# Patient Record
Sex: MALE | Race: BLACK OR AFRICAN AMERICAN | Employment: OTHER | URBAN - METROPOLITAN AREA
[De-identification: names, ages, dates, MRNs, and addresses within clinical notes are randomized per-mention and may not be internally consistent; named-entity substitution may affect disease eponyms.]

---

## 2017-02-02 ENCOUNTER — ANESTHESIA EVENT (OUTPATIENT)
Dept: ENDOSCOPY | Age: 67
End: 2017-02-02
Payer: MEDICARE

## 2017-02-03 ENCOUNTER — HOSPITAL ENCOUNTER (OUTPATIENT)
Age: 67
Setting detail: OUTPATIENT SURGERY
Discharge: HOME | End: 2017-02-03
Attending: COLON & RECTAL SURGERY | Admitting: COLON & RECTAL SURGERY
Payer: MEDICARE

## 2017-02-03 ENCOUNTER — ANESTHESIA (OUTPATIENT)
Dept: ENDOSCOPY | Age: 67
End: 2017-02-03
Payer: MEDICARE

## 2017-02-03 VITALS
RESPIRATION RATE: 16 BRPM | OXYGEN SATURATION: 99 % | TEMPERATURE: 97.7 F | WEIGHT: 245 LBS | SYSTOLIC BLOOD PRESSURE: 111 MMHG | HEART RATE: 64 BPM | HEIGHT: 69 IN | BODY MASS INDEX: 36.29 KG/M2 | DIASTOLIC BLOOD PRESSURE: 51 MMHG

## 2017-02-03 PROCEDURE — 74011250636 HC RX REV CODE- 250/636

## 2017-02-03 PROCEDURE — 76040000019: Performed by: COLON & RECTAL SURGERY

## 2017-02-03 PROCEDURE — 76060000031 HC ANESTHESIA FIRST 0.5 HR: Performed by: COLON & RECTAL SURGERY

## 2017-02-03 PROCEDURE — 74011250636 HC RX REV CODE- 250/636: Performed by: COLON & RECTAL SURGERY

## 2017-02-03 PROCEDURE — 77030027957 HC TBNG IRR ENDOGTR BUSS -B: Performed by: COLON & RECTAL SURGERY

## 2017-02-03 RX ORDER — PROPOFOL 10 MG/ML
INJECTION, EMULSION INTRAVENOUS AS NEEDED
Status: DISCONTINUED | OUTPATIENT
Start: 2017-02-03 | End: 2017-02-03 | Stop reason: HOSPADM

## 2017-02-03 RX ORDER — SODIUM CHLORIDE 0.9 % (FLUSH) 0.9 %
5-10 SYRINGE (ML) INJECTION EVERY 8 HOURS
Status: DISCONTINUED | OUTPATIENT
Start: 2017-02-03 | End: 2017-02-03 | Stop reason: HOSPADM

## 2017-02-03 RX ORDER — EPINEPHRINE 0.1 MG/ML
1 INJECTION INTRACARDIAC; INTRAVENOUS
Status: DISCONTINUED | OUTPATIENT
Start: 2017-02-03 | End: 2017-02-03 | Stop reason: HOSPADM

## 2017-02-03 RX ORDER — SODIUM CHLORIDE 9 MG/ML
INJECTION, SOLUTION INTRAVENOUS
Status: DISCONTINUED | OUTPATIENT
Start: 2017-02-03 | End: 2017-02-03 | Stop reason: HOSPADM

## 2017-02-03 RX ORDER — SODIUM CHLORIDE 0.9 % (FLUSH) 0.9 %
5-10 SYRINGE (ML) INJECTION AS NEEDED
Status: DISCONTINUED | OUTPATIENT
Start: 2017-02-03 | End: 2017-02-03 | Stop reason: HOSPADM

## 2017-02-03 RX ORDER — SODIUM CHLORIDE 9 MG/ML
50 INJECTION, SOLUTION INTRAVENOUS CONTINUOUS
Status: DISCONTINUED | OUTPATIENT
Start: 2017-02-03 | End: 2017-02-03 | Stop reason: HOSPADM

## 2017-02-03 RX ORDER — DEXTROMETHORPHAN/PSEUDOEPHED 2.5-7.5/.8
1.2 DROPS ORAL
Status: DISCONTINUED | OUTPATIENT
Start: 2017-02-03 | End: 2017-02-03 | Stop reason: HOSPADM

## 2017-02-03 RX ORDER — FLUMAZENIL 0.1 MG/ML
0.2 INJECTION INTRAVENOUS
Status: DISCONTINUED | OUTPATIENT
Start: 2017-02-03 | End: 2017-02-03 | Stop reason: HOSPADM

## 2017-02-03 RX ORDER — ATROPINE SULFATE 0.1 MG/ML
0.5 INJECTION INTRAVENOUS
Status: DISCONTINUED | OUTPATIENT
Start: 2017-02-03 | End: 2017-02-03 | Stop reason: HOSPADM

## 2017-02-03 RX ORDER — NALOXONE HYDROCHLORIDE 0.4 MG/ML
0.4 INJECTION, SOLUTION INTRAMUSCULAR; INTRAVENOUS; SUBCUTANEOUS
Status: DISCONTINUED | OUTPATIENT
Start: 2017-02-03 | End: 2017-02-03 | Stop reason: HOSPADM

## 2017-02-03 RX ADMIN — PROPOFOL 50 MG: 10 INJECTION, EMULSION INTRAVENOUS at 07:40

## 2017-02-03 RX ADMIN — PROPOFOL 50 MG: 10 INJECTION, EMULSION INTRAVENOUS at 07:42

## 2017-02-03 RX ADMIN — PROPOFOL 75 MG: 10 INJECTION, EMULSION INTRAVENOUS at 07:44

## 2017-02-03 RX ADMIN — SODIUM CHLORIDE 50 ML/HR: 900 INJECTION, SOLUTION INTRAVENOUS at 07:00

## 2017-02-03 RX ADMIN — SODIUM CHLORIDE: 9 INJECTION, SOLUTION INTRAVENOUS at 07:15

## 2017-02-03 NOTE — PERIOP NOTES
Assumed pt care taken to recovery via stretcher for further monitoring please see CRNA chart sedation/monitoring for procedure pt tolerated well.

## 2017-02-03 NOTE — ANESTHESIA PREPROCEDURE EVALUATION
Anesthetic History   No history of anesthetic complications            Review of Systems / Medical History  Patient summary reviewed, nursing notes reviewed and pertinent labs reviewed    Pulmonary  Within defined limits          Asthma        Neuro/Psych   Within defined limits           Cardiovascular  Within defined limits            CAD      Comments: Stent x1   GI/Hepatic/Renal  Within defined limits              Endo/Other  Within defined limits           Other Findings              Physical Exam    Airway  Mallampati: II  TM Distance: > 6 cm  Neck ROM: normal range of motion   Mouth opening: Normal     Cardiovascular  Regular rate and rhythm,  S1 and S2 normal,  no murmur, click, rub, or gallop             Dental  No notable dental hx       Pulmonary  Breath sounds clear to auscultation               Abdominal  GI exam deferred       Other Findings            Anesthetic Plan    ASA: 3  Anesthesia type: MAC          Induction: Intravenous  Anesthetic plan and risks discussed with: Patient

## 2017-02-03 NOTE — ROUTINE PROCESS
Zulay Amend  1950  [id number]    Situation:  Verbal report received from: Emily Dyre RN  Procedure: Procedure(s):  COLONOSCOPY    Background:    Preoperative diagnosis: HISTORY OF RECTAL CANCER   Postoperative diagnosis: Diverticulosis    :  Dr. Diamond Ryan  Assistant(s): Endoscopy Technician-1: Josiane Gonzalez IV  Endoscopy RN-1: Ravi Ferris RN    Specimens: * No specimens in log *  H. Pylori  no    Assessment:  Intra-procedure medications         Anesthesia gave intra-procedure sedation and medications, see anesthesia flow sheet yes    Intravenous fluids: NS@ KVO     Vital signs stable     Abdominal assessment: round and soft     Recommendation:  Discharge patient per MD order  Family or Friend Wife  Permission to share finding with family or friend yes

## 2017-02-03 NOTE — IP AVS SNAPSHOT
[address] 
[phone] Patient: Zulay Yoder MRN: [id number] :1950 You are allergic to the following Allergen Reactions Atorvastatin Myalgia Pcn (Penicillins) Hives Recent Documentation Height Weight BMI Smoking Status 1.753 m 111.1 kg 36.18 kg/m2 Current Every Day Smoker Emergency Contacts Name Discharge Info Relation Home Work Mobile Servando Christieu CAREGIVER [3] Spouse [3] [phone] About your hospitalization You were admitted on:  February 3, 2017 You last received care in the:  [facility] ENDOSCOPY You were discharged on:  February 3, 2017 Unit phone number:  [phone] Why you were hospitalized Your primary diagnosis was:  Not on File Providers Seen During Your Hospitalizations Provider Role Specialty Primary office phone Micaela Lezama MD Attending Provider Colon and Rectal Surgery [phone] Your Primary Care Physician (PCP) Primary Care Physician Office Phone Office Fax Enoch Mora [phone] Follow-up Information Follow up With Details Comments Contact Info Kevin Toribio MD   [facility] 57 
[phone] Your Appointments 2017  9:00 AM EST  
6 MONTH with Ronal Kruger MD  
[facility] [address] [place] [address]) [address] [facility]  
[phone] Friday February 10, 2017  9:30 AM EST PHYSICAL with MD Shante Arce TURNER, [address] ([address]) [address] [facility][note 2] 57  
[phone] Current Discharge Medication List  
  
CONTINUE these medications which have NOT CHANGED Dose & Instructions Dispensing Information Comments Morning Noon Evening Bedtime  
 amLODIPine 10 mg tablet Commonly known as:  Rudene Post Your next dose is: Today, Tomorrow Other:  _________ Dose:  10 mg Take 1 Tab by mouth daily. Quantity:  90 Tab Refills:  3  
     
   
   
   
  
 aspirin delayed-release 81 mg tablet Your next dose is: Today, Tomorrow Other:  _________ Dose:  81 mg Take 81 mg by mouth daily. Refills:  0  
     
   
   
   
  
 cloNIDine HCl 0.2 mg tablet Commonly known as:  CATAPRES Your next dose is: Today, Tomorrow Other:  _________ Dose:  0.2 mg Take 1 Tab by mouth two (2) times a day. Quantity:  60 Tab Refills:  1  
     
   
   
   
  
 clopidogrel 75 mg Tab Commonly known as:  PLAVIX Your next dose is: Today, Tomorrow Other:  _________ TAKE 1 TABLET BY MOUTH EVERY DAY Quantity:  30 Tab Refills:  11  
     
   
   
   
  
 dutasteride 0.5 mg capsule Commonly known as:  AVODART Your next dose is: Today, Tomorrow Other:  _________ Dose:  0.5 mg Take 1 Cap by mouth daily. Quantity:  90 Cap Refills:  3 FLOMAX 0.4 mg capsule Generic drug:  tamsulosin Your next dose is: Today, Tomorrow Other:  _________ Dose:  0.4 mg Take 0.4 mg by mouth daily. Refills:  0  
     
   
   
   
  
 glipiZIDE SR 5 mg CR tablet Commonly known as:  GLUCOTROL XL Your next dose is: Today, Tomorrow Other:  _________ Dose:  5 mg Take 1 Tab by mouth daily. Quantity:  30 Tab Refills:  6  
     
   
   
   
  
 hydrocortisone 2.5 % rectal cream  
Commonly known as:  PROCTOSOL HC Your next dose is: Today, Tomorrow Other:  _________ Insert  into rectum four (4) times daily. Quantity:  30 g Refills:  0 isosorbide mononitrate ER 60 mg CR tablet Commonly known as:  IMDUR Your next dose is: Today, Tomorrow Other:  _________ TAKE 1 TABLET BY MOUTH DAILY. Quantity:  30 Tab Refills:  9  
     
   
   
   
  
 labetalol 100 mg tablet Commonly known as:  Ekaterina Justin Your next dose is: Today, Tomorrow Other:  _________ TAKE 2 TABLETS BY MOUTH TWICE A DAY Quantity:  60 Tab Refills:  12  
     
   
   
   
  
 lisinopril 20 mg tablet Commonly known as:  Bibiana Del Castillo Your next dose is: Today, Tomorrow Other:  _________ Dose:  20 mg Take 1 Tab by mouth daily. Quantity:  30 Tab Refills:  6  
     
   
   
   
  
 metFORMIN 500 mg tablet Commonly known as:  GLUCOPHAGE Your next dose is: Today, Tomorrow Other:  _________ TAKE 2 TABLETS BY MOUTH TWICE A DAY WITH MEALS Quantity:  360 Tab Refills:  2  
     
   
   
   
  
 oxyCODONE-acetaminophen 5-325 mg per tablet Commonly known as:  PERCOCET Your next dose is: Today, Tomorrow Other:  _________ Dose:  1 Tab Take 1 Tab by mouth every four (4) hours as needed. Max Daily Amount: 6 Tabs. Quantity:  60 Tab Refills:  0 Discharge Instructions Lesly Iraheta [id number] 
1950 COLON DISCHARGE INSTRUCTIONS Discomfort: 
Redness at IV site- apply warm compress to area; if redness or soreness persist- contact your physician There may be a slight amount of blood passed from the rectum Gaseous discomfort- walking, belching will help relieve any discomfort You may not operate a vehicle for 12 hours You may not engage in an occupation involving machinery or appliances for rest of today You may not drink alcoholic beverages for at least 12 hours Avoid making any critical decisions for at least 24 hour DIET: 
 High fiber diet.  however -  remember your colon is empty and a heavy meal will produce gas. Avoid these foods:  vegetables, fried / greasy foods, carbonated drinks for today MEDICATIONS: 
resume ACTIVITY: 
You may resume your normal daily activities it is recommended that you spend the remainder of the day resting -  avoid any strenuous activity. CALL M.D. ANY SIGN OF: Increasing pain, nausea, vomiting Abdominal distension (swelling) New increased bleeding (oral or rectal) Fever (chills) Pain in chest area Bloody discharge from nose or mouth Shortness of breath Follow-up Instructions: 
 Call Sharmila Gonzales MD if any questions or problems. Telephone # [phone] Biopsy results will be available in  7 to10 days Should have a repeat colonoscopy in 1 year. COLONOSCOPY FINDINGS: 
Your colonoscopy showed: sigmoid diverticulosis and distal rectal mass. Discharge Orders None Introducing [facility] & HEALTH SERVICES! Dear Colton Benedict: 
Thank you for requesting a Lumenis account. Our records indicate that you already have an active Lumenis account. You can access your account anytime at [email or web address] Did you know that you can access your hospital and ER discharge instructions at any time in Lumenis? You can also review all of your test results from your hospital stay or ER visit. Additional Information If you have questions, please visit the Frequently Asked Questions section of the Lumenis website at [email or web address] Remember, Lumenis is NOT to be used for urgent needs. For medical emergencies, dial 911. Now available from your iPhone and Android! General Information Please provide this summary of care documentation to your next provider. Patient Signature:  ____________________________________________________________  Date:  ____________________________________________________________  
  
Josph Perfect    
 Provider Signature:  ____________________________________________________________ Date:  ____________________________________________________________

## 2017-02-03 NOTE — DISCHARGE INSTRUCTIONS
Enoc Ramos  [id number]  1950    COLON DISCHARGE INSTRUCTIONS  Discomfort:  Redness at IV site- apply warm compress to area; if redness or soreness persist- contact your physician  There may be a slight amount of blood passed from the rectum  Gaseous discomfort- walking, belching will help relieve any discomfort  You may not operate a vehicle for 12 hours  You may not engage in an occupation involving machinery or appliances for rest of today  You may not drink alcoholic beverages for at least 12 hours  Avoid making any critical decisions for at least 24 hour  DIET:   High fiber diet. - however -  remember your colon is empty and a heavy meal will produce gas. Avoid these foods:  vegetables, fried / greasy foods, carbonated drinks for today    MEDICATIONS:  resume       ACTIVITY:  You may resume your normal daily activities it is recommended that you spend the remainder of the day resting -  avoid any strenuous activity. CALL M.D. ANY SIGN OF:   Increasing pain, nausea, vomiting  Abdominal distension (swelling)  New increased bleeding (oral or rectal)  Fever (chills)  Pain in chest area  Bloody discharge from nose or mouth  Shortness of breath     Follow-up Instructions:   Call Jose Morales MD if any questions or problems. Telephone # [phone]  Biopsy results will be available in  7 to10 days  Should have a repeat colonoscopy in 1 year. COLONOSCOPY FINDINGS:  Your colonoscopy showed: sigmoid diverticulosis and distal rectal mass.

## 2017-02-03 NOTE — H&P
Colon and Rectal Surgery History and Physical    Subjective:      Joel Gray is a 78 y.o. male who has hx rectal ca    Patient Active Problem List    Diagnosis Date Noted    Rectal mass 10/15/2016    Status post colostomy ([facility] [address] 10/15/2016     Class: Acute    Essential hypertension with goal blood pressure less than 140/90 06/16/2016    Type 2 diabetes mellitus without complication ([facility] 14/57/7899    Coronary artery disease involving native coronary artery of native heart without angina pectoris 02/25/2016    Osteoarthritis of right hip 02/03/2015    Radial nerve compression 05/09/2014    Diabetic polyneuropathy ([facility] 05/09/2014    Hyperlipidemia 05/17/2011     Past Medical History   Diagnosis Date    Arthritis     Asthma      childhood    BPH (benign prostatic hypertrophy)     CAD (coronary artery disease) 56     M. I.    Chronic pain     Colon polyp     DM (diabetes mellitus) ([facility] 5/17/2011    Gout     Hemorrhoids     HTN (hypertension) 5/17/2011    Hyperlipidemia 5/17/2011      Past Surgical History   Procedure Laterality Date    Endoscopy, colon, diagnostic  6/2011    Hx prostatectomy  X2     biopsy benign    Pr cardiac surg procedure unlist  6700/1059     stent x2    Hx hernia repair       AS INFANT    Hx tonsillectomy      Hx orthopaedic  02/2016     hip replacement     Upper gi endoscopy,diagnosis  10/14/2016          Sigmoidoscopy,biopsy  10/14/2016          Colonoscopy N/A 10/14/2016     COLONOSCOPY/EGD performed by Laura Mai MD at [facility] ENDOSCOPY      Social History   Substance Use Topics    Smoking status: Current Every Day Smoker     Packs/day: 0.50     Years: 40.00    Smokeless tobacco: Never Used    Alcohol use 0.0 oz/week      Comment: OCCASIONAL      Family History   Problem Relation Age of Onset    Heart Disease Mother     COPD Mother     COPD Father     Diabetes Father     Hypertension Father     Alcohol abuse Sister     Diabetes Brother     High Cholesterol Brother     Hypertension Brother     Anesth Problems Neg Hx       Prior to Admission medications    Medication Sig Start Date End Date Taking? Authorizing Provider   labetalol (NORMODYNE) 100 mg tablet TAKE 2 TABLETS BY MOUTH TWICE A DAY 1/13/17   Brian Tillman MD   cloNIDine HCl (CATAPRES) 0.2 mg tablet Take 1 Tab by mouth two (2) times a day. 10/19/16   Tc Stark MD   oxyCODONE-acetaminophen (PERCOCET) 5-325 mg per tablet Take 1 Tab by mouth every four (4) hours as needed. Max Daily Amount: 6 Tabs. Patient not taking: Reported on 11/4/2016 10/18/16   Clem Olivas MD   tamsulosin [facility]) 0.4 mg capsule Take 0.4 mg by mouth daily. Historical Provider   isosorbide mononitrate ER (IMDUR) 60 mg CR tablet TAKE 1 TABLET BY MOUTH DAILY. 10/4/16   Paulo Mccoy MD   clopidogrel (PLAVIX) 75 mg tablet TAKE 1 TABLET BY MOUTH EVERY DAY  Patient not taking: No sig reported 9/27/16   Shelly Henderson MD   hydrocortisone (PROCTOSOL HC) 2.5 % rectal cream Insert  into rectum four (4) times daily. 8/18/16   Cassell Fleischer, DO   metFORMIN (GLUCOPHAGE) 500 mg tablet TAKE 2 TABLETS BY MOUTH TWICE A DAY WITH MEALS 6/30/16   Shelly Henderson MD   glipiZIDE SR (GLUCOTROL) 5 mg CR tablet Take 1 Tab by mouth daily. 6/16/16   Shelly Henderson MD   lisinopril (PRINIVIL, ZESTRIL) 20 mg tablet Take 1 Tab by mouth daily. 6/16/16   Shelly Henderson MD   amLODIPine (NORVASC) 10 mg tablet Take 1 Tab by mouth daily. 6/8/16   Zac Owens NP   aspirin delayed-release 81 mg tablet Take 81 mg by mouth daily. Historical Provider   dutasteride (AVODART) 0.5 mg capsule Take 1 Cap by mouth daily. 7/17/14   Shelly Henderson MD     Allergies   Allergen Reactions    Atorvastatin Myalgia    Pcn [Penicillins] Hives        Review of Systems:    A comprehensive review of systems was negative except for that written in the History of Present Illness. Objective:      There were no vitals taken for this visit. Physical Exam:   nad  Chest clear  Heart reg  abd soft    Imaging:  images and reports reviewed    Lab Review:  No results found for this or any previous visit (from the past 24 hour(s)). Labs and radiology: images and reports reviewed      Assessment:     Hx rectal ca    Plan:     1. I recommend proceeding with colonoscopy. Treatment alternatives were discussed. 2. Discussed aspects of surgical intervention, methods, risks (including by not limited to infection, bleeding, hematoma, and perforation of the intestines or solid organs), and the risks of general anesthetic. The patient understands the risks; any and all questions were answered to the patient's satisfaction.     Signed By: Dominic Altamirano MD     February 2, 2017

## 2017-02-03 NOTE — BRIEF OP NOTE
BRIEF OPERATIVE NOTE    Date of Procedure: 2/3/2017   Preoperative Diagnosis: HISTORY OF RECTAL CANCER   Postoperative Diagnosis: Diverticulosis    Procedure(s):  COLONOSCOPY  Surgeon(s) and Role:     * Alondra Morales MD - Primary            Surgical Staff:  Endoscopy Technician-1: Livan Coffman IV  Endoscopy RN-1: Lou Henderson RN  No case tracking events are documented in the log.   Anesthesia: MAC   Estimated Blood Loss: none  Specimens: * No specimens in log *   Findings: sigmoid diverticulosis and rectal mass just above dentate line   Complications: none apparent  Implants: * No implants in log *

## 2017-02-03 NOTE — PROGRESS NOTES
Dr eGorgiana Hernandez stated that pt could start back on his ASA and Plavix today.  This information was shared with pt wife

## 2017-02-03 NOTE — OP NOTES
Colonoscopy Procedure Note    Indications: Previous colon cancer    Anesthesia/Sedation: MAC anesthesia Propofol    Pre-Procedure Exam:  Airway: clear   Heart: normal S1and S2    Lungs: clear bilateral  Abdomen: soft, nontender, bowel sounds present and normal in all quadrants   Mental Status: awake, alert, and oriented to person, place, and time      Procedure in Detail:  Informed consent was obtained for the procedure, including sedation. Risks of perforation, hemorrhage, adverse drug reaction, and aspiration were discussed. The patient was placed in the left lateral decubitus position. Based on the pre-procedure assessment, including review of the patient's medical history, medications, allergies, and review of systems, he had been deemed to be an appropriate candidate for moderate sedation; he was therefore sedated with the medications listed above. The patient was monitored continuously with ECG tracing, pulse oximetry, blood pressure monitoring, and direct observations. A rectal examination was performed. The [id number] was inserted into the rectum and advanced under direct vision to the cecum, which was identified by the ileocecal valve and appendiceal orifice. The quality of the colonic preparation was excellent. A careful inspection was made as the colonoscope was withdrawn, including a retroflexed view of the rectum; findings and interventions are described below. Appropriate photodocumentation was obtained. Findings: scope passed per diverting loop stoma  Rectum:     - rectal mass just above dentate line  Sigmoid:     - Excavated lesions:     - Diverticulosis  Descending Colon:   Normal  Transverse Colon:   Normal  Ascending Colon:   Normal  Cecum:   Normal          Specimens: No specimens were collected. EBL: None    Complications: None; patient tolerated the procedure well. Attending Attestation: I performed the procedure.     Recommendations:    - repeat colonoscopy in 1 year    Signed By: Magy Schwartz MD                        February 3, 2017

## 2017-02-03 NOTE — ANESTHESIA POSTPROCEDURE EVALUATION
Post-Anesthesia Evaluation and Assessment    Patient: Esther Dillard MRN: [id number]  SSN: [id number]    YOB: 1950  Age: 78 y.o. Sex: male       Cardiovascular Function/Vital Signs  Visit Vitals    /65    Pulse 67    Temp 36.6 °C (97.9 °F)    Resp 19    Ht 5' 9\" (1.753 m)    Wt 111.1 kg (245 lb)    SpO2 99%    BMI 36.18 kg/m2       Patient is status post MAC anesthesia for Procedure(s):  COLONOSCOPY. Nausea/Vomiting: None    Postoperative hydration reviewed and adequate. Pain:  Pain Scale 1: Numeric (0 - 10) (02/03/17 0727)  Pain Intensity 1: 0 (02/03/17 0727)   Managed    Neurological Status: At baseline    Mental Status and Level of Consciousness: Arousable    Pulmonary Status:   O2 Device: Room air (02/03/17 0727)   Adequate oxygenation and airway patent    Complications related to anesthesia: None    Post-anesthesia assessment completed.  No concerns    Signed By: Lima Lomax MD     February 3, 2017

## 2017-02-08 ENCOUNTER — OFFICE VISIT (OUTPATIENT)
Dept: CARDIOLOGY CLINIC | Age: 67
End: 2017-02-08

## 2017-02-08 VITALS
OXYGEN SATURATION: 97 % | SYSTOLIC BLOOD PRESSURE: 120 MMHG | HEART RATE: 66 BPM | RESPIRATION RATE: 16 BRPM | DIASTOLIC BLOOD PRESSURE: 70 MMHG

## 2017-02-08 DIAGNOSIS — Z93.3 STATUS POST COLOSTOMY (HCC): ICD-10-CM

## 2017-02-08 DIAGNOSIS — E78.2 MIXED HYPERLIPIDEMIA: ICD-10-CM

## 2017-02-08 DIAGNOSIS — I25.10 CORONARY ARTERY DISEASE INVOLVING NATIVE CORONARY ARTERY OF NATIVE HEART WITHOUT ANGINA PECTORIS: ICD-10-CM

## 2017-02-08 DIAGNOSIS — E11.9 TYPE 2 DIABETES MELLITUS WITHOUT COMPLICATION, UNSPECIFIED LONG TERM INSULIN USE STATUS: ICD-10-CM

## 2017-02-08 DIAGNOSIS — I10 ESSENTIAL HYPERTENSION WITH GOAL BLOOD PRESSURE LESS THAN 140/90: Primary | ICD-10-CM

## 2017-02-08 NOTE — MR AVS SNAPSHOT
Visit Information Date & Time Provider Department Dept. Phone Encounter #  
 2/8/2017  9:15 AM Taye Lawrence, [address] [facility] [phone] Your Appointments 2/10/2017  9:30 AM  
PHYSICAL with MD Germaine Washington TURNER, [address] ([facility]) Appt Note: Physical exam; physical  
 [address] [facility] [id number] [phone]  
  
   
 [address] [facility] 7 [address] Upcoming Health Maintenance Date Due Hepatitis C Screening 1950 FOOT EXAM Q1 10/29/1959 EYE EXAM RETINAL OR DILATED Q1 10/29/1959 ZOSTER VACCINE AGE 60> 10/29/2009 DTaP/Tdap/Td series (1 - Tdap) 1/2/2010 GLAUCOMA SCREENING Q2Y 10/29/2014 Pneumococcal 65+ High/Highest Risk (1 of 2 - PCV13) 10/29/2014 MEDICARE YEARLY EXAM 10/29/2014 INFLUENZA AGE 9 TO ADULT 8/1/2016 MICROALBUMIN Q1 2/25/2017 HEMOGLOBIN A1C Q6M 4/17/2017 LIPID PANEL Q1 10/4/2017 COLONOSCOPY 2/3/2022 Allergies as of 2/8/2017  Review Complete On: 2/8/2017 By: Samina Jolley LPN Severity Noted Reaction Type Reactions Atorvastatin  10/04/2016    Myalgia Pcn [Penicillins]  05/17/2011    Hives Current Immunizations  Never Reviewed Name Date Pneumococcal Polysaccharide (PPSV-23) 6/7/2013 TD Vaccine 5/17/2005 Td 1/1/2010 Not reviewed this visit You Were Diagnosed With   
  
 Codes Comments Essential hypertension with goal blood pressure less than 140/90    -  Primary ICD-10-CM: I10 
ICD-9-CM: 401.9 Coronary artery disease involving native coronary artery of native heart without angina pectoris     ICD-10-CM: I25.10 ICD-9-CM: 414.01 Mixed hyperlipidemia     ICD-10-CM: E78.2 ICD-9-CM: 272.2 Status post colostomy [facility])     ICD-10-CM: Z93.3 ICD-9-CM: V44.3 Type 2 diabetes mellitus without complication, unspecified long term insulin use status (HCC)     ICD-10-CM: E11.9 ICD-9-CM: 250.00 BMI 38.0-38.9,adult     ICD-10-CM: I67.22 
ICD-9-CM: V85.38 Vitals BP Pulse Resp SpO2 Smoking Status 120/70 (BP 1 Location: Right arm, BP Patient Position: Sitting) 66 16 97% Former Smoker Vitals History Preferred Pharmacy Pharmacy Name Phone [id number] [address] [facility] [phone] Your Updated Medication List  
  
   
This list is accurate as of: 2/8/17 10:00 AM.  Always use your most recent med list. amLODIPine 10 mg tablet Commonly known as:  Rafiq Presser Take 1 Tab by mouth daily. aspirin delayed-release 81 mg tablet Take 81 mg by mouth daily. cloNIDine HCl 0.2 mg tablet Commonly known as:  CATAPRES Take 1 Tab by mouth two (2) times a day. clopidogrel 75 mg Tab Commonly known as:  PLAVIX TAKE 1 TABLET BY MOUTH EVERY DAY  
  
 dutasteride 0.5 mg capsule Commonly known as:  AVODART Take 1 Cap by mouth daily. FLOMAX 0.4 mg capsule Generic drug:  tamsulosin Take 0.4 mg by mouth daily. glipiZIDE SR 5 mg CR tablet Commonly known as:  GLUCOTROL XL Take 1 Tab by mouth daily. hydrocortisone 2.5 % rectal cream  
Commonly known as:  PROCTOSOL HC Insert  into rectum four (4) times daily. isosorbide mononitrate ER 60 mg CR tablet Commonly known as:  IMDUR  
TAKE 1 TABLET BY MOUTH DAILY. labetalol 100 mg tablet Commonly known as:  NORMODYNE  
TAKE 2 TABLETS BY MOUTH TWICE A DAY  
  
 lisinopril 20 mg tablet Commonly known as:  Jackmanuelnn Pares Take 1 Tab by mouth daily. metFORMIN 500 mg tablet Commonly known as:  GLUCOPHAGE  
TAKE 2 TABLETS BY MOUTH TWICE A DAY WITH MEALS  
  
 oxyCODONE-acetaminophen 5-325 mg per tablet Commonly known as:  PERCOCET Take 1 Tab by mouth every four (4) hours as needed. Max Daily Amount: 6 Tabs. Introducing [facility] & HEALTH SERVICES! Dear Amada Cheng: Thank you for requesting a RRsat account. Our records indicate that you already have an active RRsat account. You can access your account anytime at [email or web address] Did you know that you can access your hospital and ER discharge instructions at any time in RRsat? You can also review all of your test results from your hospital stay or ER visit. Additional Information If you have questions, please visit the Frequently Asked Questions section of the RRsat website at [email or web address] Remember, RRsat is NOT to be used for urgent needs. For medical emergencies, dial 911. Now available from your iPhone and Android! Please provide this summary of care documentation to your next provider. Your primary care clinician is listed as Steven. If you have any questions after today's visit, please call [phone].

## 2017-02-09 ENCOUNTER — TELEPHONE (OUTPATIENT)
Dept: CARDIOLOGY CLINIC | Age: 67
End: 2017-02-09

## 2017-02-09 NOTE — TELEPHONE ENCOUNTER
JHONY Patel   You 36 minutes ago (1:02 PM)                 May stop 5-7 days prior as recommended by surgeon. (Routing comment)               Will fax copy of this documentation to [facility].

## 2017-02-09 NOTE — TELEPHONE ENCOUNTER
We received a fax from [facility] requesting cardiac clearance for cystoscopy, bladder and prostate biopsy under MAC 02/15/2017. Per Dr. Alis Grant note form 02/08/207 patient is cleared for surgery. When should patient hold Plavix and aspirin. Please advise.

## 2017-04-10 ENCOUNTER — TELEPHONE (OUTPATIENT)
Dept: CARDIOLOGY CLINIC | Age: 67
End: 2017-04-10

## 2017-04-10 NOTE — TELEPHONE ENCOUNTER
[place] called from [facility] fax  stating patient did have surgery in 02/2017 but needs cardiac clearance for more extensive surgery. Patient will be having cystectomy, prostatectomy and rectal surgery that will take about 8hours on 05/01/2017. Can we clear patient? They are requesting patient to old aspirin and Plavix 1 week prior? Please advise.

## 2017-04-11 NOTE — TELEPHONE ENCOUNTER
Zayda HOU spoke with Dr. Bert Fischer and he stated patient needs appt before we can clear him. I spoke with patient informed him he needs appt before Dr. Bert Fischer can clear him and will have PSR call to schedule. He verbalized understanding.

## 2017-04-13 DIAGNOSIS — I25.10 CORONARY ARTERY DISEASE INVOLVING NATIVE CORONARY ARTERY OF NATIVE HEART WITHOUT ANGINA PECTORIS: ICD-10-CM

## 2017-04-13 DIAGNOSIS — Z01.810 PRE-OPERATIVE CARDIOVASCULAR EXAMINATION: Primary | ICD-10-CM

## 2017-04-13 NOTE — TELEPHONE ENCOUNTER
[facility] Mariya West NP 7 minutes ago (4:45 PM)                 Spoke with patient, scheduled for Tuesday, April 18, 2017 08:30 AM.   Thank you! (Routing comment)

## 2017-04-13 NOTE — TELEPHONE ENCOUNTER
Luisana Briones, LG Varner 2 days ago                 For that extensive surgery, will need stress test and clearance. I can see him Friday afternoon. If we can get the stress test friday I can take care all it all that day.   (Routing comment)

## 2017-04-18 ENCOUNTER — CLINICAL SUPPORT (OUTPATIENT)
Dept: CARDIOLOGY CLINIC | Age: 67
End: 2017-04-18

## 2017-04-18 DIAGNOSIS — I25.10 CORONARY ARTERY DISEASE INVOLVING NATIVE CORONARY ARTERY OF NATIVE HEART WITHOUT ANGINA PECTORIS: Primary | ICD-10-CM

## 2017-04-20 ENCOUNTER — CLINICAL SUPPORT (OUTPATIENT)
Dept: CARDIOLOGY CLINIC | Age: 67
End: 2017-04-20

## 2017-04-20 DIAGNOSIS — Z01.810 PRE-OPERATIVE CARDIOVASCULAR EXAMINATION: ICD-10-CM

## 2017-04-20 DIAGNOSIS — I25.10 CORONARY ARTERY DISEASE INVOLVING NATIVE CORONARY ARTERY OF NATIVE HEART WITHOUT ANGINA PECTORIS: ICD-10-CM

## 2017-04-20 DIAGNOSIS — R93.1 ABNORMAL NUCLEAR CARDIAC IMAGING TEST: Primary | ICD-10-CM

## 2017-04-24 ENCOUNTER — TELEPHONE (OUTPATIENT)
Dept: CARDIOLOGY CLINIC | Age: 67
End: 2017-04-24

## 2017-04-24 NOTE — TELEPHONE ENCOUNTER
----- Message from Andrea Gomez NP sent at 4/23/2017  8:49 PM EDT -----  Praneeth Del Cid: Please call patient, stress test very abnormal.  Looks like same place in 2014 where he needed a stent. Will need test results visit and cardiac cath.  Thanks

## 2017-04-24 NOTE — PROGRESS NOTES
Verified patient with two identifiers. Spoke with Alejo Wiseman patient's wife informed her stress test very abnormal.  Looks like same place in 2014 where he needed a stent. Will need test results visit and cardiac cath. Appt scheduled 04/26/2017. She verbalized understanding.

## 2017-04-24 NOTE — PROGRESS NOTES
[facility]: Please call patient, stress test very abnormal.  Looks like same place in 2014 where he needed a stent. Will need test results visit and cardiac cath.  Thanks

## 2017-04-24 NOTE — TELEPHONE ENCOUNTER
Notes Recorded by Russel Nyhan, LPN on 7/72/7034 at 0:54 AM  Verified patient with two identifiers.  Spoke with Toby Burgessr patient's wife informed her stress test very abnormal.  Looks like same place in 2014 where he needed a stent. Will need test results visit and cardiac cath. Appt scheduled 04/26/2017.  She verbalized understanding.

## 2017-04-26 ENCOUNTER — OFFICE VISIT (OUTPATIENT)
Dept: CARDIOLOGY CLINIC | Age: 67
End: 2017-04-26

## 2017-04-26 VITALS
DIASTOLIC BLOOD PRESSURE: 68 MMHG | HEIGHT: 69 IN | BODY MASS INDEX: 38.97 KG/M2 | WEIGHT: 263.1 LBS | HEART RATE: 87 BPM | SYSTOLIC BLOOD PRESSURE: 124 MMHG | OXYGEN SATURATION: 96 %

## 2017-04-26 DIAGNOSIS — I25.9 MYOCARDIAL ISCHEMIA: ICD-10-CM

## 2017-04-26 DIAGNOSIS — I25.119 CORONARY ARTERY DISEASE INVOLVING NATIVE CORONARY ARTERY OF NATIVE HEART WITH ANGINA PECTORIS (HCC): Primary | ICD-10-CM

## 2017-04-26 DIAGNOSIS — E78.2 MIXED HYPERLIPIDEMIA: ICD-10-CM

## 2017-04-26 DIAGNOSIS — E11.9 TYPE 2 DIABETES MELLITUS WITHOUT COMPLICATION, UNSPECIFIED LONG TERM INSULIN USE STATUS: ICD-10-CM

## 2017-04-26 DIAGNOSIS — I10 ESSENTIAL HYPERTENSION WITH GOAL BLOOD PRESSURE LESS THAN 140/90: ICD-10-CM

## 2017-04-26 NOTE — PROGRESS NOTES
Chief Complaint   Patient presents with    Results     stress test; complains of SOB when bending over

## 2017-04-26 NOTE — PROGRESS NOTES
Subjective/HPI:     Chaya Malcolm is a 78 y.o. male is here for f/u appt for cardiac clearance for upcoming 8hr procedure on May 1st. He has some primarily when bending over, hasnt noticed much else. Has been dealing with his urological and bowel issues primarily. The patient denies chest pain, orthopnea, PND, LE edema, palpitations, syncope, presyncope or fatigue. PCP Provider  Olesya Nixon MD  Past Medical History:   Diagnosis Date    Abnormal nuclear stress test 4/27/2017    Arthritis     Asthma     childhood    BPH (benign prostatic hypertrophy)     CAD [place]coronary artery disease) 1996    [place]    Cancer [facility])     Rectal CA     Chronic pain     Colon polyp     DM (diabetes mellitus) ([facility] [address] 5/17/2011    Gout     Hemorrhoids     HTN (hypertension) 5/17/2011    Hyperlipidemia 5/17/2011    S/P cardiac cath 4/27/2017      Past Surgical History:   Procedure Laterality Date    CARDIAC SURG PROCEDURE UNLIST  8246/8893    stent x2    COLONOSCOPY N/A 10/14/2016    COLONOSCOPY/EGD performed by Power Gomez MD at [facility] ENDOSCOPY    COLONOSCOPY N/A 2/3/2017    COLONOSCOPY performed by Beryl Pena [facility] at [facility] ENDOSCOPY    ENDOSCOPY, COLON, DIAGNOSTIC  6/2011    HX HERNIA REPAIR      AS INFANT    HX ORTHOPAEDIC  02/2016    hip replacement     HX PROSTATECTOMY  X2    biopsy benign    HX TONSILLECTOMY      SIGMOIDOSCOPY,BIOPSY  10/14/2016         UPPER GI ENDOSCOPY,DIAGNOSIS  10/14/2016          Allergies   Allergen Reactions    Atorvastatin Myalgia    Pcn [Penicillins] Hives      Family History   Problem Relation Age of Onset    Heart Disease Mother     COPD Mother     COPD Father     Diabetes Father     Hypertension Father     Alcohol abuse Sister     Diabetes Brother     High Cholesterol Brother     Hypertension Brother     Anesth Problems Neg Hx       Current Outpatient Prescriptions   Medication Sig    glipiZIDE SR (GLUCOTROL XL) 5 mg CR tablet TAKE 1 TABLET BY MOUTH EVERY DAY    lisinopril (PRINIVIL, ZESTRIL) 20 mg tablet TAKE 1 TABLET BY MOUTH DAILY.  labetalol (NORMODYNE) 100 mg tablet TAKE 2 TABLETS BY MOUTH TWICE A DAY    cloNIDine HCl (CATAPRES) 0.2 mg tablet Take 1 Tab by mouth two (2) times a day.  tamsulosin (FLOMAX) 0.4 mg capsule Take 0.4 mg by mouth daily.  isosorbide mononitrate ER (IMDUR) 60 mg CR tablet TAKE 1 TABLET BY MOUTH DAILY.  clopidogrel (PLAVIX) 75 mg tablet TAKE 1 TABLET BY MOUTH EVERY DAY    amLODIPine (NORVASC) 10 mg tablet Take 1 Tab by mouth daily.  aspirin delayed-release 81 mg tablet Take 81 mg by mouth daily.  dutasteride (AVODART) 0.5 mg capsule Take 1 Cap by mouth daily.  metFORMIN (GLUCOPHAGE) 500 mg tablet Please restart Metformin 4/29  TAKE 2 TABLETS BY MOUTH TWICE A DAY WITH MEALS     No current facility-administered medications for this visit. Vitals:    04/26/17 1105 04/26/17 1124   BP: 124/68 124/68   Pulse: 87    SpO2: 96%    Weight: 263 lb 1.6 oz (119.3 kg)    Height: 5' 9\" (1.753 m)      Social History     Social History    Marital status:      Spouse name: N/A    Number of children: N/A    Years of education: N/A     Occupational History    Not on file. Social History Main Topics    Smoking status: Former Smoker     Packs/day: 0.50     Years: 40.00     Quit date: 9/8/2016    Smokeless tobacco: Never Used    Alcohol use 0.0 oz/week      Comment: OCCASIONAL    Drug use: No    Sexual activity: Not on file     Other Topics Concern    Not on file     Social History Narrative       I have reviewed the nurses notes, vitals, problem list, allergy list, medical history, family, social history and medications. Review of Symptoms:    General: Pt denies excessive weight gain or loss. Pt is able to conduct ADL's  HEENT: Denies blurred vision, headaches, epistaxis and difficulty swallowing.   Respiratory: Denies shortness of breath, +TALBOT, denies wheezing or stridor. Cardiovascular: Denies precordial pain, palpitations, edema or PND  Gastrointestinal: +colostomy  Urinary: +upcoming cystectomy  Musculoskeletal: Denies pain or swelling from muscles or joints  Neurologic: Denies tremor, paresthesias, or sensory motor disturbance  Skin: Denies rash, itching or texture change. Psych: Denies depression        Physical Exam:      General: Well developed, in no acute distress, cooperative and alert  HEENT: No carotid bruits, no JVD, trach is midline. Neck Supple, PEERL, EOM intact. Heart:  Normal S1/S2 negative S3 or S4. Regular, no murmur, gallop or rub.   Respiratory: Clear bilaterally x 4, no wheezing or rales  Abdomen:   Soft, non-tender, no masses, bowel sounds are active.   Extremities:  No edema, normal cap refill, no cyanosis, atraumatic. Neuro: A&Ox3, speech clear, gait stable. Skin: Skin color is normal. No rashes or lesions. Non diaphoretic  Vascular: 2+ pulses symmetric in all extremities    Cardiographics    ECG: Sinus  Rhythm   -  Negative T-waves    Stress test-SPECT images demonstrate a large, low to moderate grade reversible inferior wall defect consistent with ischemia.  Gated SPECT images reveals normal myocardial thickening and wall motion. The left ventricular ejection fraction was calculated to be 52 %.  There is no evidence of transient ischemic dilatation. Impression:   Myocardial perfusion imaging is abnormal, consistent with significant inferior ischemia. Overall left ventricular systolic function was normal. Compared to the study from 7/30/2014, the current study reveals no significant change.      These test results indicate high likelihood for the presence of angiographically significant coronary artery disease      Results for orders placed or performed during the hospital encounter of 10/15/16   EKG, 12 LEAD, INITIAL   Result Value Ref Range    Ventricular Rate 96 BPM    Atrial Rate 96 BPM    P-R Interval 146 ms    QRS Duration 86 ms Q-T Interval 346 ms    QTC Calculation (Bezet) 437 ms    Calculated P Axis 54 degrees    Calculated R Axis 38 degrees    Calculated T Axis 138 degrees    Diagnosis       Normal sinus rhythm  Possible Left atrial enlargement  T wave abnormality, consider lateral ischemia    Confirmed by Gerri Parks MD, Christina Cedillo ([id number]) on 10/16/2016 4:00:06 PM           Cardiology Labs:  Lab Results   Component Value Date/Time    Cholesterol, total 131 05/16/2012 10:14 AM    HDL Cholesterol 49 05/16/2012 10:14 AM    LDL, calculated 66 05/16/2012 10:14 AM    Triglyceride 78 05/16/2012 10:14 AM       Lab Results   Component Value Date/Time    Sodium 141 04/26/2017 12:24 PM    Potassium 4.6 04/26/2017 12:24 PM    Chloride 100 04/26/2017 12:24 PM    CO2 28 04/26/2017 12:24 PM    Anion gap 11 10/25/2016 03:08 PM    Glucose 316 04/26/2017 12:24 PM    BUN 21 04/26/2017 12:24 PM    Creatinine 1.02 04/26/2017 12:24 PM    BUN/Creatinine ratio 21 04/26/2017 12:24 PM    GFR est AA 88 04/26/2017 12:24 PM    GFR est non-AA 76 04/26/2017 12:24 PM    Calcium 9.3 04/26/2017 12:24 PM    AST (SGOT) 36 04/26/2017 12:24 PM    Alk. phosphatase 129 04/26/2017 12:24 PM    Protein, total 7.0 04/26/2017 12:24 PM    Albumin 4.4 04/26/2017 12:24 PM    Globulin 4.0 10/25/2016 03:08 PM    A-G Ratio 1.7 04/26/2017 12:24 PM    ALT (SGPT) 40 04/26/2017 12:24 PM           Assessment:     Assessment:     Nikhil Martin was seen today for results. Diagnoses and all orders for this visit:    Coronary artery disease involving native coronary artery of native heart with angina pectoris (HCC)  -     AMB POC EKG ROUTINE W/ 12 LEADS, INTER & REP  -     CBC WITH AUTOMATED DIFF  -     METABOLIC PANEL, COMPREHENSIVE  -     PROTHROMBIN TIME + INR  -     CARDIAC CATHETERIZATION;  Future    Myocardial ischemia  -     AMB POC EKG ROUTINE W/ 12 LEADS, INTER & REP  -     CBC WITH AUTOMATED DIFF  -     METABOLIC PANEL, COMPREHENSIVE  -     PROTHROMBIN TIME + INR  -     CARDIAC CATHETERIZATION; Future    Essential hypertension with goal blood pressure less than 140/90  -     AMB POC EKG ROUTINE W/ 12 LEADS, INTER & REP  -     CBC WITH AUTOMATED DIFF  -     METABOLIC PANEL, COMPREHENSIVE  -     PROTHROMBIN TIME + INR  -     CARDIAC CATHETERIZATION; Future    Mixed hyperlipidemia  -     AMB POC EKG ROUTINE W/ 12 LEADS, INTER & REP  -     CBC WITH AUTOMATED DIFF  -     METABOLIC PANEL, COMPREHENSIVE  -     PROTHROMBIN TIME + INR  -     CARDIAC CATHETERIZATION; Future    Type 2 diabetes mellitus without complication, unspecified long term insulin use status  -     AMB POC EKG ROUTINE W/ 12 LEADS, INTER & REP  -     CBC WITH AUTOMATED DIFF  -     METABOLIC PANEL, COMPREHENSIVE  -     PROTHROMBIN TIME + INR  -     CARDIAC CATHETERIZATION; Future    Other orders  -     SPECIMEN STATUS REPORT        ICD-10-CM ICD-9-CM    1. Coronary artery disease involving native coronary artery of native heart with angina pectoris (HCC) I25.119 414.01 AMB POC EKG ROUTINE W/ 12 LEADS, INTER & REP     413.9 CBC WITH AUTOMATED DIFF      METABOLIC PANEL, COMPREHENSIVE      PROTHROMBIN TIME + INR      CARDIAC CATHETERIZATION   2. Myocardial ischemia I25.9 414.8 AMB POC EKG ROUTINE W/ 12 LEADS, INTER & REP      CBC WITH AUTOMATED DIFF      METABOLIC PANEL, COMPREHENSIVE      PROTHROMBIN TIME + INR      CARDIAC CATHETERIZATION   3. Essential hypertension with goal blood pressure less than 140/90 I10 401.9 AMB POC EKG ROUTINE W/ 12 LEADS, INTER & REP      CBC WITH AUTOMATED DIFF      METABOLIC PANEL, COMPREHENSIVE      PROTHROMBIN TIME + INR      CARDIAC CATHETERIZATION   4. Mixed hyperlipidemia E78.2 272.2 AMB POC EKG ROUTINE W/ 12 LEADS, INTER & REP      CBC WITH AUTOMATED DIFF      METABOLIC PANEL, COMPREHENSIVE      PROTHROMBIN TIME + INR      CARDIAC CATHETERIZATION   5.  Type 2 diabetes mellitus without complication, unspecified long term insulin use status E11.9 250.00 AMB POC EKG ROUTINE W/ 12 LEADS, INTER & REP      CBC WITH AUTOMATED DIFF      METABOLIC PANEL, COMPREHENSIVE      PROTHROMBIN TIME + INR      CARDIAC CATHETERIZATION     Orders Placed This Encounter    CBC WITH AUTOMATED DIFF    METABOLIC PANEL, COMPREHENSIVE    PROTHROMBIN TIME + INR    SPECIMEN STATUS REPORT    CARDIAC CATHETERIZATION     Standing Status:   Future     Standing Expiration Date:   10/26/2017     Order Specific Question:   Reason for Exam:     Answer:   abnormal stress test    AMB POC EKG ROUTINE W/ 12 LEADS, INTER & REP     Order Specific Question:   Reason for Exam:     Answer:   routine        Plan:     Pt with multiple cardiac risk factors and ischemia on his lexiscan stress test concerning d/t having similar stress test requiring stent in 2014 with extensive surgery expected on May 1st. He is a candidate for a cardiac cath. I have discussed the risks and benefits of cardiac cath +/- PCI- the patient expressed understanding and wishes to proceed.        Armida Nova MD

## 2017-04-27 ENCOUNTER — HOSPITAL ENCOUNTER (OUTPATIENT)
Dept: CARDIAC CATH/INVASIVE PROCEDURES | Age: 67
Discharge: HOME | End: 2017-04-27
Attending: INTERNAL MEDICINE | Admitting: INTERNAL MEDICINE
Payer: MEDICARE

## 2017-04-27 ENCOUNTER — TELEPHONE (OUTPATIENT)
Dept: CARDIOLOGY CLINIC | Age: 67
End: 2017-04-27

## 2017-04-27 VITALS
HEIGHT: 69 IN | SYSTOLIC BLOOD PRESSURE: 146 MMHG | RESPIRATION RATE: 18 BRPM | TEMPERATURE: 98.1 F | BODY MASS INDEX: 38.51 KG/M2 | WEIGHT: 260 LBS | HEART RATE: 74 BPM | OXYGEN SATURATION: 97 % | DIASTOLIC BLOOD PRESSURE: 63 MMHG

## 2017-04-27 PROBLEM — R94.39 ABNORMAL NUCLEAR STRESS TEST: Status: ACTIVE | Noted: 2017-04-27

## 2017-04-27 PROBLEM — Z98.890 S/P CARDIAC CATH: Status: ACTIVE | Noted: 2017-04-27

## 2017-04-27 LAB
ALBUMIN SERPL-MCNC: 4.4 G/DL (ref 3.6–4.8)
ALBUMIN/GLOB SERPL: 1.7 {RATIO} (ref 1.2–2.2)
ALP SERPL-CCNC: 129 IU/L (ref 39–117)
ALT SERPL-CCNC: 40 IU/L (ref 0–44)
AST SERPL-CCNC: 36 IU/L (ref 0–40)
BASOPHILS # BLD AUTO: 0 X10E3/UL (ref 0–0.2)
BASOPHILS NFR BLD AUTO: 0 %
BILIRUB SERPL-MCNC: 0.8 MG/DL (ref 0–1.2)
BUN SERPL-MCNC: 21 MG/DL (ref 8–27)
BUN/CREAT SERPL: 21 (ref 10–24)
CALCIUM SERPL-MCNC: 9.3 MG/DL (ref 8.6–10.2)
CHLORIDE SERPL-SCNC: 100 MMOL/L (ref 96–106)
CO2 SERPL-SCNC: 28 MMOL/L (ref 18–29)
CREAT SERPL-MCNC: 1.02 MG/DL (ref 0.76–1.27)
EOSINOPHIL # BLD AUTO: 0.2 X10E3/UL (ref 0–0.4)
EOSINOPHIL NFR BLD AUTO: 4 %
ERYTHROCYTE [DISTWIDTH] IN BLOOD BY AUTOMATED COUNT: 15.4 % (ref 12.3–15.4)
GLOBULIN SER CALC-MCNC: 2.6 G/DL (ref 1.5–4.5)
GLUCOSE BLD STRIP.AUTO-MCNC: 203 MG/DL (ref 65–100)
GLUCOSE BLD STRIP.AUTO-MCNC: 248 MG/DL (ref 65–100)
GLUCOSE SERPL-MCNC: 316 MG/DL (ref 65–99)
HCT VFR BLD AUTO: 32.1 % (ref 37.5–51)
HGB BLD-MCNC: 11.6 G/DL (ref 12.6–17.7)
INR PPP: 1 (ref 0.8–1.2)
LYMPHOCYTES # BLD AUTO: 0.6 X10E3/UL (ref 0.7–3.1)
LYMPHOCYTES NFR BLD AUTO: 10 %
MCH RBC QN AUTO: 31.4 PG (ref 26.6–33)
MCHC RBC AUTO-ENTMCNC: 36.1 G/DL (ref 31.5–35.7)
MCV RBC AUTO: 87 FL (ref 79–97)
MONOCYTES # BLD AUTO: 0.4 X10E3/UL (ref 0.1–0.9)
MONOCYTES NFR BLD AUTO: 8 %
NEUTROPHILS # BLD AUTO: 4.3 X10E3/UL (ref 1.4–7)
NEUTROPHILS NFR BLD AUTO: 78 %
PLATELET # BLD AUTO: 173 X10E3/UL (ref 150–379)
POTASSIUM SERPL-SCNC: 4.6 MMOL/L (ref 3.5–5.2)
PROT SERPL-MCNC: 7 G/DL (ref 6–8.5)
PROTHROMBIN TIME: 10.1 SEC (ref 9.1–12)
RBC # BLD AUTO: 3.7 X10E6/UL (ref 4.14–5.8)
SERVICE CMNT-IMP: ABNORMAL
SERVICE CMNT-IMP: ABNORMAL
SODIUM SERPL-SCNC: 141 MMOL/L (ref 134–144)
SPECIMEN STATUS REPORT, ROLRST: NORMAL
WBC # BLD AUTO: 5.5 X10E3/UL (ref 3.4–10.8)

## 2017-04-27 PROCEDURE — 77030010221 HC SPLNT WR POS TELE -B

## 2017-04-27 PROCEDURE — 74011250636 HC RX REV CODE- 250/636

## 2017-04-27 PROCEDURE — 77030019698 HC SYR ANGI MDLON MRTM -A

## 2017-04-27 PROCEDURE — 77030008543 HC TBNG MON PRSS MRTM -A

## 2017-04-27 PROCEDURE — C1769 GUIDE WIRE: HCPCS

## 2017-04-27 PROCEDURE — 77030019569 HC BND COMPR RAD TERU -B

## 2017-04-27 PROCEDURE — 74011636320 HC RX REV CODE- 636/320

## 2017-04-27 PROCEDURE — 74011000250 HC RX REV CODE- 250

## 2017-04-27 PROCEDURE — C1894 INTRO/SHEATH, NON-LASER: HCPCS

## 2017-04-27 PROCEDURE — 74011250636 HC RX REV CODE- 250/636: Performed by: INTERNAL MEDICINE

## 2017-04-27 PROCEDURE — 77030015766

## 2017-04-27 PROCEDURE — 99152 MOD SED SAME PHYS/QHP 5/>YRS: CPT

## 2017-04-27 PROCEDURE — 77030004549 HC CATH ANGI DX PRF MRTM -A

## 2017-04-27 PROCEDURE — 82962 GLUCOSE BLOOD TEST: CPT

## 2017-04-27 RX ORDER — HEPARIN SODIUM 1000 [USP'U]/ML
INJECTION, SOLUTION INTRAVENOUS; SUBCUTANEOUS
Status: COMPLETED
Start: 2017-04-27 | End: 2017-04-27

## 2017-04-27 RX ORDER — FENTANYL CITRATE 50 UG/ML
INJECTION, SOLUTION INTRAMUSCULAR; INTRAVENOUS
Status: COMPLETED
Start: 2017-04-27 | End: 2017-04-27

## 2017-04-27 RX ORDER — HEPARIN SODIUM 200 [USP'U]/100ML
500 INJECTION, SOLUTION INTRAVENOUS ONCE
Status: COMPLETED | OUTPATIENT
Start: 2017-04-27 | End: 2017-04-27

## 2017-04-27 RX ORDER — HEPARIN SODIUM 200 [USP'U]/100ML
INJECTION, SOLUTION INTRAVENOUS
Status: COMPLETED
Start: 2017-04-27 | End: 2017-04-27

## 2017-04-27 RX ORDER — HEPARIN SODIUM 1000 [USP'U]/ML
2500 INJECTION, SOLUTION INTRAVENOUS; SUBCUTANEOUS ONCE
Status: COMPLETED | OUTPATIENT
Start: 2017-04-27 | End: 2017-04-27

## 2017-04-27 RX ORDER — LIDOCAINE HYDROCHLORIDE 10 MG/ML
INJECTION, SOLUTION EPIDURAL; INFILTRATION; INTRACAUDAL; PERINEURAL
Status: COMPLETED
Start: 2017-04-27 | End: 2017-04-27

## 2017-04-27 RX ORDER — MIDAZOLAM HYDROCHLORIDE 1 MG/ML
INJECTION, SOLUTION INTRAMUSCULAR; INTRAVENOUS
Status: COMPLETED
Start: 2017-04-27 | End: 2017-04-27

## 2017-04-27 RX ORDER — VERAPAMIL HYDROCHLORIDE 2.5 MG/ML
INJECTION, SOLUTION INTRAVENOUS
Status: COMPLETED
Start: 2017-04-27 | End: 2017-04-27

## 2017-04-27 RX ORDER — METFORMIN HYDROCHLORIDE 500 MG/1
TABLET ORAL
Qty: 360 TAB | Refills: 2 | Status: SHIPPED | OUTPATIENT
Start: 2017-04-27 | End: 2017-09-22 | Stop reason: SDUPTHER

## 2017-04-27 RX ORDER — FENTANYL CITRATE 50 UG/ML
25-50 INJECTION, SOLUTION INTRAMUSCULAR; INTRAVENOUS
Status: DISCONTINUED | OUTPATIENT
Start: 2017-04-27 | End: 2017-04-27

## 2017-04-27 RX ORDER — VERAPAMIL HYDROCHLORIDE 2.5 MG/ML
2.5 INJECTION, SOLUTION INTRAVENOUS ONCE
Status: COMPLETED | OUTPATIENT
Start: 2017-04-27 | End: 2017-04-27

## 2017-04-27 RX ORDER — MIDAZOLAM HYDROCHLORIDE 1 MG/ML
.5-2 INJECTION, SOLUTION INTRAMUSCULAR; INTRAVENOUS
Status: DISCONTINUED | OUTPATIENT
Start: 2017-04-27 | End: 2017-04-27

## 2017-04-27 RX ORDER — LIDOCAINE HYDROCHLORIDE 10 MG/ML
1-30 INJECTION, SOLUTION EPIDURAL; INFILTRATION; INTRACAUDAL; PERINEURAL
Status: DISCONTINUED | OUTPATIENT
Start: 2017-04-27 | End: 2017-04-27

## 2017-04-27 RX ADMIN — HEPARIN SODIUM 2500 UNITS: 1000 INJECTION, SOLUTION INTRAVENOUS; SUBCUTANEOUS at 15:36

## 2017-04-27 RX ADMIN — NITROGLYCERIN 200 MCG: 5 INJECTION, SOLUTION INTRAVENOUS at 15:36

## 2017-04-27 RX ADMIN — LIDOCAINE HYDROCHLORIDE 1 ML: 10 INJECTION, SOLUTION EPIDURAL; INFILTRATION; INTRACAUDAL; PERINEURAL at 15:35

## 2017-04-27 RX ADMIN — IOPAMIDOL 50 ML: 755 INJECTION, SOLUTION INTRAVENOUS at 15:49

## 2017-04-27 RX ADMIN — HEPARIN SODIUM 1000 UNITS: 200 INJECTION, SOLUTION INTRAVENOUS at 15:28

## 2017-04-27 RX ADMIN — FENTANYL CITRATE 50 MCG: 50 INJECTION, SOLUTION INTRAMUSCULAR; INTRAVENOUS at 15:10

## 2017-04-27 RX ADMIN — VERAPAMIL HYDROCHLORIDE 2.5 MG: 2.5 INJECTION, SOLUTION INTRAVENOUS at 15:36

## 2017-04-27 RX ADMIN — MIDAZOLAM HYDROCHLORIDE 1 MG: 1 INJECTION INTRAMUSCULAR; INTRAVENOUS at 15:10

## 2017-04-27 RX ADMIN — HEPARIN SODIUM 1000 UNITS: 200 INJECTION, SOLUTION INTRAVENOUS at 15:29

## 2017-04-27 RX ADMIN — MIDAZOLAM HYDROCHLORIDE 1 MG: 1 INJECTION, SOLUTION INTRAMUSCULAR; INTRAVENOUS at 15:10

## 2017-04-27 RX ADMIN — VERAPAMIL HYDROCHLORIDE 2.5 MG: 2.5 INJECTION INTRAVENOUS at 15:36

## 2017-04-27 NOTE — IP AVS SNAPSHOT
[address] [facility] 
[phone] Patient: Abdias Roldan MRN: [id number] :1950 You are allergic to the following Allergen Reactions Atorvastatin Myalgia Pcn (Penicillins) Hives Recent Documentation Height Weight BMI Smoking Status 1.753 m 117.9 kg 38.4 kg/m2 Former Smoker Emergency Contacts Name Discharge Info Relation Home Work Mobile Jong Terry CAREGIVER [3] Spouse [3] [phone] About your hospitalization You were admitted on:  2017 You last received care in the:  [facility] [place] [facility] You were discharged on:  2017 Unit phone number:  [phone] Why you were hospitalized Your primary diagnosis was:  Not on File Your diagnoses also included:  Coronary Artery Disease Involving Native Coronary Artery Of Native Heart Without Angina Pectoris, Hyperlipidemia, Essential Hypertension With Goal Blood Pressure Less Than 140/90, Type 2 Diabetes Mellitus Without Complication (Hcc), Abnormal Nuclear Stress Test, S/P Cardiac Cath Providers Seen During Your Hospitalizations Provider Role Specialty Primary office phone Herbie Avitia MD Attending Provider Cardiology [phone] Your Primary Care Physician (PCP) Primary Care Physician Office Phone Office Fax Flaquita Humera [phone] Follow-up Information Follow up With Details Comments Contact Info Miracle Stubbs MD   [address] 
[phone] Current Discharge Medication List  
  
CONTINUE these medications which have CHANGED Dose & Instructions Dispensing Information Comments Morning Noon Evening Bedtime  
 metFORMIN 500 mg tablet Commonly known as:  GLUCOPHAGE What changed:  See the new instructions. Your last dose was: Your next dose is: Please restart Metformin 4/29 TAKE 2 TABLETS BY MOUTH TWICE A DAY WITH MEALS Quantity:  360 Tab Refills:  2 CONTINUE these medications which have NOT CHANGED Dose & Instructions Dispensing Information Comments Morning Noon Evening Bedtime  
 amLODIPine 10 mg tablet Commonly known as:  Rox Atkinson Your last dose was: Your next dose is:    
   
   
 Dose:  10 mg Take 1 Tab by mouth daily. Quantity:  90 Tab Refills:  3  
     
   
   
   
  
 aspirin delayed-release 81 mg tablet Your last dose was: Your next dose is:    
   
   
 Dose:  81 mg Take 81 mg by mouth daily. Refills:  0  
     
   
   
   
  
 cloNIDine HCl 0.2 mg tablet Commonly known as:  CATAPRES Your last dose was: Your next dose is:    
   
   
 Dose:  0.2 mg Take 1 Tab by mouth two (2) times a day. Quantity:  60 Tab Refills:  1  
     
   
   
   
  
 clopidogrel 75 mg Tab Commonly known as:  PLAVIX Your last dose was: Your next dose is: TAKE 1 TABLET BY MOUTH EVERY DAY Quantity:  30 Tab Refills:  11  
     
   
   
   
  
 dutasteride 0.5 mg capsule Commonly known as:  AVODART Your last dose was: Your next dose is:    
   
   
 Dose:  0.5 mg Take 1 Cap by mouth daily. Quantity:  90 Cap Refills:  3 FLOMAX 0.4 mg capsule Generic drug:  tamsulosin Your last dose was: Your next dose is:    
   
   
 Dose:  0.4 mg Take 0.4 mg by mouth daily. Refills:  0  
     
   
   
   
  
 glipiZIDE SR 5 mg CR tablet Commonly known as:  GLUCOTROL XL Your last dose was: Your next dose is: TAKE 1 TABLET BY MOUTH EVERY DAY Quantity:  30 Tab Refills:  6  
     
   
   
   
  
 isosorbide mononitrate ER 60 mg CR tablet Commonly known as:  IMDUR Your last dose was: Your next dose is: TAKE 1 TABLET BY MOUTH DAILY. Quantity:  30 Tab Refills:  9  
     
   
   
   
  
 labetalol 100 mg tablet Commonly known as:  Zygmunt Hope Your last dose was: Your next dose is: TAKE 2 TABLETS BY MOUTH TWICE A DAY Quantity:  60 Tab Refills:  12  
     
   
   
   
  
 lisinopril 20 mg tablet Commonly known as:  Minus Salk Your last dose was: Your next dose is: TAKE 1 TABLET BY MOUTH DAILY. Quantity:  30 Tab Refills:  11 Where to Get Your Medications Information on where to get these meds will be given to you by the nurse or doctor. ! Ask your nurse or doctor about these medications  
  metFORMIN 500 mg tablet Discharge Instructions [address] [place] [address]  [phone] Patient ID: 
Leila Allen [id number] 
78 y.o. 
1950 Admit Date: 4/27/2017 Discharge Date: 4/27/2017 Admitting Physician: Tammie Mejía MD  
 
Discharge Physician: Leonard Downey NP Admission Diagnoses: ABN TEST Discharge Diagnoses: Active Problems: Hyperlipidemia (5/17/2011) Type 2 diabetes mellitus without complication ([facility] [address] (2/78/6243) Coronary artery disease involving native coronary artery of native heart without angina pectoris (2/25/2016) Essential hypertension with goal blood pressure less than 140/90 (6/16/2016) Abnormal nuclear stress test (4/27/2017) S/P cardiac cath (4/27/2017) Overview: 4/27/17 cardiac cath with RCA 80% - plan for future intervention Discharge Condition: Good Cardiology Procedures this Admission:  Diagnostic left heart catheterization Disposition: home Reference discharge instructions provided by nursing for diet and activity. Signed: 
Leonard Downey NP 
4/27/2017 4:13 PM 
 
 
 Radial Cardiac Catheterization/Angiography Discharge Instructions It is normal to feel tired the first couple days. Take it easy and follow the physicians instructions. CHECK THE CATHETER INSERTION SITE DAILY: 
 
Remove the wrist dressing 24 hours after the procedure. You may shower 24 hours after the procedure. Wash with soap and water and pat dry. Gentle cleaning of the site with soap and water is sufficient, cover with a dry clean dressing or bandage. Do not apply creams or powders to the area. No soaking the wrist for 3 days. Leave the puncture site open to air after 24 hours post-procedure. CALL THE PHYSICIANS:  
 
If the site becomes red, swollen or feels warm to the touch If there is bleeding or drainage or if there is unusual pain at the radial site. If there is any minor oozing, you may apply a band-aid and remove after 12 hours. If the bleeding continues, hold pressure with the middle finger against the puncture site and the thumb against the back of the wrist,call 911 to be transported to the hospital. 
DO NOT DRIVE YOURSELF, [address] ACTIVITY:  
For the first 24 hours do not manipulate the wrist. 
No lifting, pushing or pulling over 3-5 pounds with the affected wrist for 7 daysand no straining the insertion site. Do not life grocery bags or the garbage can, do not run the vacuum  or  for 7 days. Start with short walks as in the hospital and gradually increase as tolerated each day. It is recommended to walk 30 minutes 5-7 days per week. Follow your physicians instructions on activity. Avoid walking outside in extremes of heat or cold. Walk inside when it is cold and windy or hot and humid. Things to keep in mind: 
No driving for at least 24 hours, or as designated by your physician. Limit the number of times you go up and down the stairs Take rests and pace yourself with activity. Be careful and do not strain with bowel movements. MEDICATIONS: 
 
Take all medications as prescribed Call your physician if you have any questions Keep an updated list of your medications with you at all times and give a list to your physician and pharmacist 
 
SIGNS AND SYMPTOMS:  
Be cautious of symptoms of angina or recurrent symptoms such as chest discomfort, unusual shortness of breath or fatigue. These could be symptoms of restenosis, a new blockage or a heart attack. If your symptoms are relieved with rest it is still recommended that you notify your physician of recurrent chest pain or discomfort. For CHEST PAIN or symptoms of angina not relieved with rest:  If the discomfort is not relieved with rest, and you have been prescribed Nitroglycerin, take as directed (taken under the tongue, one at a time 5 minutes apart for a total of 3 doses). If the discomfort is not relieved after the 3rd nitroglycerin, call 911. If you have not been prescribed Nitroglycerin  and your chest discomfort is not relieved with rest, call 911. AFTER CARE:  
Follow up with your physician as instructed. Follow a heart healthy diet with proper portion control, daily stress management, daily exercise, blood pressure and cholesterol control , and smoking cessation. Discharge Orders None Introducing [facility]! Dear Nikhil Martin: 
Thank you for requesting a [employer] account. Our records indicate that you already have an active [employer] account. You can access your account anytime at [email or web address] Did you know that you can access your hospital and ER discharge instructions at any time in [employer]? You can also review all of your test results from your hospital stay or ER visit. Additional Information If you have questions, please visit the Frequently Asked Questions section of the [employer] website at [email or web address] Remember, MyChart is NOT to be used for urgent needs. For medical emergencies, dial 911. Now available from your iPhone and Android! General Information Please provide this summary of care documentation to your next provider. Patient Signature:  ____________________________________________________________ Date:  ____________________________________________________________  
  
Gris Burn Provider Signature:  ____________________________________________________________ Date:  ____________________________________________________________

## 2017-04-27 NOTE — IP AVS SNAPSHOT
Current Discharge Medication List  
  
CONTINUE these medications which have CHANGED Dose & Instructions Dispensing Information Comments Morning Noon Evening Bedtime  
 metFORMIN 500 mg tablet Commonly known as:  GLUCOPHAGE What changed:  See the new instructions. Your last dose was: Your next dose is: Please restart Metformin 4/29 TAKE 2 TABLETS BY MOUTH TWICE A DAY WITH MEALS Quantity:  360 Tab Refills:  2 CONTINUE these medications which have NOT CHANGED Dose & Instructions Dispensing Information Comments Morning Noon Evening Bedtime  
 amLODIPine 10 mg tablet Commonly known as:  Marielena Aleida Your last dose was: Your next dose is:    
   
   
 Dose:  10 mg Take 1 Tab by mouth daily. Quantity:  90 Tab Refills:  3  
     
   
   
   
  
 aspirin delayed-release 81 mg tablet Your last dose was: Your next dose is:    
   
   
 Dose:  81 mg Take 81 mg by mouth daily. Refills:  0  
     
   
   
   
  
 cloNIDine HCl 0.2 mg tablet Commonly known as:  CATAPRES Your last dose was: Your next dose is:    
   
   
 Dose:  0.2 mg Take 1 Tab by mouth two (2) times a day. Quantity:  60 Tab Refills:  1  
     
   
   
   
  
 clopidogrel 75 mg Tab Commonly known as:  PLAVIX Your last dose was: Your next dose is: TAKE 1 TABLET BY MOUTH EVERY DAY Quantity:  30 Tab Refills:  11  
     
   
   
   
  
 dutasteride 0.5 mg capsule Commonly known as:  AVODART Your last dose was: Your next dose is:    
   
   
 Dose:  0.5 mg Take 1 Cap by mouth daily. Quantity:  90 Cap Refills:  3 FLOMAX 0.4 mg capsule Generic drug:  tamsulosin Your last dose was: Your next dose is:    
   
   
 Dose:  0.4 mg Take 0.4 mg by mouth daily. Refills:  0  
     
   
   
   
  
 glipiZIDE SR 5 mg CR tablet Commonly known as:  GLUCOTROL XL Your last dose was: Your next dose is: TAKE 1 TABLET BY MOUTH EVERY DAY Quantity:  30 Tab Refills:  6  
     
   
   
   
  
 isosorbide mononitrate ER 60 mg CR tablet Commonly known as:  IMDUR Your last dose was: Your next dose is: TAKE 1 TABLET BY MOUTH DAILY. Quantity:  30 Tab Refills:  9  
     
   
   
   
  
 labetalol 100 mg tablet Commonly known as:  Roya Adame Your last dose was: Your next dose is: TAKE 2 TABLETS BY MOUTH TWICE A DAY Quantity:  60 Tab Refills:  12  
     
   
   
   
  
 lisinopril 20 mg tablet Commonly known as:  Kitam Fonseca Your last dose was: Your next dose is: TAKE 1 TABLET BY MOUTH DAILY. Quantity:  30 Tab Refills:  11 Where to Get Your Medications Information on where to get these meds will be given to you by the nurse or doctor. ! Ask your nurse or doctor about these medications  
  metFORMIN 500 mg tablet

## 2017-04-27 NOTE — PROGRESS NOTES
Pt tolerated sitting up in chair & ambulated in weeks. Dressing to right wrist remains D&I. Discharge instructions reviewed.

## 2017-04-27 NOTE — PROGRESS NOTES
TRANSFER - OUT REPORT:    Verbal report given to Tania(name) on Ruben Churchill  being transferred to IVCU(unit) for routine progression of care       Report consisted of patients Situation, Background, Assessment and   Recommendations(SBAR). Information from the following report(s) SBAR, Kardex, Procedure Summary, Intake/Output and MAR was reviewed with the receiving nurse. Opportunity for questions and clarification was provided.       Patient transported with:   Registered Nurse  Tech

## 2017-04-27 NOTE — DISCHARGE INSTRUCTIONS
[address] [phone]        Patient ID:  Sudhir Rodríguez  [id number]  80 y.o.  1950    Admit Date: 4/27/2017    Discharge Date: 4/27/2017     Admitting Physician: Andre Delacruz MD     Discharge Physician: Surya Lombardo NP    Admission Diagnoses:   ABN TEST    Discharge Diagnoses: Active Problems:    Hyperlipidemia (5/17/2011)      Type 2 diabetes mellitus without complication ([facility] [address] (2/92/6023)      Coronary artery disease involving native coronary artery of native heart without angina pectoris (2/25/2016)      Essential hypertension with goal blood pressure less than 140/90 (6/16/2016)      Abnormal nuclear stress test (4/27/2017)      S/P cardiac cath (4/27/2017)      Overview: 4/27/17 cardiac cath with RCA 80% - plan for future intervention        Discharge Condition: Good    Cardiology Procedures this Admission:  Diagnostic left heart catheterization    Disposition: home    Reference discharge instructions provided by nursing for diet and activity. Signed:  Surya Lombardo NP  4/27/2017  4:13 PM      Radial Cardiac Catheterization/Angiography Discharge Instructions    It is normal to feel tired the first couple days. Take it easy and follow the physicians instructions. CHECK THE CATHETER INSERTION SITE DAILY:    Remove the wrist dressing 24 hours after the procedure. You may shower 24 hours after the procedure. Wash with soap and water and pat dry. Gentle cleaning of the site with soap and water is sufficient, cover with a dry clean dressing or bandage. Do not apply creams or powders to the area. No soaking the wrist for 3 days. Leave the puncture site open to air after 24 hours post-procedure. CALL THE PHYSICIANS:     If the site becomes red, swollen or feels warm to the touch  If there is bleeding or drainage or if there is unusual pain at the radial site.      If there is any minor oozing, you may apply a band-aid and remove after 12 hours. If the bleeding continues, hold pressure with the middle finger against the puncture site and the thumb against the back of the wrist,call 911 to be transported to the hospital.  DO NOT DRIVE YOURSELF, [address]. ACTIVITY:   For the first 24 hours do not manipulate the wrist.  No lifting, pushing or pulling over 3-5 pounds with the affected wrist for 7 daysand no straining the insertion site. Do not life grocery bags or the garbage can, do not run the vacuum  or  for 7 days. Start with short walks as in the hospital and gradually increase as tolerated each day. It is recommended to walk 30 minutes 5-7 days per week. Follow your physicians instructions on activity. Avoid walking outside in extremes of heat or cold. Walk inside when it is cold and windy or hot and humid. Things to keep in mind:  No driving for at least 24 hours, or as designated by your physician. Limit the number of times you go up and down the stairs  Take rests and pace yourself with activity. Be careful and do not strain with bowel movements. MEDICATIONS:    Take all medications as prescribed  Call your physician if you have any questions  Keep an updated list of your medications with you at all times and give a list to your physician and pharmacist    SIGNS AND SYMPTOMS:   Be cautious of symptoms of angina or recurrent symptoms such as chest discomfort, unusual shortness of breath or fatigue. These could be symptoms of restenosis, a new blockage or a heart attack. If your symptoms are relieved with rest it is still recommended that you notify your physician of recurrent chest pain or discomfort. For CHEST PAIN or symptoms of angina not relieved with rest:  If the discomfort is not relieved with rest, and you have been prescribed Nitroglycerin, take as directed (taken under the tongue, one at a time 5 minutes apart for a total of 3 doses).  If the discomfort is not relieved after the 3rd nitroglycerin, call 911. If you have not been prescribed Nitroglycerin  and your chest discomfort is not relieved with rest, call 911. AFTER CARE:   Follow up with your physician as instructed. Follow a heart healthy diet with proper portion control, daily stress management, daily exercise, blood pressure and cholesterol control , and smoking cessation.

## 2017-04-28 ENCOUNTER — TELEPHONE (OUTPATIENT)
Dept: CARDIOLOGY CLINIC | Age: 67
End: 2017-04-28

## 2017-04-28 NOTE — TELEPHONE ENCOUNTER
Patient was clear for his surgery 05/01/2017 and clearance was faxed to Dr. Brody Naik office  and to dyan at [facility] [address] .

## 2017-06-08 ENCOUNTER — HOSPITAL ENCOUNTER (OUTPATIENT)
Dept: LAB | Age: 67
Discharge: HOME | End: 2017-06-08

## 2017-06-08 PROCEDURE — 86920 COMPATIBILITY TEST SPIN: CPT | Performed by: INTERNAL MEDICINE

## 2017-06-08 PROCEDURE — 86900 BLOOD TYPING SEROLOGIC ABO: CPT | Performed by: INTERNAL MEDICINE

## 2017-06-08 RX ORDER — ACETAMINOPHEN 325 MG/1
650 TABLET ORAL ONCE
Status: COMPLETED | OUTPATIENT
Start: 2017-06-09 | End: 2017-06-09

## 2017-06-08 RX ORDER — DIPHENHYDRAMINE HCL 25 MG
25 CAPSULE ORAL ONCE
Status: COMPLETED | OUTPATIENT
Start: 2017-06-09 | End: 2017-06-09

## 2017-06-09 ENCOUNTER — HOSPITAL ENCOUNTER (OUTPATIENT)
Dept: INFUSION THERAPY | Age: 67
Discharge: HOME | End: 2017-06-09
Payer: MEDICARE

## 2017-06-09 VITALS
DIASTOLIC BLOOD PRESSURE: 69 MMHG | HEART RATE: 59 BPM | SYSTOLIC BLOOD PRESSURE: 120 MMHG | RESPIRATION RATE: 18 BRPM | OXYGEN SATURATION: 100 % | TEMPERATURE: 97.8 F

## 2017-06-09 PROCEDURE — P9016 RBC LEUKOCYTES REDUCED: HCPCS | Performed by: INTERNAL MEDICINE

## 2017-06-09 PROCEDURE — 36430 TRANSFUSION BLD/BLD COMPNT: CPT

## 2017-06-09 PROCEDURE — 74011250637 HC RX REV CODE- 250/637: Performed by: INTERNAL MEDICINE

## 2017-06-09 PROCEDURE — 74011250636 HC RX REV CODE- 250/636: Performed by: INTERNAL MEDICINE

## 2017-06-09 PROCEDURE — 77030013169 SET IV BLD ICUM -A

## 2017-06-09 RX ORDER — SODIUM CHLORIDE 9 MG/ML
250 INJECTION, SOLUTION INTRAVENOUS AS NEEDED
Status: DISCONTINUED | OUTPATIENT
Start: 2017-06-09 | End: 2017-06-13 | Stop reason: HOSPADM

## 2017-06-09 RX ORDER — SODIUM CHLORIDE 0.9 % (FLUSH) 0.9 %
10-40 SYRINGE (ML) INJECTION AS NEEDED
Status: ACTIVE | OUTPATIENT
Start: 2017-06-09 | End: 2017-06-10

## 2017-06-09 RX ADMIN — Medication 10 ML: at 14:20

## 2017-06-09 RX ADMIN — SODIUM CHLORIDE 250 ML: 900 INJECTION, SOLUTION INTRAVENOUS at 09:12

## 2017-06-09 RX ADMIN — Medication 10 ML: at 09:12

## 2017-06-09 RX ADMIN — ACETAMINOPHEN 650 MG: 325 TABLET ORAL at 09:04

## 2017-06-09 RX ADMIN — DIPHENHYDRAMINE HYDROCHLORIDE 25 MG: 25 CAPSULE ORAL at 09:04

## 2017-06-09 NOTE — PROGRESS NOTES
0855-Pt arrived at [facility] in no distress for transfusion of 2 units PRBCs. Assessment completed, pt reports SOB upon exertion and fatigue. # 22 PIV established in R A/C without difficulty. Signs/symptoms of adverse blood reaction discussed with pt, voiced understanding. Medications received:   NS @ KVO   Tylenol 650 mg PO  Benadryl 25 mg PO    0920: 1st unit PRBCs started and infusing without difficulty, will monitor. 1130: 1st unit completed without adverse reaction noted, NS flushing line. 1150: 2nd unit PRBCs started and infusing without difficulty   1350: 2nd unit completed without adverse reaction noted, NS flushing line. Patient Vitals for the past 12 hrs:   Temp Pulse Resp BP SpO2   06/09/17 1425 97.8 °F (36.6 °C) (!) 59 18 120/69 -   06/09/17 1350 97.9 °F (36.6 °C) (!) 57 18 111/59 -   06/09/17 1250 97.6 °F (36.4 °C) (!) 56 18 140/72 -   06/09/17 1220 98 °F (36.7 °C) 60 18 129/72 -   06/09/17 1205 97.6 °F (36.4 °C) (!) 57 18 133/71 -   06/09/17 1144 97.6 °F (36.4 °C) (!) 55 16 116/66 -   06/09/17 1120 98 °F (36.7 °C) (!) 59 18 119/71 -   06/09/17 1020 97.8 °F (36.6 °C) (!) 58 16 119/70 -   06/09/17 0950 97.9 °F (36.6 °C) 60 18 117/69 -   06/09/17 0935 97.7 °F (36.5 °C) 60 18 106/64 -   06/09/17 0915 97.9 °F (36.6 °C) 60 18 100/59 -   06/09/17 0857 97.9 °F (36.6 °C) 61 20 145/72 100 %       Tolerated transfusion well, no adverse reaction noted. D/C instructions reviewed, copy to pt, voiced understanding. Patient declined 1 hour post transfusion observation. 1425-D/Cd from [facility] in no distress accompanied by spouse. No further appt's scheduled at this time.

## 2017-06-10 LAB
ABO + RH BLD: NORMAL
BLD PROD TYP BPU: NORMAL
BLD PROD TYP BPU: NORMAL
BLOOD GROUP ANTIBODIES SERPL: NORMAL
BPU ID: NORMAL
BPU ID: NORMAL
CROSSMATCH RESULT,%XM: NORMAL
CROSSMATCH RESULT,%XM: NORMAL
SPECIMEN EXP DATE BLD: NORMAL
STATUS OF UNIT,%ST: NORMAL
STATUS OF UNIT,%ST: NORMAL
UNIT DIVISION, %UDIV: 0
UNIT DIVISION, %UDIV: 0

## 2017-06-19 RX ORDER — AMLODIPINE BESYLATE 10 MG/1
TABLET ORAL
Qty: 90 TAB | Refills: 2 | Status: SHIPPED | OUTPATIENT
Start: 2017-06-19 | End: 2018-04-23 | Stop reason: SDUPTHER

## 2017-06-27 ENCOUNTER — OFFICE VISIT (OUTPATIENT)
Dept: CARDIOLOGY CLINIC | Age: 67
End: 2017-06-27

## 2017-06-27 VITALS
SYSTOLIC BLOOD PRESSURE: 110 MMHG | HEIGHT: 69 IN | WEIGHT: 233.7 LBS | BODY MASS INDEX: 34.61 KG/M2 | OXYGEN SATURATION: 98 % | RESPIRATION RATE: 16 BRPM | HEART RATE: 70 BPM | DIASTOLIC BLOOD PRESSURE: 60 MMHG

## 2017-06-27 DIAGNOSIS — I25.10 CORONARY ARTERY DISEASE INVOLVING NATIVE CORONARY ARTERY OF NATIVE HEART WITHOUT ANGINA PECTORIS: Primary | ICD-10-CM

## 2017-06-27 DIAGNOSIS — E11.9 TYPE 2 DIABETES MELLITUS WITHOUT COMPLICATION, UNSPECIFIED LONG TERM INSULIN USE STATUS: ICD-10-CM

## 2017-06-27 DIAGNOSIS — C20 RECTAL CANCER (HCC): ICD-10-CM

## 2017-06-27 DIAGNOSIS — C61 PROSTATE CANCER (HCC): ICD-10-CM

## 2017-06-27 DIAGNOSIS — E78.2 MIXED HYPERLIPIDEMIA: ICD-10-CM

## 2017-06-27 RX ORDER — FOLIC ACID 1 MG/1
TABLET ORAL DAILY
COMMUNITY
End: 2018-01-16

## 2017-06-27 NOTE — PROGRESS NOTES
Subjective/HPI:     Orlando Cooper is a 78 y.o. male is here for f/u appt. He had his surgery for resection of the sigmoid colon, rectum, prostate, bladder and anus for poorly differentiated adenocarcinoma with 5/6 lymph node + for mets. He has been asked by his hem/onc to obtain clearance to start his chemo. He has had a low h/h requiring transfusions. He is currently not taking his Plavix. He is currently dealing with right sided abdominal pain. The patient denies chest pain/ shortness of breath, orthopnea, PND, LE edema, palpitations, syncope, presyncope or fatigue. PCP Provider  Nuha Camargo MD  Past Medical History:   Diagnosis Date    Abnormal nuclear stress test 4/27/2017    Arthritis     Asthma     childhood    BPH (benign prostatic hypertrophy)     CAD (coronary artery disease) 1996    [place]    Cancer [facility])     Rectal CA     Chronic pain     Colon polyp     DM (diabetes mellitus) ([facility] [address] 5/17/2011    Gout     Hemorrhoids     HTN (hypertension) 5/17/2011    Hyperlipidemia 5/17/2011    S/P cardiac cath 4/27/2017      Past Surgical History:   Procedure Laterality Date    CARDIAC SURG PROCEDURE UNLIST  2503/0479    stent x2    COLONOSCOPY N/A 10/14/2016    COLONOSCOPY/EGD [facility]ah [facility] ENDOSCOPY    COLONOSCOPY N/A 2/3/2017    COLONOSCOPY performed by [facility]ria Wills [facility] at [facility] ENDOSCOPY    ENDOSCOPY, COLON, DIAGNOSTIC  6/2011    HX HERNIA REPAIR      AS INFANT    HX ORTHOPAEDIC  02/2016    hip replacement     HX PROSTATECTOMY  X2    biopsy benign    HX TONSILLECTOMY      SIGMOIDOSCOPY,BIOPSY  10/14/2016         UPPER GI ENDOSCOPY,DIAGNOSIS  10/14/2016          Allergies   Allergen Reactions    Atorvastatin Myalgia    Pcn [Penicillins] Hives      Family History   Problem Relation Age of Onset    Heart Disease Mother     COPD Mother     COPD Father     Diabetes Father     Hypertension Father     Alcohol abuse Sister     Diabetes Brother     High Cholesterol Brother     Hypertension Brother     Anesth Problems Neg Hx       Current Outpatient Prescriptions   Medication Sig    multivitamins-minerals-lutein (MULTIVITAMIN 50 PLUS) tab tablet   See Instructions, daily, 0 Refills    ferrous sulfate (SLOW FE) 142 mg (45 mg iron) ER tablet Take  by mouth Daily (before breakfast).  folic acid (FOLVITE) 1 mg tablet Take  by mouth daily.  amLODIPine (NORVASC) 10 mg tablet TAKE 1 TABLET BY MOUTH EVERY DAY    ALPRAZolam (XANAX) 0.5 mg tablet Take 0.25 mg by mouth two (2) times daily as needed for Anxiety.  insulin glargine (LANTUS) 100 unit/mL injection 16 Units by SubCUTAneous route nightly.  metFORMIN (GLUCOPHAGE) 500 mg tablet Please restart Metformin 4/29  TAKE 2 TABLETS BY MOUTH TWICE A DAY WITH MEALS (Patient taking differently: 1,000 mg daily (with breakfast). Please restart Metformin 4/29  TAKE 2 TABLETS BY MOUTH TWICE A DAY WITH MEALS)    labetalol (NORMODYNE) 100 mg tablet TAKE 2 TABLETS BY MOUTH TWICE A DAY    cloNIDine HCl (CATAPRES) 0.2 mg tablet Take 1 Tab by mouth two (2) times a day.  isosorbide mononitrate ER (IMDUR) 60 mg CR tablet TAKE 1 TABLET BY MOUTH DAILY.  aspirin delayed-release 81 mg tablet Take 81 mg by mouth daily. No current facility-administered medications for this visit. Vitals:    06/27/17 1127 06/27/17 1128   BP: 100/60 110/60   Pulse: 70    Resp: 16    SpO2: 98%    Weight: 233 lb 11.2 oz (106 kg)    Height: 5' 9\" (1.753 m)      Social History     Social History    Marital status:      Spouse name: N/A    Number of children: N/A    Years of education: N/A     Occupational History    Not on file.      Social History Main Topics    Smoking status: Former Smoker     Packs/day: 0.50     Years: 40.00     Quit date: 9/8/2016    Smokeless tobacco: Never Used    Alcohol use 0.0 oz/week      Comment: OCCASIONAL    Drug use: No    Sexual activity: Not on file     Other Topics Concern    Not on file     Social History Narrative       I have reviewed the nurses notes, vitals, problem list, allergy list, medical history, family, social history and medications. Review of Symptoms:    General: Pt denies excessive weight gain or loss. Pt is able to conduct ADL's  HEENT: Denies blurred vision, headaches, epistaxis and difficulty swallowing. Respiratory: Denies shortness of breath, TALBOT, wheezing or stridor. Cardiovascular: Denies precordial pain, palpitations, edema or PND  Gastrointestinal: Denies indigestion,+ abdominal pain  Musculoskeletal: Denies pain or swelling from muscles or joints  Neurologic: Denies tremor, paresthesias, or sensory motor disturbance  Skin: Denies rash, itching or texture change. Psych: Denies depression        Physical Exam:      General: Well developed, in no acute distress, cooperative and alert  HEENT: No carotid bruits, no JVD, trach is midline. Neck Supple, PEERL, EOM intact. Heart:  Normal S1/S2 negative S3 or S4. Regular, no murmur, gallop or rub.   Respiratory: Clear bilaterally x 4, no wheezing or rales  Abdomen:   Soft, bowel sounds are active.   Extremities:  No edema, normal cap refill, no cyanosis, atraumatic. Neuro: A&Ox3, speech clear, gait stable. Skin: Skin color is normal.  Non diaphoretic  Vascular: 2+ pulses symmetric in all extremities    Cardiographics    ECG:Sinus  Rhythm  - occasional PAC  HR 70   Nonspecific T-abnormality.      Results for orders placed or performed during the hospital encounter of 10/15/16   EKG, 12 LEAD, INITIAL   Result Value Ref Range    Ventricular Rate 96 BPM    Atrial Rate 96 BPM    P-R Interval 146 ms    QRS Duration 86 ms    Q-T Interval 346 ms    QTC Calculation (Bezet) 437 ms    Calculated P Axis 54 degrees    Calculated R Axis 38 degrees    Calculated T Axis 138 degrees    Diagnosis       Normal sinus rhythm  Possible Left atrial enlargement  T wave abnormality, consider lateral ischemia    Confirmed by Daniele Olivas MD, Cher Larkin ([id number]) on 10/16/2016 4:00:06 PM           Cardiology Labs:  Lab Results   Component Value Date/Time    Cholesterol, total 131 05/16/2012 10:14 AM    HDL Cholesterol 49 05/16/2012 10:14 AM    LDL, calculated 66 05/16/2012 10:14 AM    Triglyceride 78 05/16/2012 10:14 AM       Lab Results   Component Value Date/Time    Sodium 135 05/26/2017 03:32 PM    Potassium 4.8 05/26/2017 03:32 PM    Chloride 98 05/26/2017 03:32 PM    CO2 19 05/26/2017 03:32 PM    Anion gap 11 10/25/2016 03:08 PM    Glucose 144 05/26/2017 03:32 PM    BUN 19 05/26/2017 03:32 PM    Creatinine 1.52 05/26/2017 03:32 PM    BUN/Creatinine ratio 13 05/26/2017 03:32 PM    GFR est AA 54 05/26/2017 03:32 PM    GFR est non-AA 47 05/26/2017 03:32 PM    Calcium 8.3 05/26/2017 03:32 PM    AST (SGOT) 19 05/26/2017 03:32 PM    Alk. phosphatase 92 05/26/2017 03:32 PM    Protein, total 6.4 05/26/2017 03:32 PM    Albumin 3.3 05/26/2017 03:32 PM    Globulin 4.0 10/25/2016 03:08 PM    A-G Ratio 1.1 05/26/2017 03:32 PM    ALT (SGPT) 28 05/26/2017 03:32 PM           Assessment:     Assessment:     Kathi Braun was seen today for hospital follow up. Diagnoses and all orders for this visit:    Coronary artery disease involving native coronary artery of native heart without angina pectoris    Mixed hyperlipidemia  -     AMB POC EKG ROUTINE W/ 12 LEADS, INTER & REP    Type 2 diabetes mellitus without complication, unspecified long term insulin use status    Rectal cancer ([facility] [address]    Prostate cancer ([facility] [address]        ICD-10-CM ICD-9-CM    1. Coronary artery disease involving native coronary artery of native heart without angina pectoris I25.10 414.01    2. Mixed hyperlipidemia E78.2 272.2 multivitamins-minerals-lutein (MULTIVITAMIN 50 PLUS) tab tablet      ferrous sulfate (SLOW FE) 142 mg (45 mg iron) ER tablet      folic acid (FOLVITE) 1 mg tablet      AMB POC EKG ROUTINE W/ 12 LEADS, INTER & REP   3.  Type 2 diabetes mellitus without complication, unspecified long term insulin use status E11.9 250.00    4. Rectal cancer (HCC) C20 154.1    5. Prostate cancer ([facility] [address] C61 185      Orders Placed This Encounter    AMB POC EKG ROUTINE W/ 12 LEADS, INTER & REP     Order Specific Question:   Reason for Exam:     Answer:   routine    multivitamins-minerals-lutein (MULTIVITAMIN 50 PLUS) tab tablet     Sig:   See Instructions, daily, 0 Refills    ferrous sulfate (SLOW FE) 142 mg (45 mg iron) ER tablet     Sig: Take  by mouth Daily (before breakfast).  folic acid (FOLVITE) 1 mg tablet     Sig: Take  by mouth daily. Plan:     Patient presents today for f/u appt requesting clearance to continue to hold the Plavix and proceed with his chemo for his adenocarcinoma. He denies cardiac complaints. EKG remains SR. BP is normotensive. Cardiac cath showed 75% stenosis discrete RCA. He can continue to hold his Plavix and proceed with his chemo. He can f/u after chemotherapy to determine if cath is indicated at that time.     Steven Benitez MD

## 2017-06-27 NOTE — PROGRESS NOTES
Patient C/O right sided abdomen pain Dr Robbie Naik is requesting if patient is cleared for chemotherapy and holding blood thinners due to low blood count.

## 2017-06-27 NOTE — MR AVS SNAPSHOT
Visit Information Date & Time Provider Department Dept. Phone Encounter #  
 6/27/2017 11:00 AM Yoel Benitez, [address] [facility] [phone] Your Appointments 8/8/2017  3:30 PM  
ESTABLISHED PATIENT with MD Asiya Mckeon TURNER, [address] [facility] ([address]) Appt Note: 3 mos check  
 [address] [facility] [address]  
[phone]  
  
   
 [address] [phone]  
  
    
 1/17/2018 10:45 AM  
6 MONTH with Yoel Benitez MD  
[facility] [address]) Appt Note: Per Dr German Motta $15CP REM; 6 month per Dr. German Motta, 15.00 cc, jr 6/27/17  
 [address]  
[phone] [address] Upcoming Health Maintenance Date Due Hepatitis C Screening 1950 FOOT EXAM Q1 10/29/1959 EYE EXAM RETINAL OR DILATED Q1 10/29/1959 ZOSTER VACCINE AGE 60> 10/29/2009 DTaP/Tdap/Td series (1 - Tdap) 1/2/2010 GLAUCOMA SCREENING Q2Y 10/29/2014 Pneumococcal 65+ High/Highest Risk (1 of 2 - PCV13) 10/29/2014 MEDICARE YEARLY EXAM 10/29/2014 MICROALBUMIN Q1 2/25/2017 INFLUENZA AGE 9 TO ADULT 8/1/2017 LIPID PANEL Q1 10/4/2017 HEMOGLOBIN A1C Q6M 11/26/2017 COLONOSCOPY 2/3/2022 Allergies as of 6/27/2017  Review Complete On: 6/27/2017 By: Jason Hall NP Severity Noted Reaction Type Reactions Atorvastatin  10/04/2016    Myalgia Pcn [Penicillins]  05/17/2011    Hives Current Immunizations  Reviewed on 6/9/2017 Name Date Pneumococcal Polysaccharide (PPSV-23) 6/7/2013 TD Vaccine 5/17/2005 Td 1/1/2010 Not reviewed this visit You Were Diagnosed With   
  
 Codes Comments Coronary artery disease involving native coronary artery of native heart without angina pectoris    -  Primary ICD-10-CM: I25.10 ICD-9-CM: 414.01 Mixed hyperlipidemia     ICD-10-CM: E78.2 ICD-9-CM: 272.2 Type 2 diabetes mellitus without complication, unspecified long term insulin use status     ICD-10-CM: E11.9 ICD-9-CM: 250.00 Rectal cancer ([facility] [address]     ICD-10-CM: C20 
ICD-9-CM: 154.1 Prostate cancer [facility])     ICD-10-CM: B89 ICD-9-CM: 375 Vitals BP Pulse Resp Height(growth percentile) Weight(growth percentile) SpO2  
 110/60 (BP 1 Location: Right arm, BP Patient Position: Sitting) 70 16 5' 9\" (1.753 m) 233 lb 11.2 oz (106 kg) 98% BMI Smoking Status 34.51 kg/m2 Former Smoker Vitals History BMI and BSA Data Body Mass Index Body Surface Area 34.51 kg/m 2 2.27 m 2 Preferred Pharmacy Pharmacy Name Phone [id number] [place] [address] [facility] [address] [phone] Your Updated Medication List  
  
   
This list is accurate as of: 6/27/17 12:08 PM.  Always use your most recent med list. amLODIPine 10 mg tablet Commonly known as:  Davin Durko TAKE 1 TABLET BY MOUTH EVERY DAY  
  
 aspirin delayed-release 81 mg tablet Take 81 mg by mouth daily. cloNIDine HCl 0.2 mg tablet Commonly known as:  CATAPRES Take 1 Tab by mouth two (2) times a day. folic acid 1 mg tablet Commonly known as:  Google Take  by mouth daily. isosorbide mononitrate ER 60 mg CR tablet Commonly known as:  IMDUR  
TAKE 1 TABLET BY MOUTH DAILY. labetalol 100 mg tablet Commonly known as:  NORMODYNE  
TAKE 2 TABLETS BY MOUTH TWICE A DAY  
  
 LANTUS 100 unit/mL injection Generic drug:  insulin glargine 16 Units by SubCUTAneous route nightly. metFORMIN 500 mg tablet Commonly known as:  GLUCOPHAGE Please restart Metformin 4/29 TAKE 2 TABLETS BY MOUTH TWICE A DAY WITH MEALS  
  
 MULTIVITAMIN 50 PLUS Tab tablet Generic drug:  multivitamins-minerals-lutein See Instructions, daily, 0 Refills SLOW  mg (45 mg iron) ER tablet Generic drug:  ferrous sulfate Take  by mouth Daily (before breakfast). XANAX 0.5 mg tablet Generic drug:  ALPRAZolam  
Take 0.25 mg by mouth two (2) times daily as needed for Anxiety. We Performed the Following AMB POC EKG ROUTINE W/ 12 LEADS, INTER & REP [55577 CPT(R)] Introducing [facility] SERVICES! Dear Jillian Wilson: 
Thank you for requesting a [employer] account. Our records indicate that you already have an active [employer] account. You can access your account anytime at [email or web address] Did you know that you can access your hospital and ER discharge instructions at any time in [employer]? You can also review all of your test results from your hospital stay or ER visit. Additional Information If you have questions, please visit the Frequently Asked Questions section of the [employer] website at [email or web address] Remember, [employer] is NOT to be used for urgent needs. For medical emergencies, dial 911. Now available from your iPhone and Android! Please provide this summary of care documentation to your next provider. Your primary care clinician is listed as Steven. If you have any questions after today's visit, please call [phone].

## 2017-07-10 ENCOUNTER — HOSPITAL ENCOUNTER (OUTPATIENT)
Dept: GENERAL RADIOLOGY | Age: 67
Discharge: HOME | End: 2017-07-10
Payer: MEDICARE

## 2017-07-10 DIAGNOSIS — R10.9 ABDOMINAL PAIN: ICD-10-CM

## 2017-07-10 PROCEDURE — 74020 XR ABD FLAT/ ERECT: CPT

## 2017-08-08 PROBLEM — E11.59 HYPERTENSION COMPLICATING DIABETES (HCC): Status: ACTIVE | Noted: 2017-08-08

## 2017-08-08 PROBLEM — I15.2 HYPERTENSION COMPLICATING DIABETES (HCC): Status: ACTIVE | Noted: 2017-08-08

## 2017-09-11 RX ORDER — ISOSORBIDE MONONITRATE 60 MG/1
TABLET, EXTENDED RELEASE ORAL
Qty: 30 TAB | Refills: 9 | Status: SHIPPED | OUTPATIENT
Start: 2017-09-11 | End: 2018-08-12 | Stop reason: SDUPTHER

## 2017-11-07 ENCOUNTER — HOSPITAL ENCOUNTER (OUTPATIENT)
Dept: GENERAL RADIOLOGY | Age: 67
Discharge: HOME | End: 2017-11-07
Payer: MEDICARE

## 2017-11-07 DIAGNOSIS — R06.02 SOB (SHORTNESS OF BREATH): ICD-10-CM

## 2017-11-07 DIAGNOSIS — Z85.048 HISTORY OF RECTAL CANCER: ICD-10-CM

## 2017-11-07 PROCEDURE — 71020 XR CHEST PA LAT: CPT

## 2017-12-27 ENCOUNTER — HOSPITAL ENCOUNTER (OUTPATIENT)
Dept: NUCLEAR MEDICINE | Age: 67
Discharge: HOME | End: 2017-12-27
Attending: INTERNAL MEDICINE
Payer: MEDICARE

## 2017-12-27 DIAGNOSIS — C20 MALIGNANT NEOPLASM OF RECTUM (HCC): ICD-10-CM

## 2017-12-27 PROCEDURE — 78306 BONE IMAGING WHOLE BODY: CPT

## 2018-01-01 ENCOUNTER — CLINICAL SUPPORT (OUTPATIENT)
Dept: CARDIOLOGY CLINIC | Age: 68
End: 2018-01-01

## 2018-01-01 ENCOUNTER — TELEPHONE (OUTPATIENT)
Dept: CARDIOLOGY CLINIC | Age: 68
End: 2018-01-01

## 2018-01-01 DIAGNOSIS — I35.9 AORTIC VALVE DISEASE: ICD-10-CM

## 2018-01-01 DIAGNOSIS — Z92.21 STATUS POST CHEMOTHERAPY: ICD-10-CM

## 2018-01-01 DIAGNOSIS — E11.59 HYPERTENSION COMPLICATING DIABETES (HCC): ICD-10-CM

## 2018-01-01 DIAGNOSIS — I15.2 HYPERTENSION COMPLICATING DIABETES (HCC): ICD-10-CM

## 2018-01-01 DIAGNOSIS — C20 RECTAL ADENOCARCINOMA (HCC): Primary | ICD-10-CM

## 2018-01-16 PROBLEM — C20 RECTAL ADENOCARCINOMA (HCC): Status: ACTIVE | Noted: 2018-01-16

## 2018-01-22 ENCOUNTER — APPOINTMENT (OUTPATIENT)
Dept: CT IMAGING | Age: 68
End: 2018-01-22
Attending: EMERGENCY MEDICINE
Payer: MEDICARE

## 2018-01-22 ENCOUNTER — HOSPITAL ENCOUNTER (EMERGENCY)
Age: 68
Discharge: HOME | End: 2018-01-23
Attending: EMERGENCY MEDICINE
Payer: MEDICARE

## 2018-01-22 DIAGNOSIS — R18.0 MALIGNANT ASCITES: Primary | ICD-10-CM

## 2018-01-22 LAB
ALBUMIN SERPL-MCNC: 3.7 G/DL (ref 3.5–5)
ALBUMIN/GLOB SERPL: 0.7 {RATIO} (ref 1.1–2.2)
ALP SERPL-CCNC: 175 U/L (ref 45–117)
ALT SERPL-CCNC: 37 U/L (ref 12–78)
ANION GAP SERPL CALC-SCNC: 8 MMOL/L (ref 5–15)
AST SERPL-CCNC: 40 U/L (ref 15–37)
BASOPHILS # BLD: 0 K/UL (ref 0–0.1)
BASOPHILS NFR BLD: 0 % (ref 0–1)
BILIRUB SERPL-MCNC: 1.2 MG/DL (ref 0.2–1)
BUN SERPL-MCNC: 12 MG/DL (ref 6–20)
BUN/CREAT SERPL: 12 (ref 12–20)
CALCIUM SERPL-MCNC: 9.6 MG/DL (ref 8.5–10.1)
CHLORIDE SERPL-SCNC: 101 MMOL/L (ref 97–108)
CO2 SERPL-SCNC: 26 MMOL/L (ref 21–32)
CREAT SERPL-MCNC: 1.04 MG/DL (ref 0.7–1.3)
DIFFERENTIAL METHOD BLD: ABNORMAL
EOSINOPHIL # BLD: 0.3 K/UL (ref 0–0.4)
EOSINOPHIL NFR BLD: 3 % (ref 0–7)
ERYTHROCYTE [DISTWIDTH] IN BLOOD BY AUTOMATED COUNT: 15.2 % (ref 11.5–14.5)
GLOBULIN SER CALC-MCNC: 5.2 G/DL (ref 2–4)
GLUCOSE SERPL-MCNC: 144 MG/DL (ref 65–100)
HCT VFR BLD AUTO: 35.2 % (ref 36.6–50.3)
HGB BLD-MCNC: 12 G/DL (ref 12.1–17)
LACTATE SERPL-SCNC: 1.4 MMOL/L (ref 0.4–2)
LYMPHOCYTES # BLD: 0.6 K/UL (ref 0.8–3.5)
LYMPHOCYTES NFR BLD: 6 % (ref 12–49)
MCH RBC QN AUTO: 30.4 PG (ref 26–34)
MCHC RBC AUTO-ENTMCNC: 34.1 G/DL (ref 30–36.5)
MCV RBC AUTO: 89.1 FL (ref 80–99)
MONOCYTES # BLD: 0.6 K/UL (ref 0–1)
MONOCYTES NFR BLD: 7 % (ref 5–13)
NEUTS SEG # BLD: 7.7 K/UL (ref 1.8–8)
NEUTS SEG NFR BLD: 84 % (ref 32–75)
PLATELET # BLD AUTO: 295 K/UL (ref 150–400)
POTASSIUM SERPL-SCNC: 4.1 MMOL/L (ref 3.5–5.1)
PROT SERPL-MCNC: 8.9 G/DL (ref 6.4–8.2)
RBC # BLD AUTO: 3.95 M/UL (ref 4.1–5.7)
RBC MORPH BLD: ABNORMAL
SODIUM SERPL-SCNC: 135 MMOL/L (ref 136–145)
WBC # BLD AUTO: 9.2 K/UL (ref 4.1–11.1)

## 2018-01-22 PROCEDURE — 99283 EMERGENCY DEPT VISIT LOW MDM: CPT

## 2018-01-22 PROCEDURE — 80053 COMPREHEN METABOLIC PANEL: CPT | Performed by: EMERGENCY MEDICINE

## 2018-01-22 PROCEDURE — 36415 COLL VENOUS BLD VENIPUNCTURE: CPT | Performed by: EMERGENCY MEDICINE

## 2018-01-22 PROCEDURE — 74177 CT ABD & PELVIS W/CONTRAST: CPT

## 2018-01-22 PROCEDURE — 74011636320 HC RX REV CODE- 636/320: Performed by: EMERGENCY MEDICINE

## 2018-01-22 PROCEDURE — 85025 COMPLETE CBC W/AUTO DIFF WBC: CPT | Performed by: EMERGENCY MEDICINE

## 2018-01-22 PROCEDURE — 83605 ASSAY OF LACTIC ACID: CPT | Performed by: EMERGENCY MEDICINE

## 2018-01-22 PROCEDURE — 74011250636 HC RX REV CODE- 250/636: Performed by: EMERGENCY MEDICINE

## 2018-01-22 RX ORDER — SODIUM CHLORIDE 0.9 % (FLUSH) 0.9 %
10 SYRINGE (ML) INJECTION
Status: COMPLETED | OUTPATIENT
Start: 2018-01-22 | End: 2018-01-22

## 2018-01-22 RX ORDER — SODIUM CHLORIDE 9 MG/ML
50 INJECTION, SOLUTION INTRAVENOUS
Status: COMPLETED | OUTPATIENT
Start: 2018-01-22 | End: 2018-01-22

## 2018-01-22 RX ADMIN — DIATRIZOATE MEGLUMINE AND DIATRIZOATE SODIUM 30 ML: 600; 100 SOLUTION ORAL; RECTAL at 19:32

## 2018-01-22 RX ADMIN — IOPAMIDOL 100 ML: 755 INJECTION, SOLUTION INTRAVENOUS at 20:53

## 2018-01-22 RX ADMIN — Medication 10 ML: at 20:53

## 2018-01-22 RX ADMIN — SODIUM CHLORIDE 50 ML/HR: 900 INJECTION, SOLUTION INTRAVENOUS at 20:53

## 2018-01-23 VITALS
WEIGHT: 248.46 LBS | DIASTOLIC BLOOD PRESSURE: 100 MMHG | SYSTOLIC BLOOD PRESSURE: 207 MMHG | HEART RATE: 111 BPM | TEMPERATURE: 98.6 F | HEIGHT: 69 IN | OXYGEN SATURATION: 95 % | RESPIRATION RATE: 16 BRPM | BODY MASS INDEX: 36.8 KG/M2

## 2018-01-23 NOTE — ED NOTES
Discharge instructions given to patient by ANDRES Almanzar. Pt verbalized understanding of discharge instructions. Pt discharged without difficulty. Pt discharged in stable condition via ambulation, accompanied by wife.

## 2018-01-23 NOTE — ED NOTES
Patient reports pain and discomfort in his abdomen for 3 days now. Patient also states not having proper bowel movements for the three days. Patient has a vast GI history and has not has much out of his ostomies. Patient states he hasn't been able to eat either. Patient denies nausea and vomiting, but feels extremely bloated. Patient updated on plan of care and call bell within reach. Patient wife at bedside.

## 2018-01-23 NOTE — ED PROVIDER NOTES
EMERGENCY DEPARTMENT HISTORY AND PHYSICAL EXAM      Date: 1/22/2018  Patient Name: Linden Hansen    History of Presenting Illness     Chief Complaint   Patient presents with    Abdominal Pain     diffuse mid Abd pain; colon surgery on May 1st for colorectal cancer - had part of colon removed, prostate removed, and bladder removed, closed rectum; has colostomy (no output in 3 days - has been taking Miralax and Colace, with no relief) and ileostomy       History Provided By: Patient    HPI: Linden Hansen, 75 y.o. male with PMHx significant for DM, HTN, hyperlipidemia, gout, BPH, colon polyps, CAD, asthma, arthritis, rectal cancer, and rectal adenocarcinoma, presents ambulatory to the ED with cc of constant, diffuse abdominal pain which started 4 days ago. Pt notes that the pain is a \"soreness and tightness\". His associated pain is mild and worse on the sides of his abdomen. Pt describes that his pain was sudden in onset. He notes his pain has improved since this morning. Pt also c/o associated constipation and abdominal distension. His pain is worse with deep inspiration. He has tried Miralax and Colace with no improvement. His last chemotherapy treatment was on 12/31. He recently had a consultation with his oncologist on 1/05 with no report of metastasis. Pt denies recent fevers. His last colon surgery was on 5/01. PCP: Elissa Abarca MD  Colorectal surgery: Dr. Charo Solano   Oncology: Chris Murdock    There are no other complaints, changes, or physical findings at this time. Current Outpatient Prescriptions   Medication Sig Dispense Refill    magnesium oxide (MAGOX) 400 mg tablet Take 400 mg by mouth daily.  rosuvastatin (CRESTOR) 5 mg tablet Take 0.5 Tabs by mouth nightly. 45 Tab 3    insulin glargine (LANTUS,BASAGLAR) 100 unit/mL (3 mL) inpn 52 Units by SubCUTAneous route daily.  16 Pen 3    ASCENSIA CONTOUR strip CHECK THREE TIMES DAILY AS DIRECTED 300 Strip 11    insulin lispro (HUMALOG) 100 unit/mL kwikpen Sliding scale QID (ac meals, hs): For -299 give 4 units, 300-400, give 7 units, over 400 give 10 units. 1 Pen 5    metFORMIN (GLUCOPHAGE) 500 mg tablet Please restart Metformin 4/29  TAKE 2 TABLETS BY MOUTH TWICE A DAY WITH MEALS 360 Tab 2    isosorbide mononitrate ER (IMDUR) 60 mg CR tablet TAKE 1 TABLET BY MOUTH DAILY. 30 Tab 9    BD INSULIN PEN NEEDLE UF SHORT 31 gauge x 5/16\" ndle USE DAILY 100 Pen Needle 11    labetalol (NORMODYNE) 100 mg tablet TAKE 2 TABLETS BY MOUTH TWICE A  Tab 12    cloNIDine HCl (CATAPRES) 0.2 mg tablet Take 1 Tab by mouth two (2) times a day. 60 Tab 11    multivitamins-minerals-lutein (MULTIVITAMIN 50 PLUS) tab tablet   See Instructions, daily, 0 Refills      ferrous sulfate (SLOW FE) 142 mg (45 mg iron) ER tablet Take  by mouth Daily (before breakfast).  amLODIPine (NORVASC) 10 mg tablet TAKE 1 TABLET BY MOUTH EVERY DAY 90 Tab 2    aspirin delayed-release 81 mg tablet Take 81 mg by mouth daily.  ALPRAZolam (XANAX) 0.5 mg tablet Take 0.25 mg by mouth two (2) times daily as needed for Anxiety. Past History     Past Medical History:  Past Medical History:   Diagnosis Date    Abnormal nuclear stress test 4/27/2017    Arthritis     Asthma     childhood    BPH (benign prostatic hypertrophy)     CAD (coronary artery disease) 1996    M. I.    Cancer [facility])     Rectal CA     Chronic pain     Colon polyp     DM (diabetes mellitus) ([facility] [address] 5/17/2011    Gout     Hemorrhoids     HTN (hypertension) 5/17/2011    Hyperlipidemia 5/17/2011    Rectal adenocarcinoma ([facility] [address] 1/16/2018    S/p surg.     S/P cardiac cath 4/27/2017       Past Surgical History:  Past Surgical History:   Procedure Laterality Date    CARDIAC SURG PROCEDURE UNLIST  9962/8680    stent x2    COLONOSCOPY N/A 10/14/2016    COLONOSCOPY/EGD performed by Basim [facility] MD at [facility] ENDOSCOPY    COLONOSCOPY N/A 2/3/2017    COLONOSCOPY performed by Sharlotte Cooks Aspen Mann MD at [facility] ENDOSCOPY    ENDOSCOPY, COLON, DIAGNOSTIC  6/2011    HX HERNIA REPAIR      AS INFANT    HX ORTHOPAEDIC  02/2016    hip replacement     HX PROSTATECTOMY  X2    biopsy benign    HX TONSILLECTOMY      SIGMOIDOSCOPY,BIOPSY  10/14/2016         UPPER GI ENDOSCOPY,DIAGNOSIS  10/14/2016            Family History:  Family History   Problem Relation Age of Onset    Heart Disease Mother     COPD Mother     COPD Father     Diabetes Father     Hypertension Father     Alcohol abuse Sister     Diabetes Brother     High Cholesterol Brother     Hypertension Brother     Anesth Problems Neg Hx        Social History:  Social History   Substance Use Topics    Smoking status: Former Smoker     Packs/day: 0.50     Years: 40.00     Quit date: 9/8/2016    Smokeless tobacco: Never Used    Alcohol use No      Comment: OCCASIONAL       Allergies: Allergies   Allergen Reactions    Atorvastatin Myalgia    Pcn [Penicillins] Hives         Review of Systems   Review of Systems   Constitutional: Negative for fatigue and fever. HENT: Negative for congestion, ear pain and rhinorrhea. Eyes: Negative for pain and redness. Respiratory: Negative for cough and wheezing. Cardiovascular: Negative for chest pain and palpitations. Gastrointestinal: Positive for abdominal distention, abdominal pain (diffuse) and constipation. Negative for nausea and vomiting. Genitourinary: Negative for dysuria, frequency and urgency. Musculoskeletal: Negative for back pain, neck pain and neck stiffness. Skin: Negative for rash and wound. Neurological: Negative for weakness, light-headedness, numbness and headaches. Physical Exam   Physical Exam   Constitutional: He is oriented to person, place, and time. He appears well-developed and well-nourished. Non-toxic appearance. No distress. HENT:   Head: Normocephalic and atraumatic.  Head is without right periorbital erythema and without left periorbital erythema. Right Ear: External ear normal.   Left Ear: External ear normal.   Nose: Nose normal.   Mouth/Throat: Uvula is midline. No trismus in the jaw. Eyes: Conjunctivae and EOM are normal. Pupils are equal, round, and reactive to light. No scleral icterus. Neck: Normal range of motion and full passive range of motion without pain. Cardiovascular: Normal rate, regular rhythm and normal heart sounds. HR is 100 on exam.    Pulmonary/Chest: Effort normal and breath sounds normal. No accessory muscle usage. No tachypnea. No respiratory distress. He has no decreased breath sounds. He has no wheezes. Abdominal: Soft. There is no tenderness. There is no rigidity and no guarding. Protuberant abdomen. 2 ostomies in place. Right ostomy with clear liquid. Left ostomy with scant amount of fecal material.  Abdomen is soft, not rigid. Diffusely uncomfortable and worse in the lateral recesses. No bruising of the flanks or abdomen. Musculoskeletal: Normal range of motion. Neurological: He is alert and oriented to person, place, and time. He is not disoriented. No cranial nerve deficit or sensory deficit. GCS eye subscore is 4. GCS verbal subscore is 5. GCS motor subscore is 6. Skin: Skin is intact. No rash noted. Psychiatric: He has a normal mood and affect. His speech is normal.   Nursing note and vitals reviewed. Diagnostic Study Results     Labs -     Recent Results (from the past 12 hour(s))   CBC WITH AUTOMATED DIFF    Collection Time: 01/22/18  6:32 PM   Result Value Ref Range    WBC 9.2 4.1 - 11.1 K/uL    RBC 3.95 (L) 4.10 - 5.70 M/uL    HGB 12.0 (L) 12.1 - 17.0 g/dL    HCT 35.2 (L) 36.6 - 50.3 %    MCV 89.1 80.0 - 99.0 FL    MCH 30.4 26.0 - 34.0 PG    MCHC 34.1 30.0 - 36.5 g/dL    RDW 15.2 (H) 11.5 - 14.5 %    PLATELET 626 957 - 697 K/uL    NEUTROPHILS 84 (H) 32 - 75 %    LYMPHOCYTES 6 (L) 12 - 49 %    MONOCYTES 7 5 - 13 %    EOSINOPHILS 3 0 - 7 %    BASOPHILS 0 0 - 1 %    ABS.  NEUTROPHILS 7.7 1.8 - 8.0 K/UL    ABS. LYMPHOCYTES 0.6 (L) 0.8 - 3.5 K/UL    ABS. MONOCYTES 0.6 0.0 - 1.0 K/UL    ABS. EOSINOPHILS 0.3 0.0 - 0.4 K/UL    ABS. BASOPHILS 0.0 0.0 - 0.1 K/UL    DF SMEAR SCANNED      RBC COMMENTS ANISOCYTOSIS  1+       METABOLIC PANEL, COMPREHENSIVE    Collection Time: 01/22/18  6:32 PM   Result Value Ref Range    Sodium 135 (L) 136 - 145 mmol/L    Potassium 4.1 3.5 - 5.1 mmol/L    Chloride 101 97 - 108 mmol/L    CO2 26 21 - 32 mmol/L    Anion gap 8 5 - 15 mmol/L    Glucose 144 (H) 65 - 100 mg/dL    BUN 12 6 - 20 MG/DL    Creatinine 1.04 0.70 - 1.30 MG/DL    BUN/Creatinine ratio 12 12 - 20      GFR est AA >60 >60 ml/min/1.73m2    GFR est non-AA >60 >60 ml/min/1.73m2    Calcium 9.6 8.5 - 10.1 MG/DL    Bilirubin, total 1.2 (H) 0.2 - 1.0 MG/DL    ALT (SGPT) 37 12 - 78 U/L    AST (SGOT) 40 (H) 15 - 37 U/L    Alk. phosphatase 175 (H) 45 - 117 U/L    Protein, total 8.9 (H) 6.4 - 8.2 g/dL    Albumin 3.7 3.5 - 5.0 g/dL    Globulin 5.2 (H) 2.0 - 4.0 g/dL    A-G Ratio 0.7 (L) 1.1 - 2.2     LACTIC ACID    Collection Time: 01/22/18  6:32 PM   Result Value Ref Range    Lactic acid 1.4 0.4 - 2.0 MMOL/L       Radiologic Studies -     CT Results  (Last 48 hours)               01/22/18 2052  CT ABD PELV W CONT Final result    Impression:  IMPRESSION:       New ascites. Cannot exclude carcinomatosis or spontaneous bacterial peritonitis. Increased small pericardial effusion, nonspecific. Interval sigmoid resection and AP resection. Narrative:  EXAM:  CT ABD PELV W CONT       INDICATION: bowel obstruction/blockage       COMPARISON: 10/15/2016       CONTRAST:  100 mL of Isovue-370. TECHNIQUE:    Following the uneventful intravenous administration of contrast, thin axial   images were obtained through the abdomen and pelvis. Coronal and sagittal   reconstructions were generated. Oral contrast wasadministered.  CT dose reduction   was achieved through use of a standardized protocol tailored for this examination and automatic exposure control for dose modulation. FINDINGS:    LUNG BASES: Clear. INCIDENTALLY IMAGED HEART AND MEDIASTINUM: Small pericardial effusion. LIVER: No mass or biliary dilatation. GALLBLADDER: Unremarkable. SPLEEN: No mass. PANCREAS: No mass or ductal dilatation. ADRENALS: Unremarkable. KIDNEYS: No mass, calculus, or hydronephrosis. STOMACH: Unremarkable. SMALL BOWEL: No dilatation or wall thickening. COLON: No dilatation or wall thickening. APPENDIX: Absent   PERITONEUM: New ascites diffusely. 2.5 x 3.5 cm presacral fluid collection with   density measurement of 16   RETROPERITONEUM: No lymphadenopathy or aortic aneurysm. REPRODUCTIVE ORGANS: AP resection. Sigmoid resection. Bilateral lower abdominal   wall ostomies. URINARY BLADDER: No mass or calculus. BONES: No destructive bone lesion. Right total hip replacement. ADDITIONAL COMMENTS: N/A               Medical Decision Making   I am the first provider for this patient. I reviewed the vital signs, available nursing notes, past medical history, past surgical history, family history and social history. Vital Signs-Reviewed the patient's vital signs. Patient Vitals for the past 12 hrs:   Temp Pulse Resp BP SpO2   01/23/18 0016 - (!) 111 16 (!) 207/100 95 %   01/22/18 2225 - 97 16 (!) 207/90 98 %   01/22/18 1930 98.6 °F (37 °C) (!) 118 16 (!) 209/83 100 %   01/22/18 1755 99.1 °F (37.3 °C) (!) 117 16 (!) 169/99 100 %     Records Reviewed: Nursing Notes and Old Medical Records    Provider Notes (Medical Decision Making):   DDx: obstruction, carcinomatosis, bacterial peritonitis, ascites     ED Course:   Initial assessment performed. The patients presenting problems have been discussed, and they are in agreement with the care plan formulated and outlined with them. I have encouraged them to ask questions as they arise throughout their visit. 10:34 PM  Discussed patient with Dr. Yamilet Cabrera.   She agrees with plan. 11:15 PM  Dr. Jake Hector paged for the third time. 11:47 PM  Attempted to page colorectal surgery a 4th time. Informed that a physician will call back in 30 minutes. CONSULT NOTE:  11:50 PM  NEL Kyle spoke with Dr. Jake Hector,  Specialty: colorectal surgery  Discussed patient's hx, disposition, and available diagnostic and imaging results. Reviewed care plans. Consultant agrees with plans as outlined. Continue the Miralax and follow up tomorrow in the office. 12:13 AM  Colorectal recommendation discussed with family. CT results discussed in detail. Pt and spouse agree to follow up with Dr. Marry Sharif tomorrow. Pt states he has plenty of pain medication at home. Anticipate discharge. Pt notes he has not taken his HTN medications today. Disposition:  DISCHARGE NOTE  12:16 AM  The patient has been re-evaluated and is ready for discharge. Reviewed available results with patient. Counseled patient on diagnosis and care plan. Patient has expressed understanding, and all questions have been answered. Patient agrees with plan and agrees to follow up as recommended, or return to the ED if their symptoms worsen. Discharge instructions have been provided and explained to the patient, along with reasons to return to the ED. PLAN:  1. Discharge Medication List as of 1/23/2018 12:15 AM        2. Follow-up Information     Follow up With Details Comments Contact Info    Richy Lewis MD Schedule an appointment as soon as possible for a visit COLORECTAL SURGERY: call tomorrow to schedule follow up [address] [address] [phone]          Return to ED if worse     Diagnosis     Clinical Impression:   1. Malignant ascites        Attestations:    Attestation Note:  This note is prepared by P[facility], acting as Scribe for Veneta Spatz PA-C Cassell Chessman:  The scribe's documentation has been prepared under my direction and personally reviewed by me in its entirety. I confirm that the note above accurately reflects all work, treatment, procedures, and medical decision making performed by me.

## 2018-01-23 NOTE — DISCHARGE INSTRUCTIONS
Thank you for allowing us to provide you with care today. We hope we addressed all of your concerns and needs. We strive to provide excellent quality care in the Emergency Department. Please rate us as excellent, as anything less than excellent does not meet our expectations. If you feel that you have not received excellent quality care or timely care, please ask to speak to the nurse manager. Please choose us in the future for your continued health care needs. The exam and treatment you received in the Emergency Department were for an urgent problem and are not intended as complete care. It is important that you follow-up with a doctor, nurse practitioner, or  [id number] assistant to: (1) confirm your diagnosis, (2) re-evaluation of changes in your illness and treatment, and (3) for ongoing care. If your symptoms become worse or you do not improve as expected and you are unable to reach your usual health care provider, you should return to the Emergency Department. We are available 24 hours a day. Take this sheet with you when you go to your follow-up visit. If you have any problem arranging the follow-up visit, contact the Emergency Department immediately. Make an appointment with your Primary Care doctor for follow up of this visit. Return to the ER if you are unable to be seen in the time recommended on your discharge instructions.

## 2018-01-24 NOTE — PROGRESS NOTES
Pre call completed with patient and  regarding upcoming procedure. Patient is still taking Aspirin 81 mg each day.

## 2018-01-25 ENCOUNTER — HOSPITAL ENCOUNTER (OUTPATIENT)
Dept: ULTRASOUND IMAGING | Age: 68
Discharge: HOME | End: 2018-01-25
Attending: INTERNAL MEDICINE
Payer: MEDICARE

## 2018-01-25 DIAGNOSIS — R18.8 ASCITIC FLUID: ICD-10-CM

## 2018-01-25 DIAGNOSIS — C20 MALIGNANT NEOPLASM OF RECTUM (HCC): ICD-10-CM

## 2018-01-25 PROCEDURE — 76705 ECHO EXAM OF ABDOMEN: CPT

## 2018-01-30 ENCOUNTER — HOSPITAL ENCOUNTER (OUTPATIENT)
Dept: PET IMAGING | Age: 68
Discharge: HOME | End: 2018-01-30
Attending: INTERNAL MEDICINE
Payer: MEDICARE

## 2018-01-30 VITALS — WEIGHT: 250 LBS | BODY MASS INDEX: 37.03 KG/M2 | HEIGHT: 69 IN

## 2018-01-30 DIAGNOSIS — R18.8 ASCITIC FLUID: ICD-10-CM

## 2018-01-30 DIAGNOSIS — C20 MALIGNANT NEOPLASM OF RECTUM (HCC): ICD-10-CM

## 2018-01-30 PROCEDURE — A9552 F18 FDG: HCPCS

## 2018-01-30 RX ORDER — SODIUM CHLORIDE 0.9 % (FLUSH) 0.9 %
10 SYRINGE (ML) INJECTION
Status: COMPLETED | OUTPATIENT
Start: 2018-01-30 | End: 2018-01-30

## 2018-01-30 RX ADMIN — Medication 10 ML: at 09:37

## 2018-02-02 ENCOUNTER — OFFICE VISIT (OUTPATIENT)
Dept: CARDIOLOGY CLINIC | Age: 68
End: 2018-02-02

## 2018-02-02 VITALS
HEART RATE: 72 BPM | OXYGEN SATURATION: 98 % | BODY MASS INDEX: 37.38 KG/M2 | HEIGHT: 69 IN | DIASTOLIC BLOOD PRESSURE: 62 MMHG | WEIGHT: 252.4 LBS | SYSTOLIC BLOOD PRESSURE: 128 MMHG

## 2018-02-02 DIAGNOSIS — E78.2 MIXED HYPERLIPIDEMIA: ICD-10-CM

## 2018-02-02 DIAGNOSIS — I25.10 CORONARY ARTERY DISEASE INVOLVING NATIVE CORONARY ARTERY OF NATIVE HEART WITHOUT ANGINA PECTORIS: Primary | ICD-10-CM

## 2018-02-02 DIAGNOSIS — E11.9 TYPE 2 DIABETES MELLITUS WITHOUT COMPLICATION, UNSPECIFIED LONG TERM INSULIN USE STATUS: ICD-10-CM

## 2018-02-02 DIAGNOSIS — I10 ESSENTIAL HYPERTENSION WITH GOAL BLOOD PRESSURE LESS THAN 140/90: ICD-10-CM

## 2018-02-02 NOTE — PROGRESS NOTES
Lucila Giraldo MD          NAME:  Loan Galvin   :   1950   MRN:   [id number]   PCP:  Jeison Koroma MD           Subjective: The patient is a 75y.o. year old male  who returns for a routine follow-up. Since the last visit, patient reports no change in exercise tolerance, chest pain, edema, medication intolerance, palpitations, shortness of breath, PND/orthopnea wheezing, sputum, syncope, dizziness or light headedness. Doing well. Past Medical History:   Diagnosis Date    Abnormal nuclear stress test 2017    Arthritis     Asthma     childhood    BPH (benign prostatic hypertrophy)     CAD (coronary artery disease)     M. I.    Cancer [facility])     Rectal CA     Chronic pain     Colon polyp     DM (diabetes mellitus) ([facility] [address] 2011    Gout     Hemorrhoids     HTN (hypertension) 2011    Hyperlipidemia 2011    [facility]    S/p surg.  S/P cardiac cath 2017        ICD-10-CM ICD-9-CM    1. Coronary artery disease involving native coronary artery of native heart without angina pectoris I25.10 414.01 AMB POC EKG ROUTINE W/ 12 LEADS, INTER & REP   2. Type 2 diabetes mellitus without complication, unspecified long term insulin use status (HCC) E11.9 250.00    3. Mixed hyperlipidemia E78.2 272.2    4.  Essential hypertension with goal blood pressure less than 140/90 I10 401.9       Social History   Substance Use Topics    Smoking status: Former Smoker     Packs/day: 0.50     Years: 40.00     Quit date: 2016    Smokeless tobacco: Never Used    Alcohol use No      Comment: OCCASIONAL      Family History   Problem Relation Age of Onset    Heart Disease Mother     COPD Mother     COPD Father     Diabetes Father     Hypertension Father     Alcohol abuse Sister     Diabetes Brother     High Cholesterol Brother     Hypertension Brother     Anesth Problems Neg Hx         Review of Systems  Cardiovascular: Negative except as noted in HPI      Objective:       Vitals:    02/02/18 1337 02/02/18 1350   BP: 124/66 128/62   Pulse: 72    SpO2: 98%    Weight: 252 lb 6.4 oz (114.5 kg)    Height: 5' 9\" (1.753 m)     Body mass index is 37.27 kg/(m^2). General PE  Mental Status - Alert. General Appearance - Not in acute distress. Chest and Lung Exam   Inspection: Accessory muscles - No use of accessory muscles in breathing. Auscultation:   Breath sounds: - Normal.    Cardiovascular   Inspection: Jugular vein - Bilateral - Inspection Normal.  Palpation/Percussion:   Apical Impulse: - Normal.  Auscultation: Rhythm - Regular. Heart Sounds - S1 WNL and S2 WNL. No S3 or S4. Murmurs & Other Heart Sounds: Auscultation of the heart reveals - No Murmurs. Peripheral Vascular   Upper Extremity: Inspection - Bilateral - No Cyanotic nailbeds or Digital clubbing. Lower Extremity:   Palpation: Edema - Bilateral - No edema. Data Review:     EKG -  EKG: unchanged from previous tracings, normal sinus rhythm, nonspecific ST and T waves changes. LABS- @brieflabs@      Allergies reviewed  Allergies   Allergen Reactions    Atorvastatin Myalgia    Pcn [Penicillins] Hives       Medications reviewed  Current Outpatient Prescriptions   Medication Sig    lactulose (CHRONULAC) 10 gram/15 mL solution Take  by mouth as needed.  magnesium oxide (MAGOX) 400 mg tablet Take 400 mg by mouth two (2) times a day.  rosuvastatin (CRESTOR) 5 mg tablet Take 0.5 Tabs by mouth nightly.  insulin glargine (LANTUS,BASAGLAR) 100 unit/mL (3 mL) inpn 52 Units by SubCUTAneous route daily.  ASCENSIA CONTOUR strip CHECK THREE TIMES DAILY AS DIRECTED    insulin lispro (HUMALOG) 100 unit/mL kwikpen Sliding scale QID (ac meals, hs): For -299 give 4 units, 300-400, give 7 units, over 400 give 10 units.     metFORMIN (GLUCOPHAGE) 500 mg tablet Please restart Metformin 4/29  TAKE 2 TABLETS BY MOUTH TWICE A DAY WITH MEALS    isosorbide mononitrate ER (IMDUR) 60 mg CR tablet TAKE 1 TABLET BY MOUTH DAILY.  BD INSULIN PEN NEEDLE UF SHORT 31 gauge x 5/16\" ndle USE DAILY    labetalol (NORMODYNE) 100 mg tablet TAKE 2 TABLETS BY MOUTH TWICE A DAY    cloNIDine HCl (CATAPRES) 0.2 mg tablet Take 1 Tab by mouth two (2) times a day.  multivitamins-minerals-lutein (MULTIVITAMIN 50 PLUS) tab tablet   See Instructions, daily, 0 Refills    ferrous sulfate (SLOW FE) 142 mg (45 mg iron) ER tablet Take  by mouth Daily (before breakfast).  amLODIPine (NORVASC) 10 mg tablet TAKE 1 TABLET BY MOUTH EVERY DAY    aspirin delayed-release 81 mg tablet Take 81 mg by mouth daily.  ALPRAZolam (XANAX) 0.5 mg tablet Take 0.25 mg by mouth two (2) times daily as needed for Anxiety. No current facility-administered medications for this visit. Assessment:       ICD-10-CM ICD-9-CM    1. Coronary artery disease involving native coronary artery of native heart without angina pectoris I25.10 414.01 AMB POC EKG ROUTINE W/ 12 LEADS, INTER & REP   2. Type 2 diabetes mellitus without complication, unspecified long term insulin use status (HCC) E11.9 250.00    3. Mixed hyperlipidemia E78.2 272.2    4. Essential hypertension with goal blood pressure less than 140/90 I10 401.9         Orders Placed This Encounter    AMB POC EKG ROUTINE W/ 12 LEADS, INTER & REP     Order Specific Question:   Reason for Exam:     Answer:   BELINDA    lactulose (CHRONULAC) 10 gram/15 mL solution     Sig: Take  by mouth as needed. Plan:     No angina. Mild TALBOT. EKG OK. Weight stable. BP at target. F/U 6 mo. Continue medical management.       Jacque Tyson MD

## 2018-02-02 NOTE — MR AVS SNAPSHOT
[address] [facility] 
[phone] Patient: Wanda Hameed MRN: [id number] :1950 Visit Information Date & Time Provider Department Dept. Phone Encounter #  
 2018  2:00 PM Roslyn Gupta, [address] [place] [address] [facility] [phone] Your Appointments 2018 11:00 AM  
ESTABLISHED PATIENT with MD Hari Velasco TURNER, [address] [facility] ([address]) Appt Note: 3 mos check  
 [id number] [address] [facility] [address] [address]  
[phone]  
  
   
 [address] Curiously [facility] [employer] [facility] [id number] Upcoming Health Maintenance Date Due Hepatitis C Screening 1950 FOOT EXAM Q1 10/29/1959 EYE EXAM RETINAL OR DILATED Q1 10/29/1959 ZOSTER VACCINE AGE 60> 2009 DTaP/Tdap/Td series (1 - Tdap) 2010 GLAUCOMA SCREENING Q2Y 10/29/2014 Pneumococcal 65+ High/Highest Risk (1 of 2 - PCV13) 10/29/2014 MICROALBUMIN Q1 2017 Influenza Age 5 to Adult 2017 HEMOGLOBIN A1C Q6M 2018 MEDICARE YEARLY EXAM 2018 LIPID PANEL Q1 2019 COLONOSCOPY 2/3/2022 Allergies as of 2018  Review Complete On: 2018 By: Roslyn Gupta MD  
  
 Severity Noted Reaction Type Reactions Atorvastatin  10/04/2016    Myalgia Pcn [Penicillins]  2011    Hives Current Immunizations  Reviewed on 2017 Name Date Pneumococcal Polysaccharide (PPSV-23) 2013 TD Vaccine 2005 Td 2010 Not reviewed this visit You Were Diagnosed With   
  
 Codes Comments Coronary artery disease involving native coronary artery of native heart without angina pectoris    -  Primary ICD-10-CM: I25.10 ICD-9-CM: 414.01 Vitals BP Pulse Height(growth percentile) Weight(growth percentile) SpO2 BMI  
 128/62 72 5' 9\" (1.753 m) 252 lb 6.4 oz (114.5 kg) 98% 37.27 kg/m2 Smoking Status Former Smoker Vitals History BMI and BSA Data Body Mass Index Body Surface Area  
 37.27 kg/m 2 2.36 m 2 Preferred Pharmacy Pharmacy Name Phone [id number] [place] [address] [facility] 0 [phone] Your Updated Medication List  
  
   
This list is accurate as of: 2/2/18  2:38 PM.  Always use your most recent med list. amLODIPine 10 mg tablet Commonly known as:  Sanchez Esposito TAKE 1 TABLET BY MOUTH EVERY DAY [employer] CONTOUR strip Generic drug:  glucose blood VI test strips CHECK THREE TIMES DAILY AS DIRECTED  
  
 aspirin delayed-release 81 mg tablet Take 81 mg by mouth daily. BD INSULIN PEN NEEDLE UF SHORT 31 gauge x 5/16\" Ndle Generic drug:  Insulin Needles (Disposable) USE DAILY  
  
 cloNIDine HCl 0.2 mg tablet Commonly known as:  CATAPRES Take 1 Tab by mouth two (2) times a day. insulin glargine 100 unit/mL (3 mL) Inpn Commonly known as:  LANTUSBASAGLAR  
52 Units by SubCUTAneous route daily. insulin lispro 100 unit/mL kwikpen Commonly known as:  HUMALOG Sliding scale QID (ac meals, hs): For -299 give 4 units, 300-400, give 7 units, over 400 give 10 units. isosorbide mononitrate ER 60 mg CR tablet Commonly known as:  IMDUR  
TAKE 1 TABLET BY MOUTH DAILY. labetalol 100 mg tablet Commonly known as:  NORMODYNE  
TAKE 2 TABLETS BY MOUTH TWICE A DAY  
  
 lactulose 10 gram/15 mL solution Commonly known as:  Issa Galea Take  by mouth as needed. MAGOX 400 mg tablet Generic drug:  magnesium oxide Take 400 mg by mouth two (2) times a day. metFORMIN 500 mg tablet Commonly known as:  GLUCOPHAGE Please restart Metformin 4/29 TAKE 2 TABLETS BY MOUTH TWICE A DAY WITH MEALS  
  
 MULTIVITAMIN 50 PLUS Tab tablet Generic drug:  multivitamins-minerals-lutein See Instructions, daily, 0 Refills  
  
 rosuvastatin 5 mg tablet Commonly known as:  CRESTOR Take 0.5 Tabs by mouth nightly. SLOW  mg (45 mg iron) ER tablet Generic drug:  ferrous sulfate Take  by mouth Daily (before breakfast). XANAX 0.5 mg tablet Generic drug:  ALPRAZolam  
Take 0.25 mg by mouth two (2) times daily as needed for Anxiety. We Performed the Following AMB POC EKG ROUTINE W/ 12 LEADS, INTER & REP [35419 CPT(R)] Introducing [facility]! Dear Jere Mai: 
Thank you for requesting a Libersy account. Our records indicate that you already have an active Libersy account. You can access your account anytime at [email or web address] Did you know that you can access your hospital and ER discharge instructions at any time in Libersy? You can also review all of your test results from your hospital stay or ER visit. Additional Information If you have questions, please visit the Frequently Asked Questions section of the Libersy website at [email or web address] Remember, Libersy is NOT to be used for urgent needs. For medical emergencies, dial 911. Now available from your iPhone and Android! Please provide this summary of care documentation to your next provider. Your primary care clinician is listed as Steven. If you have any questions after today's visit, please call [phone].

## 2018-02-02 NOTE — PROGRESS NOTES
Chief Complaint   Patient presents with    Shortness of Breath    Abdominal Pain    ED Follow-up     1. Have you been to the ER, urgent care clinic since your last visit? Hospitalized since your last visit? YES. ED [facility] ON 1/22/18 FOR ABDOMINAL PAIN    2. Have you seen or consulted any other health care providers outside of the [address] [place] [address] since your last visit? Include any pap smears or colon screening.  NO

## 2018-03-05 ENCOUNTER — HOSPITAL ENCOUNTER (OUTPATIENT)
Dept: ULTRASOUND IMAGING | Age: 68
Discharge: HOME | End: 2018-03-05
Attending: INTERNAL MEDICINE
Payer: MEDICARE

## 2018-03-05 VITALS
SYSTOLIC BLOOD PRESSURE: 176 MMHG | DIASTOLIC BLOOD PRESSURE: 72 MMHG | HEIGHT: 69 IN | RESPIRATION RATE: 16 BRPM | OXYGEN SATURATION: 100 % | WEIGHT: 250 LBS | HEART RATE: 71 BPM | BODY MASS INDEX: 37.03 KG/M2 | TEMPERATURE: 98.1 F

## 2018-03-05 DIAGNOSIS — R97.20 ELEVATED PROSTATE SPECIFIC ANTIGEN (PSA): ICD-10-CM

## 2018-03-05 DIAGNOSIS — R18.8 ASCITIC FLUID: ICD-10-CM

## 2018-03-05 DIAGNOSIS — C20 MALIGNANT NEOPLASM OF RECTUM (HCC): ICD-10-CM

## 2018-03-05 LAB
AMYLASE FLD-CCNC: 29 U/L
APPEARANCE FLD: ABNORMAL
COLOR FLD: YELLOW
EOSINOPHIL NFR FLD MANUAL: 5 %
GLUCOSE FLD-MCNC: 174 MG/DL
INR BLD: 1.2 (ref 0.9–1.2)
LYMPHOCYTES NFR FLD: 24 %
MESOTHL CELL NFR FLD: 34 %
MONOS+MACROS NFR FLD: 19 %
NEUTROPHILS NFR FLD: 18 %
NUC CELL # FLD: 425 /CU MM
PROT FLD-MCNC: 5.3 G/DL
RBC # FLD: >100 /CU MM
SPECIMEN SOURCE FLD: ABNORMAL
SPECIMEN SOURCE FLD: NORMAL

## 2018-03-05 PROCEDURE — 87205 SMEAR GRAM STAIN: CPT | Performed by: RADIOLOGY

## 2018-03-05 PROCEDURE — 84157 ASSAY OF PROTEIN OTHER: CPT | Performed by: RADIOLOGY

## 2018-03-05 PROCEDURE — 85610 PROTHROMBIN TIME: CPT

## 2018-03-05 PROCEDURE — 82150 ASSAY OF AMYLASE: CPT | Performed by: RADIOLOGY

## 2018-03-05 PROCEDURE — 74011000250 HC RX REV CODE- 250: Performed by: RADIOLOGY

## 2018-03-05 PROCEDURE — 49083 ABD PARACENTESIS W/IMAGING: CPT

## 2018-03-05 PROCEDURE — 82945 GLUCOSE OTHER FLUID: CPT | Performed by: RADIOLOGY

## 2018-03-05 PROCEDURE — 87102 FUNGUS ISOLATION CULTURE: CPT | Performed by: RADIOLOGY

## 2018-03-05 PROCEDURE — 87075 CULTR BACTERIA EXCEPT BLOOD: CPT | Performed by: RADIOLOGY

## 2018-03-05 PROCEDURE — 89050 BODY FLUID CELL COUNT: CPT | Performed by: RADIOLOGY

## 2018-03-05 RX ORDER — LIDOCAINE HYDROCHLORIDE 10 MG/ML
10 INJECTION, SOLUTION EPIDURAL; INFILTRATION; INTRACAUDAL; PERINEURAL
Status: COMPLETED | OUTPATIENT
Start: 2018-03-05 | End: 2018-03-05

## 2018-03-05 RX ADMIN — LIDOCAINE HYDROCHLORIDE 10 ML: 10 INJECTION, SOLUTION EPIDURAL; INFILTRATION; INTRACAUDAL; PERINEURAL at 13:00

## 2018-03-05 NOTE — IP AVS SNAPSHOT
[address] [facility] 
[phone] Patient: Last Sea MRN: [id number] :1950 About your hospitalization You were admitted on:  2018 You last received care in the:  [facility] Ultrasound You were discharged on:  2018 Why you were hospitalized Your primary diagnosis was:  Not on File Follow-up Information None Your Scheduled Appointments 2018  1:30 PM EDT  
ECHO CARDIOGRAMS 2D with ECHO, [facility]) [address] [facility]  
[phone] 2018 11:00 AM EDT  
ESTABLISHED PATIENT with MD Brittney Elkins HARTLEY, [address] [place] [address] ([facility]) [address] [address]  
[phone] Discharge Orders None A check cesar indicates which time of day the medication should be taken. My Medications ASK your doctor about these medications Instructions Each Dose to Equal  
 Morning Noon Evening Bedtime  
 amLODIPine 10 mg tablet Commonly known as:  Nikki Peterson Your last dose was: Your next dose is: TAKE 1 TABLET BY MOUTH EVERY DAY [employer] CONTOUR strip Generic drug:  glucose blood VI test strips Your last dose was: Your next dose is: CHECK THREE TIMES DAILY AS DIRECTED  
     
   
   
   
  
 aspirin delayed-release 81 mg tablet Your last dose was: Your next dose is: Take 81 mg by mouth daily. 81 mg  
    
   
   
   
  
 BD INSULIN PEN NEEDLE UF SHORT 31 gauge x 5/16\" Ndle Generic drug:  Insulin Needles (Disposable) Your last dose was: Your next dose is: USE DAILY  
     
   
   
   
  
 cloNIDine HCl 0.2 mg tablet Commonly known as:  CATAPRES Your last dose was: Your next dose is: Take 1 Tab by mouth two (2) times a day. 0.2 mg  
    
   
   
   
  
 insulin glargine 100 unit/mL (3 mL) Inpn Commonly known as:  Primitivomekhi Rices Your last dose was: Your next dose is:    
   
   
 52 Units by SubCUTAneous route daily. 52 Units  
    
   
   
   
  
 insulin lispro 100 unit/mL kwikpen Commonly known as:  HUMALOG Your last dose was: Your next dose is:    
   
   
 Sliding scale QID (ac meals, hs): For -299 give 4 units, 300-400, give 7 units, over 400 give 10 units. isosorbide mononitrate ER 60 mg CR tablet Commonly known as:  IMDUR Your last dose was: Your next dose is: TAKE 1 TABLET BY MOUTH DAILY. labetalol 100 mg tablet Commonly known as:  Dorla Tyler Hill Your last dose was: Your next dose is: TAKE 2 TABLETS BY MOUTH TWICE A DAY  
     
   
   
   
  
 lactulose 10 gram/15 mL solution Commonly known as:  Caye Purpura Your last dose was: Your next dose is: Take  by mouth as needed. MAGOX 400 mg tablet Generic drug:  magnesium oxide Your last dose was: Your next dose is: Take 400 mg by mouth two (2) times a day. 400 mg  
    
   
   
   
  
 metFORMIN 500 mg tablet Commonly known as:  GLUCOPHAGE Your last dose was: Your next dose is: Please restart Metformin 4/29 TAKE 2 TABLETS BY MOUTH TWICE A DAY WITH MEALS  
     
   
   
   
  
 MULTIVITAMIN 50 PLUS Tab tablet Generic drug:  multivitamins-minerals-lutein Your last dose was: Your next dose is:    
   
   
 See Instructions, daily, 0 Refills  
     
   
   
   
  
 rosuvastatin 5 mg tablet Commonly known as:  CRESTOR Your last dose was: Your next dose is: Take 0.5 Tabs by mouth nightly. 2.5 mg  
    
   
   
   
  
 SLOW  mg (45 mg iron) ER tablet Generic drug:  ferrous sulfate Your last dose was: Your next dose is: Take  by mouth Daily (before breakfast). XANAX 0.5 mg tablet Generic drug:  ALPRAZolam  
   
Your last dose was: Your next dose is: Take 0.25 mg by mouth two (2) times daily as needed for Anxiety. 0.25 mg Discharge Instructions [address] PARACENTESIS DISCHARGE INSTRUCTIONS General Information: 
During this procedure, the doctor will insert a needle into the abdomen to drain fluid. After the procedure, you will be able to take a deep breath much easier. The site of the puncture may ooze the first day. This will decrease and eventually stop. Paracentesis (draining fluid from the abdomen) sometimes makes patients hypotensive (low blood pressure). Your doctor may order for you to receive fluids or albumin (a volume booster) during the procedure through an IV site. Home Care Instructions: 
Keep the puncture site clean and dry. No tub baths or swimming until puncture site heals. Showering is acceptable. Resume your normal diet, and resume your normal activity slowly and as you tolerate. If you are short of breath, rest. If shortness of breath does not ease, please call your ordering doctor. Fluid can re-accumulate in the chest and/or in the abdomen. If this should occur, your doctor needs to know as you may need to have the procedure done again. Call If: 
   You should call your Physician and/or the Radiology Nurse if you notice any signs of infection, like pus draining, or if it is swollen or reddened. Also call if you have a fever, or if you are bleeding from the puncture site more than a small amount on the dressing. Call if the puncture site keeps draining fluid.  Some oozing is to be expected, but should slow and then stop. Call if you feel like you have pressure in your abdomen. SEEK IMMEDIATE CARE OR CALL [phone] IF YOU SUDDENLY HAVE TROUBLE BREATHING, OR IF YOUR LIPS TURN BLUE, OR IF YOU NOTICE BLOOD IN YOUR SPUTUM. Follow-Up Instructions: Please see your ordering doctor as he/she has requested. To Reach Us:  Please call [phone] and ask to speak to the nurse on call. Beth Mcgovern [facility] Special Procedures/Radiology Department Radiologist:  Dr. Tash Gama Date:  3/5/2018 Paracentesis Discharge Instructions You may have an aching pain in your abdomen at the puncture site tonight. You may take Tylenol, as directed on the label, for the pain or discomfort. Resume your previous diet and follow the medication reconciliation form. Rest today. Watch for signs of infection at the puncture site:  redness, swelling, pus, fever or chills. If this occurs, call your doctor immediately or go to the nearest Emergency Room. If you experience severe sweating, severe abdominal pain, dizziness or faintness, go to the nearest Emergency Room immediately. If you have any questions or concerns, please call [phone], and ask to speak to the nurse on-call. Other: 
 
 
 
Date: 3/5/2018 Discharging Nurse: Parish Clemons RN Introducing [facility]! Dear Sayra Henriquez: 
Thank you for requesting a Withings account. Our records indicate that you already have an active Withings account. You can access your account anytime at [email or web address] Did you know that you can access your hospital and ER discharge instructions at any time in Withings? You can also review all of your test results from your hospital stay or ER visit. Additional Information If you have questions, please visit the Frequently Asked Questions section of the Withings website at [email or web address] Remember, MyChart is NOT to be used for urgent needs. For medical emergencies, dial 911. Now available from your iPhone and Android! Unresulted Labs-Please follow up with your PCP about these lab tests Order Current Status AMYLASE, FLUID In process CELL COUNT, BODY FLUID In process CULTURE, ANAEROBIC In process CULTURE, BODY FLUID W GRAM STAIN In process CULTURE, FUNGUS In process GLUCOSE, FLUID In process GRAM STAIN In process PROTEIN TOTAL, FLUID In process US PARACENTESIS ABD W IMAGING In process Providers Seen During Your Hospitalization Provider Specialty Primary office phone Charmaine Robert MD Hematology and Oncology [phone] Your Primary Care Physician (PCP) Primary Care Physician Office Phone Office Fax Esteban Jang [phone] You are allergic to the following Allergen Reactions Atorvastatin Myalgia Pcn (Penicillins) Hives Recent Documentation Height Weight BMI Smoking Status 1.753 m 113.4 kg 36.92 kg/m2 Former Smoker Emergency Contacts Name Discharge Info Relation Home Work Mobile Millie Davila CAREGIVER [3] Spouse [3] [phone] Patient Belongings The following personal items are in your possession at time of discharge: 
     Visual Aid: None Please provide this summary of care documentation to your next provider. Signatures-by signing, you are acknowledging that this After Visit Summary has been reviewed with you and you have received a copy. Patient Signature:  ____________________________________________________________ Date:  ____________________________________________________________  
  
Betty Michael Provider Signature:  ____________________________________________________________ Date:  ____________________________________________________________

## 2018-03-05 NOTE — DISCHARGE INSTRUCTIONS
[address] [facility] PARACENTESIS DISCHARGE INSTRUCTIONS    General Information:  During this procedure, the doctor will insert a needle into the abdomen to drain fluid. After the procedure, you will be able to take a deep breath much easier. The site of the puncture may ooze the first day. This will decrease and eventually stop. Paracentesis (draining fluid from the abdomen) sometimes makes patients hypotensive (low blood pressure). Your doctor may order for you to receive fluids or albumin (a volume booster) during the procedure through an IV site. Home Care Instructions:  Keep the puncture site clean and dry. No tub baths or swimming until puncture site heals. Showering is acceptable. Resume your normal diet, and resume your normal activity slowly and as you tolerate. If you are short of breath, rest. If shortness of breath does not ease, please call your ordering doctor. Fluid can re-accumulate in the chest and/or in the abdomen. If this should occur, your doctor needs to know as you may need to have the procedure done again. Call If:     You should call your Physician and/or the Radiology Nurse if you notice any signs of infection, like pus draining, or if it is swollen or reddened. Also call if you have a fever, or if you are bleeding from the puncture site more than a small amount on the dressing. Call if the puncture site keeps draining fluid. Some oozing is to be expected, but should slow and then stop. Call if you feel like you have pressure in your abdomen. SEEK IMMEDIATE CARE OR CALL 171 IF YOU SUDDENLY HAVE TROUBLE BREATHING, OR IF YOUR LIPS TURN BLUE, OR IF YOU NOTICE BLOOD IN YOUR SPUTUM. Follow-Up Instructions: Please see your ordering doctor as he/she has requested. To Reach Us:  Please call [phone] and ask to speak to the nurse on call. [address]  Special Procedures/Radiology Department      Radiologist:  Dr. Magda Duong [place]    Date:  3/5/2018    Paracentesis Discharge Instructions    You may have an aching pain in your abdomen at the puncture site tonight. You may take Tylenol, as directed on the label, for the pain or discomfort. Resume your previous diet and follow the medication reconciliation form. Rest today. Watch for signs of infection at the puncture site:  redness, swelling, pus, fever or chills. If this occurs, call your doctor immediately or go to the nearest Emergency Room. If you experience severe sweating, severe abdominal pain, dizziness or faintness, go to the nearest Emergency Room immediately. If you have any questions or concerns, please call [phone], and ask to speak to the nurse on-call.     Other:        Date: 3/5/2018  Discharging Nurse: Citlaly Lind RN

## 2018-03-05 NOTE — IP AVS SNAPSHOT
[address] [facility] 
[phone] Patient: Guillermo Ye MRN: [id number] :1950 A check cesar indicates which time of day the medication should be taken. My Medications ASK your doctor about these medications Instructions Each Dose to Equal  
 Morning Noon Evening Bedtime  
 amLODIPine 10 mg tablet Commonly known as:  Arcelia Slate Your last dose was: Your next dose is: TAKE 1 TABLET BY MOUTH EVERY DAY [employer] CONTOUR strip Generic drug:  glucose blood VI test strips Your last dose was: Your next dose is: CHECK THREE TIMES DAILY AS DIRECTED  
     
   
   
   
  
 aspirin delayed-release 81 mg tablet Your last dose was: Your next dose is: Take 81 mg by mouth daily. 81 mg  
    
   
   
   
  
 BD INSULIN PEN NEEDLE UF SHORT 31 gauge x \" Ndle Generic drug:  Insulin Needles (Disposable) Your last dose was: Your next dose is: USE DAILY  
     
   
   
   
  
 cloNIDine HCl 0.2 mg tablet Commonly known as:  CATAPRES Your last dose was: Your next dose is: Take 1 Tab by mouth two (2) times a day. 0.2 mg  
    
   
   
   
  
 insulin glargine 100 unit/mL (3 mL) Inpn Commonly known as:  Hesham Anger Your last dose was: Your next dose is:    
   
   
 52 Units by SubCUTAneous route daily. 52 Units  
    
   
   
   
  
 insulin lispro 100 unit/mL kwikpen Commonly known as:  HUMALOG Your last dose was: Your next dose is:    
   
   
 Sliding scale QID (ac meals, hs): For -299 give 4 units, 300-400, give 7 units, over 400 give 10 units. isosorbide mononitrate ER 60 mg CR tablet Commonly known as:  IMDUR Your last dose was: Your next dose is: TAKE 1 TABLET BY MOUTH DAILY. labetalol 100 mg tablet Commonly known as:  Logan Pfeiffer Your last dose was: Your next dose is: TAKE 2 TABLETS BY MOUTH TWICE A DAY  
     
   
   
   
  
 lactulose 10 gram/15 mL solution Commonly known as:  Maria Dolores Manceraer Your last dose was: Your next dose is: Take  by mouth as needed. MAGOX 400 mg tablet Generic drug:  magnesium oxide Your last dose was: Your next dose is: Take 400 mg by mouth two (2) times a day. 400 mg  
    
   
   
   
  
 metFORMIN 500 mg tablet Commonly known as:  GLUCOPHAGE Your last dose was: Your next dose is: Please restart Metformin 4/29 TAKE 2 TABLETS BY MOUTH TWICE A DAY WITH MEALS  
     
   
   
   
  
 MULTIVITAMIN 50 PLUS Tab tablet Generic drug:  multivitamins-minerals-lutein Your last dose was: Your next dose is:    
   
   
 See Instructions, daily, 0 Refills  
     
   
   
   
  
 rosuvastatin 5 mg tablet Commonly known as:  CRESTOR Your last dose was: Your next dose is: Take 0.5 Tabs by mouth nightly. 2.5 mg  
    
   
   
   
  
 SLOW  mg (45 mg iron) ER tablet Generic drug:  ferrous sulfate Your last dose was: Your next dose is: Take  by mouth Daily (before breakfast). XANAX 0.5 mg tablet Generic drug:  ALPRAZolam  
   
Your last dose was: Your next dose is: Take 0.25 mg by mouth two (2) times daily as needed for Anxiety.   
 0.25 mg

## 2018-03-06 PROCEDURE — 88305 TISSUE EXAM BY PATHOLOGIST: CPT | Performed by: INTERNAL MEDICINE

## 2018-03-06 PROCEDURE — 88112 CYTOPATH CELL ENHANCE TECH: CPT | Performed by: INTERNAL MEDICINE

## 2018-03-09 LAB
BACTERIA SPEC CULT: NORMAL
GRAM STN SPEC: NORMAL
GRAM STN SPEC: NORMAL
SERVICE CMNT-IMP: NORMAL
SERVICE CMNT-IMP: NORMAL

## 2018-03-13 ENCOUNTER — CLINICAL SUPPORT (OUTPATIENT)
Dept: CARDIOLOGY CLINIC | Age: 68
End: 2018-03-13

## 2018-03-13 DIAGNOSIS — I31.39 PERICARDIAL EFFUSION: Primary | ICD-10-CM

## 2018-03-23 ENCOUNTER — HOSPITAL ENCOUNTER (OUTPATIENT)
Dept: GENERAL RADIOLOGY | Age: 68
Discharge: HOME | End: 2018-03-23
Attending: INTERNAL MEDICINE
Payer: MEDICARE

## 2018-03-23 DIAGNOSIS — K59.09 OTHER CONSTIPATION: ICD-10-CM

## 2018-03-23 PROCEDURE — 74250 X-RAY XM SM INT 1CNTRST STD: CPT

## 2018-03-27 ENCOUNTER — ANESTHESIA (OUTPATIENT)
Dept: ENDOSCOPY | Age: 68
End: 2018-03-27
Payer: MEDICARE

## 2018-03-27 ENCOUNTER — HOSPITAL ENCOUNTER (OUTPATIENT)
Age: 68
Setting detail: OUTPATIENT SURGERY
Discharge: HOME | End: 2018-03-27
Attending: COLON & RECTAL SURGERY | Admitting: COLON & RECTAL SURGERY
Payer: MEDICARE

## 2018-03-27 ENCOUNTER — ANESTHESIA EVENT (OUTPATIENT)
Dept: ENDOSCOPY | Age: 68
End: 2018-03-27
Payer: MEDICARE

## 2018-03-27 VITALS
HEART RATE: 78 BPM | DIASTOLIC BLOOD PRESSURE: 83 MMHG | OXYGEN SATURATION: 100 % | BODY MASS INDEX: 36.37 KG/M2 | TEMPERATURE: 99.3 F | RESPIRATION RATE: 14 BRPM | WEIGHT: 245.56 LBS | SYSTOLIC BLOOD PRESSURE: 172 MMHG | HEIGHT: 69 IN

## 2018-03-27 LAB
GLUCOSE BLD STRIP.AUTO-MCNC: 140 MG/DL (ref 65–100)
SERVICE CMNT-IMP: ABNORMAL

## 2018-03-27 PROCEDURE — 74011250636 HC RX REV CODE- 250/636

## 2018-03-27 PROCEDURE — 74011000250 HC RX REV CODE- 250

## 2018-03-27 PROCEDURE — 76040000019: Performed by: COLON & RECTAL SURGERY

## 2018-03-27 PROCEDURE — 76060000031 HC ANESTHESIA FIRST 0.5 HR: Performed by: COLON & RECTAL SURGERY

## 2018-03-27 PROCEDURE — 82962 GLUCOSE BLOOD TEST: CPT

## 2018-03-27 PROCEDURE — 74011250636 HC RX REV CODE- 250/636: Performed by: COLON & RECTAL SURGERY

## 2018-03-27 RX ORDER — EPINEPHRINE 0.1 MG/ML
1 INJECTION INTRACARDIAC; INTRAVENOUS
Status: DISCONTINUED | OUTPATIENT
Start: 2018-03-27 | End: 2018-03-27 | Stop reason: HOSPADM

## 2018-03-27 RX ORDER — LIDOCAINE HYDROCHLORIDE 20 MG/ML
INJECTION, SOLUTION EPIDURAL; INFILTRATION; INTRACAUDAL; PERINEURAL AS NEEDED
Status: DISCONTINUED | OUTPATIENT
Start: 2018-03-27 | End: 2018-03-27 | Stop reason: HOSPADM

## 2018-03-27 RX ORDER — SODIUM CHLORIDE 0.9 % (FLUSH) 0.9 %
5-10 SYRINGE (ML) INJECTION AS NEEDED
Status: DISCONTINUED | OUTPATIENT
Start: 2018-03-27 | End: 2018-03-27 | Stop reason: HOSPADM

## 2018-03-27 RX ORDER — SODIUM CHLORIDE 0.9 % (FLUSH) 0.9 %
5-10 SYRINGE (ML) INJECTION EVERY 8 HOURS
Status: DISCONTINUED | OUTPATIENT
Start: 2018-03-27 | End: 2018-03-27 | Stop reason: HOSPADM

## 2018-03-27 RX ORDER — FLUMAZENIL 0.1 MG/ML
0.2 INJECTION INTRAVENOUS
Status: DISCONTINUED | OUTPATIENT
Start: 2018-03-27 | End: 2018-03-27 | Stop reason: HOSPADM

## 2018-03-27 RX ORDER — ATROPINE SULFATE 0.1 MG/ML
0.5 INJECTION INTRAVENOUS
Status: DISCONTINUED | OUTPATIENT
Start: 2018-03-27 | End: 2018-03-27 | Stop reason: HOSPADM

## 2018-03-27 RX ORDER — DEXTROMETHORPHAN/PSEUDOEPHED 2.5-7.5/.8
1.2 DROPS ORAL
Status: DISCONTINUED | OUTPATIENT
Start: 2018-03-27 | End: 2018-03-27 | Stop reason: HOSPADM

## 2018-03-27 RX ORDER — SODIUM CHLORIDE 9 MG/ML
50 INJECTION, SOLUTION INTRAVENOUS CONTINUOUS
Status: DISCONTINUED | OUTPATIENT
Start: 2018-03-27 | End: 2018-03-27 | Stop reason: HOSPADM

## 2018-03-27 RX ORDER — PROPOFOL 10 MG/ML
INJECTION, EMULSION INTRAVENOUS AS NEEDED
Status: DISCONTINUED | OUTPATIENT
Start: 2018-03-27 | End: 2018-03-27 | Stop reason: HOSPADM

## 2018-03-27 RX ORDER — NALOXONE HYDROCHLORIDE 0.4 MG/ML
0.4 INJECTION, SOLUTION INTRAMUSCULAR; INTRAVENOUS; SUBCUTANEOUS
Status: DISCONTINUED | OUTPATIENT
Start: 2018-03-27 | End: 2018-03-27 | Stop reason: HOSPADM

## 2018-03-27 RX ADMIN — SODIUM CHLORIDE 50 ML/HR: 900 INJECTION, SOLUTION INTRAVENOUS at 12:20

## 2018-03-27 RX ADMIN — LIDOCAINE HYDROCHLORIDE 40 MG: 20 INJECTION, SOLUTION EPIDURAL; INFILTRATION; INTRACAUDAL; PERINEURAL at 12:59

## 2018-03-27 RX ADMIN — PROPOFOL 150 MG: 10 INJECTION, EMULSION INTRAVENOUS at 13:14

## 2018-03-27 NOTE — ANESTHESIA POSTPROCEDURE EVALUATION
Post-Anesthesia Evaluation and Assessment    Patient: Acosta Yee MRN: [id number]  SSN: [id number]    YOB: 1950  Age: 75 y.o. Sex: male       Cardiovascular Function/Vital Signs  Visit Vitals    /83    Pulse 78    Temp 37.4 °C (99.3 °F)    Resp 14    Ht 5' 9\" (1.753 m)    Wt 111.4 kg (245 lb 9 oz)    SpO2 100%    BMI 36.26 kg/m2       Patient is status post total IV anesthesia anesthesia for Procedure(s):  COLONOSCOPY. Nausea/Vomiting: None    Postoperative hydration reviewed and adequate. Pain:  Pain Scale 1: Numeric (0 - 10) (03/27/18 1339)  Pain Intensity 1: 0 (03/27/18 1339)   Managed    Neurological Status: At baseline    Mental Status and Level of Consciousness: Arousable    Pulmonary Status:   O2 Device: Room air (03/27/18 1341)   Adequate oxygenation and airway patent    Complications related to anesthesia: None    Post-anesthesia assessment completed.  No concerns    Signed By: George Prieto DO     March 27, 2018

## 2018-03-27 NOTE — BRIEF OP NOTE
BRIEF OPERATIVE NOTE    Date of Procedure: 3/27/2018   Preoperative Diagnosis: HISTORY OF RECTAL CANCER  Postoperative Diagnosis: * No post-op diagnosis entered *    Procedure(s):  COLONOSCOPY  Surgeon(s) and Role:     * Tarun Garcia MD - Primary         Assistant Staff: None      Surgical Staff:  Endoscopy Technician-1: Josesito Melgar  Endoscopy RN-1: Pb Whitfield RN  No case tracking events are documented in the log. Anesthesia: MAC   Estimated Blood Loss: none  Specimens: * No specimens in log *   Findings: thin stool. No obstruction.     Complications: none apparent  Implants: * No implants in log *

## 2018-03-27 NOTE — PROGRESS NOTES
Anesthesia reports 150 mg Propofol, 40 mg Lidocaine and 500 mL NS given during procedure. Received report from anesthesia staff on vital signs and status of patient.

## 2018-03-27 NOTE — ANESTHESIA PREPROCEDURE EVALUATION
Anesthetic History   No history of anesthetic complications            Review of Systems / Medical History  Patient summary reviewed, nursing notes reviewed and pertinent labs reviewed    Pulmonary          Smoker  Asthma        Neuro/Psych   Within defined limits           Cardiovascular    Hypertension          Past MI, CAD and cardiac stents (x2)    Exercise tolerance: <4 METS     GI/Hepatic/Renal  Within defined limits              Endo/Other    Diabetes    Obesity, arthritis and cancer (rectal CA)     Other Findings   Comments: Gout  Radial nerve compression         Physical Exam    Airway  Mallampati: II  TM Distance: 4 - 6 cm  Neck ROM: normal range of motion   Mouth opening: Normal     Cardiovascular  Regular rate and rhythm,  S1 and S2 normal,  no murmur, click, rub, or gallop             Dental    Dentition: Full upper dentures and Full lower dentures     Pulmonary  Breath sounds clear to auscultation               Abdominal  GI exam deferred       Other Findings            Anesthetic Plan    ASA: 3  Anesthesia type: MAC            Anesthetic plan and risks discussed with: Patient

## 2018-03-27 NOTE — H&P
Colon and Rectal Surgery History and Physical    Subjective:      Chloé Gary is a 75 y.o. male who has hx rectal cancer    Patient Active Problem List    Diagnosis Date Noted    Rectal adenocarcinoma ([address] 01/16/2018    Hypertension complicating diabetes ([address] 08/08/2017    Abnormal nuclear stress test 04/27/2017    S/P cardiac cath 04/27/2017    Rectal mass 10/15/2016    Status post colostomy ([address] 10/15/2016     Class: Acute    Type 2 diabetes mellitus without complication ([address] 97/35/0911    Coronary artery disease involving native coronary artery of native heart without angina pectoris 02/25/2016    Osteoarthritis of right hip 02/03/2015    Radial nerve compression 05/09/2014    Diabetic polyneuropathy ([address] 05/09/2014    Hyperlipidemia 05/17/2011     Past Medical History:   Diagnosis Date    Abnormal nuclear stress test 4/27/2017    Arthritis     Asthma     childhood    BPH (benign prostatic hypertrophy)     CAD (coronary artery disease) 1996    M.I., Stents x2    Cancer ([address]     Rectal CA     Chronic pain     Colon polyp     DM (diabetes mellitus) ([address] 5/17/2011    Gout     Hemorrhoids     HTN (hypertension) 5/17/2011    Hyperlipidemia 5/17/2011    Ill-defined condition     Colostomy, iliostomy    Rectal adenocarcinoma ([address] 1/16/2018    S/p surg.  S/P cardiac cath 4/27/2017      Past Surgical History:   Procedure Laterality Date    CARDIAC SURG PROCEDURE UNLIST  1060/4171    stent x2    COLONOSCOPY N/A 10/14/2016    COLONOSCOPY/EGD performed [facility] Patti Mix MD [facility] END[facility]COPY    COLONOSCOPY N/A 2/3/2017    COLONOSCOPY performed by Alexy Allen MD at [facility]. [address], COLON, DIAGNOSTIC  6/2011    HX GI      Prostate, Bladder, rectum.  colon removed    HX HERNIA REPAIR      AS INFANT    HX ORTHOPAEDIC  02/2016    hip replacement , left    HX PROSTATECTOMY  X2    biopsy benign    HX TONSILLECTOMY      HX UROLOGICAL      Bladder removed    SIGMOIDOSCOPY,BIOPSY  10/14/2016         UPPER GI ENDOSCOPY,DIAGNOSIS  10/14/2016           Social History   Substance Use Topics    Smoking status: Former Smoker     Packs/day: 0.50     Years: 40.00     Quit date: 9/8/2016    Smokeless tobacco: Never Used    Alcohol use No      Comment: [de-identified]      Family History   Problem Relation Age of Onset    Heart Disease Mother     COPD Mother     COPD Father     Diabetes Father     Hypertension Father     Alcohol abuse Sister     Diabetes Brother     High Cholesterol Brother     Hypertension Brother     Anesth Problems Neg Hx       Prior to Admission medications    Medication Sig Start Date End Date Taking? Authorizing Provider   magnesium oxide (MAGOX) 400 mg tablet Take 400 mg by mouth two (2) times a day. Yes Historical Provider   rosuvastatin (CRESTOR) 5 mg tablet Take 0.5 Tabs by mouth nightly. 1/16/18  Yes Oscar Ayala MD   insulin glargine (LANTUS,BASAGLAR) 100 unit/mL (3 mL) inpn 52 Units by SubCUTAneous route daily. Patient taking differently: 30 Units by SubCUTAneous route daily. 1/16/18  Yes Oscar Ayala MD   ASCENSIA CONTOUR strip CHECK THREE TIMES DAILY AS DIRECTED 10/29/17  Yes Oscar Ayala MD   metFORMIN (GLUCOPHAGE) 500 mg tablet Please restart Metformin 4/29  TAKE 2 TABLETS BY MOUTH TWICE A DAY WITH MEALS 9/22/17  Yes SAIMA Ayala MD   isosorbide mononitrate ER (IMDUR) 60 mg CR tablet TAKE 1 TABLET BY MOUTH DAILY. 9/11/17  Yes Alfonso Thornton MD   BD INSULIN PEN NEEDLE UF SHORT 31 gauge x 5/16\" ndle USE DAILY 8/8/17  Yes Oscar Ayala MD   labetalol (NORMODYNE) 100 mg tablet TAKE 2 TABLETS BY MOUTH TWICE A DAY 8/8/17  Yes SAIMA Ayala MD   cloNIDine HCl (CATAPRES) 0.2 mg tablet Take 1 Tab by mouth two (2) times a day.  8/8/17  Yes Oscar Ayala MD   multivitamins-minerals-lutein (MULTIVITAMIN 50 PLUS) tab tablet   See Instructions, daily, 0 Refills 10/21/16 Yes Historical Provider   ferrous sulfate (SLOW FE) 142 mg (45 mg iron) ER tablet Take  by mouth Daily (before breakfast). Yes Historical Provider   amLODIPine (NORVASC) 10 mg tablet TAKE 1 TABLET BY MOUTH EVERY DAY 6/19/17  Yes JHONY Patel   aspirin delayed-release 81 mg tablet Take 81 mg by mouth daily. Yes Historical Provider     Allergies   Allergen Reactions    Atorvastatin Myalgia    Pcn [Penicillins] Hives        Review of Systems:    A comprehensive review of systems was negative except for that written in the History of Present Illness. Objective:     Visit Vitals    BP (!) 158/97    Pulse 91    Temp 98.1 °F (36.7 °C)    Resp 19    Ht 5' 9\" (1.753 m)    Wt 111.4 kg (245 lb 9 oz)    SpO2 99%    BMI 36.26 kg/m2        Physical Exam: nad  Chest clear  Heart reg  abd soft    Imaging:  images and reports reviewed    Lab Review:    Recent Results (from the past 24 hour(s))   GLUCOSE, POC    Collection Time: 03/27/18 12:12 PM   Result Value Ref Range    Glucose (POC) 140 (H) 65 - 100 mg/dL    Performed by [address] and radiology: images and reports reviewed      Assessment:   Hx rectal cancer and change in bowel habits    Plan:     1. I recommend proceeding with colonoscopy. Treatment alternatives were discussed. 2. Discussed aspects of surgical intervention, methods, risks (including by not limited to infection, bleeding, hematoma, and perforation of the intestines or solid organs), and the risks of general anesthetic. The patient understands the risks; any and all questions were answered to the patient's satisfaction.     Signed By: Gonzalo Vang MD     March 27, 2018

## 2018-03-27 NOTE — OP NOTES
Colonoscopy Procedure Note    Indications: Previous colon cancer    Anesthesia/Sedation: MAC anesthesia Propofol    Pre-Procedure Exam:  Airway: clear   Heart: normal S1and S2    Lungs: clear bilateral  Abdomen: soft, nontender, bowel sounds present and normal in all quadrants   Mental Status: awake, alert, and oriented to person, place, and time      Procedure in Detail:  Informed consent was obtained for the procedure, including sedation. Risks of perforation, hemorrhage, adverse drug reaction, and aspiration were discussed. The patient was placed in the left lateral decubitus position. Based on the pre-procedure assessment, including review of the patient's medical history, medications, allergies, and review of systems, he had been deemed to be an appropriate candidate for moderate sedation; he was therefore sedated with the medications listed above. The patient was monitored continuously with ECG tracing, pulse oximetry, blood pressure monitoring, and direct observations. A rectal examination was performed. The [id number] was inserted into the rectum and advanced under direct vision to the cecum, which was identified by the ileocecal valve and appendiceal orifice. The quality of the colonic preparation was adequate. A careful inspection was made as the colonoscope was withdrawn, including a retroflexed view of the rectum; findings and interventions are described below. Appropriate photodocumentation was obtained. Findings:   Sigmoid: Normal  Descending Colon: Normal  Transverse Colon: Normal  Ascending Colon: Normal  Cecum: Normal    Specimens: No specimens were collected. EBL: None    Complications: None; patient tolerated the procedure well. Attending Attestation: I performed the procedure.     Recommendations:    - repeat colonoscopy in 6 months    Signed By: Gaby Stallings MD                        March 27, 2018

## 2018-03-27 NOTE — IP AVS SNAPSHOT
[address] [facility] 
[phone] Patient: Wanda Hameed MRN: [id number] :1950 About your hospitalization You were admitted on:  2018 You last received care in the:  [facility] ENDOSCOPY You were discharged on:  2018 Why you were hospitalized Your primary diagnosis was:  Not on File Follow-up Information Follow up With Details Comments Contact Info MD Tavo Velasco [address] 
[phone] Your Scheduled Appointments 2018 11:00 AM EDT  
ESTABLISHED PATIENT with MD Hari Velasco TURNER, [address] ([facility]) [address] [facility] [address]  
[phone] Discharge Orders None A check cesar indicates which time of day the medication should be taken. My Medications CHANGE how you take these medications Instructions Each Dose to Equal  
 Morning Noon Evening Bedtime  
 insulin glargine 100 unit/mL (3 mL) Inpn Commonly known as:  Nikky Shows What changed:  how much to take Your last dose was: Your next dose is:    
   
   
 52 Units by SubCUTAneous route daily. 52 Units CONTINUE taking these medications Instructions Each Dose to Equal  
 Morning Noon Evening Bedtime  
 amLODIPine 10 mg tablet Commonly known as:  Sanchez Esposito Your last dose was: Your next dose is: TAKE 1 TABLET BY MOUTH EVERY DAY [employer] CONTOUR strip Generic drug:  glucose blood VI test strips Your last dose was: Your next dose is: CHECK THREE TIMES DAILY AS DIRECTED  
     
   
   
   
  
 aspirin delayed-release 81 mg tablet Your last dose was: Your next dose is: Take 81 mg by mouth daily.   
 81 mg  
    
   
 BD INSULIN PEN NEEDLE UF SHORT 31 gauge x 5/16\" Ndle Generic drug:  Insulin Needles (Disposable) Your last dose was: Your next dose is: USE DAILY  
     
   
   
   
  
 cloNIDine HCl 0.2 mg tablet Commonly known as:  CATAPRES Your last dose was: Your next dose is: Take 1 Tab by mouth two (2) times a day. 0.2 mg  
    
   
   
   
  
 isosorbide mononitrate ER 60 mg CR tablet Commonly known as:  IMDUR Your last dose was: Your next dose is: TAKE 1 TABLET BY MOUTH DAILY. labetalol 100 mg tablet Commonly known as:  Sony Florentino Your last dose was: Your next dose is: TAKE 2 TABLETS BY MOUTH TWICE A DAY  
     
   
   
   
  
 MAGOX 400 mg tablet Generic drug:  magnesium oxide Your last dose was: Your next dose is: Take 400 mg by mouth two (2) times a day. 400 mg  
    
   
   
   
  
 metFORMIN 500 mg tablet Commonly known as:  GLUCOPHAGE Your last dose was: Your next dose is: Please restart Metformin 4/29 TAKE 2 TABLETS BY MOUTH TWICE A DAY WITH MEALS  
     
   
   
   
  
 MULTIVITAMIN 50 PLUS Tab tablet Generic drug:  multivitamins-minerals-lutein Your last dose was: Your next dose is:    
   
   
 See Instructions, daily, 0 Refills  
     
   
   
   
  
 rosuvastatin 5 mg tablet Commonly known as:  CRESTOR Your last dose was: Your next dose is: Take 0.5 Tabs by mouth nightly. 2.5 mg  
    
   
   
   
  
 SLOW  mg (45 mg iron) ER tablet Generic drug:  ferrous sulfate Your last dose was: Your next dose is: Take  by mouth Daily (before breakfast). Discharge Instructions Danilo Oconnor [id number] 
1950 COLON DISCHARGE INSTRUCTIONS Discomfort: Redness at IV site- apply warm compress to area; if redness or soreness persist- contact your physician There may be a slight amount of blood passed from the rectum Gaseous discomfort- walking, belching will help relieve any discomfort You may not operate a vehicle for 12 hours You may not engage in an occupation involving machinery or appliances for rest of today You may not drink alcoholic beverages for at least 12 hours Avoid making any critical decisions for at least 24 hour DIET: 
 High fiber diet.  however -  remember your colon is empty and a heavy meal will produce gas. Avoid these foods:  vegetables, fried / greasy foods, carbonated drinks for today MEDICATIONS: 
resume ACTIVITY: 
You may resume your normal daily activities it is recommended that you spend the remainder of the day resting -  avoid any strenuous activity. CALL M.D. ANY SIGN OF: Increasing pain, nausea, vomiting Abdominal distension (swelling) New increased bleeding (oral or rectal) Fever (chills) Follow-up Instructions: 
 Call Olga Blue MD if any questions or problems. Telephone # [phone] Should have a repeat colonoscopy in 6 months. COLONOSCOPY FINDINGS: 
Your colonoscopy showed: no obstruction. Fairly normal colon although prep was inadequate MyChart Activation Thank you for requesting access to [employer] Please follow the instructions below to securely access and download your online medical record. [employer] allows you to send messages to your doctor, view your test results, renew your prescriptions, schedule appointments, and more. How Do I Sign Up? 1. In your internet browser, go to [email or web address] 
2. Click on the First Time User? Click Here link in the Sign In box. You will be redirect to the New Member Sign Up page. 3. Enter your [employer] Access Code exactly as it appears below. You will not need to use this code after youve completed the sign-up process.  If you do not sign up before the expiration date, you must request a new code. Toolmeet Access Code: Activation code not generated Current Toolmeet Status: Active (This is the date your Toolmeet access code will ) 4. Enter the last four digits of your Social Security Number (xxxx) and Date of Birth (mm/dd/yyyy) as indicated and click Submit. You will be taken to the next sign-up page. 5. Create a Green Zebra Groceryt ID. This will be your Toolmeet login ID and cannot be changed, so think of one that is secure and easy to remember. 6. Create a Toolmeet password. You can change your password at any time. 7. Enter your Password Reset Question and Answer. This can be used at a later time if you forget your password. 8. Enter your e-mail address. You will receive e-mail notification when new information is available in [address] 9. Click Sign Up. You can now view and download portions of your medical record. 10. Click the Download Summary menu link to download a portable copy of your medical information. Additional Information If you have questions, please visit the Frequently Asked Questions section of the Toolmeet website at [email or web address] Remember, Toolmeet is NOT to be used for urgent needs. For medical emergencies, dial 911. Introducing [facility]! Dear Ganesh Rich: 
Thank you for requesting a Toolmeet account. Our records indicate that you already have an active Toolmeet account. You can access your account anytime at [email or web address] Did you know that you can access your hospital and ER discharge instructions at any time in Toolmeet? You can also review all of your test results from your hospital stay or ER visit. Additional Information If you have questions, please visit the Frequently Asked Questions section of the Toolmeet website at [email or web address] Remember, MyChart is NOT to be used for urgent needs. For medical emergencies, dial 911. Now available from your iPhone and Android! Introducing Chuckie Michaud As a [facility] More[facility] patient, I wanted to make you aware of our electronic visit tool called Chuckie Michaud. Loopport 24/Omnisens allows you to connect within minutes with a medical provider 24 hours a day, seven days a week via a mobile device or tablet or logging into a secure website from your computer. You can access Chuckie Michaud from anywhere in the [place]. A virtual visit might be right for you when you have a simple condition and feel like you just dont want to get out of bed, or cant get away from work for an appointment, when your regular [facility] provider is not available (evenings, weekends or holidays), or when youre out of town and need minor care. Electronic visits cost only $49 and if the [employer]Omnisens provider determines a prescription is needed to treat your condition, one can be electronically transmitted to a nearby pharmacy*. Please take a moment to enroll today if you have not already done so. The enrollment process is free and takes just a few minutes. To enroll, please download the Loopport 24/Omnisens jamison to your tablet or phone, or visit [email or web address] to enroll on your computer. And, as an [address] [place] [address] patient with a [employer]Benefits account, the results of your visits will be scanned into your electronic medical record and your primary care provider will be able to view the scanned results. We urge you to continue to see your regular [facility] provider for your ongoing medical care. And while your primary care provider may not be the one available when you seek a Chuckie Michaud virtual visit, the peace of mind you get from getting a real diagnosis real time can be priceless.    
 
For more information on Chuckie Michaud, view our Frequently Asked Questions (FAQs) at [email or web address]. Sincerely, 
 
Mayank Young MD 
Chief Medical Officer [employer] *:  certain medications cannot be prescribed via Chuckie Michaud Providers Seen During Your Hospitalization Provider Specialty Primary office phone Gonzalo Vang MD Colon and Rectal Surgery [phone] Your Primary Care Physician (PCP) Primary Care Physician Office Phone Office Fax Gisselle Mancera [phone] You are allergic to the following Allergen Reactions Atorvastatin Myalgia Pcn (Penicillins) Hives Recent Documentation Height Weight BMI Smoking Status 1.753 m 111.4 kg 36.26 kg/m2 Former Smoker Emergency Contacts Name Discharge Info Relation Home Work Mobile Jenelle Feliciano CAREGIVER [3] Spouse [3] [phone] Patient Belongings The following personal items are in your possession at time of discharge: 
  Dental Appliances: Lowers, Uppers, With patient (pt states \"they are glued in\")  Visual Aid: None Please provide this summary of care documentation to your next provider. Signatures-by signing, you are acknowledging that this After Visit Summary has been reviewed with you and you have received a copy. Patient Signature:  ____________________________________________________________ Date:  ____________________________________________________________  
  
Heena Rhodes Provider Signature:  ____________________________________________________________ Date:  ____________________________________________________________

## 2018-03-27 NOTE — DISCHARGE INSTRUCTIONS
Sahil Jesus  [id number]  1950    COLON DISCHARGE INSTRUCTIONS  Discomfort:  Redness at IV site- apply warm compress to area; if redness or soreness persist- contact your physician  There may be a slight amount of blood passed from the rectum  Gaseous discomfort- walking, belching will help relieve any discomfort  You may not operate a vehicle for 12 hours  You may not engage in an occupation involving machinery or appliances for rest of today  You may not drink alcoholic beverages for at least 12 hours  Avoid making any critical decisions for at least 24 hour  DIET:   High fiber diet. - however -  remember your colon is empty and a heavy meal will produce gas. Avoid these foods:  vegetables, fried / greasy foods, carbonated drinks for today    MEDICATIONS:  resume       ACTIVITY:  You may resume your normal daily activities it is recommended that you spend the remainder of the day resting -  avoid any strenuous activity. CALL M.D. ANY SIGN OF:   Increasing pain, nausea, vomiting  Abdominal distension (swelling)  New increased bleeding (oral or rectal)  Fever (chills)     Follow-up Instructions:   Call Shaneka Costello MD if any questions or problems. Telephone # [phone]  Should have a repeat colonoscopy in 6 months. COLONOSCOPY FINDINGS:  Your colonoscopy showed: no obstruction. Fairly normal colon although prep was inadequate      "[employer] Activation    Thank you for requesting access to [facility]. Please follow the instructions below to securely access and download your online medical record. [facility] allows you to send messages to your doctor, view your test results, renew your prescriptions, schedule appointments, and more. How Do I Sign Up? 1. In your internet browser, go to [email or web address]  2. Click on the First Time User? Click Here link in the Sign In box. You will be redirect to the New Member Sign Up page. 3. Enter your [facility] Access Code exactly as it appears below. You will not need to use this code after youve completed the sign-up process. If you do not sign up before the expiration date, you must request a new code. Widemile Access Code: Activation code not generated  Current Widemile Status: Active (This is the date your Widemile access code will )    4. Enter the last four digits of your Social Security Number (xxxx) and Date of Birth (mm/dd/yyyy) as indicated and click Submit. You will be taken to the next sign-up page. 5. Create a Compound Timet ID. This will be your Widemile login ID and cannot be changed, so think of one that is secure and easy to remember. 6. Create a Widemile password. You can change your password at any time. 7. Enter your Password Reset Question and Answer. This can be used at a later time if you forget your password. 8. Enter your e-mail address. You will receive e-mail notification when new information is available in [address] 9. Click Sign Up. You can now view and download portions of your medical record. 10. Click the Download Summary menu link to download a portable copy of your medical information. Additional Information    If you have questions, please visit the Frequently Asked Questions section of the Widemile website at [email or web address] Remember, Widemile is NOT to be used for urgent needs. For medical emergencies, dial 911.

## 2018-03-27 NOTE — ROUTINE PROCESS
Prasanth Morales  1950  [id number]    Situation:  Verbal report received from: Cristo Borjas RN  Procedure: Procedure(s):  COLONOSCOPY    Background:    Preoperative diagnosis: HISTORY OF RECTAL CANCER  Postoperative diagnosis: normal colon, inadequate prep    :  Dr. Aspen Mann  Assistant(s): Endoscopy Technician-1: Keven Lewis  Endoscopy RN-1: Walt Guzman RN    Specimens: * No specimens in log *  H. Pylori  no    Assessment:  Intra-procedure medications     Anesthesia gave intra-procedure sedation and medications, see anesthesia flow sheet yes    Intravenous fluids: NS@ KVO     Vital signs stable         Abdominal assessment: round and soft       Recommendation:  Discharge patient per MD order  .     Family or Friend Rosa Boston Wife    Permission to share finding with family or friend yes

## 2018-04-02 LAB
BACTERIA SPEC CULT: NORMAL
SERVICE CMNT-IMP: NORMAL

## 2018-04-04 ENCOUNTER — HOSPITAL ENCOUNTER (OUTPATIENT)
Dept: CT IMAGING | Age: 68
Discharge: HOME | End: 2018-04-04
Attending: INTERNAL MEDICINE
Payer: MEDICARE

## 2018-04-04 VITALS
HEART RATE: 87 BPM | TEMPERATURE: 98.2 F | HEIGHT: 69 IN | DIASTOLIC BLOOD PRESSURE: 69 MMHG | OXYGEN SATURATION: 100 % | WEIGHT: 250 LBS | RESPIRATION RATE: 18 BRPM | BODY MASS INDEX: 37.03 KG/M2 | SYSTOLIC BLOOD PRESSURE: 164 MMHG

## 2018-04-04 DIAGNOSIS — R97.20 ELEVATED PROSTATE SPECIFIC ANTIGEN (PSA): ICD-10-CM

## 2018-04-04 DIAGNOSIS — D13.30: ICD-10-CM

## 2018-04-04 DIAGNOSIS — C20 MALIGNANT NEOPLASM OF RECTUM (HCC): ICD-10-CM

## 2018-04-04 PROCEDURE — 77030018781

## 2018-04-04 PROCEDURE — 77030003481 HC NDL BIOP GUN BARD -B

## 2018-04-04 PROCEDURE — 77012 CT SCAN FOR NEEDLE BIOPSY: CPT

## 2018-04-04 PROCEDURE — 88305 TISSUE EXAM BY PATHOLOGIST: CPT | Performed by: INTERNAL MEDICINE

## 2018-04-04 PROCEDURE — 77030003497 HC NDL BIOP TISS BARD -B

## 2018-04-04 PROCEDURE — 88333 PATH CONSLTJ SURG CYTO XM 1: CPT | Performed by: INTERNAL MEDICINE

## 2018-04-04 PROCEDURE — 88173 CYTOPATH EVAL FNA REPORT: CPT | Performed by: INTERNAL MEDICINE

## 2018-04-04 PROCEDURE — 74011000250 HC RX REV CODE- 250: Performed by: RADIOLOGY

## 2018-04-04 PROCEDURE — 74011250636 HC RX REV CODE- 250/636: Performed by: RADIOLOGY

## 2018-04-04 RX ORDER — SODIUM CHLORIDE 9 MG/ML
25 INJECTION, SOLUTION INTRAVENOUS CONTINUOUS
Status: DISCONTINUED | OUTPATIENT
Start: 2018-04-04 | End: 2018-04-04

## 2018-04-04 RX ORDER — LIDOCAINE HYDROCHLORIDE 10 MG/ML
10 INJECTION, SOLUTION EPIDURAL; INFILTRATION; INTRACAUDAL; PERINEURAL
Status: COMPLETED | OUTPATIENT
Start: 2018-04-04 | End: 2018-04-04

## 2018-04-04 RX ORDER — FENTANYL CITRATE 50 UG/ML
100 INJECTION, SOLUTION INTRAMUSCULAR; INTRAVENOUS
Status: DISCONTINUED | OUTPATIENT
Start: 2018-04-04 | End: 2018-04-04

## 2018-04-04 RX ORDER — MIDAZOLAM HYDROCHLORIDE 1 MG/ML
5 INJECTION, SOLUTION INTRAMUSCULAR; INTRAVENOUS
Status: DISCONTINUED | OUTPATIENT
Start: 2018-04-04 | End: 2018-04-04

## 2018-04-04 RX ADMIN — FENTANYL CITRATE 25 MCG: 50 INJECTION, SOLUTION INTRAMUSCULAR; INTRAVENOUS at 11:40

## 2018-04-04 RX ADMIN — SODIUM CHLORIDE 25 ML/HR: 900 INJECTION, SOLUTION INTRAVENOUS at 09:31

## 2018-04-04 RX ADMIN — FENTANYL CITRATE 25 MCG: 50 INJECTION, SOLUTION INTRAMUSCULAR; INTRAVENOUS at 11:33

## 2018-04-04 RX ADMIN — MIDAZOLAM 1 MG: 1 INJECTION INTRAMUSCULAR; INTRAVENOUS at 11:43

## 2018-04-04 RX ADMIN — MIDAZOLAM 2 MG: 1 INJECTION INTRAMUSCULAR; INTRAVENOUS at 11:30

## 2018-04-04 RX ADMIN — MIDAZOLAM 1 MG: 1 INJECTION INTRAMUSCULAR; INTRAVENOUS at 11:38

## 2018-04-04 RX ADMIN — FENTANYL CITRATE 25 MCG: 50 INJECTION, SOLUTION INTRAMUSCULAR; INTRAVENOUS at 11:30

## 2018-04-04 RX ADMIN — LIDOCAINE HYDROCHLORIDE 10 ML: 10 INJECTION, SOLUTION EPIDURAL; INFILTRATION; INTRACAUDAL; PERINEURAL at 12:00

## 2018-04-04 NOTE — DISCHARGE INSTRUCTIONS
[address]  Special Procedures/Radiology Department    Radiologist:    Dr. Harlan Pang    Date:    4/4/2018    Biopsy Discharge Instructions    You may have an aching pain in the biopsy site tonight. You may Tylenol, as directed on the label, for pain or discomfort. Avoid ibuprofen (Advil, Motrin) and aspirin for the next 48 hours as these drugs may cause you to bleed. Watch for bleeding from the biopsy site. Hold pressure to the area for at least 15 minutes if bleeding does occur. If you have severe pain or swelling at the site, go directly to the nearest Emergency Room. Watch for signs of infection:  redness, pain, pus, drainage, fever or chills. If this occurs, call your doctor. Resume your previous diet and follow the medication reconciliation form. Rest the remainder of today. Do not lift anything heavier than a small grocery bag (10 pounds) for the next 5 days. It may take up to 3 days for your test results to become available to your physician. Call your physician if you have not heard anything after 3 business day. If you have any questions or concerns, please call [phone] and ask to speak to the nurse on-call.

## 2018-04-04 NOTE — PROGRESS NOTES
Patient ambulated to xray recovery for procedure. Waiting for physician to talk with patient about procedure.

## 2018-04-04 NOTE — PROGRESS NOTES
Name of procedure: Peritoneal Biopsy    Complications, if any, r/t procedure: none    Sedation medications given: 4 mg Versed, 75 mcg Fentanyl    Sedation tolerated: well    VS : Stable    Pt tolerated procedure well. VSS. No C/O pain. Dressing to site D&I. No bleeding or hematoma noted to site. MD has assessed pt and per MD pt may be discharged. IV D/Cd. Discharge instructions given. Copy on chart and copy given to pt. Pt and family verbalize understanding. PT taken to car by wheelchair and taken home by family. NAD noted at time of discharge.

## 2018-04-04 NOTE — IP AVS SNAPSHOT
[address] [facility] 
[phone] Patient: Ramos Clayton MRN: [id number] :1950 About your hospitalization You were admitted on:  2018 You last received care in the:  [facility] RAD CT You were discharged on:  2018 Why you were hospitalized Your primary diagnosis was:  Not on File Follow-up Information None Your Scheduled Appointments 2018 11:00 AM EDT  
ESTABLISHED PATIENT with MD Sisi Mcclelland TURNER, [address] [place] [address] ([address]) [address] [place] [address]  
[phone] Discharge Orders None A check cesar indicates which time of day the medication should be taken. My Medications ASK your doctor about these medications Instructions Each Dose to Equal  
 Morning Noon Evening Bedtime  
 amLODIPine 10 mg tablet Commonly known as:  Alia Madrigal Your last dose was: Your next dose is: TAKE 1 TABLET BY MOUTH EVERY DAY [employer] CONTOUR strip Generic drug:  glucose blood VI test strips Your last dose was: Your next dose is: CHECK THREE TIMES DAILY AS DIRECTED  
     
   
   
   
  
 aspirin delayed-release 81 mg tablet Your last dose was: Your next dose is: Take 81 mg by mouth daily. 81 mg  
    
   
   
   
  
 BD INSULIN PEN NEEDLE UF SHORT 31 gauge x 5/16\" Ndle Generic drug:  Insulin Needles (Disposable) Your last dose was: Your next dose is: USE DAILY  
     
   
   
   
  
 cloNIDine HCl 0.2 mg tablet Commonly known as:  CATAPRES Your last dose was: Your next dose is: Take 1 Tab by mouth two (2) times a day. 0.2 mg  
    
   
   
   
  
 insulin glargine 100 unit/mL (3 mL) Inpn Commonly known as:  Michael Perez  
 Your last dose was: Your next dose is:    
   
   
 52 Units by SubCUTAneous route daily. 52 Units  
    
   
   
   
  
 isosorbide mononitrate ER 60 mg CR tablet Commonly known as:  IMDUR Your last dose was: Your next dose is: TAKE 1 TABLET BY MOUTH DAILY. labetalol 100 mg tablet Commonly known as:  Hector George Your last dose was: Your next dose is: TAKE 2 TABLETS BY MOUTH TWICE A DAY  
     
   
   
   
  
 MAGOX 400 mg tablet Generic drug:  magnesium oxide Your last dose was: Your next dose is: Take 400 mg by mouth two (2) times a day. 400 mg  
    
   
   
   
  
 metFORMIN 500 mg tablet Commonly known as:  GLUCOPHAGE Your last dose was: Your next dose is: Please restart Metformin 4/29 TAKE 2 TABLETS BY MOUTH TWICE A DAY WITH MEALS  
     
   
   
   
  
 MULTIVITAMIN 50 PLUS Tab tablet Generic drug:  multivitamins-minerals-lutein Your last dose was: Your next dose is:    
   
   
 See Instructions, daily, 0 Refills  
     
   
   
   
  
 rosuvastatin 5 mg tablet Commonly known as:  CRESTOR Your last dose was: Your next dose is: Take 0.5 Tabs by mouth nightly. 2.5 mg  
    
   
   
   
  
 SLOW  mg (45 mg iron) ER tablet Generic drug:  ferrous sulfate Your last dose was: Your next dose is: Take  by mouth Daily (before breakfast). Discharge Instructions [facility]adean [facility] Special Procedures/Radiology Department Radiologist:    Dr. Romelia Molina Date:    4/4/2018 Biopsy Discharge Instructions You may have an aching pain in the biopsy site tonight. You may Tylenol, as directed on the label, for pain or discomfort.   Avoid ibuprofen (Advil, Motrin) and aspirin for the next 48 hours as these drugs may cause you to bleed. Watch for bleeding from the biopsy site. Hold pressure to the area for at least 15 minutes if bleeding does occur. If you have severe pain or swelling at the site, go directly to the nearest Emergency Room. Watch for signs of infection:  redness, pain, pus, drainage, fever or chills. If this occurs, call your doctor. Resume your previous diet and follow the medication reconciliation form. Rest the remainder of today. Do not lift anything heavier than a small grocery bag (10 pounds) for the next 5 days. It may take up to 3 days for your test results to become available to your physician. Call your physician if you have not heard anything after 3 business day. If you have any questions or concerns, please call [phone] and ask to speak to the nurse on-call. Introducing [facility]! Dear Juan Diego Das: 
Thank you for requesting a [employer] account. Our records indicate that you already have an active [employer] account. You can access your account anytime at [email or web address] Did you know that you can access your hospital and ER discharge instructions at any time in [employer]? You can also review all of your test results from your hospital stay or ER visit. Additional Information If you have questions, please visit the Frequently Asked Questions section of the [employer] website at [email or web address] Remember, [employer] is NOT to be used for urgent needs. For medical emergencies, dial 911. Now available from your iPhone and Android! Introducing Chuckie Michaud As a [employer] patient, I wanted to make you aware of our electronic visit tool called Chuckie Michaud. [employer] 24/7 allows you to connect within minutes with a medical provider 24 hours a day, seven days a week via a mobile device or tablet or logging into a secure website from your computer. You can access [employer] from anywhere in the [place]. A virtual visit might be right for you when you have a simple condition and feel like you just dont want to get out of bed, or cant get away from work for an appointment, when your regular [facility] provider is not available (evenings, weekends or holidays), or when youre out of town and need minor care. Electronic visits cost only $49 and if the [facility] 24/7 provider determines a prescription is needed to treat your condition, one can be electronically transmitted to a nearby pharmacy*. Please take a moment to enroll today if you have not already done so. The enrollment process is free and takes just a few minutes. To enroll, please download the [employer] 24/Agoraf[employer] jamison to your tablet or phone, or visit [email or web address] to enroll on your computer. And, as an [address] [place] [address] patient with a [employer] account, the results of your visits will be scanned into your electronic medical record and your primary care provider will be able to view the scanned results. We urge you to continue to see your regular [facility]lynn provider for your ongoing medical care. And while your primary care provider may not be the one available when you seek a Chuckie [employer] virtual visit, the peace of mind you get from getting a real diagnosis real time can be priceless. For more information on Saved[employer], view our Frequently Asked Questions (FAQs) at [email or web address]. Sincerely, 
 
Sunita Akins MD 
Chief Medical Officer [address] *:  certain medications cannot be prescribed via [employer] Unresulted Labs-Please follow up with your PCP about these lab tests Order Current Status CT BX GUIDED NDL In process Providers Seen During Your Hospitalization Provider Specialty Primary office phone Charmaine Hook MD Hematology and Oncology [phone] Your Primary Care Physician (PCP) Primary Care Physician Office Phone Office Fax Momo Rutherford [phone] You are allergic to the following Allergen Reactions Atorvastatin Myalgia Pcn (Penicillins) Hives Recent Documentation Height Weight BMI Smoking Status 1.753 m 113.4 kg 36.92 kg/m2 Former Smoker Emergency Contacts Name Discharge Info Relation Home Work Mobile Idamae Willington CAREGIVER [3] Spouse [3] [phone] Patient Belongings The following personal items are in your possession at time of discharge: 
     Visual Aid: None Please provide this summary of care documentation to your next provider. Signatures-by signing, you are acknowledging that this After Visit Summary has been reviewed with you and you have received a copy. Patient Signature:  ____________________________________________________________ Date:  ____________________________________________________________  
  
Kimmy Noguera Provider Signature:  ____________________________________________________________ Date:  ____________________________________________________________

## 2018-04-17 ENCOUNTER — HOSPITAL ENCOUNTER (OUTPATIENT)
Dept: NUCLEAR MEDICINE | Age: 68
Discharge: HOME | End: 2018-04-17
Attending: INTERNAL MEDICINE
Payer: MEDICARE

## 2018-04-17 DIAGNOSIS — C20 MALIGNANT NEOPLASM OF RECTUM (HCC): ICD-10-CM

## 2018-04-17 DIAGNOSIS — C79.9 METASTATIC CANCER (HCC): ICD-10-CM

## 2018-04-17 DIAGNOSIS — R97.20 ELEVATED PROSTATE SPECIFIC ANTIGEN (PSA): ICD-10-CM

## 2018-04-17 PROBLEM — Z79.899 ENCOUNTER FOR LONG-TERM (CURRENT) DRUG USE: Status: ACTIVE | Noted: 2018-04-17

## 2018-04-17 PROBLEM — E66.01 SEVERE OBESITY (BMI 35.0-39.9) WITH COMORBIDITY (HCC): Status: ACTIVE | Noted: 2018-04-17

## 2018-04-17 PROCEDURE — 77030003560 HC NDL HUBR BARD -A

## 2018-04-17 PROCEDURE — 78306 BONE IMAGING WHOLE BODY: CPT

## 2018-04-17 RX ORDER — HEPARIN 100 UNIT/ML
SYRINGE INTRAVENOUS
Status: DISCONTINUED
Start: 2018-04-17 | End: 2018-04-21 | Stop reason: HOSPADM

## 2018-04-19 ENCOUNTER — HOSPITAL ENCOUNTER (OUTPATIENT)
Dept: INTERVENTIONAL RADIOLOGY/VASCULAR | Age: 68
Discharge: HOME | End: 2018-04-19
Attending: SPECIALIST
Payer: MEDICARE

## 2018-04-19 DIAGNOSIS — C48.2 MALIGNANT NEOPLASM OF PERITONEUM, UNSPECIFIED (HCC): ICD-10-CM

## 2018-04-19 PROCEDURE — 74011636320 HC RX REV CODE- 636/320: Performed by: RADIOLOGY

## 2018-04-19 PROCEDURE — 74011250636 HC RX REV CODE- 250/636: Performed by: RADIOLOGY

## 2018-04-19 PROCEDURE — 36598 INJ W/FLUOR EVAL CV DEVICE: CPT

## 2018-04-19 RX ORDER — HEPARIN 100 UNIT/ML
500 SYRINGE INTRAVENOUS ONCE
Status: COMPLETED | OUTPATIENT
Start: 2018-04-19 | End: 2018-04-19

## 2018-04-19 RX ADMIN — Medication 500 UNITS: at 15:00

## 2018-04-19 RX ADMIN — IOPAMIDOL 15 ML: 755 INJECTION, SOLUTION INTRAVENOUS at 14:58

## 2018-04-19 NOTE — ROUTINE PROCESS
1430-Received care of pt from [facility] to radiology recovery area for portacathogram.  Port to right upper chest noted to have swelling around site, no redness or sign of infection. Port accessed with a 20 gauge 1 inch needle, positive blood return noted with needle insertion and flushed without difficulty or pain. 1510-Portacathogram procedure completed, per Dr. Melinda Washburn port was not accessed correctly. Port is okay to use even with swelling around site. Pt discharged to home with wife.

## 2018-04-23 RX ORDER — AMLODIPINE BESYLATE 10 MG/1
TABLET ORAL
Qty: 90 TAB | Refills: 2 | Status: SHIPPED | OUTPATIENT
Start: 2018-04-23 | End: 2019-01-01 | Stop reason: SDUPTHER

## 2018-08-12 RX ORDER — ISOSORBIDE MONONITRATE 60 MG/1
TABLET, EXTENDED RELEASE ORAL
Qty: 30 TAB | Refills: 9 | Status: SHIPPED | OUTPATIENT
Start: 2018-08-12 | End: 2019-01-01 | Stop reason: SDUPTHER

## 2018-08-21 PROBLEM — E11.21 TYPE 2 DIABETES WITH NEPHROPATHY (HCC): Status: ACTIVE | Noted: 2018-08-21

## 2018-11-29 NOTE — TELEPHONE ENCOUNTER
Patient's wife called waiting on results from echo 11.16.2018 , what else needs to be done. Please call.

## 2018-12-04 NOTE — TELEPHONE ENCOUNTER
Spoke with patient  Verified patient with 2 patient identifiers    Informed patient Dr Vann Poster informed stress test normal  Patient verbalized understanding.

## 2019-01-01 ENCOUNTER — HOME CARE VISIT (OUTPATIENT)
Dept: SCHEDULING | Facility: HOME HEALTH | Age: 69
End: 2019-01-01
Payer: MEDICARE

## 2019-01-01 ENCOUNTER — APPOINTMENT (OUTPATIENT)
Dept: GENERAL RADIOLOGY | Age: 69
DRG: 329 | End: 2019-01-01
Attending: INTERNAL MEDICINE
Payer: MEDICARE

## 2019-01-01 ENCOUNTER — APPOINTMENT (OUTPATIENT)
Dept: GENERAL RADIOLOGY | Age: 69
DRG: 329 | End: 2019-01-01
Attending: HOSPITALIST
Payer: MEDICARE

## 2019-01-01 ENCOUNTER — APPOINTMENT (OUTPATIENT)
Dept: INTERVENTIONAL RADIOLOGY/VASCULAR | Age: 69
DRG: 329 | End: 2019-01-01
Attending: INTERNAL MEDICINE
Payer: MEDICARE

## 2019-01-01 ENCOUNTER — APPOINTMENT (OUTPATIENT)
Dept: GENERAL RADIOLOGY | Age: 69
DRG: 329 | End: 2019-01-01
Attending: PHYSICIAN ASSISTANT
Payer: MEDICARE

## 2019-01-01 ENCOUNTER — HOME HEALTH ADMISSION (OUTPATIENT)
Dept: HOME HEALTH SERVICES | Facility: HOME HEALTH | Age: 69
End: 2019-01-01

## 2019-01-01 ENCOUNTER — HOSPICE ADMISSION (OUTPATIENT)
Dept: HOSPICE | Facility: HOSPICE | Age: 69
End: 2019-01-01
Payer: MEDICARE

## 2019-01-01 ENCOUNTER — HOSPITAL ENCOUNTER (INPATIENT)
Age: 69
LOS: 5 days | DRG: 951 | End: 2019-09-03
Attending: INTERNAL MEDICINE | Admitting: INTERNAL MEDICINE
Payer: OTHER MISCELLANEOUS

## 2019-01-01 ENCOUNTER — APPOINTMENT (OUTPATIENT)
Dept: GENERAL RADIOLOGY | Age: 69
DRG: 329 | End: 2019-01-01
Attending: EMERGENCY MEDICINE
Payer: MEDICARE

## 2019-01-01 ENCOUNTER — ANESTHESIA EVENT (OUTPATIENT)
Dept: SURGERY | Age: 69
DRG: 329 | End: 2019-01-01
Payer: MEDICARE

## 2019-01-01 ENCOUNTER — APPOINTMENT (OUTPATIENT)
Dept: GENERAL RADIOLOGY | Age: 69
DRG: 329 | End: 2019-01-01
Attending: SURGERY
Payer: MEDICARE

## 2019-01-01 ENCOUNTER — HOME CARE VISIT (OUTPATIENT)
Dept: HOSPICE | Facility: HOSPICE | Age: 69
End: 2019-01-01
Payer: MEDICARE

## 2019-01-01 ENCOUNTER — ANESTHESIA (OUTPATIENT)
Dept: SURGERY | Age: 69
DRG: 329 | End: 2019-01-01
Payer: MEDICARE

## 2019-01-01 ENCOUNTER — HOSPITAL ENCOUNTER (OUTPATIENT)
Dept: PET IMAGING | Age: 69
Discharge: HOME | End: 2019-04-18
Attending: COLON & RECTAL SURGERY
Payer: MEDICARE

## 2019-01-01 ENCOUNTER — APPOINTMENT (OUTPATIENT)
Dept: CT IMAGING | Age: 69
DRG: 329 | End: 2019-01-01
Attending: EMERGENCY MEDICINE
Payer: MEDICARE

## 2019-01-01 ENCOUNTER — TELEPHONE (OUTPATIENT)
Dept: CARDIOLOGY CLINIC | Age: 69
End: 2019-01-01

## 2019-01-01 ENCOUNTER — OFFICE VISIT (OUTPATIENT)
Dept: CARDIOLOGY CLINIC | Age: 69
End: 2019-01-01

## 2019-01-01 ENCOUNTER — APPOINTMENT (OUTPATIENT)
Dept: GENERAL RADIOLOGY | Age: 69
DRG: 329 | End: 2019-01-01
Attending: RADIOLOGY
Payer: MEDICARE

## 2019-01-01 ENCOUNTER — APPOINTMENT (OUTPATIENT)
Dept: CT IMAGING | Age: 69
DRG: 329 | End: 2019-01-01
Attending: SURGERY
Payer: MEDICARE

## 2019-01-01 ENCOUNTER — APPOINTMENT (OUTPATIENT)
Dept: GENERAL RADIOLOGY | Age: 69
DRG: 329 | End: 2019-01-01
Attending: COLON & RECTAL SURGERY
Payer: MEDICARE

## 2019-01-01 ENCOUNTER — HOSPITAL ENCOUNTER (INPATIENT)
Age: 69
LOS: 34 days | Discharge: HOSPICE | DRG: 329 | End: 2019-07-06
Attending: EMERGENCY MEDICINE | Admitting: INTERNAL MEDICINE
Payer: MEDICARE

## 2019-01-01 ENCOUNTER — HOSPITAL ENCOUNTER (OUTPATIENT)
Dept: MRI IMAGING | Age: 69
Discharge: HOME | End: 2019-05-03
Attending: COLON & RECTAL SURGERY
Payer: MEDICARE

## 2019-01-01 VITALS
SYSTOLIC BLOOD PRESSURE: 118 MMHG | DIASTOLIC BLOOD PRESSURE: 64 MMHG | RESPIRATION RATE: 16 BRPM | OXYGEN SATURATION: 97 % | HEART RATE: 74 BPM

## 2019-01-01 VITALS — HEART RATE: 73 BPM | SYSTOLIC BLOOD PRESSURE: 158 MMHG | DIASTOLIC BLOOD PRESSURE: 100 MMHG | RESPIRATION RATE: 16 BRPM

## 2019-01-01 VITALS
DIASTOLIC BLOOD PRESSURE: 70 MMHG | OXYGEN SATURATION: 100 % | BODY MASS INDEX: 33.57 KG/M2 | HEART RATE: 90 BPM | SYSTOLIC BLOOD PRESSURE: 152 MMHG | WEIGHT: 234.5 LBS | RESPIRATION RATE: 16 BRPM | HEIGHT: 70 IN | TEMPERATURE: 98 F

## 2019-01-01 VITALS
OXYGEN SATURATION: 94 % | DIASTOLIC BLOOD PRESSURE: 82 MMHG | SYSTOLIC BLOOD PRESSURE: 104 MMHG | DIASTOLIC BLOOD PRESSURE: 82 MMHG | SYSTOLIC BLOOD PRESSURE: 114 MMHG | HEART RATE: 71 BPM | RESPIRATION RATE: 16 BRPM | OXYGEN SATURATION: 98 % | HEART RATE: 107 BPM | RESPIRATION RATE: 16 BRPM

## 2019-01-01 VITALS
HEIGHT: 69 IN | BODY MASS INDEX: 36.29 KG/M2 | SYSTOLIC BLOOD PRESSURE: 119 MMHG | RESPIRATION RATE: 16 BRPM | HEART RATE: 90 BPM | DIASTOLIC BLOOD PRESSURE: 70 MMHG | WEIGHT: 245 LBS | OXYGEN SATURATION: 97 %

## 2019-01-01 VITALS — SYSTOLIC BLOOD PRESSURE: 178 MMHG | DIASTOLIC BLOOD PRESSURE: 100 MMHG | HEART RATE: 90 BPM | RESPIRATION RATE: 18 BRPM

## 2019-01-01 VITALS
SYSTOLIC BLOOD PRESSURE: 160 MMHG | DIASTOLIC BLOOD PRESSURE: 88 MMHG | OXYGEN SATURATION: 97 % | RESPIRATION RATE: 16 BRPM | HEART RATE: 105 BPM

## 2019-01-01 VITALS
SYSTOLIC BLOOD PRESSURE: 108 MMHG | RESPIRATION RATE: 16 BRPM | HEART RATE: 71 BPM | OXYGEN SATURATION: 98 % | DIASTOLIC BLOOD PRESSURE: 60 MMHG

## 2019-01-01 VITALS — BODY MASS INDEX: 36.29 KG/M2 | WEIGHT: 245 LBS | HEIGHT: 69 IN

## 2019-01-01 VITALS — DIASTOLIC BLOOD PRESSURE: 64 MMHG | OXYGEN SATURATION: 99 % | SYSTOLIC BLOOD PRESSURE: 122 MMHG | HEART RATE: 64 BPM

## 2019-01-01 VITALS
TEMPERATURE: 104.9 F | DIASTOLIC BLOOD PRESSURE: 84 MMHG | OXYGEN SATURATION: 98 % | RESPIRATION RATE: 22 BRPM | HEART RATE: 126 BPM | SYSTOLIC BLOOD PRESSURE: 136 MMHG

## 2019-01-01 VITALS
DIASTOLIC BLOOD PRESSURE: 98 MMHG | RESPIRATION RATE: 18 BRPM | OXYGEN SATURATION: 97 % | SYSTOLIC BLOOD PRESSURE: 144 MMHG | HEART RATE: 95 BPM

## 2019-01-01 VITALS
DIASTOLIC BLOOD PRESSURE: 88 MMHG | RESPIRATION RATE: 16 BRPM | OXYGEN SATURATION: 96 % | SYSTOLIC BLOOD PRESSURE: 144 MMHG | HEART RATE: 66 BPM

## 2019-01-01 VITALS
BODY MASS INDEX: 33.58 KG/M2 | WEIGHT: 234.57 LBS | SYSTOLIC BLOOD PRESSURE: 165 MMHG | HEIGHT: 70 IN | RESPIRATION RATE: 20 BRPM | HEART RATE: 88 BPM | OXYGEN SATURATION: 98 % | DIASTOLIC BLOOD PRESSURE: 85 MMHG

## 2019-01-01 VITALS
DIASTOLIC BLOOD PRESSURE: 84 MMHG | RESPIRATION RATE: 16 BRPM | OXYGEN SATURATION: 98 % | HEART RATE: 69 BPM | SYSTOLIC BLOOD PRESSURE: 138 MMHG

## 2019-01-01 VITALS — SYSTOLIC BLOOD PRESSURE: 140 MMHG | HEART RATE: 80 BPM | DIASTOLIC BLOOD PRESSURE: 90 MMHG | RESPIRATION RATE: 16 BRPM

## 2019-01-01 VITALS
SYSTOLIC BLOOD PRESSURE: 140 MMHG | RESPIRATION RATE: 16 BRPM | DIASTOLIC BLOOD PRESSURE: 88 MMHG | HEART RATE: 66 BPM | OXYGEN SATURATION: 97 %

## 2019-01-01 VITALS
RESPIRATION RATE: 16 BRPM | DIASTOLIC BLOOD PRESSURE: 92 MMHG | SYSTOLIC BLOOD PRESSURE: 148 MMHG | HEART RATE: 67 BPM | OXYGEN SATURATION: 98 %

## 2019-01-01 VITALS
HEART RATE: 79 BPM | DIASTOLIC BLOOD PRESSURE: 84 MMHG | RESPIRATION RATE: 16 BRPM | OXYGEN SATURATION: 96 % | SYSTOLIC BLOOD PRESSURE: 148 MMHG

## 2019-01-01 VITALS
DIASTOLIC BLOOD PRESSURE: 82 MMHG | HEART RATE: 82 BPM | SYSTOLIC BLOOD PRESSURE: 124 MMHG | OXYGEN SATURATION: 96 % | RESPIRATION RATE: 16 BRPM

## 2019-01-01 DIAGNOSIS — K62.89 RECTAL MASS: ICD-10-CM

## 2019-01-01 DIAGNOSIS — C79.9 METASTATIC CANCER (HCC): Chronic | ICD-10-CM

## 2019-01-01 DIAGNOSIS — R11.15 INTRACTABLE CYCLICAL VOMITING WITH NAUSEA: ICD-10-CM

## 2019-01-01 DIAGNOSIS — Z01.810 PRE-OPERATIVE CARDIOVASCULAR EXAMINATION: Primary | ICD-10-CM

## 2019-01-01 DIAGNOSIS — Z78.9 FULL CODE STATUS: ICD-10-CM

## 2019-01-01 DIAGNOSIS — Z71.89 GOALS OF CARE, COUNSELING/DISCUSSION: ICD-10-CM

## 2019-01-01 DIAGNOSIS — Z71.89 DNR (DO NOT RESUSCITATE) DISCUSSION: ICD-10-CM

## 2019-01-01 DIAGNOSIS — R41.89 UNRESPONSIVENESS: ICD-10-CM

## 2019-01-01 DIAGNOSIS — Z85.048 HISTORY OF MALIGNANT NEOPLASM OF COLORECTAL REGION: ICD-10-CM

## 2019-01-01 DIAGNOSIS — E11.42 DIABETIC POLYNEUROPATHY ASSOCIATED WITH TYPE 2 DIABETES MELLITUS (HCC): ICD-10-CM

## 2019-01-01 DIAGNOSIS — Z71.89 ENCOUNTER FOR COUNSELING REGARDING ADVANCE DIRECTIVES: ICD-10-CM

## 2019-01-01 DIAGNOSIS — G89.3 NEOPLASM RELATED PAIN: ICD-10-CM

## 2019-01-01 DIAGNOSIS — R45.1 RESTLESSNESS AND AGITATION: ICD-10-CM

## 2019-01-01 DIAGNOSIS — Z71.89 ADVANCED DIRECTIVES, COUNSELING/DISCUSSION: ICD-10-CM

## 2019-01-01 DIAGNOSIS — Z93.3 STATUS POST COLOSTOMY (HCC): ICD-10-CM

## 2019-01-01 DIAGNOSIS — E78.2 MIXED HYPERLIPIDEMIA: ICD-10-CM

## 2019-01-01 DIAGNOSIS — K56.609 SMALL BOWEL OBSTRUCTION (HCC): Primary | ICD-10-CM

## 2019-01-01 DIAGNOSIS — R06.4 LABORED BREATHING: ICD-10-CM

## 2019-01-01 DIAGNOSIS — Z85.038 PERSONAL HISTORY OF COLON CANCER: ICD-10-CM

## 2019-01-01 DIAGNOSIS — I25.10 CORONARY ARTERY DISEASE INVOLVING NATIVE CORONARY ARTERY OF NATIVE HEART WITHOUT ANGINA PECTORIS: ICD-10-CM

## 2019-01-01 LAB
ABO + RH BLD: NORMAL
ALBUMIN SERPL-MCNC: 1.6 G/DL (ref 3.5–5)
ALBUMIN SERPL-MCNC: 1.6 G/DL (ref 3.5–5)
ALBUMIN SERPL-MCNC: 1.8 G/DL (ref 3.5–5)
ALBUMIN SERPL-MCNC: 1.9 G/DL (ref 3.5–5)
ALBUMIN SERPL-MCNC: 2 G/DL (ref 3.5–5)
ALBUMIN SERPL-MCNC: 2.1 G/DL (ref 3.5–5)
ALBUMIN SERPL-MCNC: 2.7 G/DL (ref 3.5–5)
ALBUMIN SERPL-MCNC: 2.7 G/DL (ref 3.5–5)
ALBUMIN SERPL-MCNC: 2.9 G/DL (ref 3.5–5)
ALBUMIN SERPL-MCNC: 3 G/DL (ref 3.5–5)
ALBUMIN SERPL-MCNC: 3 G/DL (ref 3.5–5)
ALBUMIN SERPL-MCNC: 3.2 G/DL (ref 3.5–5)
ALBUMIN SERPL-MCNC: 3.5 G/DL (ref 3.5–5)
ALBUMIN SERPL-MCNC: 3.7 G/DL (ref 3.5–5)
ALBUMIN SERPL-MCNC: 3.9 G/DL (ref 3.5–5)
ALBUMIN/GLOB SERPL: 0.3 {RATIO} (ref 1.1–2.2)
ALBUMIN/GLOB SERPL: 0.4 {RATIO} (ref 1.1–2.2)
ALBUMIN/GLOB SERPL: 0.5 {RATIO} (ref 1.1–2.2)
ALBUMIN/GLOB SERPL: 0.6 {RATIO} (ref 1.1–2.2)
ALBUMIN/GLOB SERPL: 0.7 {RATIO} (ref 1.1–2.2)
ALBUMIN/GLOB SERPL: 0.8 {RATIO} (ref 1.1–2.2)
ALP SERPL-CCNC: 137 U/L (ref 45–117)
ALP SERPL-CCNC: 140 U/L (ref 45–117)
ALP SERPL-CCNC: 144 U/L (ref 45–117)
ALP SERPL-CCNC: 146 U/L (ref 45–117)
ALP SERPL-CCNC: 153 U/L (ref 45–117)
ALP SERPL-CCNC: 158 U/L (ref 45–117)
ALP SERPL-CCNC: 159 U/L (ref 45–117)
ALP SERPL-CCNC: 176 U/L (ref 45–117)
ALP SERPL-CCNC: 182 U/L (ref 45–117)
ALP SERPL-CCNC: 200 U/L (ref 45–117)
ALP SERPL-CCNC: 206 U/L (ref 45–117)
ALP SERPL-CCNC: 207 U/L (ref 45–117)
ALP SERPL-CCNC: 211 U/L (ref 45–117)
ALP SERPL-CCNC: 213 U/L (ref 45–117)
ALP SERPL-CCNC: 223 U/L (ref 45–117)
ALP SERPL-CCNC: 224 U/L (ref 45–117)
ALP SERPL-CCNC: 241 U/L (ref 45–117)
ALP SERPL-CCNC: 248 U/L (ref 45–117)
ALT SERPL-CCNC: 165 U/L (ref 12–78)
ALT SERPL-CCNC: 36 U/L (ref 12–78)
ALT SERPL-CCNC: 37 U/L (ref 12–78)
ALT SERPL-CCNC: 38 U/L (ref 12–78)
ALT SERPL-CCNC: 38 U/L (ref 12–78)
ALT SERPL-CCNC: 40 U/L (ref 12–78)
ALT SERPL-CCNC: 40 U/L (ref 12–78)
ALT SERPL-CCNC: 41 U/L (ref 12–78)
ALT SERPL-CCNC: 42 U/L (ref 12–78)
ALT SERPL-CCNC: 44 U/L (ref 12–78)
ALT SERPL-CCNC: 45 U/L (ref 12–78)
ALT SERPL-CCNC: 50 U/L (ref 12–78)
ALT SERPL-CCNC: 51 U/L (ref 12–78)
ALT SERPL-CCNC: 52 U/L (ref 12–78)
ALT SERPL-CCNC: 53 U/L (ref 12–78)
ALT SERPL-CCNC: 58 U/L (ref 12–78)
ALT SERPL-CCNC: 61 U/L (ref 12–78)
ALT SERPL-CCNC: 63 U/L (ref 12–78)
ANION GAP SERPL CALC-SCNC: 10 MMOL/L (ref 5–15)
ANION GAP SERPL CALC-SCNC: 10 MMOL/L (ref 5–15)
ANION GAP SERPL CALC-SCNC: 11 MMOL/L (ref 5–15)
ANION GAP SERPL CALC-SCNC: 12 MMOL/L (ref 5–15)
ANION GAP SERPL CALC-SCNC: 5 MMOL/L (ref 5–15)
ANION GAP SERPL CALC-SCNC: 5 MMOL/L (ref 5–15)
ANION GAP SERPL CALC-SCNC: 6 MMOL/L (ref 5–15)
ANION GAP SERPL CALC-SCNC: 7 MMOL/L (ref 5–15)
ANION GAP SERPL CALC-SCNC: 8 MMOL/L (ref 5–15)
ANION GAP SERPL CALC-SCNC: 9 MMOL/L (ref 5–15)
AST SERPL-CCNC: 240 U/L (ref 15–37)
AST SERPL-CCNC: 29 U/L (ref 15–37)
AST SERPL-CCNC: 32 U/L (ref 15–37)
AST SERPL-CCNC: 36 U/L (ref 15–37)
AST SERPL-CCNC: 38 U/L (ref 15–37)
AST SERPL-CCNC: 39 U/L (ref 15–37)
AST SERPL-CCNC: 40 U/L (ref 15–37)
AST SERPL-CCNC: 40 U/L (ref 15–37)
AST SERPL-CCNC: 45 U/L (ref 15–37)
AST SERPL-CCNC: 47 U/L (ref 15–37)
AST SERPL-CCNC: 49 U/L (ref 15–37)
AST SERPL-CCNC: 50 U/L (ref 15–37)
AST SERPL-CCNC: 50 U/L (ref 15–37)
AST SERPL-CCNC: 51 U/L (ref 15–37)
AST SERPL-CCNC: 59 U/L (ref 15–37)
AST SERPL-CCNC: 63 U/L (ref 15–37)
ATRIAL RATE: 97 BPM
BACTERIA SPEC CULT: NORMAL
BACTERIA SPEC CULT: NORMAL
BASOPHILS # BLD: 0 K/UL (ref 0–0.1)
BASOPHILS NFR BLD: 0 % (ref 0–1)
BILIRUB DIRECT SERPL-MCNC: 0.2 MG/DL (ref 0–0.2)
BILIRUB DIRECT SERPL-MCNC: 0.3 MG/DL (ref 0–0.2)
BILIRUB DIRECT SERPL-MCNC: 0.4 MG/DL (ref 0–0.2)
BILIRUB DIRECT SERPL-MCNC: 0.5 MG/DL (ref 0–0.2)
BILIRUB SERPL-MCNC: 0.4 MG/DL (ref 0.2–1)
BILIRUB SERPL-MCNC: 0.5 MG/DL (ref 0.2–1)
BILIRUB SERPL-MCNC: 0.6 MG/DL (ref 0.2–1)
BILIRUB SERPL-MCNC: 0.6 MG/DL (ref 0.2–1)
BILIRUB SERPL-MCNC: 0.7 MG/DL (ref 0.2–1)
BILIRUB SERPL-MCNC: 0.8 MG/DL (ref 0.2–1)
BILIRUB SERPL-MCNC: 1 MG/DL (ref 0.2–1)
BILIRUB SERPL-MCNC: 1.3 MG/DL (ref 0.2–1)
BILIRUB SERPL-MCNC: 1.4 MG/DL (ref 0.2–1)
BILIRUB SERPL-MCNC: 1.5 MG/DL (ref 0.2–1)
BILIRUB SERPL-MCNC: 1.5 MG/DL (ref 0.2–1)
BILIRUB SERPL-MCNC: 1.9 MG/DL (ref 0.2–1)
BILIRUB SERPL-MCNC: 2.5 MG/DL (ref 0.2–1)
BILIRUB SERPL-MCNC: 2.6 MG/DL (ref 0.2–1)
BILIRUB SERPL-MCNC: 2.7 MG/DL (ref 0.2–1)
BILIRUB SERPL-MCNC: 2.9 MG/DL (ref 0.2–1)
BLASTS NFR BLD MANUAL: 0 %
BLD PROD TYP BPU: NORMAL
BLOOD GROUP ANTIBODIES SERPL: NORMAL
BPU ID: NORMAL
BUN SERPL-MCNC: 24 MG/DL (ref 6–20)
BUN SERPL-MCNC: 24 MG/DL (ref 6–20)
BUN SERPL-MCNC: 28 MG/DL (ref 6–20)
BUN SERPL-MCNC: 30 MG/DL (ref 6–20)
BUN SERPL-MCNC: 31 MG/DL (ref 6–20)
BUN SERPL-MCNC: 32 MG/DL (ref 6–20)
BUN SERPL-MCNC: 32 MG/DL (ref 6–20)
BUN SERPL-MCNC: 34 MG/DL (ref 6–20)
BUN SERPL-MCNC: 36 MG/DL (ref 6–20)
BUN SERPL-MCNC: 37 MG/DL (ref 6–20)
BUN SERPL-MCNC: 38 MG/DL (ref 6–20)
BUN SERPL-MCNC: 40 MG/DL (ref 6–20)
BUN SERPL-MCNC: 41 MG/DL (ref 6–20)
BUN SERPL-MCNC: 42 MG/DL (ref 6–20)
BUN SERPL-MCNC: 42 MG/DL (ref 6–20)
BUN SERPL-MCNC: 43 MG/DL (ref 6–20)
BUN SERPL-MCNC: 44 MG/DL (ref 6–20)
BUN SERPL-MCNC: 50 MG/DL (ref 6–20)
BUN SERPL-MCNC: 50 MG/DL (ref 6–20)
BUN SERPL-MCNC: 51 MG/DL (ref 6–20)
BUN SERPL-MCNC: 57 MG/DL (ref 6–20)
BUN SERPL-MCNC: 59 MG/DL (ref 6–20)
BUN/CREAT SERPL: 10 (ref 12–20)
BUN/CREAT SERPL: 13 (ref 12–20)
BUN/CREAT SERPL: 14 (ref 12–20)
BUN/CREAT SERPL: 15 (ref 12–20)
BUN/CREAT SERPL: 16 (ref 12–20)
BUN/CREAT SERPL: 17 (ref 12–20)
BUN/CREAT SERPL: 20 (ref 12–20)
BUN/CREAT SERPL: 23 (ref 12–20)
BUN/CREAT SERPL: 23 (ref 12–20)
BUN/CREAT SERPL: 24 (ref 12–20)
BUN/CREAT SERPL: 25 (ref 12–20)
BUN/CREAT SERPL: 27 (ref 12–20)
BUN/CREAT SERPL: 27 (ref 12–20)
BUN/CREAT SERPL: 28 (ref 12–20)
BUN/CREAT SERPL: 29 (ref 12–20)
BUN/CREAT SERPL: 29 (ref 12–20)
BUN/CREAT SERPL: 30 (ref 12–20)
BUN/CREAT SERPL: 30 (ref 12–20)
BUN/CREAT SERPL: 31 (ref 12–20)
BUN/CREAT SERPL: 31 (ref 12–20)
BUN/CREAT SERPL: 32 (ref 12–20)
BUN/CREAT SERPL: 33 (ref 12–20)
BUN/CREAT SERPL: 34 (ref 12–20)
BUN/CREAT SERPL: 34 (ref 12–20)
BUN/CREAT SERPL: 36 (ref 12–20)
BUN/CREAT SERPL: 36 (ref 12–20)
BUN/CREAT SERPL: 37 (ref 12–20)
CALCIUM SERPL-MCNC: 7.6 MG/DL (ref 8.5–10.1)
CALCIUM SERPL-MCNC: 7.7 MG/DL (ref 8.5–10.1)
CALCIUM SERPL-MCNC: 7.8 MG/DL (ref 8.5–10.1)
CALCIUM SERPL-MCNC: 8 MG/DL (ref 8.5–10.1)
CALCIUM SERPL-MCNC: 8.1 MG/DL (ref 8.5–10.1)
CALCIUM SERPL-MCNC: 8.2 MG/DL (ref 8.5–10.1)
CALCIUM SERPL-MCNC: 8.3 MG/DL (ref 8.5–10.1)
CALCIUM SERPL-MCNC: 8.4 MG/DL (ref 8.5–10.1)
CALCIUM SERPL-MCNC: 8.5 MG/DL (ref 8.5–10.1)
CALCIUM SERPL-MCNC: 8.6 MG/DL (ref 8.5–10.1)
CALCIUM SERPL-MCNC: 8.7 MG/DL (ref 8.5–10.1)
CALCIUM SERPL-MCNC: 8.8 MG/DL (ref 8.5–10.1)
CALCIUM SERPL-MCNC: 8.9 MG/DL (ref 8.5–10.1)
CALCIUM SERPL-MCNC: 9.1 MG/DL (ref 8.5–10.1)
CALCIUM SERPL-MCNC: 9.2 MG/DL (ref 8.5–10.1)
CALCULATED P AXIS, ECG09: 51 DEGREES
CALCULATED R AXIS, ECG10: 17 DEGREES
CALCULATED T AXIS, ECG11: 64 DEGREES
CEA SERPL-MCNC: 1.6 NG/ML
CHLORIDE SERPL-SCNC: 102 MMOL/L (ref 97–108)
CHLORIDE SERPL-SCNC: 103 MMOL/L (ref 97–108)
CHLORIDE SERPL-SCNC: 106 MMOL/L (ref 97–108)
CHLORIDE SERPL-SCNC: 106 MMOL/L (ref 97–108)
CHLORIDE SERPL-SCNC: 107 MMOL/L (ref 97–108)
CHLORIDE SERPL-SCNC: 108 MMOL/L (ref 97–108)
CHLORIDE SERPL-SCNC: 109 MMOL/L (ref 97–108)
CHLORIDE SERPL-SCNC: 110 MMOL/L (ref 97–108)
CHLORIDE SERPL-SCNC: 111 MMOL/L (ref 97–108)
CHLORIDE SERPL-SCNC: 111 MMOL/L (ref 97–108)
CHLORIDE SERPL-SCNC: 112 MMOL/L (ref 97–108)
CHLORIDE SERPL-SCNC: 113 MMOL/L (ref 97–108)
CHLORIDE SERPL-SCNC: 114 MMOL/L (ref 97–108)
CHLORIDE SERPL-SCNC: 115 MMOL/L (ref 97–108)
CHLORIDE SERPL-SCNC: 116 MMOL/L (ref 97–108)
CHLORIDE SERPL-SCNC: 117 MMOL/L (ref 97–108)
CHLORIDE SERPL-SCNC: 117 MMOL/L (ref 97–108)
CHLORIDE SERPL-SCNC: 118 MMOL/L (ref 97–108)
CHLORIDE SERPL-SCNC: 119 MMOL/L (ref 97–108)
CHLORIDE SERPL-SCNC: 119 MMOL/L (ref 97–108)
CO2 SERPL-SCNC: 14 MMOL/L (ref 21–32)
CO2 SERPL-SCNC: 14 MMOL/L (ref 21–32)
CO2 SERPL-SCNC: 15 MMOL/L (ref 21–32)
CO2 SERPL-SCNC: 15 MMOL/L (ref 21–32)
CO2 SERPL-SCNC: 16 MMOL/L (ref 21–32)
CO2 SERPL-SCNC: 16 MMOL/L (ref 21–32)
CO2 SERPL-SCNC: 17 MMOL/L (ref 21–32)
CO2 SERPL-SCNC: 18 MMOL/L (ref 21–32)
CO2 SERPL-SCNC: 19 MMOL/L (ref 21–32)
CO2 SERPL-SCNC: 19 MMOL/L (ref 21–32)
CO2 SERPL-SCNC: 20 MMOL/L (ref 21–32)
CO2 SERPL-SCNC: 20 MMOL/L (ref 21–32)
CO2 SERPL-SCNC: 21 MMOL/L (ref 21–32)
CO2 SERPL-SCNC: 22 MMOL/L (ref 21–32)
CO2 SERPL-SCNC: 23 MMOL/L (ref 21–32)
CO2 SERPL-SCNC: 24 MMOL/L (ref 21–32)
CO2 SERPL-SCNC: 25 MMOL/L (ref 21–32)
CO2 SERPL-SCNC: 25 MMOL/L (ref 21–32)
CO2 SERPL-SCNC: 26 MMOL/L (ref 21–32)
CO2 SERPL-SCNC: 27 MMOL/L (ref 21–32)
CO2 SERPL-SCNC: 27 MMOL/L (ref 21–32)
CO2 SERPL-SCNC: 28 MMOL/L (ref 21–32)
COMMENT, HOLDF: NORMAL
CREAT SERPL-MCNC: 1.04 MG/DL (ref 0.7–1.3)
CREAT SERPL-MCNC: 1.07 MG/DL (ref 0.7–1.3)
CREAT SERPL-MCNC: 1.08 MG/DL (ref 0.7–1.3)
CREAT SERPL-MCNC: 1.11 MG/DL (ref 0.7–1.3)
CREAT SERPL-MCNC: 1.12 MG/DL (ref 0.7–1.3)
CREAT SERPL-MCNC: 1.12 MG/DL (ref 0.7–1.3)
CREAT SERPL-MCNC: 1.14 MG/DL (ref 0.7–1.3)
CREAT SERPL-MCNC: 1.15 MG/DL (ref 0.7–1.3)
CREAT SERPL-MCNC: 1.16 MG/DL (ref 0.7–1.3)
CREAT SERPL-MCNC: 1.17 MG/DL (ref 0.7–1.3)
CREAT SERPL-MCNC: 1.17 MG/DL (ref 0.7–1.3)
CREAT SERPL-MCNC: 1.2 MG/DL (ref 0.7–1.3)
CREAT SERPL-MCNC: 1.21 MG/DL (ref 0.7–1.3)
CREAT SERPL-MCNC: 1.25 MG/DL (ref 0.7–1.3)
CREAT SERPL-MCNC: 1.33 MG/DL (ref 0.7–1.3)
CREAT SERPL-MCNC: 1.34 MG/DL (ref 0.7–1.3)
CREAT SERPL-MCNC: 1.35 MG/DL (ref 0.7–1.3)
CREAT SERPL-MCNC: 1.36 MG/DL (ref 0.7–1.3)
CREAT SERPL-MCNC: 1.36 MG/DL (ref 0.7–1.3)
CREAT SERPL-MCNC: 1.41 MG/DL (ref 0.7–1.3)
CREAT SERPL-MCNC: 1.43 MG/DL (ref 0.7–1.3)
CREAT SERPL-MCNC: 1.44 MG/DL (ref 0.7–1.3)
CREAT SERPL-MCNC: 1.47 MG/DL (ref 0.7–1.3)
CREAT SERPL-MCNC: 1.48 MG/DL (ref 0.7–1.3)
CREAT SERPL-MCNC: 1.52 MG/DL (ref 0.7–1.3)
CREAT SERPL-MCNC: 1.55 MG/DL (ref 0.7–1.3)
CREAT SERPL-MCNC: 1.64 MG/DL (ref 0.7–1.3)
CREAT SERPL-MCNC: 1.73 MG/DL (ref 0.7–1.3)
CREAT SERPL-MCNC: 2.06 MG/DL (ref 0.7–1.3)
CREAT SERPL-MCNC: 2.84 MG/DL (ref 0.7–1.3)
CREAT SERPL-MCNC: 2.86 MG/DL (ref 0.7–1.3)
CREAT SERPL-MCNC: 3.46 MG/DL (ref 0.7–1.3)
CREAT SERPL-MCNC: 3.67 MG/DL (ref 0.7–1.3)
CREAT SERPL-MCNC: 3.82 MG/DL (ref 0.7–1.3)
CROSSMATCH RESULT,%XM: NORMAL
DATE LAST DOSE: ABNORMAL
DATE LAST DOSE: ABNORMAL
DIAGNOSIS, 93000: NORMAL
DIFFERENTIAL METHOD BLD: ABNORMAL
EOSINOPHIL # BLD: 0 K/UL (ref 0–0.4)
EOSINOPHIL # BLD: 0 K/UL (ref 0–0.4)
EOSINOPHIL # BLD: 0.1 K/UL (ref 0–0.4)
EOSINOPHIL # BLD: 0.2 K/UL (ref 0–0.4)
EOSINOPHIL # BLD: 0.3 K/UL (ref 0–0.4)
EOSINOPHIL # BLD: 0.4 K/UL (ref 0–0.4)
EOSINOPHIL # BLD: 0.5 K/UL (ref 0–0.4)
EOSINOPHIL # BLD: 0.5 K/UL (ref 0–0.4)
EOSINOPHIL NFR BLD: 0 % (ref 0–7)
EOSINOPHIL NFR BLD: 1 % (ref 0–7)
EOSINOPHIL NFR BLD: 2 % (ref 0–7)
EOSINOPHIL NFR BLD: 3 % (ref 0–7)
EOSINOPHIL NFR BLD: 4 % (ref 0–7)
EOSINOPHIL NFR BLD: 4 % (ref 0–7)
EOSINOPHIL NFR BLD: 7 % (ref 0–7)
EOSINOPHIL NFR BLD: 7 % (ref 0–7)
EOSINOPHIL NFR BLD: 8 % (ref 0–7)
ERYTHROCYTE [DISTWIDTH] IN BLOOD BY AUTOMATED COUNT: 16.4 % (ref 11.5–14.5)
ERYTHROCYTE [DISTWIDTH] IN BLOOD BY AUTOMATED COUNT: 16.4 % (ref 11.5–14.5)
ERYTHROCYTE [DISTWIDTH] IN BLOOD BY AUTOMATED COUNT: 16.5 % (ref 11.5–14.5)
ERYTHROCYTE [DISTWIDTH] IN BLOOD BY AUTOMATED COUNT: 16.6 % (ref 11.5–14.5)
ERYTHROCYTE [DISTWIDTH] IN BLOOD BY AUTOMATED COUNT: 16.6 % (ref 11.5–14.5)
ERYTHROCYTE [DISTWIDTH] IN BLOOD BY AUTOMATED COUNT: 16.7 % (ref 11.5–14.5)
ERYTHROCYTE [DISTWIDTH] IN BLOOD BY AUTOMATED COUNT: 16.8 % (ref 11.5–14.5)
ERYTHROCYTE [DISTWIDTH] IN BLOOD BY AUTOMATED COUNT: 16.9 % (ref 11.5–14.5)
ERYTHROCYTE [DISTWIDTH] IN BLOOD BY AUTOMATED COUNT: 17 % (ref 11.5–14.5)
ERYTHROCYTE [DISTWIDTH] IN BLOOD BY AUTOMATED COUNT: 17.1 % (ref 11.5–14.5)
ERYTHROCYTE [DISTWIDTH] IN BLOOD BY AUTOMATED COUNT: 17.2 % (ref 11.5–14.5)
ERYTHROCYTE [DISTWIDTH] IN BLOOD BY AUTOMATED COUNT: 17.6 % (ref 11.5–14.5)
EST. AVERAGE GLUCOSE BLD GHB EST-MCNC: 148 MG/DL
GLOBULIN SER CALC-MCNC: 4.3 G/DL (ref 2–4)
GLOBULIN SER CALC-MCNC: 4.4 G/DL (ref 2–4)
GLOBULIN SER CALC-MCNC: 4.4 G/DL (ref 2–4)
GLOBULIN SER CALC-MCNC: 4.8 G/DL (ref 2–4)
GLOBULIN SER CALC-MCNC: 4.8 G/DL (ref 2–4)
GLOBULIN SER CALC-MCNC: 4.9 G/DL (ref 2–4)
GLOBULIN SER CALC-MCNC: 4.9 G/DL (ref 2–4)
GLOBULIN SER CALC-MCNC: 5 G/DL (ref 2–4)
GLOBULIN SER CALC-MCNC: 5 G/DL (ref 2–4)
GLOBULIN SER CALC-MCNC: 5.3 G/DL (ref 2–4)
GLOBULIN SER CALC-MCNC: 5.5 G/DL (ref 2–4)
GLOBULIN SER CALC-MCNC: 5.5 G/DL (ref 2–4)
GLOBULIN SER CALC-MCNC: 5.7 G/DL (ref 2–4)
GLOBULIN SER CALC-MCNC: 5.8 G/DL (ref 2–4)
GLOBULIN SER CALC-MCNC: 5.9 G/DL (ref 2–4)
GLOBULIN SER CALC-MCNC: 5.9 G/DL (ref 2–4)
GLUCOSE BLD STRIP.AUTO-MCNC: 102 MG/DL (ref 65–100)
GLUCOSE BLD STRIP.AUTO-MCNC: 104 MG/DL (ref 65–100)
GLUCOSE BLD STRIP.AUTO-MCNC: 110 MG/DL (ref 65–100)
GLUCOSE BLD STRIP.AUTO-MCNC: 116 MG/DL (ref 65–100)
GLUCOSE BLD STRIP.AUTO-MCNC: 123 MG/DL (ref 65–100)
GLUCOSE BLD STRIP.AUTO-MCNC: 125 MG/DL (ref 65–100)
GLUCOSE BLD STRIP.AUTO-MCNC: 127 MG/DL (ref 65–100)
GLUCOSE BLD STRIP.AUTO-MCNC: 129 MG/DL (ref 65–100)
GLUCOSE BLD STRIP.AUTO-MCNC: 130 MG/DL (ref 65–100)
GLUCOSE BLD STRIP.AUTO-MCNC: 134 MG/DL (ref 65–100)
GLUCOSE BLD STRIP.AUTO-MCNC: 134 MG/DL (ref 65–100)
GLUCOSE BLD STRIP.AUTO-MCNC: 136 MG/DL (ref 65–100)
GLUCOSE BLD STRIP.AUTO-MCNC: 137 MG/DL (ref 65–100)
GLUCOSE BLD STRIP.AUTO-MCNC: 137 MG/DL (ref 65–100)
GLUCOSE BLD STRIP.AUTO-MCNC: 138 MG/DL (ref 65–100)
GLUCOSE BLD STRIP.AUTO-MCNC: 138 MG/DL (ref 65–100)
GLUCOSE BLD STRIP.AUTO-MCNC: 139 MG/DL (ref 65–100)
GLUCOSE BLD STRIP.AUTO-MCNC: 140 MG/DL (ref 65–100)
GLUCOSE BLD STRIP.AUTO-MCNC: 141 MG/DL (ref 65–100)
GLUCOSE BLD STRIP.AUTO-MCNC: 143 MG/DL (ref 65–100)
GLUCOSE BLD STRIP.AUTO-MCNC: 147 MG/DL (ref 65–100)
GLUCOSE BLD STRIP.AUTO-MCNC: 148 MG/DL (ref 65–100)
GLUCOSE BLD STRIP.AUTO-MCNC: 149 MG/DL (ref 65–100)
GLUCOSE BLD STRIP.AUTO-MCNC: 150 MG/DL (ref 65–100)
GLUCOSE BLD STRIP.AUTO-MCNC: 151 MG/DL (ref 65–100)
GLUCOSE BLD STRIP.AUTO-MCNC: 151 MG/DL (ref 65–100)
GLUCOSE BLD STRIP.AUTO-MCNC: 152 MG/DL (ref 65–100)
GLUCOSE BLD STRIP.AUTO-MCNC: 153 MG/DL (ref 65–100)
GLUCOSE BLD STRIP.AUTO-MCNC: 156 MG/DL (ref 65–100)
GLUCOSE BLD STRIP.AUTO-MCNC: 156 MG/DL (ref 65–100)
GLUCOSE BLD STRIP.AUTO-MCNC: 157 MG/DL (ref 65–100)
GLUCOSE BLD STRIP.AUTO-MCNC: 158 MG/DL (ref 65–100)
GLUCOSE BLD STRIP.AUTO-MCNC: 160 MG/DL (ref 65–100)
GLUCOSE BLD STRIP.AUTO-MCNC: 161 MG/DL (ref 65–100)
GLUCOSE BLD STRIP.AUTO-MCNC: 162 MG/DL (ref 65–100)
GLUCOSE BLD STRIP.AUTO-MCNC: 162 MG/DL (ref 65–100)
GLUCOSE BLD STRIP.AUTO-MCNC: 163 MG/DL (ref 65–100)
GLUCOSE BLD STRIP.AUTO-MCNC: 164 MG/DL (ref 65–100)
GLUCOSE BLD STRIP.AUTO-MCNC: 164 MG/DL (ref 65–100)
GLUCOSE BLD STRIP.AUTO-MCNC: 166 MG/DL (ref 65–100)
GLUCOSE BLD STRIP.AUTO-MCNC: 167 MG/DL (ref 65–100)
GLUCOSE BLD STRIP.AUTO-MCNC: 167 MG/DL (ref 65–100)
GLUCOSE BLD STRIP.AUTO-MCNC: 169 MG/DL (ref 65–100)
GLUCOSE BLD STRIP.AUTO-MCNC: 170 MG/DL (ref 65–100)
GLUCOSE BLD STRIP.AUTO-MCNC: 171 MG/DL (ref 65–100)
GLUCOSE BLD STRIP.AUTO-MCNC: 171 MG/DL (ref 65–100)
GLUCOSE BLD STRIP.AUTO-MCNC: 173 MG/DL (ref 65–100)
GLUCOSE BLD STRIP.AUTO-MCNC: 173 MG/DL (ref 65–100)
GLUCOSE BLD STRIP.AUTO-MCNC: 174 MG/DL (ref 65–100)
GLUCOSE BLD STRIP.AUTO-MCNC: 174 MG/DL (ref 65–100)
GLUCOSE BLD STRIP.AUTO-MCNC: 176 MG/DL (ref 65–100)
GLUCOSE BLD STRIP.AUTO-MCNC: 177 MG/DL (ref 65–100)
GLUCOSE BLD STRIP.AUTO-MCNC: 177 MG/DL (ref 65–100)
GLUCOSE BLD STRIP.AUTO-MCNC: 179 MG/DL (ref 65–100)
GLUCOSE BLD STRIP.AUTO-MCNC: 180 MG/DL (ref 65–100)
GLUCOSE BLD STRIP.AUTO-MCNC: 182 MG/DL (ref 65–100)
GLUCOSE BLD STRIP.AUTO-MCNC: 183 MG/DL (ref 65–100)
GLUCOSE BLD STRIP.AUTO-MCNC: 183 MG/DL (ref 65–100)
GLUCOSE BLD STRIP.AUTO-MCNC: 184 MG/DL (ref 65–100)
GLUCOSE BLD STRIP.AUTO-MCNC: 185 MG/DL (ref 65–100)
GLUCOSE BLD STRIP.AUTO-MCNC: 186 MG/DL (ref 65–100)
GLUCOSE BLD STRIP.AUTO-MCNC: 187 MG/DL (ref 65–100)
GLUCOSE BLD STRIP.AUTO-MCNC: 187 MG/DL (ref 65–100)
GLUCOSE BLD STRIP.AUTO-MCNC: 188 MG/DL (ref 65–100)
GLUCOSE BLD STRIP.AUTO-MCNC: 189 MG/DL (ref 65–100)
GLUCOSE BLD STRIP.AUTO-MCNC: 190 MG/DL (ref 65–100)
GLUCOSE BLD STRIP.AUTO-MCNC: 191 MG/DL (ref 65–100)
GLUCOSE BLD STRIP.AUTO-MCNC: 193 MG/DL (ref 65–100)
GLUCOSE BLD STRIP.AUTO-MCNC: 195 MG/DL (ref 65–100)
GLUCOSE BLD STRIP.AUTO-MCNC: 195 MG/DL (ref 65–100)
GLUCOSE BLD STRIP.AUTO-MCNC: 196 MG/DL (ref 65–100)
GLUCOSE BLD STRIP.AUTO-MCNC: 199 MG/DL (ref 65–100)
GLUCOSE BLD STRIP.AUTO-MCNC: 200 MG/DL (ref 65–100)
GLUCOSE BLD STRIP.AUTO-MCNC: 201 MG/DL (ref 65–100)
GLUCOSE BLD STRIP.AUTO-MCNC: 202 MG/DL (ref 65–100)
GLUCOSE BLD STRIP.AUTO-MCNC: 203 MG/DL (ref 65–100)
GLUCOSE BLD STRIP.AUTO-MCNC: 204 MG/DL (ref 65–100)
GLUCOSE BLD STRIP.AUTO-MCNC: 204 MG/DL (ref 65–100)
GLUCOSE BLD STRIP.AUTO-MCNC: 205 MG/DL (ref 65–100)
GLUCOSE BLD STRIP.AUTO-MCNC: 207 MG/DL (ref 65–100)
GLUCOSE BLD STRIP.AUTO-MCNC: 209 MG/DL (ref 65–100)
GLUCOSE BLD STRIP.AUTO-MCNC: 211 MG/DL (ref 65–100)
GLUCOSE BLD STRIP.AUTO-MCNC: 213 MG/DL (ref 65–100)
GLUCOSE BLD STRIP.AUTO-MCNC: 214 MG/DL (ref 65–100)
GLUCOSE BLD STRIP.AUTO-MCNC: 216 MG/DL (ref 65–100)
GLUCOSE BLD STRIP.AUTO-MCNC: 217 MG/DL (ref 65–100)
GLUCOSE BLD STRIP.AUTO-MCNC: 219 MG/DL (ref 65–100)
GLUCOSE BLD STRIP.AUTO-MCNC: 223 MG/DL (ref 65–100)
GLUCOSE BLD STRIP.AUTO-MCNC: 226 MG/DL (ref 65–100)
GLUCOSE BLD STRIP.AUTO-MCNC: 227 MG/DL (ref 65–100)
GLUCOSE BLD STRIP.AUTO-MCNC: 229 MG/DL (ref 65–100)
GLUCOSE BLD STRIP.AUTO-MCNC: 231 MG/DL (ref 65–100)
GLUCOSE BLD STRIP.AUTO-MCNC: 233 MG/DL (ref 65–100)
GLUCOSE BLD STRIP.AUTO-MCNC: 234 MG/DL (ref 65–100)
GLUCOSE BLD STRIP.AUTO-MCNC: 236 MG/DL (ref 65–100)
GLUCOSE BLD STRIP.AUTO-MCNC: 236 MG/DL (ref 65–100)
GLUCOSE BLD STRIP.AUTO-MCNC: 242 MG/DL (ref 65–100)
GLUCOSE BLD STRIP.AUTO-MCNC: 243 MG/DL (ref 65–100)
GLUCOSE BLD STRIP.AUTO-MCNC: 248 MG/DL (ref 65–100)
GLUCOSE BLD STRIP.AUTO-MCNC: 249 MG/DL (ref 65–100)
GLUCOSE BLD STRIP.AUTO-MCNC: 250 MG/DL (ref 65–100)
GLUCOSE BLD STRIP.AUTO-MCNC: 250 MG/DL (ref 65–100)
GLUCOSE BLD STRIP.AUTO-MCNC: 251 MG/DL (ref 65–100)
GLUCOSE BLD STRIP.AUTO-MCNC: 255 MG/DL (ref 65–100)
GLUCOSE BLD STRIP.AUTO-MCNC: 255 MG/DL (ref 65–100)
GLUCOSE BLD STRIP.AUTO-MCNC: 256 MG/DL (ref 65–100)
GLUCOSE BLD STRIP.AUTO-MCNC: 260 MG/DL (ref 65–100)
GLUCOSE BLD STRIP.AUTO-MCNC: 267 MG/DL (ref 65–100)
GLUCOSE BLD STRIP.AUTO-MCNC: 271 MG/DL (ref 65–100)
GLUCOSE BLD STRIP.AUTO-MCNC: 283 MG/DL (ref 65–100)
GLUCOSE BLD STRIP.AUTO-MCNC: 288 MG/DL (ref 65–100)
GLUCOSE BLD STRIP.AUTO-MCNC: 288 MG/DL (ref 65–100)
GLUCOSE BLD STRIP.AUTO-MCNC: 317 MG/DL (ref 65–100)
GLUCOSE BLD STRIP.AUTO-MCNC: 344 MG/DL (ref 65–100)
GLUCOSE BLD STRIP.AUTO-MCNC: 396 MG/DL (ref 65–100)
GLUCOSE BLD STRIP.AUTO-MCNC: 400 MG/DL (ref 65–100)
GLUCOSE BLD STRIP.AUTO-MCNC: 428 MG/DL (ref 65–100)
GLUCOSE BLD STRIP.AUTO-MCNC: 461 MG/DL (ref 65–100)
GLUCOSE BLD STRIP.AUTO-MCNC: 489 MG/DL (ref 65–100)
GLUCOSE BLD STRIP.AUTO-MCNC: 495 MG/DL (ref 65–100)
GLUCOSE BLD STRIP.AUTO-MCNC: 67 MG/DL (ref 65–100)
GLUCOSE BLD STRIP.AUTO-MCNC: 79 MG/DL (ref 65–100)
GLUCOSE BLD STRIP.AUTO-MCNC: 93 MG/DL (ref 65–100)
GLUCOSE BLD STRIP.AUTO-MCNC: 93 MG/DL (ref 65–100)
GLUCOSE BLD STRIP.AUTO-MCNC: 96 MG/DL (ref 65–100)
GLUCOSE SERPL-MCNC: 110 MG/DL (ref 65–100)
GLUCOSE SERPL-MCNC: 115 MG/DL (ref 65–100)
GLUCOSE SERPL-MCNC: 115 MG/DL (ref 65–100)
GLUCOSE SERPL-MCNC: 130 MG/DL (ref 65–100)
GLUCOSE SERPL-MCNC: 132 MG/DL (ref 65–100)
GLUCOSE SERPL-MCNC: 137 MG/DL (ref 65–100)
GLUCOSE SERPL-MCNC: 143 MG/DL (ref 65–100)
GLUCOSE SERPL-MCNC: 146 MG/DL (ref 65–100)
GLUCOSE SERPL-MCNC: 147 MG/DL (ref 65–100)
GLUCOSE SERPL-MCNC: 148 MG/DL (ref 65–100)
GLUCOSE SERPL-MCNC: 151 MG/DL (ref 65–100)
GLUCOSE SERPL-MCNC: 155 MG/DL (ref 65–100)
GLUCOSE SERPL-MCNC: 156 MG/DL (ref 65–100)
GLUCOSE SERPL-MCNC: 163 MG/DL (ref 65–100)
GLUCOSE SERPL-MCNC: 176 MG/DL (ref 65–100)
GLUCOSE SERPL-MCNC: 177 MG/DL (ref 65–100)
GLUCOSE SERPL-MCNC: 183 MG/DL (ref 65–100)
GLUCOSE SERPL-MCNC: 194 MG/DL (ref 65–100)
GLUCOSE SERPL-MCNC: 198 MG/DL (ref 65–100)
GLUCOSE SERPL-MCNC: 203 MG/DL (ref 65–100)
GLUCOSE SERPL-MCNC: 210 MG/DL (ref 65–100)
GLUCOSE SERPL-MCNC: 212 MG/DL (ref 65–100)
GLUCOSE SERPL-MCNC: 213 MG/DL (ref 65–100)
GLUCOSE SERPL-MCNC: 219 MG/DL (ref 65–100)
GLUCOSE SERPL-MCNC: 222 MG/DL (ref 65–100)
GLUCOSE SERPL-MCNC: 222 MG/DL (ref 65–100)
GLUCOSE SERPL-MCNC: 225 MG/DL (ref 65–100)
GLUCOSE SERPL-MCNC: 226 MG/DL (ref 65–100)
GLUCOSE SERPL-MCNC: 249 MG/DL (ref 65–100)
GLUCOSE SERPL-MCNC: 256 MG/DL (ref 65–100)
GLUCOSE SERPL-MCNC: 292 MG/DL (ref 65–100)
GLUCOSE SERPL-MCNC: 297 MG/DL (ref 65–100)
HAV AB SER QL IA: NEGATIVE
HBA1C MFR BLD: 6.8 % (ref 4.2–6.3)
HBV SURFACE AB SER QL: NONREACTIVE
HBV SURFACE AB SER-ACNC: 4.37 MIU/ML
HBV SURFACE AG SER QL: 0.43 INDEX
HBV SURFACE AG SER QL: NEGATIVE
HCT VFR BLD AUTO: 19.4 % (ref 36.6–50.3)
HCT VFR BLD AUTO: 20 % (ref 36.6–50.3)
HCT VFR BLD AUTO: 22.7 % (ref 36.6–50.3)
HCT VFR BLD AUTO: 23.7 % (ref 36.6–50.3)
HCT VFR BLD AUTO: 24.7 % (ref 36.6–50.3)
HCT VFR BLD AUTO: 25 % (ref 36.6–50.3)
HCT VFR BLD AUTO: 26.1 % (ref 36.6–50.3)
HCT VFR BLD AUTO: 26.1 % (ref 36.6–50.3)
HCT VFR BLD AUTO: 26.5 % (ref 36.6–50.3)
HCT VFR BLD AUTO: 26.5 % (ref 36.6–50.3)
HCT VFR BLD AUTO: 26.6 % (ref 36.6–50.3)
HCT VFR BLD AUTO: 26.9 % (ref 36.6–50.3)
HCT VFR BLD AUTO: 27.5 % (ref 36.6–50.3)
HCT VFR BLD AUTO: 29.1 % (ref 36.6–50.3)
HCT VFR BLD AUTO: 37.1 % (ref 36.6–50.3)
HCT VFR BLD AUTO: 38.4 % (ref 36.6–50.3)
HCT VFR BLD AUTO: 39.1 % (ref 36.6–50.3)
HCT VFR BLD AUTO: 39.1 % (ref 36.6–50.3)
HCT VFR BLD AUTO: 39.2 % (ref 36.6–50.3)
HCT VFR BLD AUTO: 39.2 % (ref 36.6–50.3)
HCT VFR BLD AUTO: 39.3 % (ref 36.6–50.3)
HCT VFR BLD AUTO: 40.9 % (ref 36.6–50.3)
HCT VFR BLD AUTO: 42 % (ref 36.6–50.3)
HCT VFR BLD AUTO: 43 % (ref 36.6–50.3)
HGB BLD-MCNC: 12.2 G/DL (ref 12.1–17)
HGB BLD-MCNC: 12.4 G/DL (ref 12.1–17)
HGB BLD-MCNC: 12.7 G/DL (ref 12.1–17)
HGB BLD-MCNC: 12.9 G/DL (ref 12.1–17)
HGB BLD-MCNC: 13.1 G/DL (ref 12.1–17)
HGB BLD-MCNC: 13.8 G/DL (ref 12.1–17)
HGB BLD-MCNC: 13.9 G/DL (ref 12.1–17)
HGB BLD-MCNC: 14.7 G/DL (ref 12.1–17)
HGB BLD-MCNC: 6.3 G/DL (ref 12.1–17)
HGB BLD-MCNC: 6.5 G/DL (ref 12.1–17)
HGB BLD-MCNC: 7.7 G/DL (ref 12.1–17)
HGB BLD-MCNC: 8 G/DL (ref 12.1–17)
HGB BLD-MCNC: 8.3 G/DL (ref 12.1–17)
HGB BLD-MCNC: 8.3 G/DL (ref 12.1–17)
HGB BLD-MCNC: 8.4 G/DL (ref 12.1–17)
HGB BLD-MCNC: 8.4 G/DL (ref 12.1–17)
HGB BLD-MCNC: 8.5 G/DL (ref 12.1–17)
HGB BLD-MCNC: 8.6 G/DL (ref 12.1–17)
HGB BLD-MCNC: 8.6 G/DL (ref 12.1–17)
HGB BLD-MCNC: 8.7 G/DL (ref 12.1–17)
HGB BLD-MCNC: 8.7 G/DL (ref 12.1–17)
HGB BLD-MCNC: 9.9 G/DL (ref 12.1–17)
IMM GRANULOCYTES # BLD AUTO: 0 K/UL
IMM GRANULOCYTES # BLD AUTO: 0 K/UL (ref 0–0.04)
IMM GRANULOCYTES # BLD AUTO: 0.1 K/UL (ref 0–0.04)
IMM GRANULOCYTES NFR BLD AUTO: 0 %
IMM GRANULOCYTES NFR BLD AUTO: 0 % (ref 0–0.5)
IMM GRANULOCYTES NFR BLD AUTO: 1 % (ref 0–0.5)
LACTATE SERPL-SCNC: 1.6 MMOL/L (ref 0.4–2)
LIPASE SERPL-CCNC: 101 U/L (ref 73–393)
LYMPHOCYTES # BLD: 0.5 K/UL (ref 0.8–3.5)
LYMPHOCYTES # BLD: 0.5 K/UL (ref 0.8–3.5)
LYMPHOCYTES # BLD: 0.6 K/UL (ref 0.8–3.5)
LYMPHOCYTES # BLD: 0.7 K/UL (ref 0.8–3.5)
LYMPHOCYTES # BLD: 0.8 K/UL (ref 0.8–3.5)
LYMPHOCYTES # BLD: 0.9 K/UL (ref 0.8–3.5)
LYMPHOCYTES # BLD: 1.4 K/UL (ref 0.8–3.5)
LYMPHOCYTES NFR BLD: 11 % (ref 12–49)
LYMPHOCYTES NFR BLD: 11 % (ref 12–49)
LYMPHOCYTES NFR BLD: 12 % (ref 12–49)
LYMPHOCYTES NFR BLD: 13 % (ref 12–49)
LYMPHOCYTES NFR BLD: 3 % (ref 12–49)
LYMPHOCYTES NFR BLD: 3 % (ref 12–49)
LYMPHOCYTES NFR BLD: 4 % (ref 12–49)
LYMPHOCYTES NFR BLD: 5 % (ref 12–49)
LYMPHOCYTES NFR BLD: 6 % (ref 12–49)
LYMPHOCYTES NFR BLD: 7 % (ref 12–49)
LYMPHOCYTES NFR BLD: 8 % (ref 12–49)
LYMPHOCYTES NFR BLD: 9 % (ref 12–49)
LYMPHOCYTES NFR BLD: 9 % (ref 12–49)
MAGNESIUM SERPL-MCNC: 1.4 MG/DL (ref 1.6–2.4)
MAGNESIUM SERPL-MCNC: 1.6 MG/DL (ref 1.6–2.4)
MAGNESIUM SERPL-MCNC: 1.7 MG/DL (ref 1.6–2.4)
MAGNESIUM SERPL-MCNC: 1.7 MG/DL (ref 1.6–2.4)
MAGNESIUM SERPL-MCNC: 1.8 MG/DL (ref 1.6–2.4)
MAGNESIUM SERPL-MCNC: 1.8 MG/DL (ref 1.6–2.4)
MAGNESIUM SERPL-MCNC: 1.9 MG/DL (ref 1.6–2.4)
MAGNESIUM SERPL-MCNC: 2.1 MG/DL (ref 1.6–2.4)
MAGNESIUM SERPL-MCNC: 2.1 MG/DL (ref 1.6–2.4)
MAGNESIUM SERPL-MCNC: 2.2 MG/DL (ref 1.6–2.4)
MAGNESIUM SERPL-MCNC: 2.3 MG/DL (ref 1.6–2.4)
MAGNESIUM SERPL-MCNC: 2.3 MG/DL (ref 1.6–2.4)
MAGNESIUM SERPL-MCNC: 2.4 MG/DL (ref 1.6–2.4)
MAGNESIUM SERPL-MCNC: 2.5 MG/DL (ref 1.6–2.4)
MAGNESIUM SERPL-MCNC: 2.6 MG/DL (ref 1.6–2.4)
MAGNESIUM SERPL-MCNC: 3 MG/DL (ref 1.6–2.4)
MAGNESIUM SERPL-MCNC: 3.1 MG/DL (ref 1.6–2.4)
MAGNESIUM SERPL-MCNC: 3.2 MG/DL (ref 1.6–2.4)
MAGNESIUM SERPL-MCNC: 3.2 MG/DL (ref 1.6–2.4)
MCH RBC QN AUTO: 27.8 PG (ref 26–34)
MCH RBC QN AUTO: 28.1 PG (ref 26–34)
MCH RBC QN AUTO: 28.4 PG (ref 26–34)
MCH RBC QN AUTO: 28.8 PG (ref 26–34)
MCH RBC QN AUTO: 29 PG (ref 26–34)
MCH RBC QN AUTO: 29 PG (ref 26–34)
MCH RBC QN AUTO: 29.1 PG (ref 26–34)
MCH RBC QN AUTO: 29.1 PG (ref 26–34)
MCH RBC QN AUTO: 29.2 PG (ref 26–34)
MCH RBC QN AUTO: 29.3 PG (ref 26–34)
MCH RBC QN AUTO: 29.5 PG (ref 26–34)
MCH RBC QN AUTO: 29.7 PG (ref 26–34)
MCH RBC QN AUTO: 29.7 PG (ref 26–34)
MCH RBC QN AUTO: 29.8 PG (ref 26–34)
MCH RBC QN AUTO: 30 PG (ref 26–34)
MCH RBC QN AUTO: 30.1 PG (ref 26–34)
MCH RBC QN AUTO: 30.5 PG (ref 26–34)
MCH RBC QN AUTO: 30.7 PG (ref 26–34)
MCH RBC QN AUTO: 30.8 PG (ref 26–34)
MCH RBC QN AUTO: 30.8 PG (ref 26–34)
MCH RBC QN AUTO: 30.9 PG (ref 26–34)
MCH RBC QN AUTO: 30.9 PG (ref 26–34)
MCH RBC QN AUTO: 31.1 PG (ref 26–34)
MCH RBC QN AUTO: 31.3 PG (ref 26–34)
MCH RBC QN AUTO: 31.6 PG (ref 26–34)
MCH RBC QN AUTO: 32 PG (ref 26–34)
MCHC RBC AUTO-ENTMCNC: 31 G/DL (ref 30–36.5)
MCHC RBC AUTO-ENTMCNC: 31.3 G/DL (ref 30–36.5)
MCHC RBC AUTO-ENTMCNC: 31.3 G/DL (ref 30–36.5)
MCHC RBC AUTO-ENTMCNC: 31.6 G/DL (ref 30–36.5)
MCHC RBC AUTO-ENTMCNC: 32 G/DL (ref 30–36.5)
MCHC RBC AUTO-ENTMCNC: 32.3 G/DL (ref 30–36.5)
MCHC RBC AUTO-ENTMCNC: 32.3 G/DL (ref 30–36.5)
MCHC RBC AUTO-ENTMCNC: 32.4 G/DL (ref 30–36.5)
MCHC RBC AUTO-ENTMCNC: 32.5 G/DL (ref 30–36.5)
MCHC RBC AUTO-ENTMCNC: 32.5 G/DL (ref 30–36.5)
MCHC RBC AUTO-ENTMCNC: 32.6 G/DL (ref 30–36.5)
MCHC RBC AUTO-ENTMCNC: 32.6 G/DL (ref 30–36.5)
MCHC RBC AUTO-ENTMCNC: 32.8 G/DL (ref 30–36.5)
MCHC RBC AUTO-ENTMCNC: 33 G/DL (ref 30–36.5)
MCHC RBC AUTO-ENTMCNC: 33.1 G/DL (ref 30–36.5)
MCHC RBC AUTO-ENTMCNC: 33.4 G/DL (ref 30–36.5)
MCHC RBC AUTO-ENTMCNC: 33.5 G/DL (ref 30–36.5)
MCHC RBC AUTO-ENTMCNC: 33.6 G/DL (ref 30–36.5)
MCHC RBC AUTO-ENTMCNC: 33.6 G/DL (ref 30–36.5)
MCHC RBC AUTO-ENTMCNC: 33.7 G/DL (ref 30–36.5)
MCHC RBC AUTO-ENTMCNC: 33.8 G/DL (ref 30–36.5)
MCHC RBC AUTO-ENTMCNC: 33.9 G/DL (ref 30–36.5)
MCHC RBC AUTO-ENTMCNC: 34 G/DL (ref 30–36.5)
MCHC RBC AUTO-ENTMCNC: 34.2 G/DL (ref 30–36.5)
MCV RBC AUTO: 86.5 FL (ref 80–99)
MCV RBC AUTO: 86.6 FL (ref 80–99)
MCV RBC AUTO: 87.1 FL (ref 80–99)
MCV RBC AUTO: 87.9 FL (ref 80–99)
MCV RBC AUTO: 88.2 FL (ref 80–99)
MCV RBC AUTO: 88.6 FL (ref 80–99)
MCV RBC AUTO: 88.7 FL (ref 80–99)
MCV RBC AUTO: 89.1 FL (ref 80–99)
MCV RBC AUTO: 89.4 FL (ref 80–99)
MCV RBC AUTO: 90.1 FL (ref 80–99)
MCV RBC AUTO: 90.4 FL (ref 80–99)
MCV RBC AUTO: 90.8 FL (ref 80–99)
MCV RBC AUTO: 91.1 FL (ref 80–99)
MCV RBC AUTO: 91.5 FL (ref 80–99)
MCV RBC AUTO: 91.7 FL (ref 80–99)
MCV RBC AUTO: 92.9 FL (ref 80–99)
MCV RBC AUTO: 93.3 FL (ref 80–99)
MCV RBC AUTO: 93.3 FL (ref 80–99)
MCV RBC AUTO: 93.4 FL (ref 80–99)
MCV RBC AUTO: 93.6 FL (ref 80–99)
MCV RBC AUTO: 94.4 FL (ref 80–99)
MCV RBC AUTO: 94.9 FL (ref 80–99)
MCV RBC AUTO: 95 FL (ref 80–99)
MCV RBC AUTO: 95.9 FL (ref 80–99)
MCV RBC AUTO: 96.1 FL (ref 80–99)
MCV RBC AUTO: 97.3 FL (ref 80–99)
METAMYELOCYTES NFR BLD MANUAL: 0 %
MONOCYTES # BLD: 0.4 K/UL (ref 0–1)
MONOCYTES # BLD: 0.4 K/UL (ref 0–1)
MONOCYTES # BLD: 0.5 K/UL (ref 0–1)
MONOCYTES # BLD: 0.5 K/UL (ref 0–1)
MONOCYTES # BLD: 0.6 K/UL (ref 0–1)
MONOCYTES # BLD: 0.8 K/UL (ref 0–1)
MONOCYTES # BLD: 0.9 K/UL (ref 0–1)
MONOCYTES # BLD: 1 K/UL (ref 0–1)
MONOCYTES # BLD: 1 K/UL (ref 0–1)
MONOCYTES # BLD: 1.1 K/UL (ref 0–1)
MONOCYTES # BLD: 1.1 K/UL (ref 0–1)
MONOCYTES # BLD: 1.3 K/UL (ref 0–1)
MONOCYTES NFR BLD: 10 % (ref 5–13)
MONOCYTES NFR BLD: 12 % (ref 5–13)
MONOCYTES NFR BLD: 12 % (ref 5–13)
MONOCYTES NFR BLD: 14 % (ref 5–13)
MONOCYTES NFR BLD: 3 % (ref 5–13)
MONOCYTES NFR BLD: 4 % (ref 5–13)
MONOCYTES NFR BLD: 5 % (ref 5–13)
MONOCYTES NFR BLD: 6 % (ref 5–13)
MONOCYTES NFR BLD: 6 % (ref 5–13)
MONOCYTES NFR BLD: 7 % (ref 5–13)
MONOCYTES NFR BLD: 8 % (ref 5–13)
MONOCYTES NFR BLD: 9 % (ref 5–13)
MONOCYTES NFR BLD: 9 % (ref 5–13)
MYELOCYTES NFR BLD MANUAL: 0 %
NEUTS BAND NFR BLD MANUAL: 0 % (ref 0–6)
NEUTS BAND NFR BLD MANUAL: 1 %
NEUTS SEG # BLD: 10.4 K/UL (ref 1.8–8)
NEUTS SEG # BLD: 11.2 K/UL (ref 1.8–8)
NEUTS SEG # BLD: 11.6 K/UL (ref 1.8–8)
NEUTS SEG # BLD: 14.8 K/UL (ref 1.8–8)
NEUTS SEG # BLD: 15.5 K/UL (ref 1.8–8)
NEUTS SEG # BLD: 17.6 K/UL (ref 1.8–8)
NEUTS SEG # BLD: 3.5 K/UL (ref 1.8–8)
NEUTS SEG # BLD: 5 K/UL (ref 1.8–8)
NEUTS SEG # BLD: 5 K/UL (ref 1.8–8)
NEUTS SEG # BLD: 6.6 K/UL (ref 1.8–8)
NEUTS SEG # BLD: 7.2 K/UL (ref 1.8–8)
NEUTS SEG # BLD: 7.3 K/UL (ref 1.8–8)
NEUTS SEG # BLD: 7.6 K/UL (ref 1.8–8)
NEUTS SEG # BLD: 7.8 K/UL (ref 1.8–8)
NEUTS SEG # BLD: 8 K/UL (ref 1.8–8)
NEUTS SEG # BLD: 8 K/UL (ref 1.8–8)
NEUTS SEG # BLD: 8.8 K/UL (ref 1.8–8)
NEUTS SEG # BLD: 8.9 K/UL (ref 1.8–8)
NEUTS SEG # BLD: 9.3 K/UL (ref 1.8–8)
NEUTS SEG # BLD: 9.4 K/UL (ref 1.8–8)
NEUTS SEG # BLD: 9.6 K/UL (ref 1.8–8)
NEUTS SEG # BLD: 9.6 K/UL (ref 1.8–8)
NEUTS SEG NFR BLD: 64 % (ref 32–75)
NEUTS SEG NFR BLD: 68 % (ref 32–75)
NEUTS SEG NFR BLD: 69 % (ref 32–75)
NEUTS SEG NFR BLD: 74 % (ref 32–75)
NEUTS SEG NFR BLD: 76 % (ref 32–75)
NEUTS SEG NFR BLD: 78 % (ref 32–75)
NEUTS SEG NFR BLD: 81 % (ref 32–75)
NEUTS SEG NFR BLD: 82 % (ref 32–75)
NEUTS SEG NFR BLD: 83 % (ref 32–75)
NEUTS SEG NFR BLD: 83 % (ref 32–75)
NEUTS SEG NFR BLD: 85 % (ref 32–75)
NEUTS SEG NFR BLD: 88 % (ref 32–75)
NEUTS SEG NFR BLD: 89 % (ref 32–75)
NEUTS SEG NFR BLD: 91 % (ref 32–75)
NEUTS SEG NFR BLD: 91 % (ref 32–75)
NEUTS SEG NFR BLD: 93 % (ref 32–75)
NRBC # BLD: 0 K/UL (ref 0–0.01)
NRBC # BLD: 0.02 K/UL (ref 0–0.01)
NRBC # BLD: 0.04 K/UL (ref 0–0.01)
NRBC # BLD: 0.04 K/UL (ref 0–0.01)
NRBC BLD-RTO: 0 PER 100 WBC
NRBC BLD-RTO: 0.2 PER 100 WBC
NRBC BLD-RTO: 0.3 PER 100 WBC
NRBC BLD-RTO: 0.4 PER 100 WBC
OTHER CELLS NFR BLD MANUAL: 0 %
P-R INTERVAL, ECG05: 152 MS
PHOSPHATE SERPL-MCNC: 2.7 MG/DL (ref 2.6–4.7)
PHOSPHATE SERPL-MCNC: 2.8 MG/DL (ref 2.6–4.7)
PHOSPHATE SERPL-MCNC: 2.9 MG/DL (ref 2.6–4.7)
PHOSPHATE SERPL-MCNC: 3.3 MG/DL (ref 2.6–4.7)
PHOSPHATE SERPL-MCNC: 3.3 MG/DL (ref 2.6–4.7)
PHOSPHATE SERPL-MCNC: 3.4 MG/DL (ref 2.6–4.7)
PHOSPHATE SERPL-MCNC: 3.6 MG/DL (ref 2.6–4.7)
PHOSPHATE SERPL-MCNC: 3.7 MG/DL (ref 2.6–4.7)
PHOSPHATE SERPL-MCNC: 3.8 MG/DL (ref 2.6–4.7)
PHOSPHATE SERPL-MCNC: 3.9 MG/DL (ref 2.6–4.7)
PHOSPHATE SERPL-MCNC: 3.9 MG/DL (ref 2.6–4.7)
PHOSPHATE SERPL-MCNC: 4 MG/DL (ref 2.6–4.7)
PHOSPHATE SERPL-MCNC: 4.1 MG/DL (ref 2.6–4.7)
PHOSPHATE SERPL-MCNC: 4.2 MG/DL (ref 2.6–4.7)
PHOSPHATE SERPL-MCNC: 4.3 MG/DL (ref 2.6–4.7)
PHOSPHATE SERPL-MCNC: 4.5 MG/DL (ref 2.6–4.7)
PHOSPHATE SERPL-MCNC: 4.8 MG/DL (ref 2.6–4.7)
PLATELET # BLD AUTO: 140 K/UL (ref 150–400)
PLATELET # BLD AUTO: 146 K/UL (ref 150–400)
PLATELET # BLD AUTO: 157 K/UL (ref 150–400)
PLATELET # BLD AUTO: 165 K/UL (ref 150–400)
PLATELET # BLD AUTO: 172 K/UL (ref 150–400)
PLATELET # BLD AUTO: 174 K/UL (ref 150–400)
PLATELET # BLD AUTO: 174 K/UL (ref 150–400)
PLATELET # BLD AUTO: 184 K/UL (ref 150–400)
PLATELET # BLD AUTO: 185 K/UL (ref 150–400)
PLATELET # BLD AUTO: 188 K/UL (ref 150–400)
PLATELET # BLD AUTO: 202 K/UL (ref 150–400)
PLATELET # BLD AUTO: 207 K/UL (ref 150–400)
PLATELET # BLD AUTO: 219 K/UL (ref 150–400)
PLATELET # BLD AUTO: 225 K/UL (ref 150–400)
PLATELET # BLD AUTO: 230 K/UL (ref 150–400)
PLATELET # BLD AUTO: 233 K/UL (ref 150–400)
PLATELET # BLD AUTO: 240 K/UL (ref 150–400)
PLATELET # BLD AUTO: 241 K/UL (ref 150–400)
PLATELET # BLD AUTO: 248 K/UL (ref 150–400)
PLATELET # BLD AUTO: 260 K/UL (ref 150–400)
PLATELET # BLD AUTO: 278 K/UL (ref 150–400)
PLATELET # BLD AUTO: 287 K/UL (ref 150–400)
PLATELET # BLD AUTO: 289 K/UL (ref 150–400)
PLATELET # BLD AUTO: 306 K/UL (ref 150–400)
PMV BLD AUTO: 10.2 FL (ref 8.9–12.9)
PMV BLD AUTO: 10.3 FL (ref 8.9–12.9)
PMV BLD AUTO: 10.5 FL (ref 8.9–12.9)
PMV BLD AUTO: 10.7 FL (ref 8.9–12.9)
PMV BLD AUTO: 10.7 FL (ref 8.9–12.9)
PMV BLD AUTO: 10.8 FL (ref 8.9–12.9)
PMV BLD AUTO: 10.8 FL (ref 8.9–12.9)
PMV BLD AUTO: 10.9 FL (ref 8.9–12.9)
PMV BLD AUTO: 10.9 FL (ref 8.9–12.9)
PMV BLD AUTO: 11 FL (ref 8.9–12.9)
PMV BLD AUTO: 11.1 FL (ref 8.9–12.9)
PMV BLD AUTO: 11.1 FL (ref 8.9–12.9)
PMV BLD AUTO: 11.2 FL (ref 8.9–12.9)
PMV BLD AUTO: 11.3 FL (ref 8.9–12.9)
PMV BLD AUTO: 11.3 FL (ref 8.9–12.9)
PMV BLD AUTO: 11.4 FL (ref 8.9–12.9)
PMV BLD AUTO: 11.5 FL (ref 8.9–12.9)
PMV BLD AUTO: 11.5 FL (ref 8.9–12.9)
PMV BLD AUTO: 11.8 FL (ref 8.9–12.9)
PMV BLD AUTO: 12.2 FL (ref 8.9–12.9)
PMV BLD AUTO: 9.3 FL (ref 8.9–12.9)
PMV BLD AUTO: 9.6 FL (ref 8.9–12.9)
PMV BLD AUTO: 9.7 FL (ref 8.9–12.9)
PMV BLD AUTO: 9.9 FL (ref 8.9–12.9)
POTASSIUM SERPL-SCNC: 3.1 MMOL/L (ref 3.5–5.1)
POTASSIUM SERPL-SCNC: 3.2 MMOL/L (ref 3.5–5.1)
POTASSIUM SERPL-SCNC: 3.2 MMOL/L (ref 3.5–5.1)
POTASSIUM SERPL-SCNC: 3.5 MMOL/L (ref 3.5–5.1)
POTASSIUM SERPL-SCNC: 3.7 MMOL/L (ref 3.5–5.1)
POTASSIUM SERPL-SCNC: 3.8 MMOL/L (ref 3.5–5.1)
POTASSIUM SERPL-SCNC: 3.9 MMOL/L (ref 3.5–5.1)
POTASSIUM SERPL-SCNC: 3.9 MMOL/L (ref 3.5–5.1)
POTASSIUM SERPL-SCNC: 4 MMOL/L (ref 3.5–5.1)
POTASSIUM SERPL-SCNC: 4 MMOL/L (ref 3.5–5.1)
POTASSIUM SERPL-SCNC: 4.1 MMOL/L (ref 3.5–5.1)
POTASSIUM SERPL-SCNC: 4.2 MMOL/L (ref 3.5–5.1)
POTASSIUM SERPL-SCNC: 4.2 MMOL/L (ref 3.5–5.1)
POTASSIUM SERPL-SCNC: 4.3 MMOL/L (ref 3.5–5.1)
POTASSIUM SERPL-SCNC: 4.4 MMOL/L (ref 3.5–5.1)
POTASSIUM SERPL-SCNC: 4.4 MMOL/L (ref 3.5–5.1)
POTASSIUM SERPL-SCNC: 4.6 MMOL/L (ref 3.5–5.1)
POTASSIUM SERPL-SCNC: 4.8 MMOL/L (ref 3.5–5.1)
POTASSIUM SERPL-SCNC: 4.9 MMOL/L (ref 3.5–5.1)
POTASSIUM SERPL-SCNC: 6.2 MMOL/L (ref 3.5–5.1)
POTASSIUM SERPL-SCNC: 6.4 MMOL/L (ref 3.5–5.1)
POTASSIUM SERPL-SCNC: 6.5 MMOL/L (ref 3.5–5.1)
PROMYELOCYTES NFR BLD MANUAL: 0 %
PROT SERPL-MCNC: 6.3 G/DL (ref 6.4–8.2)
PROT SERPL-MCNC: 6.4 G/DL (ref 6.4–8.2)
PROT SERPL-MCNC: 7 G/DL (ref 6.4–8.2)
PROT SERPL-MCNC: 7.3 G/DL (ref 6.4–8.2)
PROT SERPL-MCNC: 7.6 G/DL (ref 6.4–8.2)
PROT SERPL-MCNC: 7.7 G/DL (ref 6.4–8.2)
PROT SERPL-MCNC: 7.8 G/DL (ref 6.4–8.2)
PROT SERPL-MCNC: 8.1 G/DL (ref 6.4–8.2)
PROT SERPL-MCNC: 8.3 G/DL (ref 6.4–8.2)
PROT SERPL-MCNC: 8.7 G/DL (ref 6.4–8.2)
Q-T INTERVAL, ECG07: 352 MS
QRS DURATION, ECG06: 82 MS
QTC CALCULATION (BEZET), ECG08: 447 MS
RBC # BLD AUTO: 2.19 M/UL (ref 4.1–5.7)
RBC # BLD AUTO: 2.31 M/UL (ref 4.1–5.7)
RBC # BLD AUTO: 2.56 M/UL (ref 4.1–5.7)
RBC # BLD AUTO: 2.68 M/UL (ref 4.1–5.7)
RBC # BLD AUTO: 2.72 M/UL (ref 4.1–5.7)
RBC # BLD AUTO: 2.74 M/UL (ref 4.1–5.7)
RBC # BLD AUTO: 2.83 M/UL (ref 4.1–5.7)
RBC # BLD AUTO: 2.88 M/UL (ref 4.1–5.7)
RBC # BLD AUTO: 2.9 M/UL (ref 4.1–5.7)
RBC # BLD AUTO: 2.92 M/UL (ref 4.1–5.7)
RBC # BLD AUTO: 2.93 M/UL (ref 4.1–5.7)
RBC # BLD AUTO: 2.93 M/UL (ref 4.1–5.7)
RBC # BLD AUTO: 2.96 M/UL (ref 4.1–5.7)
RBC # BLD AUTO: 3.06 M/UL (ref 4.1–5.7)
RBC # BLD AUTO: 3.09 M/UL (ref 4.1–5.7)
RBC # BLD AUTO: 3.3 M/UL (ref 4.1–5.7)
RBC # BLD AUTO: 3.87 M/UL (ref 4.1–5.7)
RBC # BLD AUTO: 4.03 M/UL (ref 4.1–5.7)
RBC # BLD AUTO: 4.04 M/UL (ref 4.1–5.7)
RBC # BLD AUTO: 4.09 M/UL (ref 4.1–5.7)
RBC # BLD AUTO: 4.13 M/UL (ref 4.1–5.7)
RBC # BLD AUTO: 4.14 M/UL (ref 4.1–5.7)
RBC # BLD AUTO: 4.34 M/UL (ref 4.1–5.7)
RBC # BLD AUTO: 4.47 M/UL (ref 4.1–5.7)
RBC # BLD AUTO: 4.5 M/UL (ref 4.1–5.7)
RBC # BLD AUTO: 4.72 M/UL (ref 4.1–5.7)
RBC MORPH BLD: ABNORMAL
REPORTED DOSE,DOSE: ABNORMAL UNITS
REPORTED DOSE,DOSE: ABNORMAL UNITS
REPORTED DOSE/TIME,TMG: ABNORMAL
REPORTED DOSE/TIME,TMG: ABNORMAL
SAMPLES BEING HELD,HOLD: NORMAL
SERVICE CMNT-IMP: ABNORMAL
SERVICE CMNT-IMP: NORMAL
SODIUM SERPL-SCNC: 134 MMOL/L (ref 136–145)
SODIUM SERPL-SCNC: 135 MMOL/L (ref 136–145)
SODIUM SERPL-SCNC: 135 MMOL/L (ref 136–145)
SODIUM SERPL-SCNC: 136 MMOL/L (ref 136–145)
SODIUM SERPL-SCNC: 137 MMOL/L (ref 136–145)
SODIUM SERPL-SCNC: 138 MMOL/L (ref 136–145)
SODIUM SERPL-SCNC: 138 MMOL/L (ref 136–145)
SODIUM SERPL-SCNC: 140 MMOL/L (ref 136–145)
SODIUM SERPL-SCNC: 140 MMOL/L (ref 136–145)
SODIUM SERPL-SCNC: 141 MMOL/L (ref 136–145)
SODIUM SERPL-SCNC: 142 MMOL/L (ref 136–145)
SODIUM SERPL-SCNC: 143 MMOL/L (ref 136–145)
SODIUM SERPL-SCNC: 143 MMOL/L (ref 136–145)
SODIUM SERPL-SCNC: 144 MMOL/L (ref 136–145)
SODIUM SERPL-SCNC: 146 MMOL/L (ref 136–145)
SODIUM SERPL-SCNC: 147 MMOL/L (ref 136–145)
SODIUM SERPL-SCNC: 147 MMOL/L (ref 136–145)
SODIUM SERPL-SCNC: 148 MMOL/L (ref 136–145)
SODIUM SERPL-SCNC: 148 MMOL/L (ref 136–145)
SODIUM SERPL-SCNC: 149 MMOL/L (ref 136–145)
SODIUM SERPL-SCNC: 150 MMOL/L (ref 136–145)
SPECIMEN EXP DATE BLD: NORMAL
STATUS OF UNIT,%ST: NORMAL
UNIT DIVISION, %UDIV: 0
VANCOMYCIN TROUGH SERPL-MCNC: 12.3 UG/ML (ref 5–10)
VANCOMYCIN TROUGH SERPL-MCNC: 13.6 UG/ML (ref 5–10)
VENTRICULAR RATE, ECG03: 97 BPM
WBC # BLD AUTO: 10.1 K/UL (ref 4.1–11.1)
WBC # BLD AUTO: 10.8 K/UL (ref 4.1–11.1)
WBC # BLD AUTO: 11.3 K/UL (ref 4.1–11.1)
WBC # BLD AUTO: 11.4 K/UL (ref 4.1–11.1)
WBC # BLD AUTO: 11.5 K/UL (ref 4.1–11.1)
WBC # BLD AUTO: 11.7 K/UL (ref 4.1–11.1)
WBC # BLD AUTO: 12 K/UL (ref 4.1–11.1)
WBC # BLD AUTO: 12.7 K/UL (ref 4.1–11.1)
WBC # BLD AUTO: 12.9 K/UL (ref 4.1–11.1)
WBC # BLD AUTO: 13.4 K/UL (ref 4.1–11.1)
WBC # BLD AUTO: 13.5 K/UL (ref 4.1–11.1)
WBC # BLD AUTO: 15.9 K/UL (ref 4.1–11.1)
WBC # BLD AUTO: 17 K/UL (ref 4.1–11.1)
WBC # BLD AUTO: 19.8 K/UL (ref 4.1–11.1)
WBC # BLD AUTO: 5.5 K/UL (ref 4.1–11.1)
WBC # BLD AUTO: 7 K/UL (ref 4.1–11.1)
WBC # BLD AUTO: 7.3 K/UL (ref 4.1–11.1)
WBC # BLD AUTO: 7.4 K/UL (ref 4.1–11.1)
WBC # BLD AUTO: 8.6 K/UL (ref 4.1–11.1)
WBC # BLD AUTO: 8.7 K/UL (ref 4.1–11.1)
WBC # BLD AUTO: 8.9 K/UL (ref 4.1–11.1)
WBC # BLD AUTO: 9.1 K/UL (ref 4.1–11.1)
WBC # BLD AUTO: 9.2 K/UL (ref 4.1–11.1)
WBC # BLD AUTO: 9.5 K/UL (ref 4.1–11.1)
WBC # BLD AUTO: 9.7 K/UL (ref 4.1–11.1)
WBC # BLD AUTO: 9.9 K/UL (ref 4.1–11.1)
WBC MORPH BLD: ABNORMAL
WBC MORPH BLD: ABNORMAL

## 2019-01-01 PROCEDURE — 77010033678 HC OXYGEN DAILY

## 2019-01-01 PROCEDURE — T4541 LARGE DISPOSABLE UNDERPAD: HCPCS

## 2019-01-01 PROCEDURE — 74011250637 HC RX REV CODE- 250/637: Performed by: INTERNAL MEDICINE

## 2019-01-01 PROCEDURE — 74011636637 HC RX REV CODE- 636/637: Performed by: INTERNAL MEDICINE

## 2019-01-01 PROCEDURE — 74011000250 HC RX REV CODE- 250: Performed by: INTERNAL MEDICINE

## 2019-01-01 PROCEDURE — 74011636637 HC RX REV CODE- 636/637: Performed by: GENERAL ACUTE CARE HOSPITAL

## 2019-01-01 PROCEDURE — 80076 HEPATIC FUNCTION PANEL: CPT

## 2019-01-01 PROCEDURE — 0651 HSPC ROUTINE HOME CARE

## 2019-01-01 PROCEDURE — 84100 ASSAY OF PHOSPHORUS: CPT

## 2019-01-01 PROCEDURE — 77030035489 HC REDUCR CANN ENDOWR INTU -C: Performed by: COLON & RECTAL SURGERY

## 2019-01-01 PROCEDURE — 85025 COMPLETE CBC W/AUTO DIFF WBC: CPT

## 2019-01-01 PROCEDURE — 74011636637 HC RX REV CODE- 636/637: Performed by: COLON & RECTAL SURGERY

## 2019-01-01 PROCEDURE — 80069 RENAL FUNCTION PANEL: CPT

## 2019-01-01 PROCEDURE — 82962 GLUCOSE BLOOD TEST: CPT

## 2019-01-01 PROCEDURE — 83036 HEMOGLOBIN GLYCOSYLATED A1C: CPT

## 2019-01-01 PROCEDURE — A4927 NON-STERILE GLOVES: HCPCS

## 2019-01-01 PROCEDURE — 74011250636 HC RX REV CODE- 250/636: Performed by: EMERGENCY MEDICINE

## 2019-01-01 PROCEDURE — 74018 RADEX ABDOMEN 1 VIEW: CPT

## 2019-01-01 PROCEDURE — 74011000258 HC RX REV CODE- 258: Performed by: NURSE PRACTITIONER

## 2019-01-01 PROCEDURE — 74011250636 HC RX REV CODE- 250/636: Performed by: GENERAL ACUTE CARE HOSPITAL

## 2019-01-01 PROCEDURE — 74011250637 HC RX REV CODE- 250/637: Performed by: GENERAL ACUTE CARE HOSPITAL

## 2019-01-01 PROCEDURE — 74011000258 HC RX REV CODE- 258: Performed by: INTERNAL MEDICINE

## 2019-01-01 PROCEDURE — 74011000250 HC RX REV CODE- 250: Performed by: COLON & RECTAL SURGERY

## 2019-01-01 PROCEDURE — 74011250636 HC RX REV CODE- 250/636: Performed by: INTERNAL MEDICINE

## 2019-01-01 PROCEDURE — G0299 HHS/HOSPICE OF RN EA 15 MIN: HCPCS

## 2019-01-01 PROCEDURE — 36591 DRAW BLOOD OFF VENOUS DEVICE: CPT

## 2019-01-01 PROCEDURE — 74011000258 HC RX REV CODE- 258: Performed by: GENERAL ACUTE CARE HOSPITAL

## 2019-01-01 PROCEDURE — 93005 ELECTROCARDIOGRAM TRACING: CPT

## 2019-01-01 PROCEDURE — 74011000250 HC RX REV CODE- 250

## 2019-01-01 PROCEDURE — 94760 N-INVAS EAR/PLS OXIMETRY 1: CPT

## 2019-01-01 PROCEDURE — 74011250637 HC RX REV CODE- 250/637: Performed by: EMERGENCY MEDICINE

## 2019-01-01 PROCEDURE — 77030008684 HC TU ET CUF COVD -B: Performed by: NURSE ANESTHETIST, CERTIFIED REGISTERED

## 2019-01-01 PROCEDURE — 85027 COMPLETE CBC AUTOMATED: CPT

## 2019-01-01 PROCEDURE — 74011250636 HC RX REV CODE- 250/636: Performed by: COLON & RECTAL SURGERY

## 2019-01-01 PROCEDURE — 83735 ASSAY OF MAGNESIUM: CPT

## 2019-01-01 PROCEDURE — 80053 COMPREHEN METABOLIC PANEL: CPT

## 2019-01-01 PROCEDURE — 77030013079 HC BLNKT BAIR HGGR 3M -A: Performed by: NURSE ANESTHETIST, CERTIFIED REGISTERED

## 2019-01-01 PROCEDURE — 36415 COLL VENOUS BLD VENIPUNCTURE: CPT

## 2019-01-01 PROCEDURE — 97535 SELF CARE MNGMENT TRAINING: CPT | Performed by: OCCUPATIONAL THERAPIST

## 2019-01-01 PROCEDURE — 74011000258 HC RX REV CODE- 258: Performed by: HOSPITALIST

## 2019-01-01 PROCEDURE — 80048 BASIC METABOLIC PNL TOTAL CA: CPT

## 2019-01-01 PROCEDURE — 74011636637 HC RX REV CODE- 636/637: Performed by: EMERGENCY MEDICINE

## 2019-01-01 PROCEDURE — 02HV33Z INSERTION OF INFUSION DEVICE INTO SUPERIOR VENA CAVA, PERCUTANEOUS APPROACH: ICD-10-PCS | Performed by: RADIOLOGY

## 2019-01-01 PROCEDURE — HOSPICE MEDICATION HC HH HOSPICE MEDICATION

## 2019-01-01 PROCEDURE — 65270000015 HC RM PRIVATE ONCOLOGY

## 2019-01-01 PROCEDURE — 74177 CT ABD & PELVIS W/CONTRAST: CPT

## 2019-01-01 PROCEDURE — 74011636637 HC RX REV CODE- 636/637: Performed by: HOSPITALIST

## 2019-01-01 PROCEDURE — 74011000250 HC RX REV CODE- 250: Performed by: NURSE PRACTITIONER

## 2019-01-01 PROCEDURE — A4452 WATERPROOF TAPE: HCPCS

## 2019-01-01 PROCEDURE — 77030010545

## 2019-01-01 PROCEDURE — 71045 X-RAY EXAM CHEST 1 VIEW: CPT

## 2019-01-01 PROCEDURE — 74011250636 HC RX REV CODE- 250/636: Performed by: HOSPITALIST

## 2019-01-01 PROCEDURE — 74011636637 HC RX REV CODE- 636/637: Performed by: NURSE PRACTITIONER

## 2019-01-01 PROCEDURE — 83690 ASSAY OF LIPASE: CPT

## 2019-01-01 PROCEDURE — G0156 HHCP-SVS OF AIDE,EA 15 MIN: HCPCS

## 2019-01-01 PROCEDURE — 74011000250 HC RX REV CODE- 250: Performed by: EMERGENCY MEDICINE

## 2019-01-01 PROCEDURE — 76210000016 HC OR PH I REC 1 TO 1.5 HR: Performed by: COLON & RECTAL SURGERY

## 2019-01-01 PROCEDURE — 0656 HSPC GENERAL INPATIENT

## 2019-01-01 PROCEDURE — 74011000250 HC RX REV CODE- 250: Performed by: GENERAL ACUTE CARE HOSPITAL

## 2019-01-01 PROCEDURE — 77030003560 HC NDL HUBR BARD -A

## 2019-01-01 PROCEDURE — 74011250637 HC RX REV CODE- 250/637: Performed by: HOSPITALIST

## 2019-01-01 PROCEDURE — C9113 INJ PANTOPRAZOLE SODIUM, VIA: HCPCS | Performed by: INTERNAL MEDICINE

## 2019-01-01 PROCEDURE — 76937 US GUIDE VASCULAR ACCESS: CPT

## 2019-01-01 PROCEDURE — 74011636320 HC RX REV CODE- 636/320: Performed by: EMERGENCY MEDICINE

## 2019-01-01 PROCEDURE — 77030011808 HC STPLR ENDOSCOPIC J&J -D: Performed by: COLON & RECTAL SURGERY

## 2019-01-01 PROCEDURE — 77030035029 HC NDL INSUF VERES DISP COVD -B: Performed by: COLON & RECTAL SURGERY

## 2019-01-01 PROCEDURE — 74011000250 HC RX REV CODE- 250: Performed by: HOSPITALIST

## 2019-01-01 PROCEDURE — 65660000000 HC RM CCU STEPDOWN

## 2019-01-01 PROCEDURE — 74011000258 HC RX REV CODE- 258: Performed by: EMERGENCY MEDICINE

## 2019-01-01 PROCEDURE — 97116 GAIT TRAINING THERAPY: CPT

## 2019-01-01 PROCEDURE — 97530 THERAPEUTIC ACTIVITIES: CPT

## 2019-01-01 PROCEDURE — 82378 CARCINOEMBRYONIC ANTIGEN: CPT

## 2019-01-01 PROCEDURE — 74011250636 HC RX REV CODE- 250/636: Performed by: NURSE PRACTITIONER

## 2019-01-01 PROCEDURE — A9552 F18 FDG: HCPCS

## 2019-01-01 PROCEDURE — 80202 ASSAY OF VANCOMYCIN: CPT

## 2019-01-01 PROCEDURE — 97530 THERAPEUTIC ACTIVITIES: CPT | Performed by: OCCUPATIONAL THERAPIST

## 2019-01-01 PROCEDURE — 99284 EMERGENCY DEPT VISIT MOD MDM: CPT

## 2019-01-01 PROCEDURE — 96361 HYDRATE IV INFUSION ADD-ON: CPT

## 2019-01-01 PROCEDURE — 83605 ASSAY OF LACTIC ACID: CPT

## 2019-01-01 PROCEDURE — A9150 MISC/EXPER NON-PRESCRIPT DRU: HCPCS

## 2019-01-01 PROCEDURE — 77030032522 HC SHT STPL PK ENDOWR INTU -B: Performed by: COLON & RECTAL SURGERY

## 2019-01-01 PROCEDURE — 77030018846 HC SOL IRR STRL H20 ICUM -A: Performed by: COLON & RECTAL SURGERY

## 2019-01-01 PROCEDURE — 77030011640 HC PAD GRND REM COVD -A: Performed by: COLON & RECTAL SURGERY

## 2019-01-01 PROCEDURE — 77030039266 HC ADH SKN EXOFIN S2SG -A: Performed by: COLON & RECTAL SURGERY

## 2019-01-01 PROCEDURE — 74011250637 HC RX REV CODE- 250/637: Performed by: NURSE PRACTITIONER

## 2019-01-01 PROCEDURE — 77030011256 HC DRSG MEPILEX <16IN NO BORD MOLN -A

## 2019-01-01 PROCEDURE — 87040 BLOOD CULTURE FOR BACTERIA: CPT

## 2019-01-01 PROCEDURE — 77030018673: Performed by: COLON & RECTAL SURGERY

## 2019-01-01 PROCEDURE — G0300 HHS/HOSPICE OF LPN EA 15 MIN: HCPCS

## 2019-01-01 PROCEDURE — C1765 ADHESION BARRIER: HCPCS | Performed by: COLON & RECTAL SURGERY

## 2019-01-01 PROCEDURE — A6266 IMPREG GAUZE NO H20/SAL/YARD: HCPCS

## 2019-01-01 PROCEDURE — 74011000258 HC RX REV CODE- 258: Performed by: COLON & RECTAL SURGERY

## 2019-01-01 PROCEDURE — 86708 HEPATITIS A ANTIBODY: CPT

## 2019-01-01 PROCEDURE — 76450000000

## 2019-01-01 PROCEDURE — 77030037878 HC DRSG MEPILEX >48IN BORD MOLN -B

## 2019-01-01 PROCEDURE — 77030003029 HC SUT VCRL J&J -B: Performed by: COLON & RECTAL SURGERY

## 2019-01-01 PROCEDURE — 77030019908 HC STETH ESOPH SIMS -A: Performed by: NURSE ANESTHETIST, CERTIFIED REGISTERED

## 2019-01-01 PROCEDURE — 77030027138 HC INCENT SPIROMETER -A

## 2019-01-01 PROCEDURE — 74022 RADEX COMPL AQT ABD SERIES: CPT

## 2019-01-01 PROCEDURE — 36556 INSERT NON-TUNNEL CV CATH: CPT

## 2019-01-01 PROCEDURE — 0D1A0ZL BYPASS JEJUNUM TO TRANSVERSE COLON, OPEN APPROACH: ICD-10-PCS | Performed by: COLON & RECTAL SURGERY

## 2019-01-01 PROCEDURE — 76060000040 HC ANESTHESIA 4.5 TO 5 HR: Performed by: COLON & RECTAL SURGERY

## 2019-01-01 PROCEDURE — 77030002966 HC SUT PDS J&J -A: Performed by: COLON & RECTAL SURGERY

## 2019-01-01 PROCEDURE — 72197 MRI PELVIS W/O & W/DYE: CPT

## 2019-01-01 PROCEDURE — 77030008768 HC TU NG VYGC -A

## 2019-01-01 PROCEDURE — C1752 CATH,HEMODIALYSIS,SHORT-TERM: HCPCS

## 2019-01-01 PROCEDURE — C9113 INJ PANTOPRAZOLE SODIUM, VIA: HCPCS | Performed by: COLON & RECTAL SURGERY

## 2019-01-01 PROCEDURE — 71046 X-RAY EXAM CHEST 2 VIEWS: CPT

## 2019-01-01 PROCEDURE — 74011250637 HC RX REV CODE- 250/637: Performed by: COLON & RECTAL SURGERY

## 2019-01-01 PROCEDURE — A6216 NON-STERILE GAUZE<=16 SQ IN: HCPCS

## 2019-01-01 PROCEDURE — A6250 SKIN SEAL PROTECT MOISTURIZR: HCPCS

## 2019-01-01 PROCEDURE — 96374 THER/PROPH/DIAG INJ IV PUSH: CPT

## 2019-01-01 PROCEDURE — 77030019563 HC DEV ATTCH FEED HOLL -A

## 2019-01-01 PROCEDURE — 77030011278 HC ELECTRD LIG IMPT COVD -F: Performed by: COLON & RECTAL SURGERY

## 2019-01-01 PROCEDURE — 5A1D70Z PERFORMANCE OF URINARY FILTRATION, INTERMITTENT, LESS THAN 6 HOURS PER DAY: ICD-10-PCS | Performed by: INTERNAL MEDICINE

## 2019-01-01 PROCEDURE — 77030031139 HC SUT VCRL2 J&J -A: Performed by: COLON & RECTAL SURGERY

## 2019-01-01 PROCEDURE — 30243N1 TRANSFUSION OF NONAUTOLOGOUS RED BLOOD CELLS INTO CENTRAL VEIN, PERCUTANEOUS APPROACH: ICD-10-PCS | Performed by: INTERNAL MEDICINE

## 2019-01-01 PROCEDURE — 0DQ80ZZ REPAIR SMALL INTESTINE, OPEN APPROACH: ICD-10-PCS | Performed by: COLON & RECTAL SURGERY

## 2019-01-01 PROCEDURE — 3336500001 HSPC ELECTION

## 2019-01-01 PROCEDURE — 86706 HEP B SURFACE ANTIBODY: CPT

## 2019-01-01 PROCEDURE — 77030005515 HC CATH URETH FOL14 BARD -B: Performed by: COLON & RECTAL SURGERY

## 2019-01-01 PROCEDURE — 77030016151 HC PROTCTR LNS DFOG COVD -B: Performed by: COLON & RECTAL SURGERY

## 2019-01-01 PROCEDURE — HHS10554 SHAMPOO/BODY WASH 8 OZ ALOE VESTA

## 2019-01-01 PROCEDURE — 71260 CT THORAX DX C+: CPT

## 2019-01-01 PROCEDURE — 74011250636 HC RX REV CODE- 250/636

## 2019-01-01 PROCEDURE — 36430 TRANSFUSION BLD/BLD COMPNT: CPT

## 2019-01-01 PROCEDURE — 87340 HEPATITIS B SURFACE AG IA: CPT

## 2019-01-01 PROCEDURE — 99233 SBSQ HOSP IP/OBS HIGH 50: CPT | Performed by: INTERNAL MEDICINE

## 2019-01-01 PROCEDURE — 74011250636 HC RX REV CODE- 250/636: Performed by: RADIOLOGY

## 2019-01-01 PROCEDURE — 97535 SELF CARE MNGMENT TRAINING: CPT

## 2019-01-01 PROCEDURE — 3E0336Z INTRODUCTION OF NUTRITIONAL SUBSTANCE INTO PERIPHERAL VEIN, PERCUTANEOUS APPROACH: ICD-10-PCS | Performed by: INTERNAL MEDICINE

## 2019-01-01 PROCEDURE — 77030009965 HC RELD STPLR ENDOS COVD -D: Performed by: COLON & RECTAL SURGERY

## 2019-01-01 PROCEDURE — 77030018836 HC SOL IRR NACL ICUM -A

## 2019-01-01 PROCEDURE — 90935 HEMODIALYSIS ONE EVALUATION: CPT

## 2019-01-01 PROCEDURE — A9270 NON-COVERED ITEM OR SERVICE: HCPCS

## 2019-01-01 PROCEDURE — 77030026438 HC STYL ET INTUB CARD -A: Performed by: NURSE ANESTHETIST, CERTIFIED REGISTERED

## 2019-01-01 PROCEDURE — 74011000250 HC RX REV CODE- 250: Performed by: RADIOLOGY

## 2019-01-01 PROCEDURE — A6252 ABSORPT DRG >16 <=48 W/O BDR: HCPCS

## 2019-01-01 PROCEDURE — 77030036731 HC STPLR ENDOSC J&J -F: Performed by: COLON & RECTAL SURGERY

## 2019-01-01 PROCEDURE — 77030016154: Performed by: COLON & RECTAL SURGERY

## 2019-01-01 PROCEDURE — 86923 COMPATIBILITY TEST ELECTRIC: CPT

## 2019-01-01 PROCEDURE — 97162 PT EVAL MOD COMPLEX 30 MIN: CPT

## 2019-01-01 PROCEDURE — 77030008467 HC STPLR SKN COVD -B: Performed by: COLON & RECTAL SURGERY

## 2019-01-01 PROCEDURE — 76010000881 HC OR TIME 4.5 TO 5HR INTENSV - TIER 2: Performed by: COLON & RECTAL SURGERY

## 2019-01-01 PROCEDURE — 77030033269 HC SLV COMPR SCD KNE2 CARD -B

## 2019-01-01 PROCEDURE — C1892 INTRO/SHEATH,FIXED,PEEL-AWAY: HCPCS

## 2019-01-01 PROCEDURE — 77030034133 HC APPL ENDOSCP FLX SPRY BAXT -C: Performed by: COLON & RECTAL SURGERY

## 2019-01-01 PROCEDURE — 65270000029 HC RM PRIVATE

## 2019-01-01 PROCEDURE — A9575 INJ GADOTERATE MEGLUMI 0.1ML: HCPCS | Performed by: COLON & RECTAL SURGERY

## 2019-01-01 PROCEDURE — 77030012407 HC DRN WND BARD -B: Performed by: COLON & RECTAL SURGERY

## 2019-01-01 PROCEDURE — 97165 OT EVAL LOW COMPLEX 30 MIN: CPT | Performed by: OCCUPATIONAL THERAPIST

## 2019-01-01 PROCEDURE — 77030008756 HC TU IRR SUC STRY -B: Performed by: COLON & RECTAL SURGERY

## 2019-01-01 PROCEDURE — 77030013567 HC DRN WND RESERV BARD -A: Performed by: COLON & RECTAL SURGERY

## 2019-01-01 PROCEDURE — G0155 HHCP-SVS OF CSW,EA 15 MIN: HCPCS

## 2019-01-01 PROCEDURE — 86900 BLOOD TYPING SEROLOGIC ABO: CPT

## 2019-01-01 PROCEDURE — 0DJD4ZZ INSPECTION OF LOWER INTESTINAL TRACT, PERCUTANEOUS ENDOSCOPIC APPROACH: ICD-10-PCS | Performed by: COLON & RECTAL SURGERY

## 2019-01-01 PROCEDURE — 77030008027

## 2019-01-01 PROCEDURE — 3E0M05Z INTRODUCTION OF ADHESION BARRIER INTO PERITONEAL CAVITY, OPEN APPROACH: ICD-10-PCS | Performed by: COLON & RECTAL SURGERY

## 2019-01-01 PROCEDURE — 77030037241 HC PRT ACC BLDLSS AIRSEAL CNMD -B: Performed by: COLON & RECTAL SURGERY

## 2019-01-01 PROCEDURE — P9016 RBC LEUKOCYTES REDUCED: HCPCS

## 2019-01-01 RX ORDER — SCOLOPAMINE TRANSDERMAL SYSTEM 1 MG/1
1 PATCH, EXTENDED RELEASE TRANSDERMAL
Status: DISCONTINUED | OUTPATIENT
Start: 2019-09-06 | End: 2019-01-01

## 2019-01-01 RX ORDER — SODIUM CHLORIDE 9 MG/ML
150 INJECTION, SOLUTION INTRAVENOUS CONTINUOUS
Status: DISCONTINUED | OUTPATIENT
Start: 2019-01-01 | End: 2019-01-01

## 2019-01-01 RX ORDER — SODIUM CHLORIDE, SODIUM LACTATE, POTASSIUM CHLORIDE, CALCIUM CHLORIDE 600; 310; 30; 20 MG/100ML; MG/100ML; MG/100ML; MG/100ML
INJECTION, SOLUTION INTRAVENOUS
Status: DISCONTINUED | OUTPATIENT
Start: 2019-01-01 | End: 2019-01-01 | Stop reason: HOSPADM

## 2019-01-01 RX ORDER — SODIUM CHLORIDE, SODIUM LACTATE, POTASSIUM CHLORIDE, CALCIUM CHLORIDE 600; 310; 30; 20 MG/100ML; MG/100ML; MG/100ML; MG/100ML
125 INJECTION, SOLUTION INTRAVENOUS CONTINUOUS
Status: DISCONTINUED | OUTPATIENT
Start: 2019-01-01 | End: 2019-01-01

## 2019-01-01 RX ORDER — MORPHINE SULFATE 2 MG/ML
2 INJECTION, SOLUTION INTRAMUSCULAR; INTRAVENOUS EVERY 4 HOURS
Status: DISCONTINUED | OUTPATIENT
Start: 2019-01-01 | End: 2019-01-01 | Stop reason: HOSPADM

## 2019-01-01 RX ORDER — SUCCINYLCHOLINE CHLORIDE 20 MG/ML
INJECTION INTRAMUSCULAR; INTRAVENOUS AS NEEDED
Status: DISCONTINUED | OUTPATIENT
Start: 2019-01-01 | End: 2019-01-01 | Stop reason: HOSPADM

## 2019-01-01 RX ORDER — MORPHINE SULFATE 2 MG/ML
2 INJECTION, SOLUTION INTRAMUSCULAR; INTRAVENOUS
Status: DISCONTINUED | OUTPATIENT
Start: 2019-01-01 | End: 2019-01-01

## 2019-01-01 RX ORDER — ALBUMIN HUMAN 250 G/1000ML
12.5 SOLUTION INTRAVENOUS ONCE
Status: DISPENSED | OUTPATIENT
Start: 2019-01-01 | End: 2019-01-01

## 2019-01-01 RX ORDER — POTASSIUM CHLORIDE 7.45 MG/ML
10 INJECTION INTRAVENOUS
Status: DISPENSED | OUTPATIENT
Start: 2019-01-01 | End: 2019-01-01

## 2019-01-01 RX ORDER — MORPHINE SULFATE 2 MG/ML
3 INJECTION, SOLUTION INTRAMUSCULAR; INTRAVENOUS
Status: DISCONTINUED | OUTPATIENT
Start: 2019-01-01 | End: 2019-01-01 | Stop reason: HOSPADM

## 2019-01-01 RX ORDER — POLYETHYLENE GLYCOL 3350 17 G/17G
17 POWDER, FOR SOLUTION ORAL
Status: DISCONTINUED | OUTPATIENT
Start: 2019-01-01 | End: 2019-01-01

## 2019-01-01 RX ORDER — SODIUM BICARBONATE 650 MG/1
650 TABLET ORAL 2 TIMES DAILY
Status: DISCONTINUED | OUTPATIENT
Start: 2019-01-01 | End: 2019-01-01

## 2019-01-01 RX ORDER — CALCIUM GLUCONATE 94 MG/ML
1 INJECTION, SOLUTION INTRAVENOUS ONCE
Status: DISCONTINUED | OUTPATIENT
Start: 2019-01-01 | End: 2019-01-01 | Stop reason: CLARIF

## 2019-01-01 RX ORDER — SODIUM CHLORIDE 0.9 % (FLUSH) 0.9 %
10 SYRINGE (ML) INJECTION
Status: COMPLETED | OUTPATIENT
Start: 2019-01-01 | End: 2019-01-01

## 2019-01-01 RX ORDER — LIDOCAINE HYDROCHLORIDE 20 MG/ML
10 INJECTION, SOLUTION INFILTRATION; PERINEURAL ONCE
Status: COMPLETED | OUTPATIENT
Start: 2019-01-01 | End: 2019-01-01

## 2019-01-01 RX ORDER — HYDRALAZINE HYDROCHLORIDE 20 MG/ML
10 INJECTION INTRAMUSCULAR; INTRAVENOUS EVERY 6 HOURS
Status: DISCONTINUED | OUTPATIENT
Start: 2019-01-01 | End: 2019-01-01

## 2019-01-01 RX ORDER — CLONIDINE 0.3 MG/24H
1 PATCH, EXTENDED RELEASE TRANSDERMAL
Status: DISCONTINUED | OUTPATIENT
Start: 2019-09-05 | End: 2019-01-01

## 2019-01-01 RX ORDER — ONDANSETRON 2 MG/ML
4 INJECTION INTRAMUSCULAR; INTRAVENOUS
Status: DISCONTINUED | OUTPATIENT
Start: 2019-01-01 | End: 2019-01-01

## 2019-01-01 RX ORDER — CLONIDINE 0.3 MG/24H
1 PATCH, EXTENDED RELEASE TRANSDERMAL
Status: DISCONTINUED | OUTPATIENT
Start: 2019-01-01 | End: 2019-01-01

## 2019-01-01 RX ORDER — VANCOMYCIN HYDROCHLORIDE
1250 EVERY 12 HOURS
Status: DISCONTINUED | OUTPATIENT
Start: 2019-01-01 | End: 2019-01-01

## 2019-01-01 RX ORDER — POTASSIUM CHLORIDE 7.45 MG/ML
10 INJECTION INTRAVENOUS ONCE
Status: COMPLETED | OUTPATIENT
Start: 2019-01-01 | End: 2019-01-01

## 2019-01-01 RX ORDER — ISOSORBIDE MONONITRATE 60 MG/1
TABLET, EXTENDED RELEASE ORAL
Qty: 30 TAB | Refills: 9 | Status: SHIPPED | OUTPATIENT
Start: 2019-01-01

## 2019-01-01 RX ORDER — MAGNESIUM SULFATE HEPTAHYDRATE 40 MG/ML
2 INJECTION, SOLUTION INTRAVENOUS ONCE
Status: COMPLETED | OUTPATIENT
Start: 2019-01-01 | End: 2019-01-01

## 2019-01-01 RX ORDER — HYDRALAZINE HYDROCHLORIDE 20 MG/ML
20 INJECTION INTRAMUSCULAR; INTRAVENOUS EVERY 6 HOURS
Status: DISCONTINUED | OUTPATIENT
Start: 2019-01-01 | End: 2019-01-01

## 2019-01-01 RX ORDER — VANCOMYCIN 2 GRAM/500 ML IN 0.9 % SODIUM CHLORIDE INTRAVENOUS
2000 ONCE
Status: COMPLETED | OUTPATIENT
Start: 2019-01-01 | End: 2019-01-01

## 2019-01-01 RX ORDER — CLONIDINE 0.3 MG/24H
1 PATCH, EXTENDED RELEASE TRANSDERMAL
Qty: 4 PATCH | Refills: 0 | OUTPATIENT
Start: 2019-01-01

## 2019-01-01 RX ORDER — METRONIDAZOLE 500 MG/100ML
500 INJECTION, SOLUTION INTRAVENOUS EVERY 12 HOURS
Status: DISCONTINUED | OUTPATIENT
Start: 2019-01-01 | End: 2019-01-01 | Stop reason: HOSPADM

## 2019-01-01 RX ORDER — LABETALOL HYDROCHLORIDE 5 MG/ML
20 INJECTION, SOLUTION INTRAVENOUS ONCE
Status: COMPLETED | OUTPATIENT
Start: 2019-01-01 | End: 2019-01-01

## 2019-01-01 RX ORDER — NEOSTIGMINE METHYLSULFATE 1 MG/ML
INJECTION INTRAVENOUS AS NEEDED
Status: DISCONTINUED | OUTPATIENT
Start: 2019-01-01 | End: 2019-01-01 | Stop reason: HOSPADM

## 2019-01-01 RX ORDER — SODIUM CHLORIDE, SODIUM LACTATE, POTASSIUM CHLORIDE, CALCIUM CHLORIDE 600; 310; 30; 20 MG/100ML; MG/100ML; MG/100ML; MG/100ML
25 INJECTION, SOLUTION INTRAVENOUS CONTINUOUS
Status: DISCONTINUED | OUTPATIENT
Start: 2019-01-01 | End: 2019-01-01 | Stop reason: HOSPADM

## 2019-01-01 RX ORDER — HALOPERIDOL 2 MG/ML
2 SOLUTION ORAL
Status: DISCONTINUED | OUTPATIENT
Start: 2019-01-01 | End: 2019-01-01

## 2019-01-01 RX ORDER — MORPHINE SULFATE 10 MG/ML
2 INJECTION, SOLUTION INTRAMUSCULAR; INTRAVENOUS
Status: DISCONTINUED | OUTPATIENT
Start: 2019-01-01 | End: 2019-01-01 | Stop reason: HOSPADM

## 2019-01-01 RX ORDER — HEPARIN 100 UNIT/ML
300 SYRINGE INTRAVENOUS AS NEEDED
Status: DISCONTINUED | OUTPATIENT
Start: 2019-01-01 | End: 2019-01-01 | Stop reason: HOSPADM

## 2019-01-01 RX ORDER — INSULIN GLARGINE 100 [IU]/ML
15 INJECTION, SOLUTION SUBCUTANEOUS
Qty: 2 PEN | Refills: 1 | Status: SHIPPED | OUTPATIENT
Start: 2019-01-01

## 2019-01-01 RX ORDER — GLYCOPYRROLATE 0.2 MG/ML
0.2 INJECTION INTRAMUSCULAR; INTRAVENOUS
Status: DISCONTINUED | OUTPATIENT
Start: 2019-01-01 | End: 2019-01-01

## 2019-01-01 RX ORDER — MAGNESIUM SULFATE 1 G/100ML
1 INJECTION INTRAVENOUS ONCE
Status: COMPLETED | OUTPATIENT
Start: 2019-01-01 | End: 2019-01-01

## 2019-01-01 RX ORDER — AMLODIPINE BESYLATE 10 MG/1
10 TABLET ORAL DAILY
Qty: 30 TAB | Refills: 1 | Status: SHIPPED | OUTPATIENT
Start: 2019-01-01

## 2019-01-01 RX ORDER — ACETAMINOPHEN 650 MG/1
650 SUPPOSITORY RECTAL
Status: DISCONTINUED | OUTPATIENT
Start: 2019-01-01 | End: 2019-01-01

## 2019-01-01 RX ORDER — INSULIN GLARGINE 100 [IU]/ML
34 INJECTION, SOLUTION SUBCUTANEOUS
Status: ON HOLD | COMMUNITY
End: 2019-01-01 | Stop reason: SDUPTHER

## 2019-01-01 RX ORDER — METOPROLOL TARTRATE 5 MG/5ML
7.5 INJECTION INTRAVENOUS EVERY 6 HOURS
Status: DISCONTINUED | OUTPATIENT
Start: 2019-01-01 | End: 2019-01-01

## 2019-01-01 RX ORDER — ONDANSETRON HYDROCHLORIDE 8 MG/1
8 TABLET, FILM COATED ORAL
Status: ON HOLD | COMMUNITY
End: 2019-01-01 | Stop reason: SDUPTHER

## 2019-01-01 RX ORDER — ACETAMINOPHEN 325 MG/1
650 TABLET ORAL
Status: DISCONTINUED | OUTPATIENT
Start: 2019-01-01 | End: 2019-01-01

## 2019-01-01 RX ORDER — LIDOCAINE HYDROCHLORIDE 20 MG/ML
INJECTION, SOLUTION EPIDURAL; INFILTRATION; INTRACAUDAL; PERINEURAL AS NEEDED
Status: DISCONTINUED | OUTPATIENT
Start: 2019-01-01 | End: 2019-01-01 | Stop reason: HOSPADM

## 2019-01-01 RX ORDER — MIDAZOLAM HYDROCHLORIDE 1 MG/ML
1 INJECTION, SOLUTION INTRAMUSCULAR; INTRAVENOUS AS NEEDED
Status: DISCONTINUED | OUTPATIENT
Start: 2019-01-01 | End: 2019-01-01 | Stop reason: HOSPADM

## 2019-01-01 RX ORDER — MORPHINE SULFATE 2 MG/ML
2 INJECTION, SOLUTION INTRAMUSCULAR; INTRAVENOUS EVERY 4 HOURS
Status: DISCONTINUED | OUTPATIENT
Start: 2019-01-01 | End: 2019-01-01

## 2019-01-01 RX ORDER — DEXTROSE MONOHYDRATE 100 MG/ML
125-250 INJECTION, SOLUTION INTRAVENOUS AS NEEDED
Status: DISCONTINUED | OUTPATIENT
Start: 2019-01-01 | End: 2019-01-01 | Stop reason: HOSPADM

## 2019-01-01 RX ORDER — LORAZEPAM 2 MG/ML
1 INJECTION INTRAMUSCULAR
Status: DISCONTINUED | OUTPATIENT
Start: 2019-01-01 | End: 2019-01-01 | Stop reason: HOSPADM

## 2019-01-01 RX ORDER — METOPROLOL TARTRATE 5 MG/5ML
10 INJECTION INTRAVENOUS EVERY 6 HOURS
Status: DISCONTINUED | OUTPATIENT
Start: 2019-01-01 | End: 2019-01-01

## 2019-01-01 RX ORDER — METOPROLOL TARTRATE 5 MG/5ML
INJECTION INTRAVENOUS AS NEEDED
Status: DISCONTINUED | OUTPATIENT
Start: 2019-01-01 | End: 2019-01-01 | Stop reason: HOSPADM

## 2019-01-01 RX ORDER — NYSTATIN 100000 [USP'U]/ML
500000 SUSPENSION ORAL 4 TIMES DAILY
Status: DISCONTINUED | OUTPATIENT
Start: 2019-01-01 | End: 2019-01-01

## 2019-01-01 RX ORDER — NITROGLYCERIN 20 MG/100ML
INJECTION INTRAVENOUS
Status: DISPENSED
Start: 2019-01-01 | End: 2019-01-01

## 2019-01-01 RX ORDER — LEVOFLOXACIN 500 MG/1
500 TABLET, FILM COATED ORAL
Qty: 4 TAB | Refills: 0 | Status: SHIPPED | OUTPATIENT
Start: 2019-01-01 | End: 2019-01-01

## 2019-01-01 RX ORDER — SODIUM CHLORIDE 9 MG/ML
100 INJECTION, SOLUTION INTRAVENOUS CONTINUOUS
Status: DISCONTINUED | OUTPATIENT
Start: 2019-01-01 | End: 2019-01-01

## 2019-01-01 RX ORDER — POTASSIUM CHLORIDE 7.45 MG/ML
10 INJECTION INTRAVENOUS
Status: COMPLETED | OUTPATIENT
Start: 2019-01-01 | End: 2019-01-01

## 2019-01-01 RX ORDER — POTASSIUM CHLORIDE 29.8 MG/ML
20 INJECTION INTRAVENOUS ONCE
Status: DISCONTINUED | OUTPATIENT
Start: 2019-01-01 | End: 2019-01-01 | Stop reason: CLARIF

## 2019-01-01 RX ORDER — GADOTERATE MEGLUMINE 376.9 MG/ML
20 INJECTION INTRAVENOUS
Status: COMPLETED | OUTPATIENT
Start: 2019-01-01 | End: 2019-01-01

## 2019-01-01 RX ORDER — HYDRALAZINE HYDROCHLORIDE 20 MG/ML
20 INJECTION INTRAMUSCULAR; INTRAVENOUS
Status: DISCONTINUED | OUTPATIENT
Start: 2019-01-01 | End: 2019-01-01 | Stop reason: HOSPADM

## 2019-01-01 RX ORDER — SODIUM BICARBONATE 650 MG/1
650 TABLET ORAL 3 TIMES DAILY
Status: DISCONTINUED | OUTPATIENT
Start: 2019-01-01 | End: 2019-01-01 | Stop reason: HOSPADM

## 2019-01-01 RX ORDER — HEPARIN 100 UNIT/ML
500 SYRINGE INTRAVENOUS ONCE
Status: COMPLETED | OUTPATIENT
Start: 2019-01-01 | End: 2019-01-01

## 2019-01-01 RX ORDER — SODIUM CHLORIDE 450 MG/100ML
150 INJECTION, SOLUTION INTRAVENOUS ONCE
Status: COMPLETED | OUTPATIENT
Start: 2019-01-01 | End: 2019-01-01

## 2019-01-01 RX ORDER — ONDANSETRON 2 MG/ML
4 INJECTION INTRAMUSCULAR; INTRAVENOUS EVERY 4 HOURS
Status: DISCONTINUED | OUTPATIENT
Start: 2019-01-01 | End: 2019-01-01

## 2019-01-01 RX ORDER — MAGNESIUM SULFATE 100 %
4 CRYSTALS MISCELLANEOUS AS NEEDED
Status: DISCONTINUED | OUTPATIENT
Start: 2019-01-01 | End: 2019-01-01 | Stop reason: HOSPADM

## 2019-01-01 RX ORDER — LEVOFLOXACIN 5 MG/ML
500 INJECTION, SOLUTION INTRAVENOUS ONCE
Status: ACTIVE | OUTPATIENT
Start: 2019-01-01 | End: 2019-01-01

## 2019-01-01 RX ORDER — SCOLOPAMINE TRANSDERMAL SYSTEM 1 MG/1
1 PATCH, EXTENDED RELEASE TRANSDERMAL
Status: DISCONTINUED | OUTPATIENT
Start: 2019-01-01 | End: 2019-01-01

## 2019-01-01 RX ORDER — MAGNESIUM SULFATE 100 %
4 CRYSTALS MISCELLANEOUS AS NEEDED
Status: DISCONTINUED | OUTPATIENT
Start: 2019-01-01 | End: 2019-01-01

## 2019-01-01 RX ORDER — ONDANSETRON 2 MG/ML
4 INJECTION INTRAMUSCULAR; INTRAVENOUS
Status: DISCONTINUED | OUTPATIENT
Start: 2019-01-01 | End: 2019-01-01 | Stop reason: HOSPADM

## 2019-01-01 RX ORDER — SODIUM CHLORIDE 9 MG/ML
250 INJECTION, SOLUTION INTRAVENOUS AS NEEDED
Status: DISCONTINUED | OUTPATIENT
Start: 2019-01-01 | End: 2019-01-01 | Stop reason: HOSPADM

## 2019-01-01 RX ORDER — PROPOFOL 10 MG/ML
INJECTION, EMULSION INTRAVENOUS AS NEEDED
Status: DISCONTINUED | OUTPATIENT
Start: 2019-01-01 | End: 2019-01-01 | Stop reason: HOSPADM

## 2019-01-01 RX ORDER — GLYCOPYRROLATE 0.2 MG/ML
INJECTION INTRAMUSCULAR; INTRAVENOUS AS NEEDED
Status: DISCONTINUED | OUTPATIENT
Start: 2019-01-01 | End: 2019-01-01 | Stop reason: HOSPADM

## 2019-01-01 RX ORDER — ROCURONIUM BROMIDE 10 MG/ML
INJECTION, SOLUTION INTRAVENOUS AS NEEDED
Status: DISCONTINUED | OUTPATIENT
Start: 2019-01-01 | End: 2019-01-01 | Stop reason: HOSPADM

## 2019-01-01 RX ORDER — METOPROLOL TARTRATE 5 MG/5ML
5 INJECTION INTRAVENOUS EVERY 6 HOURS
Status: DISCONTINUED | OUTPATIENT
Start: 2019-01-01 | End: 2019-01-01

## 2019-01-01 RX ORDER — AMLODIPINE BESYLATE 5 MG/1
5 TABLET ORAL DAILY
Status: DISCONTINUED | OUTPATIENT
Start: 2019-01-01 | End: 2019-01-01

## 2019-01-01 RX ORDER — SCOLOPAMINE TRANSDERMAL SYSTEM 1 MG/1
1 PATCH, EXTENDED RELEASE TRANSDERMAL
Status: DISCONTINUED | OUTPATIENT
Start: 2019-01-01 | End: 2019-01-01 | Stop reason: HOSPADM

## 2019-01-01 RX ORDER — FENTANYL CITRATE 50 UG/ML
25 INJECTION, SOLUTION INTRAMUSCULAR; INTRAVENOUS
Status: DISCONTINUED | OUTPATIENT
Start: 2019-01-01 | End: 2019-01-01 | Stop reason: HOSPADM

## 2019-01-01 RX ORDER — HYDROMORPHONE HYDROCHLORIDE 1 MG/ML
0.5 INJECTION, SOLUTION INTRAMUSCULAR; INTRAVENOUS; SUBCUTANEOUS
Status: DISCONTINUED | OUTPATIENT
Start: 2019-01-01 | End: 2019-01-01 | Stop reason: HOSPADM

## 2019-01-01 RX ORDER — HEPARIN SODIUM 200 [USP'U]/100ML
600 INJECTION, SOLUTION INTRAVENOUS ONCE
Status: DISPENSED | OUTPATIENT
Start: 2019-01-01 | End: 2019-01-01

## 2019-01-01 RX ORDER — NYSTATIN 100000 [USP'U]/ML
500000 SUSPENSION ORAL 4 TIMES DAILY
Status: DISPENSED | OUTPATIENT
Start: 2019-01-01 | End: 2019-01-01

## 2019-01-01 RX ORDER — SODIUM CHLORIDE 0.9 % (FLUSH) 0.9 %
5-40 SYRINGE (ML) INJECTION EVERY 8 HOURS
Status: DISCONTINUED | OUTPATIENT
Start: 2019-01-01 | End: 2019-01-01

## 2019-01-01 RX ORDER — INSULIN LISPRO 100 [IU]/ML
INJECTION, SOLUTION INTRAVENOUS; SUBCUTANEOUS EVERY 6 HOURS
Status: DISCONTINUED | OUTPATIENT
Start: 2019-01-01 | End: 2019-01-01 | Stop reason: HOSPADM

## 2019-01-01 RX ORDER — POLYETHYLENE GLYCOL 3350 17 G/17G
17 POWDER, FOR SOLUTION ORAL
COMMUNITY

## 2019-01-01 RX ORDER — SODIUM BICARBONATE 84 MG/ML
50 INJECTION, SOLUTION INTRAVENOUS ONCE
Status: COMPLETED | OUTPATIENT
Start: 2019-01-01 | End: 2019-01-01

## 2019-01-01 RX ORDER — ACETAMINOPHEN 10 MG/ML
1000 INJECTION, SOLUTION INTRAVENOUS ONCE
Status: COMPLETED | OUTPATIENT
Start: 2019-01-01 | End: 2019-01-01

## 2019-01-01 RX ORDER — CLONIDINE 0.3 MG/24H
1 PATCH, EXTENDED RELEASE TRANSDERMAL
Status: DISCONTINUED | OUTPATIENT
Start: 2019-01-01 | End: 2019-01-01 | Stop reason: HOSPADM

## 2019-01-01 RX ORDER — INSULIN GLARGINE 100 [IU]/ML
15 INJECTION, SOLUTION SUBCUTANEOUS
Status: DISCONTINUED | OUTPATIENT
Start: 2019-01-01 | End: 2019-01-01

## 2019-01-01 RX ORDER — LABETALOL 100 MG/1
100 TABLET, FILM COATED ORAL EVERY 12 HOURS
Qty: 60 TAB | Refills: 1 | Status: SHIPPED | OUTPATIENT
Start: 2019-01-01

## 2019-01-01 RX ORDER — HYDROMORPHONE HYDROCHLORIDE 2 MG/ML
INJECTION, SOLUTION INTRAMUSCULAR; INTRAVENOUS; SUBCUTANEOUS AS NEEDED
Status: DISCONTINUED | OUTPATIENT
Start: 2019-01-01 | End: 2019-01-01 | Stop reason: HOSPADM

## 2019-01-01 RX ORDER — MORPHINE SULFATE 20 MG/5ML
5 SOLUTION ORAL
Qty: 100 ML | Refills: 0 | Status: SHIPPED | OUTPATIENT
Start: 2019-01-01 | End: 2019-01-01

## 2019-01-01 RX ORDER — LABETALOL 100 MG/1
100 TABLET, FILM COATED ORAL EVERY 12 HOURS
Status: DISCONTINUED | OUTPATIENT
Start: 2019-01-01 | End: 2019-01-01 | Stop reason: HOSPADM

## 2019-01-01 RX ORDER — KETOROLAC TROMETHAMINE 30 MG/ML
30 INJECTION, SOLUTION INTRAMUSCULAR; INTRAVENOUS
Status: DISCONTINUED | OUTPATIENT
Start: 2019-01-01 | End: 2019-01-01 | Stop reason: HOSPADM

## 2019-01-01 RX ORDER — ENOXAPARIN SODIUM 100 MG/ML
40 INJECTION SUBCUTANEOUS EVERY 24 HOURS
Status: DISCONTINUED | OUTPATIENT
Start: 2019-01-01 | End: 2019-01-01 | Stop reason: HOSPADM

## 2019-01-01 RX ORDER — BUPIVACAINE HYDROCHLORIDE AND EPINEPHRINE 5; 5 MG/ML; UG/ML
INJECTION, SOLUTION EPIDURAL; INTRACAUDAL; PERINEURAL AS NEEDED
Status: DISCONTINUED | OUTPATIENT
Start: 2019-01-01 | End: 2019-01-01 | Stop reason: HOSPADM

## 2019-01-01 RX ORDER — SODIUM CHLORIDE 0.9 % (FLUSH) 0.9 %
5-40 SYRINGE (ML) INJECTION AS NEEDED
Status: DISCONTINUED | OUTPATIENT
Start: 2019-01-01 | End: 2019-01-01 | Stop reason: HOSPADM

## 2019-01-01 RX ORDER — PHENYLEPHRINE HCL IN 0.9% NACL 0.4MG/10ML
SYRINGE (ML) INTRAVENOUS AS NEEDED
Status: DISCONTINUED | OUTPATIENT
Start: 2019-01-01 | End: 2019-01-01 | Stop reason: HOSPADM

## 2019-01-01 RX ORDER — DEXTROSE MONOHYDRATE AND SODIUM CHLORIDE 5; .45 G/100ML; G/100ML
75 INJECTION, SOLUTION INTRAVENOUS CONTINUOUS
Status: DISCONTINUED | OUTPATIENT
Start: 2019-01-01 | End: 2019-01-01

## 2019-01-01 RX ORDER — ONDANSETRON 2 MG/ML
INJECTION INTRAMUSCULAR; INTRAVENOUS AS NEEDED
Status: DISCONTINUED | OUTPATIENT
Start: 2019-01-01 | End: 2019-01-01 | Stop reason: HOSPADM

## 2019-01-01 RX ORDER — SODIUM CHLORIDE 450 MG/100ML
100 INJECTION, SOLUTION INTRAVENOUS CONTINUOUS
Status: DISCONTINUED | OUTPATIENT
Start: 2019-01-01 | End: 2019-01-01

## 2019-01-01 RX ORDER — FENTANYL CITRATE 50 UG/ML
50 INJECTION, SOLUTION INTRAMUSCULAR; INTRAVENOUS AS NEEDED
Status: DISCONTINUED | OUTPATIENT
Start: 2019-01-01 | End: 2019-01-01 | Stop reason: HOSPADM

## 2019-01-01 RX ORDER — SODIUM BICARBONATE IN D5W 150/1000ML
PLASTIC BAG, INJECTION (ML) INTRAVENOUS CONTINUOUS
Status: DISCONTINUED | OUTPATIENT
Start: 2019-01-01 | End: 2019-01-01

## 2019-01-01 RX ORDER — LIDOCAINE HYDROCHLORIDE 10 MG/ML
0.1 INJECTION, SOLUTION EPIDURAL; INFILTRATION; INTRACAUDAL; PERINEURAL AS NEEDED
Status: DISCONTINUED | OUTPATIENT
Start: 2019-01-01 | End: 2019-01-01 | Stop reason: HOSPADM

## 2019-01-01 RX ORDER — LORAZEPAM 2 MG/ML
0.5 INJECTION INTRAMUSCULAR EVERY 6 HOURS
Status: DISCONTINUED | OUTPATIENT
Start: 2019-01-01 | End: 2019-01-01

## 2019-01-01 RX ORDER — SODIUM BICARBONATE 650 MG/1
650 TABLET ORAL 3 TIMES DAILY
Qty: 90 TAB | Refills: 0 | Status: SHIPPED | OUTPATIENT
Start: 2019-01-01

## 2019-01-01 RX ORDER — SCOLOPAMINE TRANSDERMAL SYSTEM 1 MG/1
1 PATCH, EXTENDED RELEASE TRANSDERMAL
Qty: 10 PATCH | Refills: 0 | Status: SHIPPED | OUTPATIENT
Start: 2019-01-01

## 2019-01-01 RX ORDER — METRONIDAZOLE 500 MG/100ML
500 INJECTION, SOLUTION INTRAVENOUS EVERY 12 HOURS
Status: DISCONTINUED | OUTPATIENT
Start: 2019-01-01 | End: 2019-01-01

## 2019-01-01 RX ORDER — LORAZEPAM 2 MG/ML
1 INJECTION INTRAMUSCULAR EVERY 4 HOURS
Status: DISCONTINUED | OUTPATIENT
Start: 2019-01-01 | End: 2019-01-01 | Stop reason: HOSPADM

## 2019-01-01 RX ORDER — SODIUM CHLORIDE 0.9 % (FLUSH) 0.9 %
5-40 SYRINGE (ML) INJECTION EVERY 8 HOURS
Status: DISCONTINUED | OUTPATIENT
Start: 2019-01-01 | End: 2019-01-01 | Stop reason: HOSPADM

## 2019-01-01 RX ORDER — LABETALOL HYDROCHLORIDE 5 MG/ML
20 INJECTION, SOLUTION INTRAVENOUS
Status: DISCONTINUED | OUTPATIENT
Start: 2019-01-01 | End: 2019-01-01 | Stop reason: HOSPADM

## 2019-01-01 RX ORDER — DEXAMETHASONE SODIUM PHOSPHATE 4 MG/ML
4 INJECTION, SOLUTION INTRA-ARTICULAR; INTRALESIONAL; INTRAMUSCULAR; INTRAVENOUS; SOFT TISSUE EVERY 6 HOURS
Status: DISCONTINUED | OUTPATIENT
Start: 2019-01-01 | End: 2019-01-01

## 2019-01-01 RX ORDER — VANCOMYCIN HYDROCHLORIDE
1250
Status: DISCONTINUED | OUTPATIENT
Start: 2019-01-01 | End: 2019-01-01

## 2019-01-01 RX ORDER — DICYCLOMINE HYDROCHLORIDE 10 MG/1
20 CAPSULE ORAL
Status: DISCONTINUED | OUTPATIENT
Start: 2019-01-01 | End: 2019-01-01 | Stop reason: HOSPADM

## 2019-01-01 RX ORDER — INSULIN LISPRO 100 [IU]/ML
INJECTION, SOLUTION INTRAVENOUS; SUBCUTANEOUS EVERY 6 HOURS
Status: DISCONTINUED | OUTPATIENT
Start: 2019-01-01 | End: 2019-01-01

## 2019-01-01 RX ORDER — LABETALOL HYDROCHLORIDE 5 MG/ML
INJECTION, SOLUTION INTRAVENOUS AS NEEDED
Status: DISCONTINUED | OUTPATIENT
Start: 2019-01-01 | End: 2019-01-01 | Stop reason: HOSPADM

## 2019-01-01 RX ORDER — ENOXAPARIN SODIUM 100 MG/ML
40 INJECTION SUBCUTANEOUS EVERY 24 HOURS
Status: DISCONTINUED | OUTPATIENT
Start: 2019-01-01 | End: 2019-01-01

## 2019-01-01 RX ORDER — ONDANSETRON 2 MG/ML
4 INJECTION INTRAMUSCULAR; INTRAVENOUS
Status: COMPLETED | OUTPATIENT
Start: 2019-01-01 | End: 2019-01-01

## 2019-01-01 RX ORDER — NALOXONE HYDROCHLORIDE 0.4 MG/ML
0.4 INJECTION, SOLUTION INTRAMUSCULAR; INTRAVENOUS; SUBCUTANEOUS AS NEEDED
Status: DISCONTINUED | OUTPATIENT
Start: 2019-01-01 | End: 2019-01-01 | Stop reason: HOSPADM

## 2019-01-01 RX ORDER — INSULIN LISPRO 100 [IU]/ML
INJECTION, SOLUTION INTRAVENOUS; SUBCUTANEOUS
Status: DISCONTINUED | OUTPATIENT
Start: 2019-01-01 | End: 2019-01-01

## 2019-01-01 RX ORDER — HYDRALAZINE HYDROCHLORIDE 20 MG/ML
10 INJECTION INTRAMUSCULAR; INTRAVENOUS
Status: DISCONTINUED | OUTPATIENT
Start: 2019-01-01 | End: 2019-01-01

## 2019-01-01 RX ORDER — BISMUTH SUBSALICYLATE 262 MG
1 TABLET,CHEWABLE ORAL DAILY
COMMUNITY
End: 2019-01-01

## 2019-01-01 RX ORDER — CAPECITABINE 500 MG/1
TABLET, FILM COATED ORAL
Refills: 2 | Status: ON HOLD | COMMUNITY
Start: 2019-01-01 | End: 2019-01-01

## 2019-01-01 RX ORDER — FENTANYL CITRATE 50 UG/ML
INJECTION, SOLUTION INTRAMUSCULAR; INTRAVENOUS AS NEEDED
Status: DISCONTINUED | OUTPATIENT
Start: 2019-01-01 | End: 2019-01-01 | Stop reason: HOSPADM

## 2019-01-01 RX ORDER — HEPARIN SODIUM 5000 [USP'U]/ML
5000 INJECTION, SOLUTION INTRAVENOUS; SUBCUTANEOUS EVERY 8 HOURS
Status: DISCONTINUED | OUTPATIENT
Start: 2019-01-01 | End: 2019-01-01

## 2019-01-01 RX ORDER — HYDRALAZINE HYDROCHLORIDE 20 MG/ML
20 INJECTION INTRAMUSCULAR; INTRAVENOUS EVERY 6 HOURS
Status: DISPENSED | OUTPATIENT
Start: 2019-01-01 | End: 2019-01-01

## 2019-01-01 RX ORDER — HEPARIN SODIUM 5000 [USP'U]/ML
5000 INJECTION, SOLUTION INTRAVENOUS; SUBCUTANEOUS EVERY 12 HOURS
Status: DISCONTINUED | OUTPATIENT
Start: 2019-01-01 | End: 2019-01-01 | Stop reason: CLARIF

## 2019-01-01 RX ORDER — CLONIDINE 0.3 MG/24H
1 PATCH, EXTENDED RELEASE TRANSDERMAL
Qty: 4 PATCH | Refills: 1 | Status: SHIPPED | OUTPATIENT
Start: 2019-01-01

## 2019-01-01 RX ORDER — AMLODIPINE BESYLATE 5 MG/1
10 TABLET ORAL DAILY
Status: DISCONTINUED | OUTPATIENT
Start: 2019-01-01 | End: 2019-01-01 | Stop reason: HOSPADM

## 2019-01-01 RX ORDER — ACETAMINOPHEN 650 MG/1
650 SUPPOSITORY RECTAL
Status: DISCONTINUED | OUTPATIENT
Start: 2019-01-01 | End: 2019-01-01 | Stop reason: HOSPADM

## 2019-01-01 RX ORDER — KETOROLAC TROMETHAMINE 30 MG/ML
30 INJECTION, SOLUTION INTRAMUSCULAR; INTRAVENOUS
Status: ACTIVE | OUTPATIENT
Start: 2019-01-01 | End: 2019-01-01

## 2019-01-01 RX ORDER — ACETAMINOPHEN 10 MG/ML
1000 INJECTION, SOLUTION INTRAVENOUS
Status: DISPENSED | OUTPATIENT
Start: 2019-01-01 | End: 2019-01-01

## 2019-01-01 RX ORDER — MORPHINE SULFATE 2 MG/ML
2 INJECTION, SOLUTION INTRAMUSCULAR; INTRAVENOUS
Status: DISCONTINUED | OUTPATIENT
Start: 2019-01-01 | End: 2019-01-01 | Stop reason: HOSPADM

## 2019-01-01 RX ORDER — AMLODIPINE BESYLATE 10 MG/1
TABLET ORAL
Qty: 90 TAB | Refills: 2 | Status: SHIPPED | OUTPATIENT
Start: 2019-01-01 | End: 2019-01-01

## 2019-01-01 RX ORDER — LORAZEPAM 2 MG/ML
1 CONCENTRATE ORAL
Qty: 30 ML | Refills: 0 | Status: SHIPPED | OUTPATIENT
Start: 2019-01-01

## 2019-01-01 RX ORDER — ONDANSETRON HYDROCHLORIDE 8 MG/1
8 TABLET, FILM COATED ORAL
Qty: 30 TAB | Refills: 0 | Status: SHIPPED | OUTPATIENT
Start: 2019-01-01

## 2019-01-01 RX ORDER — BACLOFEN 10 MG/1
10 TABLET ORAL 3 TIMES DAILY
Status: DISCONTINUED | OUTPATIENT
Start: 2019-01-01 | End: 2019-01-01

## 2019-01-01 RX ORDER — ONDANSETRON 2 MG/ML
4 INJECTION INTRAMUSCULAR; INTRAVENOUS AS NEEDED
Status: DISCONTINUED | OUTPATIENT
Start: 2019-01-01 | End: 2019-01-01 | Stop reason: HOSPADM

## 2019-01-01 RX ORDER — FACIAL-BODY WIPES
10 EACH TOPICAL DAILY PRN
Status: DISCONTINUED | OUTPATIENT
Start: 2019-01-01 | End: 2019-01-01

## 2019-01-01 RX ORDER — METRONIDAZOLE 500 MG/100ML
500 INJECTION, SOLUTION INTRAVENOUS ONCE
Status: COMPLETED | OUTPATIENT
Start: 2019-01-01 | End: 2019-01-01

## 2019-01-01 RX ORDER — VANCOMYCIN/0.9 % SOD CHLORIDE 1.5G/250ML
1500 PLASTIC BAG, INJECTION (ML) INTRAVENOUS EVERY 24 HOURS
Status: DISCONTINUED | OUTPATIENT
Start: 2019-01-01 | End: 2019-01-01 | Stop reason: ALTCHOICE

## 2019-01-01 RX ORDER — SODIUM BICARBONATE 1 MEQ/ML
50 SYRINGE (ML) INTRAVENOUS ONCE
Status: COMPLETED | OUTPATIENT
Start: 2019-01-01 | End: 2019-01-01

## 2019-01-01 RX ORDER — LORAZEPAM 2 MG/ML
1 INJECTION INTRAMUSCULAR
Status: DISCONTINUED | OUTPATIENT
Start: 2019-01-01 | End: 2019-01-01

## 2019-01-01 RX ADMIN — ACETAMINOPHEN 650 MG: 160 SOLUTION ORAL at 08:42

## 2019-01-01 RX ADMIN — HUMAN INSULIN 10 UNITS: 100 INJECTION, SOLUTION SUBCUTANEOUS at 12:05

## 2019-01-01 RX ADMIN — PROMETHAZINE HYDROCHLORIDE 25 MG: 25 INJECTION INTRAMUSCULAR; INTRAVENOUS at 18:44

## 2019-01-01 RX ADMIN — CEFEPIME HYDROCHLORIDE 2 G: 2 INJECTION, POWDER, FOR SOLUTION INTRAVENOUS at 23:23

## 2019-01-01 RX ADMIN — NITROGLYCERIN 1 INCH: 20 OINTMENT TOPICAL at 00:41

## 2019-01-01 RX ADMIN — MORPHINE SULFATE 2 MG: 2 INJECTION, SOLUTION INTRAMUSCULAR; INTRAVENOUS at 17:37

## 2019-01-01 RX ADMIN — HYDROMORPHONE HYDROCHLORIDE 0.5 MG: 1 INJECTION, SOLUTION INTRAMUSCULAR; INTRAVENOUS; SUBCUTANEOUS at 07:08

## 2019-01-01 RX ADMIN — METRONIDAZOLE 500 MG: 500 INJECTION, SOLUTION INTRAVENOUS at 01:00

## 2019-01-01 RX ADMIN — NITROGLYCERIN 1 INCH: 20 OINTMENT TOPICAL at 05:52

## 2019-01-01 RX ADMIN — POTASSIUM CHLORIDE 10 MEQ: 10 INJECTION, SOLUTION INTRAVENOUS at 08:04

## 2019-01-01 RX ADMIN — METOPROLOL TARTRATE 5 MG: 5 INJECTION INTRAVENOUS at 18:16

## 2019-01-01 RX ADMIN — HUMAN INSULIN 25 UNITS: 100 INJECTION, SUSPENSION SUBCUTANEOUS at 20:59

## 2019-01-01 RX ADMIN — ACETAMINOPHEN 1000 MG: 10 INJECTION, SOLUTION INTRAVENOUS at 10:48

## 2019-01-01 RX ADMIN — MORPHINE SULFATE 2 MG: 2 INJECTION, SOLUTION INTRAMUSCULAR; INTRAVENOUS at 01:07

## 2019-01-01 RX ADMIN — AMLODIPINE BESYLATE 10 MG: 5 TABLET ORAL at 09:17

## 2019-01-01 RX ADMIN — NITROGLYCERIN 1 INCH: 20 OINTMENT TOPICAL at 12:32

## 2019-01-01 RX ADMIN — Medication 10 ML: at 21:00

## 2019-01-01 RX ADMIN — METOPROLOL TARTRATE 5 MG: 5 INJECTION INTRAVENOUS at 00:11

## 2019-01-01 RX ADMIN — Medication 10 ML: at 05:57

## 2019-01-01 RX ADMIN — LABETALOL HCL 100 MG: 100 TABLET, FILM COATED ORAL at 08:38

## 2019-01-01 RX ADMIN — ACETAMINOPHEN 1000 MG: 10 INJECTION, SOLUTION INTRAVENOUS at 18:30

## 2019-01-01 RX ADMIN — POTASSIUM CHLORIDE 10 MEQ: 10 INJECTION, SOLUTION INTRAVENOUS at 11:40

## 2019-01-01 RX ADMIN — METOPROLOL TARTRATE 5 MG: 5 INJECTION INTRAVENOUS at 06:36

## 2019-01-01 RX ADMIN — HEPARIN SODIUM 5000 UNITS: 5000 INJECTION INTRAVENOUS; SUBCUTANEOUS at 08:51

## 2019-01-01 RX ADMIN — Medication 10 ML: at 19:36

## 2019-01-01 RX ADMIN — Medication 10 ML: at 14:00

## 2019-01-01 RX ADMIN — LORAZEPAM 1 MG: 2 INJECTION INTRAMUSCULAR; INTRAVENOUS at 05:03

## 2019-01-01 RX ADMIN — METOPROLOL TARTRATE 7.5 MG: 5 INJECTION INTRAVENOUS at 18:14

## 2019-01-01 RX ADMIN — MORPHINE SULFATE 2 MG: 2 INJECTION, SOLUTION INTRAMUSCULAR; INTRAVENOUS at 04:09

## 2019-01-01 RX ADMIN — HUMAN INSULIN 25 UNITS: 100 INJECTION, SUSPENSION SUBCUTANEOUS at 08:22

## 2019-01-01 RX ADMIN — SODIUM BICARBONATE 650 MG: 650 TABLET ORAL at 23:04

## 2019-01-01 RX ADMIN — HUMAN INSULIN 25 UNITS: 100 INJECTION, SUSPENSION SUBCUTANEOUS at 08:57

## 2019-01-01 RX ADMIN — NITROGLYCERIN 1 INCH: 20 OINTMENT TOPICAL at 18:38

## 2019-01-01 RX ADMIN — PANTOPRAZOLE SODIUM 40 MG: 40 INJECTION, POWDER, FOR SOLUTION INTRAVENOUS at 08:27

## 2019-01-01 RX ADMIN — Medication 10 ML: at 06:16

## 2019-01-01 RX ADMIN — NITROGLYCERIN 1 INCH: 20 OINTMENT TOPICAL at 23:22

## 2019-01-01 RX ADMIN — INSULIN LISPRO 4 UNITS: 100 INJECTION, SOLUTION INTRAVENOUS; SUBCUTANEOUS at 05:45

## 2019-01-01 RX ADMIN — ENOXAPARIN SODIUM 40 MG: 40 INJECTION SUBCUTANEOUS at 12:32

## 2019-01-01 RX ADMIN — CEFEPIME HYDROCHLORIDE 1 G: 1 INJECTION, POWDER, FOR SOLUTION INTRAMUSCULAR; INTRAVENOUS at 12:04

## 2019-01-01 RX ADMIN — INSULIN LISPRO 4 UNITS: 100 INJECTION, SOLUTION INTRAVENOUS; SUBCUTANEOUS at 18:27

## 2019-01-01 RX ADMIN — Medication: at 10:55

## 2019-01-01 RX ADMIN — NITROGLYCERIN 1 INCH: 20 OINTMENT TOPICAL at 11:48

## 2019-01-01 RX ADMIN — ENOXAPARIN SODIUM 40 MG: 40 INJECTION SUBCUTANEOUS at 18:15

## 2019-01-01 RX ADMIN — INSULIN LISPRO 3 UNITS: 100 INJECTION, SOLUTION INTRAVENOUS; SUBCUTANEOUS at 06:39

## 2019-01-01 RX ADMIN — VANCOMYCIN HYDROCHLORIDE 1500 MG: 10 INJECTION, POWDER, LYOPHILIZED, FOR SOLUTION INTRAVENOUS at 13:42

## 2019-01-01 RX ADMIN — AMLODIPINE BESYLATE 10 MG: 5 TABLET ORAL at 08:43

## 2019-01-01 RX ADMIN — NITROGLYCERIN 1 INCH: 20 OINTMENT TOPICAL at 18:19

## 2019-01-01 RX ADMIN — CEFEPIME HYDROCHLORIDE 2 G: 2 INJECTION, POWDER, FOR SOLUTION INTRAVENOUS at 00:03

## 2019-01-01 RX ADMIN — METOPROLOL TARTRATE 2 MG: 5 INJECTION INTRAVENOUS at 17:07

## 2019-01-01 RX ADMIN — MORPHINE SULFATE 2 MG: 2 INJECTION, SOLUTION INTRAMUSCULAR; INTRAVENOUS at 23:19

## 2019-01-01 RX ADMIN — Medication 10 ML: at 05:48

## 2019-01-01 RX ADMIN — VANCOMYCIN HYDROCHLORIDE 1250 MG: 10 INJECTION, POWDER, LYOPHILIZED, FOR SOLUTION INTRAVENOUS at 07:57

## 2019-01-01 RX ADMIN — METOPROLOL TARTRATE 5 MG: 5 INJECTION INTRAVENOUS at 12:03

## 2019-01-01 RX ADMIN — Medication: at 02:56

## 2019-01-01 RX ADMIN — PANTOPRAZOLE SODIUM 40 MG: 40 INJECTION, POWDER, FOR SOLUTION INTRAVENOUS at 08:22

## 2019-01-01 RX ADMIN — INSULIN LISPRO 3 UNITS: 100 INJECTION, SOLUTION INTRAVENOUS; SUBCUTANEOUS at 06:07

## 2019-01-01 RX ADMIN — PANTOPRAZOLE SODIUM 40 MG: 40 INJECTION, POWDER, FOR SOLUTION INTRAVENOUS at 09:41

## 2019-01-01 RX ADMIN — INSULIN LISPRO 4 UNITS: 100 INJECTION, SOLUTION INTRAVENOUS; SUBCUTANEOUS at 12:03

## 2019-01-01 RX ADMIN — LABETALOL HCL 100 MG: 100 TABLET, FILM COATED ORAL at 21:17

## 2019-01-01 RX ADMIN — POTASSIUM CHLORIDE 10 MEQ: 10 INJECTION, SOLUTION INTRAVENOUS at 11:30

## 2019-01-01 RX ADMIN — Medication 10 ML: at 07:46

## 2019-01-01 RX ADMIN — INSULIN LISPRO 3 UNITS: 100 INJECTION, SOLUTION INTRAVENOUS; SUBCUTANEOUS at 05:32

## 2019-01-01 RX ADMIN — MORPHINE SULFATE 2 MG: 2 INJECTION, SOLUTION INTRAMUSCULAR; INTRAVENOUS at 22:05

## 2019-01-01 RX ADMIN — INSULIN LISPRO 3 UNITS: 100 INJECTION, SOLUTION INTRAVENOUS; SUBCUTANEOUS at 23:11

## 2019-01-01 RX ADMIN — Medication 10 ML: at 05:41

## 2019-01-01 RX ADMIN — PROMETHAZINE HYDROCHLORIDE 25 MG: 25 INJECTION INTRAMUSCULAR; INTRAVENOUS at 12:47

## 2019-01-01 RX ADMIN — PANTOPRAZOLE SODIUM 40 MG: 40 INJECTION, POWDER, FOR SOLUTION INTRAVENOUS at 08:51

## 2019-01-01 RX ADMIN — INSULIN LISPRO 3 UNITS: 100 INJECTION, SOLUTION INTRAVENOUS; SUBCUTANEOUS at 18:00

## 2019-01-01 RX ADMIN — SODIUM BICARBONATE 650 MG: 650 TABLET ORAL at 09:17

## 2019-01-01 RX ADMIN — INSULIN LISPRO 3 UNITS: 100 INJECTION, SOLUTION INTRAVENOUS; SUBCUTANEOUS at 05:52

## 2019-01-01 RX ADMIN — Medication 10 ML: at 18:24

## 2019-01-01 RX ADMIN — MAGNESIUM SULFATE HEPTAHYDRATE 2 G: 40 INJECTION, SOLUTION INTRAVENOUS at 08:31

## 2019-01-01 RX ADMIN — NITROGLYCERIN 1 INCH: 20 OINTMENT TOPICAL at 18:51

## 2019-01-01 RX ADMIN — HUMAN INSULIN 32 UNITS: 100 INJECTION, SUSPENSION SUBCUTANEOUS at 08:31

## 2019-01-01 RX ADMIN — NITROGLYCERIN 1 INCH: 20 OINTMENT TOPICAL at 00:05

## 2019-01-01 RX ADMIN — INSULIN LISPRO 3 UNITS: 100 INJECTION, SOLUTION INTRAVENOUS; SUBCUTANEOUS at 06:47

## 2019-01-01 RX ADMIN — ENOXAPARIN SODIUM 40 MG: 40 INJECTION SUBCUTANEOUS at 18:09

## 2019-01-01 RX ADMIN — ACETAMINOPHEN 500 MG: 160 SOLUTION ORAL at 21:33

## 2019-01-01 RX ADMIN — INSULIN LISPRO 3 UNITS: 100 INJECTION, SOLUTION INTRAVENOUS; SUBCUTANEOUS at 06:02

## 2019-01-01 RX ADMIN — Medication: at 08:00

## 2019-01-01 RX ADMIN — Medication 10 ML: at 06:00

## 2019-01-01 RX ADMIN — METRONIDAZOLE 500 MG: 500 INJECTION, SOLUTION INTRAVENOUS at 21:08

## 2019-01-01 RX ADMIN — FAMOTIDINE: 10 INJECTION INTRAVENOUS at 17:59

## 2019-01-01 RX ADMIN — METOPROLOL TARTRATE 5 MG: 5 INJECTION INTRAVENOUS at 23:23

## 2019-01-01 RX ADMIN — FAMOTIDINE: 10 INJECTION INTRAVENOUS at 18:05

## 2019-01-01 RX ADMIN — VANCOMYCIN HYDROCHLORIDE 2000 MG: 10 INJECTION, POWDER, LYOPHILIZED, FOR SOLUTION INTRAVENOUS at 23:45

## 2019-01-01 RX ADMIN — ENOXAPARIN SODIUM 40 MG: 40 INJECTION SUBCUTANEOUS at 17:00

## 2019-01-01 RX ADMIN — INSULIN LISPRO 7 UNITS: 100 INJECTION, SOLUTION INTRAVENOUS; SUBCUTANEOUS at 11:54

## 2019-01-01 RX ADMIN — CEFEPIME HYDROCHLORIDE 2 G: 2 INJECTION, POWDER, FOR SOLUTION INTRAVENOUS at 21:14

## 2019-01-01 RX ADMIN — INSULIN LISPRO 3 UNITS: 100 INJECTION, SOLUTION INTRAVENOUS; SUBCUTANEOUS at 11:52

## 2019-01-01 RX ADMIN — HUMAN INSULIN 30 UNITS: 100 INJECTION, SUSPENSION SUBCUTANEOUS at 21:24

## 2019-01-01 RX ADMIN — HEPARIN SODIUM 5000 UNITS: 5000 INJECTION INTRAVENOUS; SUBCUTANEOUS at 00:30

## 2019-01-01 RX ADMIN — Medication 10 ML: at 21:35

## 2019-01-01 RX ADMIN — DEXAMETHASONE SODIUM PHOSPHATE 4 MG: 4 INJECTION, SOLUTION INTRAMUSCULAR; INTRAVENOUS at 05:50

## 2019-01-01 RX ADMIN — POTASSIUM CHLORIDE 10 MEQ: 10 INJECTION, SOLUTION INTRAVENOUS at 12:44

## 2019-01-01 RX ADMIN — NITROGLYCERIN 1 INCH: 20 OINTMENT TOPICAL at 06:26

## 2019-01-01 RX ADMIN — HYDROMORPHONE HYDROCHLORIDE 1 MG: 2 INJECTION, SOLUTION INTRAMUSCULAR; INTRAVENOUS; SUBCUTANEOUS at 16:49

## 2019-01-01 RX ADMIN — HYDRALAZINE HYDROCHLORIDE 20 MG: 20 INJECTION INTRAMUSCULAR; INTRAVENOUS at 20:51

## 2019-01-01 RX ADMIN — HUMAN INSULIN 25 UNITS: 100 INJECTION, SUSPENSION SUBCUTANEOUS at 21:14

## 2019-01-01 RX ADMIN — INSULIN LISPRO 3 UNITS: 100 INJECTION, SOLUTION INTRAVENOUS; SUBCUTANEOUS at 00:04

## 2019-01-01 RX ADMIN — INSULIN LISPRO 2 UNITS: 100 INJECTION, SOLUTION INTRAVENOUS; SUBCUTANEOUS at 00:42

## 2019-01-01 RX ADMIN — INSULIN LISPRO 7 UNITS: 100 INJECTION, SOLUTION INTRAVENOUS; SUBCUTANEOUS at 12:29

## 2019-01-01 RX ADMIN — LABETALOL HCL 100 MG: 100 TABLET, FILM COATED ORAL at 08:32

## 2019-01-01 RX ADMIN — INSULIN LISPRO 4 UNITS: 100 INJECTION, SOLUTION INTRAVENOUS; SUBCUTANEOUS at 13:00

## 2019-01-01 RX ADMIN — VANCOMYCIN HYDROCHLORIDE 1250 MG: 10 INJECTION, POWDER, LYOPHILIZED, FOR SOLUTION INTRAVENOUS at 01:14

## 2019-01-01 RX ADMIN — Medication 10 ML: at 21:19

## 2019-01-01 RX ADMIN — INSULIN LISPRO 3 UNITS: 100 INJECTION, SOLUTION INTRAVENOUS; SUBCUTANEOUS at 12:04

## 2019-01-01 RX ADMIN — PROMETHAZINE HYDROCHLORIDE 25 MG: 25 INJECTION, SOLUTION INTRAMUSCULAR; INTRAVENOUS at 00:41

## 2019-01-01 RX ADMIN — METRONIDAZOLE 500 MG: 500 INJECTION, SOLUTION INTRAVENOUS at 08:44

## 2019-01-01 RX ADMIN — NITROGLYCERIN 1 INCH: 20 OINTMENT TOPICAL at 12:39

## 2019-01-01 RX ADMIN — METOPROLOL TARTRATE 5 MG: 5 INJECTION INTRAVENOUS at 12:49

## 2019-01-01 RX ADMIN — PROMETHAZINE HYDROCHLORIDE 25 MG: 25 INJECTION INTRAMUSCULAR; INTRAVENOUS at 16:00

## 2019-01-01 RX ADMIN — Medication 10 ML: at 14:03

## 2019-01-01 RX ADMIN — INSULIN LISPRO 3 UNITS: 100 INJECTION, SOLUTION INTRAVENOUS; SUBCUTANEOUS at 00:18

## 2019-01-01 RX ADMIN — Medication 10 ML: at 21:17

## 2019-01-01 RX ADMIN — FAMOTIDINE: 10 INJECTION INTRAVENOUS at 19:25

## 2019-01-01 RX ADMIN — METOPROLOL TARTRATE 5 MG: 5 INJECTION INTRAVENOUS at 12:11

## 2019-01-01 RX ADMIN — SODIUM CHLORIDE: 234 INJECTION INTRAMUSCULAR; INTRAVENOUS; SUBCUTANEOUS at 20:50

## 2019-01-01 RX ADMIN — METOPROLOL TARTRATE 5 MG: 5 INJECTION INTRAVENOUS at 00:40

## 2019-01-01 RX ADMIN — SODIUM CHLORIDE: 234 INJECTION INTRAMUSCULAR; INTRAVENOUS; SUBCUTANEOUS at 11:59

## 2019-01-01 RX ADMIN — HUMAN INSULIN 25 UNITS: 100 INJECTION, SUSPENSION SUBCUTANEOUS at 08:15

## 2019-01-01 RX ADMIN — HYDRALAZINE HYDROCHLORIDE 10 MG: 20 INJECTION INTRAMUSCULAR; INTRAVENOUS at 17:10

## 2019-01-01 RX ADMIN — CEFEPIME HYDROCHLORIDE 2 G: 2 INJECTION, POWDER, FOR SOLUTION INTRAVENOUS at 08:03

## 2019-01-01 RX ADMIN — Medication 40 ML: at 04:06

## 2019-01-01 RX ADMIN — Medication 10 ML: at 05:16

## 2019-01-01 RX ADMIN — NITROGLYCERIN 1 INCH: 20 OINTMENT TOPICAL at 12:41

## 2019-01-01 RX ADMIN — NITROGLYCERIN 1 INCH: 20 OINTMENT TOPICAL at 23:54

## 2019-01-01 RX ADMIN — HYDRALAZINE HYDROCHLORIDE 20 MG: 20 INJECTION INTRAMUSCULAR; INTRAVENOUS at 04:33

## 2019-01-01 RX ADMIN — NITROGLYCERIN 1 INCH: 20 OINTMENT TOPICAL at 06:36

## 2019-01-01 RX ADMIN — HUMAN INSULIN 30 UNITS: 100 INJECTION, SUSPENSION SUBCUTANEOUS at 09:02

## 2019-01-01 RX ADMIN — DEXAMETHASONE SODIUM PHOSPHATE 4 MG: 4 INJECTION, SOLUTION INTRAMUSCULAR; INTRAVENOUS at 18:18

## 2019-01-01 RX ADMIN — METRONIDAZOLE 500 MG: 500 INJECTION, SOLUTION INTRAVENOUS at 21:52

## 2019-01-01 RX ADMIN — Medication 10 ML: at 06:44

## 2019-01-01 RX ADMIN — Medication 10 ML: at 14:10

## 2019-01-01 RX ADMIN — INSULIN LISPRO 4 UNITS: 100 INJECTION, SOLUTION INTRAVENOUS; SUBCUTANEOUS at 06:03

## 2019-01-01 RX ADMIN — MORPHINE SULFATE 2 MG: 2 INJECTION, SOLUTION INTRAMUSCULAR; INTRAVENOUS at 16:13

## 2019-01-01 RX ADMIN — HEPARIN SODIUM 5000 UNITS: 5000 INJECTION INTRAVENOUS; SUBCUTANEOUS at 08:04

## 2019-01-01 RX ADMIN — Medication 10 ML: at 21:30

## 2019-01-01 RX ADMIN — HEPARIN SODIUM 5000 UNITS: 5000 INJECTION INTRAVENOUS; SUBCUTANEOUS at 00:51

## 2019-01-01 RX ADMIN — MORPHINE SULFATE 2 MG: 2 INJECTION, SOLUTION INTRAMUSCULAR; INTRAVENOUS at 00:04

## 2019-01-01 RX ADMIN — Medication 10 ML: at 15:00

## 2019-01-01 RX ADMIN — HUMAN INSULIN 32 UNITS: 100 INJECTION, SUSPENSION SUBCUTANEOUS at 20:44

## 2019-01-01 RX ADMIN — Medication: at 16:00

## 2019-01-01 RX ADMIN — LABETALOL HCL 100 MG: 100 TABLET, FILM COATED ORAL at 08:10

## 2019-01-01 RX ADMIN — INSULIN LISPRO 3 UNITS: 100 INJECTION, SOLUTION INTRAVENOUS; SUBCUTANEOUS at 06:32

## 2019-01-01 RX ADMIN — SODIUM BICARBONATE 650 MG: 650 TABLET ORAL at 23:22

## 2019-01-01 RX ADMIN — METOPROLOL TARTRATE 5 MG: 5 INJECTION INTRAVENOUS at 12:41

## 2019-01-01 RX ADMIN — HUMAN INSULIN 30 UNITS: 100 INJECTION, SUSPENSION SUBCUTANEOUS at 22:58

## 2019-01-01 RX ADMIN — WATER: 1000 INJECTION, SOLUTION INTRAVENOUS at 21:13

## 2019-01-01 RX ADMIN — METOPROLOL TARTRATE 5 MG: 5 INJECTION INTRAVENOUS at 11:54

## 2019-01-01 RX ADMIN — Medication 10 ML: at 14:37

## 2019-01-01 RX ADMIN — DEXTROSE MONOHYDRATE AND SODIUM CHLORIDE 75 ML/HR: 5; .45 INJECTION, SOLUTION INTRAVENOUS at 03:35

## 2019-01-01 RX ADMIN — CALCIUM GLUCONATE: 94 INJECTION, SOLUTION INTRAVENOUS at 18:06

## 2019-01-01 RX ADMIN — METOPROLOL TARTRATE 5 MG: 5 INJECTION INTRAVENOUS at 00:23

## 2019-01-01 RX ADMIN — I.V. FAT EMULSION 250 ML: 20 EMULSION INTRAVENOUS at 20:56

## 2019-01-01 RX ADMIN — INSULIN LISPRO 3 UNITS: 100 INJECTION, SOLUTION INTRAVENOUS; SUBCUTANEOUS at 18:21

## 2019-01-01 RX ADMIN — NITROGLYCERIN 1 INCH: 20 OINTMENT TOPICAL at 18:18

## 2019-01-01 RX ADMIN — INSULIN LISPRO 3 UNITS: 100 INJECTION, SOLUTION INTRAVENOUS; SUBCUTANEOUS at 23:55

## 2019-01-01 RX ADMIN — INSULIN LISPRO 3 UNITS: 100 INJECTION, SOLUTION INTRAVENOUS; SUBCUTANEOUS at 00:11

## 2019-01-01 RX ADMIN — AMLODIPINE BESYLATE 10 MG: 5 TABLET ORAL at 09:30

## 2019-01-01 RX ADMIN — METOPROLOL TARTRATE 10 MG: 5 INJECTION INTRAVENOUS at 05:44

## 2019-01-01 RX ADMIN — METRONIDAZOLE 500 MG: 500 INJECTION, SOLUTION INTRAVENOUS at 12:58

## 2019-01-01 RX ADMIN — LABETALOL HCL 100 MG: 100 TABLET, FILM COATED ORAL at 21:24

## 2019-01-01 RX ADMIN — HUMAN INSULIN 25 UNITS: 100 INJECTION, SUSPENSION SUBCUTANEOUS at 09:18

## 2019-01-01 RX ADMIN — CALCIUM GLUCONATE: 94 INJECTION, SOLUTION INTRAVENOUS at 18:23

## 2019-01-01 RX ADMIN — FAMOTIDINE: 10 INJECTION INTRAVENOUS at 18:27

## 2019-01-01 RX ADMIN — INSULIN LISPRO 4 UNITS: 100 INJECTION, SOLUTION INTRAVENOUS; SUBCUTANEOUS at 18:35

## 2019-01-01 RX ADMIN — METOPROLOL TARTRATE 5 MG: 5 INJECTION INTRAVENOUS at 11:33

## 2019-01-01 RX ADMIN — Medication 40 ML: at 06:00

## 2019-01-01 RX ADMIN — AMLODIPINE BESYLATE 10 MG: 5 TABLET ORAL at 09:31

## 2019-01-01 RX ADMIN — ENOXAPARIN SODIUM 40 MG: 40 INJECTION SUBCUTANEOUS at 12:29

## 2019-01-01 RX ADMIN — NITROGLYCERIN 1 INCH: 20 OINTMENT TOPICAL at 01:35

## 2019-01-01 RX ADMIN — NITROGLYCERIN 1 INCH: 20 OINTMENT TOPICAL at 11:50

## 2019-01-01 RX ADMIN — FAMOTIDINE: 10 INJECTION INTRAVENOUS at 19:23

## 2019-01-01 RX ADMIN — ENOXAPARIN SODIUM 40 MG: 40 INJECTION SUBCUTANEOUS at 17:10

## 2019-01-01 RX ADMIN — PROMETHAZINE HYDROCHLORIDE 25 MG: 25 INJECTION INTRAMUSCULAR; INTRAVENOUS at 02:59

## 2019-01-01 RX ADMIN — FENTANYL CITRATE 100 MCG: 50 INJECTION, SOLUTION INTRAMUSCULAR; INTRAVENOUS at 16:08

## 2019-01-01 RX ADMIN — INSULIN LISPRO 3 UNITS: 100 INJECTION, SOLUTION INTRAVENOUS; SUBCUTANEOUS at 07:01

## 2019-01-01 RX ADMIN — ENOXAPARIN SODIUM 40 MG: 40 INJECTION SUBCUTANEOUS at 17:45

## 2019-01-01 RX ADMIN — HUMAN INSULIN 32 UNITS: 100 INJECTION, SUSPENSION SUBCUTANEOUS at 22:43

## 2019-01-01 RX ADMIN — NITROGLYCERIN 1 INCH: 20 OINTMENT TOPICAL at 00:55

## 2019-01-01 RX ADMIN — HYDROMORPHONE HYDROCHLORIDE 0.5 MG: 1 INJECTION, SOLUTION INTRAMUSCULAR; INTRAVENOUS; SUBCUTANEOUS at 02:49

## 2019-01-01 RX ADMIN — Medication 10 ML: at 18:16

## 2019-01-01 RX ADMIN — PROMETHAZINE HYDROCHLORIDE 25 MG: 25 INJECTION, SOLUTION INTRAMUSCULAR; INTRAVENOUS at 18:25

## 2019-01-01 RX ADMIN — INSULIN LISPRO 4 UNITS: 100 INJECTION, SOLUTION INTRAVENOUS; SUBCUTANEOUS at 18:18

## 2019-01-01 RX ADMIN — NYSTATIN 500000 UNITS: 100000 SUSPENSION ORAL at 08:41

## 2019-01-01 RX ADMIN — LABETALOL HCL 100 MG: 100 TABLET, FILM COATED ORAL at 09:17

## 2019-01-01 RX ADMIN — METOPROLOL TARTRATE 5 MG: 5 INJECTION INTRAVENOUS at 05:52

## 2019-01-01 RX ADMIN — NITROGLYCERIN 1 INCH: 20 OINTMENT TOPICAL at 23:47

## 2019-01-01 RX ADMIN — GLUCAGON HYDROCHLORIDE 1 MG: 1 INJECTION, POWDER, FOR SOLUTION INTRAMUSCULAR; INTRAVENOUS; SUBCUTANEOUS at 06:18

## 2019-01-01 RX ADMIN — METOPROLOL TARTRATE 5 MG: 5 INJECTION INTRAVENOUS at 17:38

## 2019-01-01 RX ADMIN — LORAZEPAM 0.5 MG: 2 INJECTION INTRAMUSCULAR; INTRAVENOUS at 10:28

## 2019-01-01 RX ADMIN — Medication 10 ML: at 22:17

## 2019-01-01 RX ADMIN — SODIUM CHLORIDE 175 ML/HR: 450 INJECTION, SOLUTION INTRAVENOUS at 08:15

## 2019-01-01 RX ADMIN — CEFEPIME HYDROCHLORIDE 2 G: 2 INJECTION, POWDER, FOR SOLUTION INTRAVENOUS at 20:47

## 2019-01-01 RX ADMIN — SODIUM BICARBONATE 650 MG: 650 TABLET ORAL at 16:44

## 2019-01-01 RX ADMIN — Medication 500 UNITS: at 16:16

## 2019-01-01 RX ADMIN — DEXAMETHASONE SODIUM PHOSPHATE 4 MG: 4 INJECTION, SOLUTION INTRAMUSCULAR; INTRAVENOUS at 05:12

## 2019-01-01 RX ADMIN — SODIUM CHLORIDE 175 ML/HR: 450 INJECTION, SOLUTION INTRAVENOUS at 10:00

## 2019-01-01 RX ADMIN — INSULIN LISPRO 4 UNITS: 100 INJECTION, SOLUTION INTRAVENOUS; SUBCUTANEOUS at 00:23

## 2019-01-01 RX ADMIN — FAMOTIDINE: 10 INJECTION INTRAVENOUS at 18:42

## 2019-01-01 RX ADMIN — METOPROLOL TARTRATE 5 MG: 5 INJECTION INTRAVENOUS at 17:57

## 2019-01-01 RX ADMIN — Medication 10 ML: at 20:52

## 2019-01-01 RX ADMIN — HYDROMORPHONE HYDROCHLORIDE 0.5 MG: 1 INJECTION, SOLUTION INTRAMUSCULAR; INTRAVENOUS; SUBCUTANEOUS at 20:20

## 2019-01-01 RX ADMIN — HUMAN INSULIN 25 UNITS: 100 INJECTION, SUSPENSION SUBCUTANEOUS at 21:07

## 2019-01-01 RX ADMIN — ACETAMINOPHEN 1000 MG: 10 INJECTION, SOLUTION INTRAVENOUS at 04:23

## 2019-01-01 RX ADMIN — METOPROLOL TARTRATE 5 MG: 5 INJECTION INTRAVENOUS at 12:06

## 2019-01-01 RX ADMIN — ACETAMINOPHEN 500 MG: 160 SOLUTION ORAL at 02:39

## 2019-01-01 RX ADMIN — METOPROLOL TARTRATE 5 MG: 5 INJECTION INTRAVENOUS at 06:26

## 2019-01-01 RX ADMIN — Medication 10 ML: at 08:42

## 2019-01-01 RX ADMIN — NYSTATIN 500000 UNITS: 100000 SUSPENSION ORAL at 13:25

## 2019-01-01 RX ADMIN — Medication: at 17:22

## 2019-01-01 RX ADMIN — IOPAMIDOL 100 ML: 755 INJECTION, SOLUTION INTRAVENOUS at 16:51

## 2019-01-01 RX ADMIN — ONDANSETRON HYDROCHLORIDE 4 MG: 2 INJECTION, SOLUTION INTRAMUSCULAR; INTRAVENOUS at 11:05

## 2019-01-01 RX ADMIN — PANTOPRAZOLE SODIUM 40 MG: 40 INJECTION, POWDER, FOR SOLUTION INTRAVENOUS at 08:43

## 2019-01-01 RX ADMIN — NITROGLYCERIN 1 INCH: 20 OINTMENT TOPICAL at 06:00

## 2019-01-01 RX ADMIN — METOPROLOL TARTRATE 5 MG: 5 INJECTION INTRAVENOUS at 00:54

## 2019-01-01 RX ADMIN — MORPHINE SULFATE 2 MG: 2 INJECTION, SOLUTION INTRAMUSCULAR; INTRAVENOUS at 09:31

## 2019-01-01 RX ADMIN — INSULIN LISPRO 3 UNITS: 100 INJECTION, SOLUTION INTRAVENOUS; SUBCUTANEOUS at 12:40

## 2019-01-01 RX ADMIN — LABETALOL HCL 100 MG: 100 TABLET, FILM COATED ORAL at 21:48

## 2019-01-01 RX ADMIN — INSULIN LISPRO 7 UNITS: 100 INJECTION, SOLUTION INTRAVENOUS; SUBCUTANEOUS at 06:16

## 2019-01-01 RX ADMIN — Medication 10 ML: at 21:51

## 2019-01-01 RX ADMIN — Medication: at 15:28

## 2019-01-01 RX ADMIN — CALCIUM GLUCONATE: 94 INJECTION, SOLUTION INTRAVENOUS at 18:40

## 2019-01-01 RX ADMIN — GLYCOPYRROLATE 0.6 MG: 0.2 INJECTION INTRAMUSCULAR; INTRAVENOUS at 19:18

## 2019-01-01 RX ADMIN — Medication 10 ML: at 06:06

## 2019-01-01 RX ADMIN — PANTOPRAZOLE SODIUM 40 MG: 40 INJECTION, POWDER, FOR SOLUTION INTRAVENOUS at 09:17

## 2019-01-01 RX ADMIN — SODIUM BICARBONATE 650 MG: 650 TABLET ORAL at 21:19

## 2019-01-01 RX ADMIN — NITROGLYCERIN 1 INCH: 20 OINTMENT TOPICAL at 18:04

## 2019-01-01 RX ADMIN — Medication 10 ML: at 15:15

## 2019-01-01 RX ADMIN — Medication: at 09:12

## 2019-01-01 RX ADMIN — HEPARIN SODIUM 5000 UNITS: 5000 INJECTION INTRAVENOUS; SUBCUTANEOUS at 08:40

## 2019-01-01 RX ADMIN — INSULIN LISPRO 3 UNITS: 100 INJECTION, SOLUTION INTRAVENOUS; SUBCUTANEOUS at 18:50

## 2019-01-01 RX ADMIN — INSULIN LISPRO 7 UNITS: 100 INJECTION, SOLUTION INTRAVENOUS; SUBCUTANEOUS at 06:38

## 2019-01-01 RX ADMIN — ENOXAPARIN SODIUM 40 MG: 40 INJECTION SUBCUTANEOUS at 12:40

## 2019-01-01 RX ADMIN — ENOXAPARIN SODIUM 40 MG: 40 INJECTION SUBCUTANEOUS at 18:55

## 2019-01-01 RX ADMIN — FAMOTIDINE: 10 INJECTION INTRAVENOUS at 18:50

## 2019-01-01 RX ADMIN — METOPROLOL TARTRATE 5 MG: 5 INJECTION INTRAVENOUS at 18:29

## 2019-01-01 RX ADMIN — WATER: 1000 INJECTION, SOLUTION INTRAVENOUS at 15:09

## 2019-01-01 RX ADMIN — Medication: at 15:01

## 2019-01-01 RX ADMIN — Medication 10 ML: at 13:36

## 2019-01-01 RX ADMIN — Medication 30 ML: at 22:00

## 2019-01-01 RX ADMIN — AMLODIPINE BESYLATE 10 MG: 5 TABLET ORAL at 09:11

## 2019-01-01 RX ADMIN — INSULIN LISPRO 3 UNITS: 100 INJECTION, SOLUTION INTRAVENOUS; SUBCUTANEOUS at 00:29

## 2019-01-01 RX ADMIN — Medication 10 ML: at 05:17

## 2019-01-01 RX ADMIN — NYSTATIN 500000 UNITS: 100000 SUSPENSION ORAL at 17:14

## 2019-01-01 RX ADMIN — MORPHINE SULFATE 2 MG: 2 INJECTION, SOLUTION INTRAMUSCULAR; INTRAVENOUS at 20:51

## 2019-01-01 RX ADMIN — HUMAN INSULIN 25 UNITS: 100 INJECTION, SUSPENSION SUBCUTANEOUS at 00:18

## 2019-01-01 RX ADMIN — HUMAN INSULIN 25 UNITS: 100 INJECTION, SUSPENSION SUBCUTANEOUS at 18:38

## 2019-01-01 RX ADMIN — INSULIN LISPRO 4 UNITS: 100 INJECTION, SOLUTION INTRAVENOUS; SUBCUTANEOUS at 17:57

## 2019-01-01 RX ADMIN — SODIUM CHLORIDE 175 ML/HR: 450 INJECTION, SOLUTION INTRAVENOUS at 21:00

## 2019-01-01 RX ADMIN — INSULIN LISPRO 3 UNITS: 100 INJECTION, SOLUTION INTRAVENOUS; SUBCUTANEOUS at 18:36

## 2019-01-01 RX ADMIN — Medication: at 17:55

## 2019-01-01 RX ADMIN — VANCOMYCIN HYDROCHLORIDE 1250 MG: 10 INJECTION, POWDER, LYOPHILIZED, FOR SOLUTION INTRAVENOUS at 12:20

## 2019-01-01 RX ADMIN — INSULIN LISPRO 3 UNITS: 100 INJECTION, SOLUTION INTRAVENOUS; SUBCUTANEOUS at 00:39

## 2019-01-01 RX ADMIN — SODIUM CHLORIDE 175 ML/HR: 450 INJECTION, SOLUTION INTRAVENOUS at 15:24

## 2019-01-01 RX ADMIN — HYDROMORPHONE HYDROCHLORIDE 0.5 MG: 1 INJECTION, SOLUTION INTRAMUSCULAR; INTRAVENOUS; SUBCUTANEOUS at 18:54

## 2019-01-01 RX ADMIN — METRONIDAZOLE 500 MG: 500 INJECTION, SOLUTION INTRAVENOUS at 09:06

## 2019-01-01 RX ADMIN — NYSTATIN 500000 UNITS: 100000 SUSPENSION ORAL at 08:31

## 2019-01-01 RX ADMIN — HYDROMORPHONE HYDROCHLORIDE 1 MG: 2 INJECTION, SOLUTION INTRAMUSCULAR; INTRAVENOUS; SUBCUTANEOUS at 17:02

## 2019-01-01 RX ADMIN — LABETALOL HCL 100 MG: 100 TABLET, FILM COATED ORAL at 23:17

## 2019-01-01 RX ADMIN — CEFEPIME HYDROCHLORIDE 2 G: 2 INJECTION, POWDER, FOR SOLUTION INTRAVENOUS at 09:11

## 2019-01-01 RX ADMIN — ENOXAPARIN SODIUM 40 MG: 40 INJECTION SUBCUTANEOUS at 18:13

## 2019-01-01 RX ADMIN — Medication 10 ML: at 06:03

## 2019-01-01 RX ADMIN — METOPROLOL TARTRATE 10 MG: 5 INJECTION INTRAVENOUS at 17:10

## 2019-01-01 RX ADMIN — METOPROLOL TARTRATE 5 MG: 5 INJECTION INTRAVENOUS at 18:41

## 2019-01-01 RX ADMIN — VANCOMYCIN HYDROCHLORIDE 1500 MG: 10 INJECTION, POWDER, LYOPHILIZED, FOR SOLUTION INTRAVENOUS at 12:00

## 2019-01-01 RX ADMIN — FAMOTIDINE: 10 INJECTION INTRAVENOUS at 19:17

## 2019-01-01 RX ADMIN — VANCOMYCIN HYDROCHLORIDE 1000 MG: 1 INJECTION, POWDER, LYOPHILIZED, FOR SOLUTION INTRAVENOUS at 05:17

## 2019-01-01 RX ADMIN — NITROGLYCERIN 1 INCH: 20 OINTMENT TOPICAL at 00:51

## 2019-01-01 RX ADMIN — METOPROLOL TARTRATE 10 MG: 5 INJECTION INTRAVENOUS at 00:11

## 2019-01-01 RX ADMIN — I.V. FAT EMULSION 250 ML: 20 EMULSION INTRAVENOUS at 20:03

## 2019-01-01 RX ADMIN — SODIUM CHLORIDE 125 ML/HR: 450 INJECTION, SOLUTION INTRAVENOUS at 12:40

## 2019-01-01 RX ADMIN — METOPROLOL TARTRATE 5 MG: 5 INJECTION INTRAVENOUS at 06:08

## 2019-01-01 RX ADMIN — PANTOPRAZOLE SODIUM 40 MG: 40 INJECTION, POWDER, FOR SOLUTION INTRAVENOUS at 09:01

## 2019-01-01 RX ADMIN — Medication 20 ML: at 07:05

## 2019-01-01 RX ADMIN — HUMAN INSULIN 25 UNITS: 100 INJECTION, SUSPENSION SUBCUTANEOUS at 09:41

## 2019-01-01 RX ADMIN — INSULIN LISPRO 2 UNITS: 100 INJECTION, SOLUTION INTRAVENOUS; SUBCUTANEOUS at 12:12

## 2019-01-01 RX ADMIN — SODIUM CHLORIDE 125 ML/HR: 900 INJECTION, SOLUTION INTRAVENOUS at 11:08

## 2019-01-01 RX ADMIN — SODIUM BICARBONATE 50 MEQ: 84 INJECTION, SOLUTION INTRAVENOUS at 11:42

## 2019-01-01 RX ADMIN — HUMAN INSULIN 32 UNITS: 100 INJECTION, SUSPENSION SUBCUTANEOUS at 09:22

## 2019-01-01 RX ADMIN — I.V. FAT EMULSION 250 ML: 20 EMULSION INTRAVENOUS at 21:52

## 2019-01-01 RX ADMIN — NITROGLYCERIN 1 INCH: 20 OINTMENT TOPICAL at 12:29

## 2019-01-01 RX ADMIN — HYDRALAZINE HYDROCHLORIDE 10 MG: 20 INJECTION INTRAMUSCULAR; INTRAVENOUS at 13:11

## 2019-01-01 RX ADMIN — HUMAN INSULIN 25 UNITS: 100 INJECTION, SUSPENSION SUBCUTANEOUS at 09:01

## 2019-01-01 RX ADMIN — SODIUM CHLORIDE, SODIUM LACTATE, POTASSIUM CHLORIDE, CALCIUM CHLORIDE: 600; 310; 30; 20 INJECTION, SOLUTION INTRAVENOUS at 16:00

## 2019-01-01 RX ADMIN — PROMETHAZINE HYDROCHLORIDE 25 MG: 25 INJECTION INTRAMUSCULAR; INTRAVENOUS at 10:41

## 2019-01-01 RX ADMIN — PROMETHAZINE HYDROCHLORIDE 25 MG: 25 INJECTION INTRAMUSCULAR; INTRAVENOUS at 18:58

## 2019-01-01 RX ADMIN — INSULIN LISPRO 4 UNITS: 100 INJECTION, SOLUTION INTRAVENOUS; SUBCUTANEOUS at 05:16

## 2019-01-01 RX ADMIN — Medication 10 ML: at 21:37

## 2019-01-01 RX ADMIN — HYDROMORPHONE HYDROCHLORIDE 0.5 MG: 1 INJECTION, SOLUTION INTRAMUSCULAR; INTRAVENOUS; SUBCUTANEOUS at 21:34

## 2019-01-01 RX ADMIN — SODIUM CHLORIDE 100 ML/HR: 450 INJECTION, SOLUTION INTRAVENOUS at 07:40

## 2019-01-01 RX ADMIN — NYSTATIN 500000 UNITS: 100000 SUSPENSION ORAL at 08:53

## 2019-01-01 RX ADMIN — NITROGLYCERIN 1 INCH: 20 OINTMENT TOPICAL at 12:00

## 2019-01-01 RX ADMIN — FAMOTIDINE: 10 INJECTION INTRAVENOUS at 17:43

## 2019-01-01 RX ADMIN — SODIUM CHLORIDE 125 ML/HR: 450 INJECTION, SOLUTION INTRAVENOUS at 21:14

## 2019-01-01 RX ADMIN — INSULIN LISPRO 4 UNITS: 100 INJECTION, SOLUTION INTRAVENOUS; SUBCUTANEOUS at 00:40

## 2019-01-01 RX ADMIN — NITROGLYCERIN 1 INCH: 20 OINTMENT TOPICAL at 12:23

## 2019-01-01 RX ADMIN — Medication: at 08:35

## 2019-01-01 RX ADMIN — NITROGLYCERIN 1 INCH: 20 OINTMENT TOPICAL at 06:13

## 2019-01-01 RX ADMIN — INSULIN LISPRO 7 UNITS: 100 INJECTION, SOLUTION INTRAVENOUS; SUBCUTANEOUS at 12:33

## 2019-01-01 RX ADMIN — Medication: at 09:28

## 2019-01-01 RX ADMIN — NITROGLYCERIN 1 INCH: 20 OINTMENT TOPICAL at 05:47

## 2019-01-01 RX ADMIN — CALCIUM GLUCONATE 1 G: 98 INJECTION, SOLUTION INTRAVENOUS at 17:47

## 2019-01-01 RX ADMIN — HUMAN INSULIN 32 UNITS: 100 INJECTION, SUSPENSION SUBCUTANEOUS at 08:14

## 2019-01-01 RX ADMIN — MORPHINE SULFATE 2 MG: 2 INJECTION, SOLUTION INTRAMUSCULAR; INTRAVENOUS at 12:06

## 2019-01-01 RX ADMIN — CEFEPIME HYDROCHLORIDE 2 G: 2 INJECTION, POWDER, FOR SOLUTION INTRAVENOUS at 12:11

## 2019-01-01 RX ADMIN — CALCIUM GLUCONATE: 94 INJECTION, SOLUTION INTRAVENOUS at 18:01

## 2019-01-01 RX ADMIN — DEXAMETHASONE SODIUM PHOSPHATE 4 MG: 4 INJECTION, SOLUTION INTRAMUSCULAR; INTRAVENOUS at 13:21

## 2019-01-01 RX ADMIN — Medication 16 G: at 17:15

## 2019-01-01 RX ADMIN — SODIUM BICARBONATE 650 MG: 650 TABLET ORAL at 09:11

## 2019-01-01 RX ADMIN — Medication: at 19:11

## 2019-01-01 RX ADMIN — AMLODIPINE BESYLATE 5 MG: 5 TABLET ORAL at 08:43

## 2019-01-01 RX ADMIN — HUMAN INSULIN 32 UNITS: 100 INJECTION, SUSPENSION SUBCUTANEOUS at 09:30

## 2019-01-01 RX ADMIN — NITROGLYCERIN 1 INCH: 20 OINTMENT TOPICAL at 11:34

## 2019-01-01 RX ADMIN — Medication 10 ML: at 18:22

## 2019-01-01 RX ADMIN — Medication 20 ML: at 00:19

## 2019-01-01 RX ADMIN — Medication 10 ML: at 13:42

## 2019-01-01 RX ADMIN — SODIUM CHLORIDE 150 ML/HR: 450 INJECTION, SOLUTION INTRAVENOUS at 10:54

## 2019-01-01 RX ADMIN — ROCURONIUM BROMIDE 10 MG: 10 INJECTION, SOLUTION INTRAVENOUS at 18:31

## 2019-01-01 RX ADMIN — ONDANSETRON HYDROCHLORIDE 4 MG: 2 INJECTION, SOLUTION INTRAMUSCULAR; INTRAVENOUS at 16:00

## 2019-01-01 RX ADMIN — PROMETHAZINE HYDROCHLORIDE 25 MG: 25 INJECTION INTRAMUSCULAR; INTRAVENOUS at 22:02

## 2019-01-01 RX ADMIN — NITROGLYCERIN 1 INCH: 20 OINTMENT TOPICAL at 18:14

## 2019-01-01 RX ADMIN — PANTOPRAZOLE SODIUM 40 MG: 40 INJECTION, POWDER, FOR SOLUTION INTRAVENOUS at 08:46

## 2019-01-01 RX ADMIN — HUMAN INSULIN 30 UNITS: 100 INJECTION, SUSPENSION SUBCUTANEOUS at 09:28

## 2019-01-01 RX ADMIN — HYDROMORPHONE HYDROCHLORIDE 0.5 MG: 1 INJECTION, SOLUTION INTRAMUSCULAR; INTRAVENOUS; SUBCUTANEOUS at 08:18

## 2019-01-01 RX ADMIN — CALCIUM GLUCONATE: 94 INJECTION, SOLUTION INTRAVENOUS at 19:37

## 2019-01-01 RX ADMIN — NITROGLYCERIN 1 INCH: 20 OINTMENT TOPICAL at 07:00

## 2019-01-01 RX ADMIN — HUMAN INSULIN 30 UNITS: 100 INJECTION, SUSPENSION SUBCUTANEOUS at 08:21

## 2019-01-01 RX ADMIN — ROCURONIUM BROMIDE 10 MG: 10 INJECTION, SOLUTION INTRAVENOUS at 18:01

## 2019-01-01 RX ADMIN — Medication 10 ML: at 21:22

## 2019-01-01 RX ADMIN — NITROGLYCERIN 1 INCH: 20 OINTMENT TOPICAL at 18:23

## 2019-01-01 RX ADMIN — HUMAN INSULIN 32 UNITS: 100 INJECTION, SUSPENSION SUBCUTANEOUS at 21:14

## 2019-01-01 RX ADMIN — Medication: at 08:41

## 2019-01-01 RX ADMIN — Medication 10 ML: at 06:47

## 2019-01-01 RX ADMIN — DEXAMETHASONE SODIUM PHOSPHATE 4 MG: 4 INJECTION, SOLUTION INTRAMUSCULAR; INTRAVENOUS at 17:14

## 2019-01-01 RX ADMIN — LIDOCAINE HYDROCHLORIDE 200 MG: 20 INJECTION, SOLUTION INFILTRATION; PERINEURAL at 16:15

## 2019-01-01 RX ADMIN — METRONIDAZOLE 500 MG: 500 INJECTION, SOLUTION INTRAVENOUS at 00:23

## 2019-01-01 RX ADMIN — INSULIN LISPRO 3 UNITS: 100 INJECTION, SOLUTION INTRAVENOUS; SUBCUTANEOUS at 00:01

## 2019-01-01 RX ADMIN — SODIUM CHLORIDE 175 ML/HR: 450 INJECTION, SOLUTION INTRAVENOUS at 02:35

## 2019-01-01 RX ADMIN — Medication: at 09:20

## 2019-01-01 RX ADMIN — PROMETHAZINE HYDROCHLORIDE 25 MG: 25 INJECTION INTRAMUSCULAR; INTRAVENOUS at 22:34

## 2019-01-01 RX ADMIN — METOPROLOL TARTRATE 5 MG: 5 INJECTION INTRAVENOUS at 17:21

## 2019-01-01 RX ADMIN — PANTOPRAZOLE SODIUM 40 MG: 40 INJECTION, POWDER, FOR SOLUTION INTRAVENOUS at 09:03

## 2019-01-01 RX ADMIN — MORPHINE SULFATE 2 MG: 2 INJECTION, SOLUTION INTRAMUSCULAR; INTRAVENOUS at 11:54

## 2019-01-01 RX ADMIN — METRONIDAZOLE 500 MG: 500 INJECTION, SOLUTION INTRAVENOUS at 13:05

## 2019-01-01 RX ADMIN — POTASSIUM CHLORIDE 10 MEQ: 10 INJECTION, SOLUTION INTRAVENOUS at 08:41

## 2019-01-01 RX ADMIN — CALCIUM GLUCONATE: 94 INJECTION, SOLUTION INTRAVENOUS at 18:17

## 2019-01-01 RX ADMIN — CALCIUM GLUCONATE: 94 INJECTION, SOLUTION INTRAVENOUS at 18:13

## 2019-01-01 RX ADMIN — Medication 10 ML: at 22:38

## 2019-01-01 RX ADMIN — I.V. FAT EMULSION 250 ML: 20 EMULSION INTRAVENOUS at 21:44

## 2019-01-01 RX ADMIN — SODIUM CHLORIDE 125 ML/HR: 450 INJECTION, SOLUTION INTRAVENOUS at 20:00

## 2019-01-01 RX ADMIN — LABETALOL HCL 100 MG: 100 TABLET, FILM COATED ORAL at 21:52

## 2019-01-01 RX ADMIN — Medication 40 ML: at 03:33

## 2019-01-01 RX ADMIN — INSULIN LISPRO 4 UNITS: 100 INJECTION, SOLUTION INTRAVENOUS; SUBCUTANEOUS at 18:15

## 2019-01-01 RX ADMIN — PROMETHAZINE HYDROCHLORIDE 25 MG: 25 INJECTION, SOLUTION INTRAMUSCULAR; INTRAVENOUS at 20:08

## 2019-01-01 RX ADMIN — Medication 10 ML: at 14:15

## 2019-01-01 RX ADMIN — Medication 10 ML: at 05:53

## 2019-01-01 RX ADMIN — HEPARIN SODIUM 5000 UNITS: 5000 INJECTION INTRAVENOUS; SUBCUTANEOUS at 23:21

## 2019-01-01 RX ADMIN — PANTOPRAZOLE SODIUM 40 MG: 40 INJECTION, POWDER, FOR SOLUTION INTRAVENOUS at 08:15

## 2019-01-01 RX ADMIN — DICYCLOMINE HYDROCHLORIDE 20 MG: 10 CAPSULE ORAL at 15:15

## 2019-01-01 RX ADMIN — VANCOMYCIN HYDROCHLORIDE 2000 MG: 10 INJECTION, POWDER, LYOPHILIZED, FOR SOLUTION INTRAVENOUS at 23:35

## 2019-01-01 RX ADMIN — INSULIN LISPRO 4 UNITS: 100 INJECTION, SOLUTION INTRAVENOUS; SUBCUTANEOUS at 11:59

## 2019-01-01 RX ADMIN — SODIUM CHLORIDE 125 ML/HR: 450 INJECTION, SOLUTION INTRAVENOUS at 05:48

## 2019-01-01 RX ADMIN — ACETAMINOPHEN 500 MG: 160 SOLUTION ORAL at 15:10

## 2019-01-01 RX ADMIN — DEXAMETHASONE SODIUM PHOSPHATE 4 MG: 4 INJECTION, SOLUTION INTRAMUSCULAR; INTRAVENOUS at 17:52

## 2019-01-01 RX ADMIN — CALCIUM GLUCONATE 1 G: 98 INJECTION, SOLUTION INTRAVENOUS at 08:16

## 2019-01-01 RX ADMIN — NITROGLYCERIN 1 INCH: 20 OINTMENT TOPICAL at 00:21

## 2019-01-01 RX ADMIN — MAGNESIUM SULFATE HEPTAHYDRATE 1 G: 1 INJECTION, SOLUTION INTRAVENOUS at 09:24

## 2019-01-01 RX ADMIN — INSULIN LISPRO 4 UNITS: 100 INJECTION, SOLUTION INTRAVENOUS; SUBCUTANEOUS at 12:10

## 2019-01-01 RX ADMIN — Medication 10 ML: at 23:04

## 2019-01-01 RX ADMIN — SODIUM BICARBONATE 650 MG: 650 TABLET ORAL at 21:33

## 2019-01-01 RX ADMIN — METOPROLOL TARTRATE 5 MG: 5 INJECTION INTRAVENOUS at 00:05

## 2019-01-01 RX ADMIN — INSULIN LISPRO 3 UNITS: 100 INJECTION, SOLUTION INTRAVENOUS; SUBCUTANEOUS at 13:00

## 2019-01-01 RX ADMIN — NITROGLYCERIN 1 INCH: 20 OINTMENT TOPICAL at 12:51

## 2019-01-01 RX ADMIN — SODIUM CHLORIDE 150 ML/HR: 900 INJECTION, SOLUTION INTRAVENOUS at 08:17

## 2019-01-01 RX ADMIN — Medication: at 15:53

## 2019-01-01 RX ADMIN — METOPROLOL TARTRATE 5 MG: 5 INJECTION INTRAVENOUS at 18:20

## 2019-01-01 RX ADMIN — METOPROLOL TARTRATE 5 MG: 5 INJECTION INTRAVENOUS at 12:29

## 2019-01-01 RX ADMIN — INSULIN LISPRO 3 UNITS: 100 INJECTION, SOLUTION INTRAVENOUS; SUBCUTANEOUS at 13:20

## 2019-01-01 RX ADMIN — I.V. FAT EMULSION 250 ML: 20 EMULSION INTRAVENOUS at 19:22

## 2019-01-01 RX ADMIN — SUCCINYLCHOLINE CHLORIDE 140 MG: 20 INJECTION INTRAMUSCULAR; INTRAVENOUS at 16:08

## 2019-01-01 RX ADMIN — METRONIDAZOLE 500 MG: 500 INJECTION, SOLUTION INTRAVENOUS at 00:29

## 2019-01-01 RX ADMIN — Medication 10 ML: at 14:21

## 2019-01-01 RX ADMIN — ONDANSETRON 4 MG: 2 INJECTION INTRAMUSCULAR; INTRAVENOUS at 13:56

## 2019-01-01 RX ADMIN — METOPROLOL TARTRATE 5 MG: 5 INJECTION INTRAVENOUS at 20:13

## 2019-01-01 RX ADMIN — ONDANSETRON 4 MG: 2 INJECTION INTRAMUSCULAR; INTRAVENOUS at 05:12

## 2019-01-01 RX ADMIN — METRONIDAZOLE 500 MG: 500 INJECTION, SOLUTION INTRAVENOUS at 01:28

## 2019-01-01 RX ADMIN — HUMAN INSULIN 32 UNITS: 100 INJECTION, SUSPENSION SUBCUTANEOUS at 09:05

## 2019-01-01 RX ADMIN — INSULIN LISPRO 3 UNITS: 100 INJECTION, SOLUTION INTRAVENOUS; SUBCUTANEOUS at 00:02

## 2019-01-01 RX ADMIN — LABETALOL HCL 100 MG: 100 TABLET, FILM COATED ORAL at 08:43

## 2019-01-01 RX ADMIN — METOPROLOL TARTRATE 5 MG: 5 INJECTION INTRAVENOUS at 01:08

## 2019-01-01 RX ADMIN — LABETALOL HCL 100 MG: 100 TABLET, FILM COATED ORAL at 23:03

## 2019-01-01 RX ADMIN — LABETALOL HYDROCHLORIDE 10 MG: 5 INJECTION, SOLUTION INTRAVENOUS at 19:27

## 2019-01-01 RX ADMIN — INSULIN LISPRO 4 UNITS: 100 INJECTION, SOLUTION INTRAVENOUS; SUBCUTANEOUS at 14:29

## 2019-01-01 RX ADMIN — HUMAN INSULIN 30 UNITS: 100 INJECTION, SUSPENSION SUBCUTANEOUS at 08:41

## 2019-01-01 RX ADMIN — LABETALOL HCL 100 MG: 100 TABLET, FILM COATED ORAL at 09:47

## 2019-01-01 RX ADMIN — INSULIN LISPRO 4 UNITS: 100 INJECTION, SOLUTION INTRAVENOUS; SUBCUTANEOUS at 12:06

## 2019-01-01 RX ADMIN — NITROGLYCERIN 1 INCH: 20 OINTMENT TOPICAL at 18:20

## 2019-01-01 RX ADMIN — NITROGLYCERIN 1 INCH: 20 OINTMENT TOPICAL at 18:16

## 2019-01-01 RX ADMIN — Medication 20 ML: at 18:06

## 2019-01-01 RX ADMIN — NITROGLYCERIN 1 INCH: 20 OINTMENT TOPICAL at 12:11

## 2019-01-01 RX ADMIN — FAMOTIDINE: 10 INJECTION INTRAVENOUS at 18:09

## 2019-01-01 RX ADMIN — ENOXAPARIN SODIUM 40 MG: 40 INJECTION SUBCUTANEOUS at 11:41

## 2019-01-01 RX ADMIN — ENOXAPARIN SODIUM 40 MG: 40 INJECTION SUBCUTANEOUS at 19:10

## 2019-01-01 RX ADMIN — INSULIN LISPRO 7 UNITS: 100 INJECTION, SOLUTION INTRAVENOUS; SUBCUTANEOUS at 00:05

## 2019-01-01 RX ADMIN — SODIUM CHLORIDE 125 ML/HR: 450 INJECTION, SOLUTION INTRAVENOUS at 04:00

## 2019-01-01 RX ADMIN — CALCIUM GLUCONATE: 94 INJECTION, SOLUTION INTRAVENOUS at 19:16

## 2019-01-01 RX ADMIN — PROMETHAZINE HYDROCHLORIDE 25 MG: 25 INJECTION, SOLUTION INTRAMUSCULAR; INTRAVENOUS at 02:07

## 2019-01-01 RX ADMIN — Medication 10 ML: at 06:04

## 2019-01-01 RX ADMIN — SODIUM BICARBONATE 650 MG: 650 TABLET ORAL at 18:16

## 2019-01-01 RX ADMIN — METRONIDAZOLE 500 MG: 500 INJECTION, SOLUTION INTRAVENOUS at 00:52

## 2019-01-01 RX ADMIN — LABETALOL HCL 100 MG: 100 TABLET, FILM COATED ORAL at 21:02

## 2019-01-01 RX ADMIN — LABETALOL HCL 100 MG: 100 TABLET, FILM COATED ORAL at 09:06

## 2019-01-01 RX ADMIN — HUMAN INSULIN 30 UNITS: 100 INJECTION, SUSPENSION SUBCUTANEOUS at 08:40

## 2019-01-01 RX ADMIN — CEFEPIME HYDROCHLORIDE 2 G: 2 INJECTION, POWDER, FOR SOLUTION INTRAVENOUS at 00:28

## 2019-01-01 RX ADMIN — NITROGLYCERIN 1 INCH: 20 OINTMENT TOPICAL at 17:57

## 2019-01-01 RX ADMIN — INSULIN LISPRO 3 UNITS: 100 INJECTION, SOLUTION INTRAVENOUS; SUBCUTANEOUS at 23:40

## 2019-01-01 RX ADMIN — INSULIN LISPRO 2 UNITS: 100 INJECTION, SOLUTION INTRAVENOUS; SUBCUTANEOUS at 06:05

## 2019-01-01 RX ADMIN — ONDANSETRON 4 MG: 2 INJECTION INTRAMUSCULAR; INTRAVENOUS at 18:18

## 2019-01-01 RX ADMIN — Medication 10 ML: at 21:25

## 2019-01-01 RX ADMIN — Medication: at 09:24

## 2019-01-01 RX ADMIN — MORPHINE SULFATE 2 MG: 2 INJECTION, SOLUTION INTRAMUSCULAR; INTRAVENOUS at 08:25

## 2019-01-01 RX ADMIN — ONDANSETRON 4 MG: 2 INJECTION INTRAMUSCULAR; INTRAVENOUS at 19:18

## 2019-01-01 RX ADMIN — INSULIN LISPRO 3 UNITS: 100 INJECTION, SOLUTION INTRAVENOUS; SUBCUTANEOUS at 00:00

## 2019-01-01 RX ADMIN — NITROGLYCERIN 1 INCH: 20 OINTMENT TOPICAL at 05:59

## 2019-01-01 RX ADMIN — NITROGLYCERIN 1 INCH: 20 OINTMENT TOPICAL at 06:46

## 2019-01-01 RX ADMIN — SODIUM CHLORIDE 250 ML: 900 INJECTION, SOLUTION INTRAVENOUS at 21:16

## 2019-01-01 RX ADMIN — INSULIN LISPRO 3 UNITS: 100 INJECTION, SOLUTION INTRAVENOUS; SUBCUTANEOUS at 06:24

## 2019-01-01 RX ADMIN — Medication 10 ML: at 16:51

## 2019-01-01 RX ADMIN — Medication 10 ML: at 23:16

## 2019-01-01 RX ADMIN — SODIUM CHLORIDE, SODIUM LACTATE, POTASSIUM CHLORIDE, CALCIUM CHLORIDE: 600; 310; 30; 20 INJECTION, SOLUTION INTRAVENOUS at 17:36

## 2019-01-01 RX ADMIN — LORAZEPAM 1 MG: 2 INJECTION INTRAMUSCULAR; INTRAVENOUS at 00:40

## 2019-01-01 RX ADMIN — CALCIUM GLUCONATE: 94 INJECTION, SOLUTION INTRAVENOUS at 18:36

## 2019-01-01 RX ADMIN — HUMAN INSULIN 25 UNITS: 100 INJECTION, SUSPENSION SUBCUTANEOUS at 08:46

## 2019-01-01 RX ADMIN — MORPHINE SULFATE 2 MG: 2 INJECTION, SOLUTION INTRAMUSCULAR; INTRAVENOUS at 15:51

## 2019-01-01 RX ADMIN — LABETALOL HYDROCHLORIDE 20 MG: 5 INJECTION INTRAVENOUS at 09:14

## 2019-01-01 RX ADMIN — Medication 10 ML: at 23:27

## 2019-01-01 RX ADMIN — LABETALOL HCL 100 MG: 100 TABLET, FILM COATED ORAL at 21:19

## 2019-01-01 RX ADMIN — NITROGLYCERIN 1 INCH: 20 OINTMENT TOPICAL at 06:27

## 2019-01-01 RX ADMIN — NYSTATIN 500000 UNITS: 100000 SUSPENSION ORAL at 22:02

## 2019-01-01 RX ADMIN — HUMAN INSULIN 30 UNITS: 100 INJECTION, SUSPENSION SUBCUTANEOUS at 08:05

## 2019-01-01 RX ADMIN — AMLODIPINE BESYLATE 10 MG: 5 TABLET ORAL at 08:31

## 2019-01-01 RX ADMIN — SODIUM CHLORIDE, PRESERVATIVE FREE 300 UNITS: 5 INJECTION INTRAVENOUS at 11:38

## 2019-01-01 RX ADMIN — Medication 10 ML: at 21:02

## 2019-01-01 RX ADMIN — PANTOPRAZOLE SODIUM 40 MG: 40 INJECTION, POWDER, FOR SOLUTION INTRAVENOUS at 09:18

## 2019-01-01 RX ADMIN — LABETALOL HCL 100 MG: 100 TABLET, FILM COATED ORAL at 08:53

## 2019-01-01 RX ADMIN — INSULIN LISPRO 7 UNITS: 100 INJECTION, SOLUTION INTRAVENOUS; SUBCUTANEOUS at 08:32

## 2019-01-01 RX ADMIN — METRONIDAZOLE 500 MG: 500 INJECTION, SOLUTION INTRAVENOUS at 12:05

## 2019-01-01 RX ADMIN — PROMETHAZINE HYDROCHLORIDE 25 MG: 25 INJECTION INTRAMUSCULAR; INTRAVENOUS at 15:11

## 2019-01-01 RX ADMIN — INSULIN LISPRO 4 UNITS: 100 INJECTION, SOLUTION INTRAVENOUS; SUBCUTANEOUS at 06:43

## 2019-01-01 RX ADMIN — INSULIN LISPRO 4 UNITS: 100 INJECTION, SOLUTION INTRAVENOUS; SUBCUTANEOUS at 18:13

## 2019-01-01 RX ADMIN — HYDRALAZINE HYDROCHLORIDE 10 MG: 20 INJECTION INTRAMUSCULAR; INTRAVENOUS at 20:03

## 2019-01-01 RX ADMIN — HEPARIN SODIUM 5000 UNITS: 5000 INJECTION INTRAVENOUS; SUBCUTANEOUS at 00:23

## 2019-01-01 RX ADMIN — LORAZEPAM 1 MG: 2 INJECTION INTRAMUSCULAR; INTRAVENOUS at 20:08

## 2019-01-01 RX ADMIN — METRONIDAZOLE 500 MG: 500 INJECTION, SOLUTION INTRAVENOUS at 12:38

## 2019-01-01 RX ADMIN — INSULIN LISPRO 4 UNITS: 100 INJECTION, SOLUTION INTRAVENOUS; SUBCUTANEOUS at 07:32

## 2019-01-01 RX ADMIN — ENOXAPARIN SODIUM 40 MG: 40 INJECTION SUBCUTANEOUS at 19:15

## 2019-01-01 RX ADMIN — CEFEPIME HYDROCHLORIDE 2 G: 2 INJECTION, POWDER, FOR SOLUTION INTRAVENOUS at 08:43

## 2019-01-01 RX ADMIN — HUMAN INSULIN 30 UNITS: 100 INJECTION, SUSPENSION SUBCUTANEOUS at 08:53

## 2019-01-01 RX ADMIN — METOPROLOL TARTRATE 5 MG: 5 INJECTION INTRAVENOUS at 18:19

## 2019-01-01 RX ADMIN — AMLODIPINE BESYLATE 5 MG: 5 TABLET ORAL at 10:00

## 2019-01-01 RX ADMIN — Medication 10 ML: at 13:49

## 2019-01-01 RX ADMIN — SODIUM BICARBONATE 650 MG: 650 TABLET ORAL at 15:52

## 2019-01-01 RX ADMIN — INSULIN LISPRO 3 UNITS: 100 INJECTION, SOLUTION INTRAVENOUS; SUBCUTANEOUS at 23:42

## 2019-01-01 RX ADMIN — Medication: at 23:17

## 2019-01-01 RX ADMIN — I.V. FAT EMULSION 250 ML: 20 EMULSION INTRAVENOUS at 21:59

## 2019-01-01 RX ADMIN — FAMOTIDINE: 10 INJECTION INTRAVENOUS at 17:00

## 2019-01-01 RX ADMIN — MORPHINE SULFATE 2 MG: 2 INJECTION, SOLUTION INTRAMUSCULAR; INTRAVENOUS at 12:21

## 2019-01-01 RX ADMIN — ONDANSETRON 4 MG: 2 INJECTION INTRAMUSCULAR; INTRAVENOUS at 08:47

## 2019-01-01 RX ADMIN — INSULIN LISPRO 4 UNITS: 100 INJECTION, SOLUTION INTRAVENOUS; SUBCUTANEOUS at 01:34

## 2019-01-01 RX ADMIN — INSULIN LISPRO 4 UNITS: 100 INJECTION, SOLUTION INTRAVENOUS; SUBCUTANEOUS at 13:51

## 2019-01-01 RX ADMIN — HEPARIN SODIUM 5000 UNITS: 5000 INJECTION INTRAVENOUS; SUBCUTANEOUS at 16:20

## 2019-01-01 RX ADMIN — MORPHINE SULFATE 2 MG: 2 INJECTION, SOLUTION INTRAMUSCULAR; INTRAVENOUS at 20:07

## 2019-01-01 RX ADMIN — ENOXAPARIN SODIUM 40 MG: 40 INJECTION SUBCUTANEOUS at 09:00

## 2019-01-01 RX ADMIN — POTASSIUM CHLORIDE 10 MEQ: 10 INJECTION, SOLUTION INTRAVENOUS at 09:08

## 2019-01-01 RX ADMIN — INSULIN LISPRO 3 UNITS: 100 INJECTION, SOLUTION INTRAVENOUS; SUBCUTANEOUS at 12:48

## 2019-01-01 RX ADMIN — ENOXAPARIN SODIUM 40 MG: 40 INJECTION SUBCUTANEOUS at 17:53

## 2019-01-01 RX ADMIN — AMLODIPINE BESYLATE 5 MG: 5 TABLET ORAL at 08:38

## 2019-01-01 RX ADMIN — HYDRALAZINE HYDROCHLORIDE 10 MG: 20 INJECTION INTRAMUSCULAR; INTRAVENOUS at 06:11

## 2019-01-01 RX ADMIN — HUMAN INSULIN 25 UNITS: 100 INJECTION, SUSPENSION SUBCUTANEOUS at 21:01

## 2019-01-01 RX ADMIN — MORPHINE SULFATE 2 MG: 2 INJECTION, SOLUTION INTRAMUSCULAR; INTRAVENOUS at 06:26

## 2019-01-01 RX ADMIN — HUMAN INSULIN 25 UNITS: 100 INJECTION, SUSPENSION SUBCUTANEOUS at 10:00

## 2019-01-01 RX ADMIN — POTASSIUM CHLORIDE 10 MEQ: 10 INJECTION, SOLUTION INTRAVENOUS at 10:36

## 2019-01-01 RX ADMIN — HUMAN INSULIN 30 UNITS: 100 INJECTION, SUSPENSION SUBCUTANEOUS at 09:17

## 2019-01-01 RX ADMIN — INSULIN LISPRO 3 UNITS: 100 INJECTION, SOLUTION INTRAVENOUS; SUBCUTANEOUS at 05:53

## 2019-01-01 RX ADMIN — PROMETHAZINE HYDROCHLORIDE 25 MG: 25 INJECTION INTRAMUSCULAR; INTRAVENOUS at 08:32

## 2019-01-01 RX ADMIN — ENOXAPARIN SODIUM 40 MG: 40 INJECTION SUBCUTANEOUS at 18:01

## 2019-01-01 RX ADMIN — AMLODIPINE BESYLATE 10 MG: 5 TABLET ORAL at 09:33

## 2019-01-01 RX ADMIN — Medication 10 ML: at 22:00

## 2019-01-01 RX ADMIN — LORAZEPAM 0.5 MG: 2 INJECTION INTRAMUSCULAR; INTRAVENOUS at 22:34

## 2019-01-01 RX ADMIN — SODIUM CHLORIDE 75 ML/HR: 900 INJECTION, SOLUTION INTRAVENOUS at 03:53

## 2019-01-01 RX ADMIN — HUMAN INSULIN 25 UNITS: 100 INJECTION, SUSPENSION SUBCUTANEOUS at 22:44

## 2019-01-01 RX ADMIN — Medication: at 08:22

## 2019-01-01 RX ADMIN — CEFEPIME HYDROCHLORIDE 2 G: 2 INJECTION, POWDER, FOR SOLUTION INTRAVENOUS at 09:05

## 2019-01-01 RX ADMIN — METOPROLOL TARTRATE 3 MG: 5 INJECTION INTRAVENOUS at 17:04

## 2019-01-01 RX ADMIN — INSULIN LISPRO 10 UNITS: 100 INJECTION, SOLUTION INTRAVENOUS; SUBCUTANEOUS at 07:39

## 2019-01-01 RX ADMIN — INSULIN LISPRO 3 UNITS: 100 INJECTION, SOLUTION INTRAVENOUS; SUBCUTANEOUS at 06:03

## 2019-01-01 RX ADMIN — INSULIN LISPRO 3 UNITS: 100 INJECTION, SOLUTION INTRAVENOUS; SUBCUTANEOUS at 17:57

## 2019-01-01 RX ADMIN — INSULIN LISPRO 3 UNITS: 100 INJECTION, SOLUTION INTRAVENOUS; SUBCUTANEOUS at 00:21

## 2019-01-01 RX ADMIN — HEPARIN SODIUM 5000 UNITS: 5000 INJECTION INTRAVENOUS; SUBCUTANEOUS at 18:21

## 2019-01-01 RX ADMIN — Medication 10 ML: at 22:15

## 2019-01-01 RX ADMIN — CEFEPIME HYDROCHLORIDE 1 G: 1 INJECTION, POWDER, FOR SOLUTION INTRAMUSCULAR; INTRAVENOUS at 12:50

## 2019-01-01 RX ADMIN — METOPROLOL TARTRATE 5 MG: 5 INJECTION INTRAVENOUS at 06:46

## 2019-01-01 RX ADMIN — CEFEPIME HYDROCHLORIDE 1 G: 1 INJECTION, POWDER, FOR SOLUTION INTRAMUSCULAR; INTRAVENOUS at 14:06

## 2019-01-01 RX ADMIN — NITROGLYCERIN 1 INCH: 20 OINTMENT TOPICAL at 06:04

## 2019-01-01 RX ADMIN — NITROGLYCERIN 1 INCH: 20 OINTMENT TOPICAL at 17:20

## 2019-01-01 RX ADMIN — LORAZEPAM 1 MG: 2 INJECTION INTRAMUSCULAR; INTRAVENOUS at 20:18

## 2019-01-01 RX ADMIN — INSULIN LISPRO 4 UNITS: 100 INJECTION, SOLUTION INTRAVENOUS; SUBCUTANEOUS at 06:57

## 2019-01-01 RX ADMIN — Medication 10 ML: at 18:37

## 2019-01-01 RX ADMIN — METRONIDAZOLE 500 MG: 500 INJECTION, SOLUTION INTRAVENOUS at 08:04

## 2019-01-01 RX ADMIN — METOPROLOL TARTRATE 5 MG: 5 INJECTION INTRAVENOUS at 00:48

## 2019-01-01 RX ADMIN — HUMAN INSULIN 30 UNITS: 100 INJECTION, SUSPENSION SUBCUTANEOUS at 22:41

## 2019-01-01 RX ADMIN — HUMAN INSULIN 25 UNITS: 100 INJECTION, SUSPENSION SUBCUTANEOUS at 09:00

## 2019-01-01 RX ADMIN — LABETALOL HYDROCHLORIDE 20 MG: 5 INJECTION INTRAVENOUS at 09:32

## 2019-01-01 RX ADMIN — FAMOTIDINE: 10 INJECTION INTRAVENOUS at 18:13

## 2019-01-01 RX ADMIN — SODIUM CHLORIDE: 234 INJECTION INTRAMUSCULAR; INTRAVENOUS; SUBCUTANEOUS at 09:31

## 2019-01-01 RX ADMIN — CEFEPIME HYDROCHLORIDE 2 G: 2 INJECTION, POWDER, FOR SOLUTION INTRAVENOUS at 09:32

## 2019-01-01 RX ADMIN — HUMAN INSULIN 30 UNITS: 100 INJECTION, SUSPENSION SUBCUTANEOUS at 21:37

## 2019-01-01 RX ADMIN — Medication: at 09:21

## 2019-01-01 RX ADMIN — INSULIN LISPRO 3 UNITS: 100 INJECTION, SOLUTION INTRAVENOUS; SUBCUTANEOUS at 23:48

## 2019-01-01 RX ADMIN — NITROGLYCERIN 1 INCH: 20 OINTMENT TOPICAL at 18:41

## 2019-01-01 RX ADMIN — HYDRALAZINE HYDROCHLORIDE 10 MG: 20 INJECTION INTRAMUSCULAR; INTRAVENOUS at 16:54

## 2019-01-01 RX ADMIN — HUMAN INSULIN 30 UNITS: 100 INJECTION, SUSPENSION SUBCUTANEOUS at 21:40

## 2019-01-01 RX ADMIN — METRONIDAZOLE 500 MG: 500 INJECTION, SOLUTION INTRAVENOUS at 12:11

## 2019-01-01 RX ADMIN — Medication 20 ML: at 23:03

## 2019-01-01 RX ADMIN — Medication 10 ML: at 15:53

## 2019-01-01 RX ADMIN — SODIUM BICARBONATE 2 ML: 0.2 INJECTION, SOLUTION INTRAVENOUS at 16:15

## 2019-01-01 RX ADMIN — HUMAN INSULIN 30 UNITS: 100 INJECTION, SUSPENSION SUBCUTANEOUS at 08:51

## 2019-01-01 RX ADMIN — NYSTATIN 500000 UNITS: 100000 SUSPENSION ORAL at 22:44

## 2019-01-01 RX ADMIN — NITROGLYCERIN 1 INCH: 20 OINTMENT TOPICAL at 06:01

## 2019-01-01 RX ADMIN — VANCOMYCIN HYDROCHLORIDE 1000 MG: 1 INJECTION, POWDER, LYOPHILIZED, FOR SOLUTION INTRAVENOUS at 16:43

## 2019-01-01 RX ADMIN — Medication 10 ML: at 00:56

## 2019-01-01 RX ADMIN — DEXAMETHASONE SODIUM PHOSPHATE 4 MG: 4 INJECTION, SOLUTION INTRAMUSCULAR; INTRAVENOUS at 11:55

## 2019-01-01 RX ADMIN — HUMAN INSULIN 25 UNITS: 100 INJECTION, SUSPENSION SUBCUTANEOUS at 22:09

## 2019-01-01 RX ADMIN — PROMETHAZINE HYDROCHLORIDE 25 MG: 25 INJECTION INTRAMUSCULAR; INTRAVENOUS at 05:12

## 2019-01-01 RX ADMIN — SODIUM BICARBONATE 650 MG: 650 TABLET ORAL at 15:10

## 2019-01-01 RX ADMIN — Medication 10 ML: at 06:28

## 2019-01-01 RX ADMIN — HYDRALAZINE HYDROCHLORIDE 20 MG: 20 INJECTION INTRAMUSCULAR; INTRAVENOUS at 11:40

## 2019-01-01 RX ADMIN — Medication 10 ML: at 06:01

## 2019-01-01 RX ADMIN — NITROGLYCERIN 1 INCH: 20 OINTMENT TOPICAL at 00:39

## 2019-01-01 RX ADMIN — METOPROLOL TARTRATE 5 MG: 5 INJECTION INTRAVENOUS at 05:16

## 2019-01-01 RX ADMIN — LABETALOL HCL 100 MG: 100 TABLET, FILM COATED ORAL at 09:30

## 2019-01-01 RX ADMIN — CEFEPIME HYDROCHLORIDE 2 G: 2 INJECTION, POWDER, FOR SOLUTION INTRAVENOUS at 20:43

## 2019-01-01 RX ADMIN — ACETAMINOPHEN 1000 MG: 10 INJECTION, SOLUTION INTRAVENOUS at 21:34

## 2019-01-01 RX ADMIN — HUMAN INSULIN 30 UNITS: 100 INJECTION, SUSPENSION SUBCUTANEOUS at 21:48

## 2019-01-01 RX ADMIN — PANTOPRAZOLE SODIUM 40 MG: 40 INJECTION, POWDER, FOR SOLUTION INTRAVENOUS at 08:58

## 2019-01-01 RX ADMIN — Medication 10 ML: at 21:20

## 2019-01-01 RX ADMIN — DEXTROSE MONOHYDRATE AND SODIUM CHLORIDE 75 ML/HR: 5; .45 INJECTION, SOLUTION INTRAVENOUS at 08:52

## 2019-01-01 RX ADMIN — Medication: at 18:16

## 2019-01-01 RX ADMIN — NYSTATIN 500000 UNITS: 100000 SUSPENSION ORAL at 09:17

## 2019-01-01 RX ADMIN — METOPROLOL TARTRATE 5 MG: 5 INJECTION INTRAVENOUS at 06:12

## 2019-01-01 RX ADMIN — LORAZEPAM 1 MG: 2 INJECTION INTRAMUSCULAR; INTRAVENOUS at 16:00

## 2019-01-01 RX ADMIN — ENOXAPARIN SODIUM 40 MG: 40 INJECTION SUBCUTANEOUS at 10:54

## 2019-01-01 RX ADMIN — METRONIDAZOLE 500 MG: 500 INJECTION, SOLUTION INTRAVENOUS at 16:16

## 2019-01-01 RX ADMIN — NITROGLYCERIN 1 INCH: 20 OINTMENT TOPICAL at 23:27

## 2019-01-01 RX ADMIN — ONDANSETRON 4 MG: 2 INJECTION INTRAMUSCULAR; INTRAVENOUS at 23:16

## 2019-01-01 RX ADMIN — METOPROLOL TARTRATE 5 MG: 5 INJECTION INTRAVENOUS at 06:16

## 2019-01-01 RX ADMIN — CALCIUM GLUCONATE: 94 INJECTION, SOLUTION INTRAVENOUS at 22:38

## 2019-01-01 RX ADMIN — METRONIDAZOLE 500 MG: 500 INJECTION, SOLUTION INTRAVENOUS at 09:13

## 2019-01-01 RX ADMIN — Medication 10 ML: at 14:02

## 2019-01-01 RX ADMIN — NYSTATIN 500000 UNITS: 100000 SUSPENSION ORAL at 14:01

## 2019-01-01 RX ADMIN — METOPROLOL TARTRATE 5 MG: 5 INJECTION INTRAVENOUS at 06:54

## 2019-01-01 RX ADMIN — INSULIN LISPRO 10 UNITS: 100 INJECTION, SOLUTION INTRAVENOUS; SUBCUTANEOUS at 08:48

## 2019-01-01 RX ADMIN — PANTOPRAZOLE SODIUM 40 MG: 40 INJECTION, POWDER, FOR SOLUTION INTRAVENOUS at 10:02

## 2019-01-01 RX ADMIN — PANTOPRAZOLE SODIUM 40 MG: 40 INJECTION, POWDER, FOR SOLUTION INTRAVENOUS at 08:37

## 2019-01-01 RX ADMIN — LABETALOL HCL 100 MG: 100 TABLET, FILM COATED ORAL at 21:09

## 2019-01-01 RX ADMIN — CEFEPIME HYDROCHLORIDE 2 G: 2 INJECTION, POWDER, FOR SOLUTION INTRAVENOUS at 11:31

## 2019-01-01 RX ADMIN — INSULIN LISPRO 3 UNITS: 100 INJECTION, SOLUTION INTRAVENOUS; SUBCUTANEOUS at 06:34

## 2019-01-01 RX ADMIN — SODIUM BICARBONATE 650 MG: 650 TABLET ORAL at 09:24

## 2019-01-01 RX ADMIN — NITROGLYCERIN 1 INCH: 20 OINTMENT TOPICAL at 00:30

## 2019-01-01 RX ADMIN — FAMOTIDINE: 10 INJECTION INTRAVENOUS at 18:16

## 2019-01-01 RX ADMIN — INSULIN LISPRO 2 UNITS: 100 INJECTION, SOLUTION INTRAVENOUS; SUBCUTANEOUS at 18:38

## 2019-01-01 RX ADMIN — INSULIN LISPRO 7 UNITS: 100 INJECTION, SOLUTION INTRAVENOUS; SUBCUTANEOUS at 12:23

## 2019-01-01 RX ADMIN — POTASSIUM CHLORIDE 10 MEQ: 10 INJECTION, SOLUTION INTRAVENOUS at 14:26

## 2019-01-01 RX ADMIN — INSULIN LISPRO 7 UNITS: 100 INJECTION, SOLUTION INTRAVENOUS; SUBCUTANEOUS at 18:20

## 2019-01-01 RX ADMIN — PROMETHAZINE HYDROCHLORIDE 25 MG: 25 INJECTION INTRAMUSCULAR; INTRAVENOUS at 06:47

## 2019-01-01 RX ADMIN — LABETALOL HCL 100 MG: 100 TABLET, FILM COATED ORAL at 22:09

## 2019-01-01 RX ADMIN — METOPROLOL TARTRATE 5 MG: 5 INJECTION INTRAVENOUS at 12:23

## 2019-01-01 RX ADMIN — SODIUM BICARBONATE 650 MG: 650 TABLET ORAL at 09:30

## 2019-01-01 RX ADMIN — HUMAN INSULIN 30 UNITS: 100 INJECTION, SUSPENSION SUBCUTANEOUS at 20:51

## 2019-01-01 RX ADMIN — HUMAN INSULIN 32 UNITS: 100 INJECTION, SUSPENSION SUBCUTANEOUS at 23:03

## 2019-01-01 RX ADMIN — HYDRALAZINE HYDROCHLORIDE 20 MG: 20 INJECTION INTRAMUSCULAR; INTRAVENOUS at 00:11

## 2019-01-01 RX ADMIN — Medication 10 ML: at 21:48

## 2019-01-01 RX ADMIN — Medication: at 09:11

## 2019-01-01 RX ADMIN — Medication: at 08:44

## 2019-01-01 RX ADMIN — NITROGLYCERIN 1 INCH: 20 OINTMENT TOPICAL at 12:04

## 2019-01-01 RX ADMIN — SODIUM CHLORIDE 75 ML/HR: 900 INJECTION, SOLUTION INTRAVENOUS at 12:05

## 2019-01-01 RX ADMIN — Medication: at 09:43

## 2019-01-01 RX ADMIN — Medication 10 ML: at 06:55

## 2019-01-01 RX ADMIN — Medication 10 ML: at 06:07

## 2019-01-01 RX ADMIN — INSULIN LISPRO 3 UNITS: 100 INJECTION, SOLUTION INTRAVENOUS; SUBCUTANEOUS at 12:58

## 2019-01-01 RX ADMIN — LORAZEPAM 1 MG: 2 INJECTION INTRAMUSCULAR; INTRAVENOUS at 04:09

## 2019-01-01 RX ADMIN — Medication 10 ML: at 21:33

## 2019-01-01 RX ADMIN — PANTOPRAZOLE SODIUM 40 MG: 40 INJECTION, POWDER, FOR SOLUTION INTRAVENOUS at 10:00

## 2019-01-01 RX ADMIN — HUMAN INSULIN 30 UNITS: 100 INJECTION, SUSPENSION SUBCUTANEOUS at 09:33

## 2019-01-01 RX ADMIN — HUMAN INSULIN 32 UNITS: 100 INJECTION, SUSPENSION SUBCUTANEOUS at 22:08

## 2019-01-01 RX ADMIN — CEFEPIME HYDROCHLORIDE 2 G: 2 INJECTION, POWDER, FOR SOLUTION INTRAVENOUS at 20:21

## 2019-01-01 RX ADMIN — LABETALOL HCL 100 MG: 100 TABLET, FILM COATED ORAL at 09:11

## 2019-01-01 RX ADMIN — HEPARIN SODIUM 5000 UNITS: 5000 INJECTION INTRAVENOUS; SUBCUTANEOUS at 08:27

## 2019-01-01 RX ADMIN — Medication: at 10:29

## 2019-01-01 RX ADMIN — SODIUM BICARBONATE 650 MG: 650 TABLET ORAL at 18:01

## 2019-01-01 RX ADMIN — INSULIN LISPRO 7 UNITS: 100 INJECTION, SOLUTION INTRAVENOUS; SUBCUTANEOUS at 17:14

## 2019-01-01 RX ADMIN — DEXAMETHASONE SODIUM PHOSPHATE 4 MG: 4 INJECTION, SOLUTION INTRAMUSCULAR; INTRAVENOUS at 12:46

## 2019-01-01 RX ADMIN — DEXTROSE MONOHYDRATE AND SODIUM CHLORIDE 75 ML/HR: 5; .45 INJECTION, SOLUTION INTRAVENOUS at 01:40

## 2019-01-01 RX ADMIN — Medication 10 ML: at 05:27

## 2019-01-01 RX ADMIN — METOPROLOL TARTRATE 7.5 MG: 5 INJECTION INTRAVENOUS at 00:02

## 2019-01-01 RX ADMIN — CALCIUM GLUCONATE: 94 INJECTION, SOLUTION INTRAVENOUS at 18:33

## 2019-01-01 RX ADMIN — HUMAN INSULIN 30 UNITS: 100 INJECTION, SUSPENSION SUBCUTANEOUS at 23:17

## 2019-01-01 RX ADMIN — Medication 10 ML: at 21:14

## 2019-01-01 RX ADMIN — MORPHINE SULFATE 2 MG: 2 INJECTION, SOLUTION INTRAMUSCULAR; INTRAVENOUS at 15:15

## 2019-01-01 RX ADMIN — BACLOFEN 10 MG: 10 TABLET ORAL at 22:02

## 2019-01-01 RX ADMIN — HYDRALAZINE HYDROCHLORIDE 20 MG: 20 INJECTION INTRAMUSCULAR; INTRAVENOUS at 21:25

## 2019-01-01 RX ADMIN — IOPAMIDOL 100 ML: 755 INJECTION, SOLUTION INTRAVENOUS at 15:34

## 2019-01-01 RX ADMIN — HUMAN INSULIN 30 UNITS: 100 INJECTION, SUSPENSION SUBCUTANEOUS at 08:38

## 2019-01-01 RX ADMIN — INSULIN LISPRO 4 UNITS: 100 INJECTION, SOLUTION INTRAVENOUS; SUBCUTANEOUS at 00:02

## 2019-01-01 RX ADMIN — LABETALOL HCL 100 MG: 100 TABLET, FILM COATED ORAL at 09:31

## 2019-01-01 RX ADMIN — Medication 10 ML: at 13:40

## 2019-01-01 RX ADMIN — AMLODIPINE BESYLATE 10 MG: 5 TABLET ORAL at 08:15

## 2019-01-01 RX ADMIN — METOPROLOL TARTRATE 5 MG: 5 INJECTION INTRAVENOUS at 00:30

## 2019-01-01 RX ADMIN — NITROGLYCERIN 1 INCH: 20 OINTMENT TOPICAL at 11:55

## 2019-01-01 RX ADMIN — CALCIUM GLUCONATE: 94 INJECTION, SOLUTION INTRAVENOUS at 20:09

## 2019-01-01 RX ADMIN — METRONIDAZOLE 500 MG: 500 INJECTION, SOLUTION INTRAVENOUS at 21:19

## 2019-01-01 RX ADMIN — INSULIN LISPRO 3 UNITS: 100 INJECTION, SOLUTION INTRAVENOUS; SUBCUTANEOUS at 07:26

## 2019-01-01 RX ADMIN — HEPARIN SODIUM 5000 UNITS: 5000 INJECTION INTRAVENOUS; SUBCUTANEOUS at 09:18

## 2019-01-01 RX ADMIN — PROMETHAZINE HYDROCHLORIDE 25 MG: 25 INJECTION INTRAMUSCULAR; INTRAVENOUS at 22:54

## 2019-01-01 RX ADMIN — NITROGLYCERIN 1 INCH: 20 OINTMENT TOPICAL at 00:04

## 2019-01-01 RX ADMIN — ACETAMINOPHEN 500 MG: 160 SOLUTION ORAL at 15:12

## 2019-01-01 RX ADMIN — HUMAN INSULIN 32 UNITS: 100 INJECTION, SUSPENSION SUBCUTANEOUS at 09:25

## 2019-01-01 RX ADMIN — INSULIN LISPRO 7 UNITS: 100 INJECTION, SOLUTION INTRAVENOUS; SUBCUTANEOUS at 06:36

## 2019-01-01 RX ADMIN — INSULIN LISPRO 3 UNITS: 100 INJECTION, SOLUTION INTRAVENOUS; SUBCUTANEOUS at 11:28

## 2019-01-01 RX ADMIN — INSULIN LISPRO 3 UNITS: 100 INJECTION, SOLUTION INTRAVENOUS; SUBCUTANEOUS at 11:55

## 2019-01-01 RX ADMIN — AMLODIPINE BESYLATE 10 MG: 5 TABLET ORAL at 09:06

## 2019-01-01 RX ADMIN — MORPHINE SULFATE 2 MG: 2 INJECTION, SOLUTION INTRAMUSCULAR; INTRAVENOUS at 21:25

## 2019-01-01 RX ADMIN — Medication 30 ML: at 07:33

## 2019-01-01 RX ADMIN — HUMAN INSULIN 30 UNITS: 100 INJECTION, SUSPENSION SUBCUTANEOUS at 22:56

## 2019-01-01 RX ADMIN — Medication 20 ML: at 07:36

## 2019-01-01 RX ADMIN — NITROGLYCERIN 1 INCH: 20 OINTMENT TOPICAL at 05:26

## 2019-01-01 RX ADMIN — Medication 10 ML: at 13:52

## 2019-01-01 RX ADMIN — I.V. FAT EMULSION 250 ML: 20 EMULSION INTRAVENOUS at 20:44

## 2019-01-01 RX ADMIN — SODIUM BICARBONATE 50 MEQ: 84 INJECTION, SOLUTION INTRAVENOUS at 17:39

## 2019-01-01 RX ADMIN — DEXAMETHASONE SODIUM PHOSPHATE 4 MG: 4 INJECTION, SOLUTION INTRAMUSCULAR; INTRAVENOUS at 23:51

## 2019-01-01 RX ADMIN — Medication 10 ML: at 13:30

## 2019-01-01 RX ADMIN — NYSTATIN 500000 UNITS: 100000 SUSPENSION ORAL at 14:15

## 2019-01-01 RX ADMIN — INSULIN LISPRO 4 UNITS: 100 INJECTION, SOLUTION INTRAVENOUS; SUBCUTANEOUS at 00:52

## 2019-01-01 RX ADMIN — HEPARIN SODIUM 5000 UNITS: 5000 INJECTION INTRAVENOUS; SUBCUTANEOUS at 21:50

## 2019-01-01 RX ADMIN — INSULIN LISPRO 3 UNITS: 100 INJECTION, SOLUTION INTRAVENOUS; SUBCUTANEOUS at 12:32

## 2019-01-01 RX ADMIN — INSULIN LISPRO 4 UNITS: 100 INJECTION, SOLUTION INTRAVENOUS; SUBCUTANEOUS at 18:00

## 2019-01-01 RX ADMIN — ACETAMINOPHEN 650 MG: 325 TABLET ORAL at 18:17

## 2019-01-01 RX ADMIN — LORAZEPAM 1 MG: 2 INJECTION INTRAMUSCULAR; INTRAVENOUS at 08:12

## 2019-01-01 RX ADMIN — CALCIUM GLUCONATE: 94 INJECTION, SOLUTION INTRAVENOUS at 18:18

## 2019-01-01 RX ADMIN — POTASSIUM CHLORIDE 10 MEQ: 10 INJECTION, SOLUTION INTRAVENOUS at 11:07

## 2019-01-01 RX ADMIN — METOPROLOL TARTRATE 5 MG: 5 INJECTION INTRAVENOUS at 05:59

## 2019-01-01 RX ADMIN — AMLODIPINE BESYLATE 10 MG: 5 TABLET ORAL at 09:46

## 2019-01-01 RX ADMIN — Medication 10 ML: at 16:21

## 2019-01-01 RX ADMIN — ENOXAPARIN SODIUM 40 MG: 40 INJECTION SUBCUTANEOUS at 12:23

## 2019-01-01 RX ADMIN — LORAZEPAM 1 MG: 2 INJECTION INTRAMUSCULAR; INTRAVENOUS at 12:21

## 2019-01-01 RX ADMIN — NITROGLYCERIN 1 INCH: 20 OINTMENT TOPICAL at 12:09

## 2019-01-01 RX ADMIN — NITROGLYCERIN 1 INCH: 20 OINTMENT TOPICAL at 18:28

## 2019-01-01 RX ADMIN — Medication: at 08:07

## 2019-01-01 RX ADMIN — HUMAN INSULIN 30 UNITS: 100 INJECTION, SUSPENSION SUBCUTANEOUS at 21:17

## 2019-01-01 RX ADMIN — DICYCLOMINE HYDROCHLORIDE 20 MG: 10 CAPSULE ORAL at 15:51

## 2019-01-01 RX ADMIN — PROMETHAZINE HYDROCHLORIDE 25 MG: 25 INJECTION INTRAMUSCULAR; INTRAVENOUS at 07:00

## 2019-01-01 RX ADMIN — HUMAN INSULIN 32 UNITS: 100 INJECTION, SUSPENSION SUBCUTANEOUS at 23:12

## 2019-01-01 RX ADMIN — HUMAN INSULIN 32 UNITS: 100 INJECTION, SUSPENSION SUBCUTANEOUS at 09:29

## 2019-01-01 RX ADMIN — PROMETHAZINE HYDROCHLORIDE 25 MG: 25 INJECTION INTRAMUSCULAR; INTRAVENOUS at 03:34

## 2019-01-01 RX ADMIN — HUMAN INSULIN 30 UNITS: 100 INJECTION, SUSPENSION SUBCUTANEOUS at 18:18

## 2019-01-01 RX ADMIN — Medication 10 ML: at 06:12

## 2019-01-01 RX ADMIN — NYSTATIN 500000 UNITS: 100000 SUSPENSION ORAL at 21:17

## 2019-01-01 RX ADMIN — NYSTATIN 500000 UNITS: 100000 SUSPENSION ORAL at 12:59

## 2019-01-01 RX ADMIN — PROMETHAZINE HYDROCHLORIDE 25 MG: 25 INJECTION INTRAMUSCULAR; INTRAVENOUS at 00:49

## 2019-01-01 RX ADMIN — HYDROMORPHONE HYDROCHLORIDE 0.5 MG: 1 INJECTION, SOLUTION INTRAMUSCULAR; INTRAVENOUS; SUBCUTANEOUS at 13:47

## 2019-01-01 RX ADMIN — LABETALOL HYDROCHLORIDE 20 MG: 5 INJECTION INTRAVENOUS at 09:19

## 2019-01-01 RX ADMIN — Medication 10 ML: at 21:49

## 2019-01-01 RX ADMIN — SODIUM BICARBONATE 650 MG: 650 TABLET ORAL at 08:43

## 2019-01-01 RX ADMIN — ENOXAPARIN SODIUM 40 MG: 40 INJECTION SUBCUTANEOUS at 18:37

## 2019-01-01 RX ADMIN — METOPROLOL TARTRATE 5 MG: 5 INJECTION INTRAVENOUS at 23:42

## 2019-01-01 RX ADMIN — METOPROLOL TARTRATE 5 MG: 5 INJECTION INTRAVENOUS at 06:01

## 2019-01-01 RX ADMIN — INSULIN LISPRO 3 UNITS: 100 INJECTION, SOLUTION INTRAVENOUS; SUBCUTANEOUS at 13:39

## 2019-01-01 RX ADMIN — SODIUM BICARBONATE 650 MG: 650 TABLET ORAL at 17:53

## 2019-01-01 RX ADMIN — Medication 10 ML: at 13:14

## 2019-01-01 RX ADMIN — Medication 10 ML: at 13:13

## 2019-01-01 RX ADMIN — HYDRALAZINE HYDROCHLORIDE 20 MG: 20 INJECTION INTRAMUSCULAR; INTRAVENOUS at 08:01

## 2019-01-01 RX ADMIN — Medication 20 ML: at 06:00

## 2019-01-01 RX ADMIN — INSULIN LISPRO 4 UNITS: 100 INJECTION, SOLUTION INTRAVENOUS; SUBCUTANEOUS at 18:41

## 2019-01-01 RX ADMIN — INSULIN LISPRO 3 UNITS: 100 INJECTION, SOLUTION INTRAVENOUS; SUBCUTANEOUS at 12:00

## 2019-01-01 RX ADMIN — Medication: at 15:54

## 2019-01-01 RX ADMIN — METRONIDAZOLE 500 MG: 500 INJECTION, SOLUTION INTRAVENOUS at 20:48

## 2019-01-01 RX ADMIN — HYDROMORPHONE HYDROCHLORIDE 0.5 MG: 1 INJECTION, SOLUTION INTRAMUSCULAR; INTRAVENOUS; SUBCUTANEOUS at 20:03

## 2019-01-01 RX ADMIN — Medication 10 ML: at 16:13

## 2019-01-01 RX ADMIN — Medication 10 ML: at 13:07

## 2019-01-01 RX ADMIN — NITROGLYCERIN 1 INCH: 20 OINTMENT TOPICAL at 00:11

## 2019-01-01 RX ADMIN — DEXAMETHASONE SODIUM PHOSPHATE 4 MG: 4 INJECTION, SOLUTION INTRAMUSCULAR; INTRAVENOUS at 00:49

## 2019-01-01 RX ADMIN — Medication 120 MCG: at 16:15

## 2019-01-01 RX ADMIN — INSULIN LISPRO 4 UNITS: 100 INJECTION, SOLUTION INTRAVENOUS; SUBCUTANEOUS at 00:54

## 2019-01-01 RX ADMIN — HUMAN INSULIN 25 UNITS: 100 INJECTION, SUSPENSION SUBCUTANEOUS at 18:41

## 2019-01-01 RX ADMIN — HUMAN INSULIN 25 UNITS: 100 INJECTION, SUSPENSION SUBCUTANEOUS at 09:21

## 2019-01-01 RX ADMIN — Medication: at 18:23

## 2019-01-01 RX ADMIN — INSULIN LISPRO 3 UNITS: 100 INJECTION, SOLUTION INTRAVENOUS; SUBCUTANEOUS at 06:13

## 2019-01-01 RX ADMIN — Medication: at 09:01

## 2019-01-01 RX ADMIN — HUMAN INSULIN 30 UNITS: 100 INJECTION, SUSPENSION SUBCUTANEOUS at 21:18

## 2019-01-01 RX ADMIN — NITROGLYCERIN 1 INCH: 20 OINTMENT TOPICAL at 12:05

## 2019-01-01 RX ADMIN — INSULIN LISPRO 4 UNITS: 100 INJECTION, SOLUTION INTRAVENOUS; SUBCUTANEOUS at 13:06

## 2019-01-01 RX ADMIN — I.V. FAT EMULSION 250 ML: 20 EMULSION INTRAVENOUS at 21:53

## 2019-01-01 RX ADMIN — PROPOFOL 150 MG: 10 INJECTION, EMULSION INTRAVENOUS at 16:08

## 2019-01-01 RX ADMIN — AMLODIPINE BESYLATE 10 MG: 5 TABLET ORAL at 08:53

## 2019-01-01 RX ADMIN — INSULIN LISPRO 3 UNITS: 100 INJECTION, SOLUTION INTRAVENOUS; SUBCUTANEOUS at 06:00

## 2019-01-01 RX ADMIN — NYSTATIN 500000 UNITS: 100000 SUSPENSION ORAL at 09:30

## 2019-01-01 RX ADMIN — HEPARIN SODIUM 5000 UNITS: 5000 INJECTION INTRAVENOUS; SUBCUTANEOUS at 17:22

## 2019-01-01 RX ADMIN — CEFEPIME HYDROCHLORIDE 2 G: 2 INJECTION, POWDER, FOR SOLUTION INTRAVENOUS at 20:15

## 2019-01-01 RX ADMIN — DEXAMETHASONE SODIUM PHOSPHATE 4 MG: 4 INJECTION, SOLUTION INTRAMUSCULAR; INTRAVENOUS at 05:46

## 2019-01-01 RX ADMIN — NITROGLYCERIN 1 INCH: 20 OINTMENT TOPICAL at 23:42

## 2019-01-01 RX ADMIN — INSULIN LISPRO 10 UNITS: 100 INJECTION, SOLUTION INTRAVENOUS; SUBCUTANEOUS at 17:40

## 2019-01-01 RX ADMIN — ROCURONIUM BROMIDE 10 MG: 10 INJECTION, SOLUTION INTRAVENOUS at 17:28

## 2019-01-01 RX ADMIN — LIDOCAINE HYDROCHLORIDE 60 MG: 20 INJECTION, SOLUTION EPIDURAL; INFILTRATION; INTRACAUDAL; PERINEURAL at 16:08

## 2019-01-01 RX ADMIN — Medication: at 15:10

## 2019-01-01 RX ADMIN — SODIUM CHLORIDE 500 ML: 900 INJECTION, SOLUTION INTRAVENOUS at 13:56

## 2019-01-01 RX ADMIN — ONDANSETRON HYDROCHLORIDE 4 MG: 2 INJECTION, SOLUTION INTRAMUSCULAR; INTRAVENOUS at 17:10

## 2019-01-01 RX ADMIN — METRONIDAZOLE 500 MG: 500 INJECTION, SOLUTION INTRAVENOUS at 09:29

## 2019-01-01 RX ADMIN — Medication 10 ML: at 13:45

## 2019-01-01 RX ADMIN — HUMAN INSULIN 25 UNITS: 100 INJECTION, SUSPENSION SUBCUTANEOUS at 21:34

## 2019-01-01 RX ADMIN — LORAZEPAM 0.5 MG: 2 INJECTION INTRAMUSCULAR; INTRAVENOUS at 05:51

## 2019-01-01 RX ADMIN — NYSTATIN 500000 UNITS: 100000 SUSPENSION ORAL at 23:16

## 2019-01-01 RX ADMIN — INSULIN LISPRO 3 UNITS: 100 INJECTION, SOLUTION INTRAVENOUS; SUBCUTANEOUS at 13:33

## 2019-01-01 RX ADMIN — METOPROLOL TARTRATE 5 MG: 5 INJECTION INTRAVENOUS at 23:20

## 2019-01-01 RX ADMIN — INSULIN LISPRO 3 UNITS: 100 INJECTION, SOLUTION INTRAVENOUS; SUBCUTANEOUS at 06:05

## 2019-01-01 RX ADMIN — Medication: at 09:38

## 2019-01-01 RX ADMIN — PANTOPRAZOLE SODIUM 40 MG: 40 INJECTION, POWDER, FOR SOLUTION INTRAVENOUS at 09:20

## 2019-01-01 RX ADMIN — Medication 10 ML: at 23:20

## 2019-01-01 RX ADMIN — ENOXAPARIN SODIUM 40 MG: 40 INJECTION SUBCUTANEOUS at 18:16

## 2019-01-01 RX ADMIN — Medication: at 18:17

## 2019-01-01 RX ADMIN — MORPHINE SULFATE 2 MG: 2 INJECTION, SOLUTION INTRAMUSCULAR; INTRAVENOUS at 19:36

## 2019-01-01 RX ADMIN — NITROGLYCERIN 1 INCH: 20 OINTMENT TOPICAL at 17:38

## 2019-01-01 RX ADMIN — LABETALOL HCL 100 MG: 100 TABLET, FILM COATED ORAL at 20:43

## 2019-01-01 RX ADMIN — Medication 10 ML: at 06:33

## 2019-01-01 RX ADMIN — ENOXAPARIN SODIUM 40 MG: 40 INJECTION SUBCUTANEOUS at 17:13

## 2019-01-01 RX ADMIN — FAMOTIDINE: 10 INJECTION INTRAVENOUS at 18:36

## 2019-01-01 RX ADMIN — CEFEPIME HYDROCHLORIDE 2 G: 2 INJECTION, POWDER, FOR SOLUTION INTRAVENOUS at 12:03

## 2019-01-01 RX ADMIN — NITROGLYCERIN 1 INCH: 20 OINTMENT TOPICAL at 05:16

## 2019-01-01 RX ADMIN — METOPROLOL TARTRATE 5 MG: 5 INJECTION INTRAVENOUS at 18:04

## 2019-01-01 RX ADMIN — LABETALOL HCL 100 MG: 100 TABLET, FILM COATED ORAL at 20:36

## 2019-01-01 RX ADMIN — Medication 10 ML: at 13:20

## 2019-01-01 RX ADMIN — INSULIN LISPRO 3 UNITS: 100 INJECTION, SOLUTION INTRAVENOUS; SUBCUTANEOUS at 18:34

## 2019-01-01 RX ADMIN — Medication 10 ML: at 22:46

## 2019-01-01 RX ADMIN — NITROGLYCERIN 1 INCH: 20 OINTMENT TOPICAL at 06:17

## 2019-01-01 RX ADMIN — NYSTATIN 500000 UNITS: 100000 SUSPENSION ORAL at 21:16

## 2019-01-01 RX ADMIN — Medication 10 ML: at 21:52

## 2019-01-01 RX ADMIN — HYDRALAZINE HYDROCHLORIDE 20 MG: 20 INJECTION INTRAMUSCULAR; INTRAVENOUS at 05:44

## 2019-01-01 RX ADMIN — LABETALOL HCL 100 MG: 100 TABLET, FILM COATED ORAL at 21:37

## 2019-01-01 RX ADMIN — INSULIN LISPRO 3 UNITS: 100 INJECTION, SOLUTION INTRAVENOUS; SUBCUTANEOUS at 05:39

## 2019-01-01 RX ADMIN — LORAZEPAM 0.5 MG: 2 INJECTION INTRAMUSCULAR; INTRAVENOUS at 17:55

## 2019-01-01 RX ADMIN — LABETALOL HCL 100 MG: 100 TABLET, FILM COATED ORAL at 09:33

## 2019-01-01 RX ADMIN — MAGNESIUM SULFATE HEPTAHYDRATE 2 G: 40 INJECTION, SOLUTION INTRAVENOUS at 10:05

## 2019-01-01 RX ADMIN — AMLODIPINE BESYLATE 5 MG: 5 TABLET ORAL at 08:10

## 2019-01-01 RX ADMIN — NYSTATIN 500000 UNITS: 100000 SUSPENSION ORAL at 18:18

## 2019-01-01 RX ADMIN — LABETALOL HYDROCHLORIDE 20 MG: 5 INJECTION INTRAVENOUS at 14:44

## 2019-01-01 RX ADMIN — NYSTATIN 500000 UNITS: 100000 SUSPENSION ORAL at 17:53

## 2019-01-01 RX ADMIN — INSULIN LISPRO 2 UNITS: 100 INJECTION, SOLUTION INTRAVENOUS; SUBCUTANEOUS at 18:22

## 2019-01-01 RX ADMIN — HUMAN INSULIN 25 UNITS: 100 INJECTION, SUSPENSION SUBCUTANEOUS at 08:42

## 2019-01-01 RX ADMIN — ENOXAPARIN SODIUM 40 MG: 40 INJECTION SUBCUTANEOUS at 11:54

## 2019-01-01 RX ADMIN — CALCIUM GLUCONATE: 94 INJECTION, SOLUTION INTRAVENOUS at 18:28

## 2019-01-01 RX ADMIN — INSULIN LISPRO 4 UNITS: 100 INJECTION, SOLUTION INTRAVENOUS; SUBCUTANEOUS at 17:13

## 2019-01-01 RX ADMIN — PROMETHAZINE HYDROCHLORIDE 25 MG: 25 INJECTION, SOLUTION INTRAMUSCULAR; INTRAVENOUS at 05:03

## 2019-01-01 RX ADMIN — DICYCLOMINE HYDROCHLORIDE 20 MG: 10 CAPSULE ORAL at 09:30

## 2019-01-01 RX ADMIN — METOPROLOL TARTRATE 5 MG: 5 INJECTION INTRAVENOUS at 11:55

## 2019-01-01 RX ADMIN — ONDANSETRON 4 MG: 2 INJECTION INTRAMUSCULAR; INTRAVENOUS at 00:48

## 2019-01-01 RX ADMIN — POTASSIUM CHLORIDE 10 MEQ: 10 INJECTION, SOLUTION INTRAVENOUS at 09:55

## 2019-01-01 RX ADMIN — SODIUM CHLORIDE 100 ML/HR: 450 INJECTION, SOLUTION INTRAVENOUS at 21:33

## 2019-01-01 RX ADMIN — PROMETHAZINE HYDROCHLORIDE 25 MG: 25 INJECTION, SOLUTION INTRAMUSCULAR; INTRAVENOUS at 12:21

## 2019-01-01 RX ADMIN — ONDANSETRON 4 MG: 2 INJECTION INTRAMUSCULAR; INTRAVENOUS at 22:01

## 2019-01-01 RX ADMIN — MORPHINE SULFATE 2 MG: 2 INJECTION, SOLUTION INTRAMUSCULAR; INTRAVENOUS at 02:44

## 2019-01-01 RX ADMIN — DEXAMETHASONE SODIUM PHOSPHATE 4 MG: 4 INJECTION, SOLUTION INTRAMUSCULAR; INTRAVENOUS at 00:19

## 2019-01-01 RX ADMIN — LORAZEPAM 1 MG: 2 INJECTION INTRAMUSCULAR; INTRAVENOUS at 00:05

## 2019-01-01 RX ADMIN — FAMOTIDINE: 10 INJECTION INTRAVENOUS at 18:37

## 2019-01-01 RX ADMIN — SODIUM BICARBONATE 650 MG: 650 TABLET ORAL at 08:15

## 2019-01-01 RX ADMIN — METOPROLOL TARTRATE 5 MG: 5 INJECTION INTRAVENOUS at 18:22

## 2019-01-01 RX ADMIN — METRONIDAZOLE 500 MG: 500 INJECTION, SOLUTION INTRAVENOUS at 00:03

## 2019-01-01 RX ADMIN — ENOXAPARIN SODIUM 40 MG: 40 INJECTION SUBCUTANEOUS at 12:03

## 2019-01-01 RX ADMIN — INSULIN LISPRO 3 UNITS: 100 INJECTION, SOLUTION INTRAVENOUS; SUBCUTANEOUS at 17:50

## 2019-01-01 RX ADMIN — DIATRIZOATE MEGLUMINE AND DIATRIZOATE SODIUM 30 ML: 660; 100 LIQUID ORAL; RECTAL at 11:23

## 2019-01-01 RX ADMIN — SODIUM BICARBONATE 650 MG: 650 TABLET ORAL at 22:09

## 2019-01-01 RX ADMIN — SODIUM BICARBONATE 650 MG: 650 TABLET ORAL at 09:06

## 2019-01-01 RX ADMIN — ROCURONIUM BROMIDE 10 MG: 10 INJECTION, SOLUTION INTRAVENOUS at 16:57

## 2019-01-01 RX ADMIN — NEOSTIGMINE METHYLSULFATE 4 MG: 1 INJECTION INTRAVENOUS at 19:18

## 2019-01-01 RX ADMIN — LABETALOL HCL 100 MG: 100 TABLET, FILM COATED ORAL at 10:00

## 2019-01-01 RX ADMIN — GADOTERATE MEGLUMINE 20 ML: 376.9 INJECTION INTRAVENOUS at 16:00

## 2019-01-01 RX ADMIN — NYSTATIN 500000 UNITS: 100000 SUSPENSION ORAL at 15:40

## 2019-01-01 RX ADMIN — HUMAN INSULIN 30 UNITS: 100 INJECTION, SUSPENSION SUBCUTANEOUS at 09:00

## 2019-01-01 RX ADMIN — KETOROLAC TROMETHAMINE 30 MG: 30 INJECTION, SOLUTION INTRAMUSCULAR at 05:04

## 2019-01-01 RX ADMIN — Medication 30 ML: at 06:07

## 2019-01-01 RX ADMIN — ENOXAPARIN SODIUM 40 MG: 40 INJECTION SUBCUTANEOUS at 10:05

## 2019-01-01 RX ADMIN — METOPROLOL TARTRATE 5 MG: 5 INJECTION INTRAVENOUS at 00:41

## 2019-01-01 RX ADMIN — METOPROLOL TARTRATE 5 MG: 5 INJECTION INTRAVENOUS at 13:06

## 2019-01-01 RX ADMIN — METRONIDAZOLE 500 MG: 500 INJECTION, SOLUTION INTRAVENOUS at 20:44

## 2019-01-01 RX ADMIN — HEPARIN SODIUM 5000 UNITS: 5000 INJECTION INTRAVENOUS; SUBCUTANEOUS at 18:05

## 2019-01-01 RX ADMIN — METOPROLOL TARTRATE 5 MG: 5 INJECTION INTRAVENOUS at 12:33

## 2019-01-01 RX ADMIN — DEXTROSE MONOHYDRATE AND SODIUM CHLORIDE 75 ML/HR: 5; .45 INJECTION, SOLUTION INTRAVENOUS at 18:47

## 2019-01-01 RX ADMIN — ROCURONIUM BROMIDE 50 MG: 10 INJECTION, SOLUTION INTRAVENOUS at 16:17

## 2019-01-01 RX ADMIN — HUMAN INSULIN 10 UNITS: 100 INJECTION, SOLUTION SUBCUTANEOUS at 08:36

## 2019-01-01 RX ADMIN — Medication 10 ML: at 15:34

## 2019-01-01 RX ADMIN — NYSTATIN 500000 UNITS: 100000 SUSPENSION ORAL at 18:15

## 2019-01-01 RX ADMIN — ENOXAPARIN SODIUM 40 MG: 40 INJECTION SUBCUTANEOUS at 17:52

## 2019-01-01 RX ADMIN — HYDRALAZINE HYDROCHLORIDE 20 MG: 20 INJECTION INTRAMUSCULAR; INTRAVENOUS at 16:49

## 2019-01-01 RX ADMIN — PROMETHAZINE HYDROCHLORIDE 25 MG: 25 INJECTION INTRAMUSCULAR; INTRAVENOUS at 18:12

## 2019-01-01 RX ADMIN — SODIUM BICARBONATE 150 MEQ/1,000 ML IN DEXTROSE 5 % INTRAVENOUS: SOLUTION at 11:38

## 2019-01-01 RX ADMIN — HUMAN INSULIN 25 UNITS: 100 INJECTION, SUSPENSION SUBCUTANEOUS at 21:35

## 2019-01-01 RX ADMIN — SODIUM CHLORIDE 125 ML/HR: 900 INJECTION, SOLUTION INTRAVENOUS at 16:53

## 2019-01-01 RX ADMIN — INSULIN LISPRO 4 UNITS: 100 INJECTION, SOLUTION INTRAVENOUS; SUBCUTANEOUS at 19:53

## 2019-01-01 RX ADMIN — HUMAN INSULIN 32 UNITS: 100 INJECTION, SUSPENSION SUBCUTANEOUS at 08:42

## 2019-01-01 RX ADMIN — FAMOTIDINE: 10 INJECTION INTRAVENOUS at 19:07

## 2019-01-01 RX ADMIN — Medication: at 08:38

## 2019-01-01 RX ADMIN — METOPROLOL TARTRATE 7.5 MG: 5 INJECTION INTRAVENOUS at 05:26

## 2019-01-01 RX ADMIN — METOPROLOL TARTRATE 5 MG: 5 INJECTION INTRAVENOUS at 01:38

## 2019-01-01 RX ADMIN — Medication: at 17:14

## 2019-01-01 RX ADMIN — HUMAN INSULIN 25 UNITS: 100 INJECTION, SUSPENSION SUBCUTANEOUS at 08:27

## 2019-01-01 RX ADMIN — INSULIN LISPRO 4 UNITS: 100 INJECTION, SOLUTION INTRAVENOUS; SUBCUTANEOUS at 13:10

## 2019-01-01 RX ADMIN — INSULIN LISPRO 3 UNITS: 100 INJECTION, SOLUTION INTRAVENOUS; SUBCUTANEOUS at 18:48

## 2019-02-27 NOTE — PROGRESS NOTES
Yoana Newton DNP, ANP-BC  Subjective/HPI:     Lynette Lewis is a 70 y.o. male is here for cardiac clearance for ureteroscope next week. Patient reports he has been feeling well in his usual state of health denies dyspnea on exertion, fatigue or exertional chest pain. To recall patient has a history of MI 1996, most recent PTCA stenting 2001. Cardiac catheterization as shown below has a distal lesion in the RCA. 2017 cardiac cath  CORONARY CIRCULATION: The coronary circulation is right dominant. Left  main: Normal. LAD: Angiography showed minor luminal irregularities. Circumflex: Angiography showed minor luminal irregularities. Distal RCA:  There was a discrete 75 % stenosis. 2018 ECHO  SUMMARY:  Procedure information: This was a technically difficult study. Left ventricle: The cavity was small. Systolic function was hyperdynamic. Ejection fraction was estimated to be 80 %. There were no regional wall  motion abnormalities. Wall thickness was markedly increased. There was  increased intracavitary gradient with rest and valsalva. PCP Provider  Amy Shea MD  Past Medical History:   Diagnosis Date    Abnormal nuclear stress test 4/27/2017    Arthritis     Asthma     childhood    BPH (benign prostatic hypertrophy)     CAD (coronary artery disease) 1996    M.I., Stents x2    Cancer ([address]     Rectal CA     Chronic pain     Colon polyp     DM (diabetes mellitus) ([address] 5/17/2011    Gout     Hemorrhoids     HTN (hypertension) 5/17/2011    Hyperlipidemia 5/17/2011    Ill-defined condition     Colostomy, iliostomy    Rectal adenocarcinoma ([address] 1/16/2018    S/p surg.     S/P cardiac cath 4/27/2017      Past Surgical History:   Procedure Laterality Date    CARDIAC SURG PROCEDURE UNLIST  0438/4644    stent x2    COLONOSCOPY N/A 10/14/2016    COLONOSCOPY/EGD performed [facility] Cedrick Hidalgo MD [facility] ENDOSCOPY    COLONOSCOPY N/A 2/3/2017    COLONOSCOPY performed by Bart Byrnes Leandro Nunez MD at [facility] COLONOSCOPY N/A 3/27/2018    COLONOSCOPY performed by Mitzie Skiff, MD at [facility] ENDOSCOPY    ENDOSCOPY, COLON, DIAGNOSTIC  6/2011    HX GI      Prostate, Bladder, rectum. colon removed    HX HERNIA REPAIR      AS INFANT    HX ORTHOPAEDIC  02/2016    hip replacement , left    HX PROSTATECTOMY  X2    biopsy benign    HX TONSILLECTOMY      HX UROLOGICAL      Bladder removed    SIGMOIDOSCOPY,BIOPSY  10/14/2016         UPPER GI ENDOSCOPY,DIAGNOSIS  10/14/2016          Allergies   Allergen Reactions    Atorvastatin Myalgia    Pcn [Penicillins] Hives      Family History   Problem Relation Age of Onset    Heart Disease Mother     COPD Mother     COPD Father     Diabetes Father     Hypertension Father     Alcohol abuse Sister     Diabetes Brother     High Cholesterol Brother     Hypertension Brother     Anesth Problems Neg Hx       Current Outpatient Medications   Medication Sig    bevacizumab (AVASTIN IV) by IntraVENous route.  capecitabine (XELODA) 500 mg tablet Take 3 tablets for a total of 1500 mg twice daily for 14 days then take 7 days off.  amLODIPine (NORVASC) 10 mg tablet TAKE 1 TABLET BY MOUTH EVERY DAY    HUMALOG KWIKPEN INSULIN 100 unit/mL kwikpen USE FOUR TIMES DAILY PER SLIDING SCALE    lisinopril (PRINIVIL, ZESTRIL) 10 mg tablet Take 10 mg by mouth daily.  glucose blood VI test strips (ASCENSIA CONTOUR) strip Check three times daily as directed       E11.9    BD ULTRA-FINE SHORT PEN NEEDLE 31 gauge x 5/16\" ndle USE TO INJECT INSULIN AS DIRECTED    labetalol (NORMODYNE) 100 mg tablet TAKE 2 TABLETS BY MOUTH TWICE A DAY    isosorbide mononitrate ER (IMDUR) 60 mg CR tablet TAKE 1 TABLET BY MOUTH DAILY.     metFORMIN (GLUCOPHAGE) 500 mg tablet TAKE 2 TABLETS BY MOUTH TWICE DAILY WITH MEALS    LANTUS SOLOSTAR U-100 INSULIN 100 unit/mL (3 mL) inpn INJECT 52 UNITS SUBCUTANEOUSLY EVERY DAY (Patient taking differently: INJECT 32 UNITS SUBCUTANEOUSLY EVERY DAY)    magnesium oxide (MAGOX) 400 mg tablet Take 400 mg by mouth two (2) times a day.  cloNIDine HCl (CATAPRES) 0.2 mg tablet Take 1 Tab by mouth two (2) times a day.  multivitamins-minerals-lutein (MULTIVITAMIN 50 PLUS) tab tablet   See Instructions, daily, 0 Refills    ferrous sulfate (SLOW FE) 142 mg (45 mg iron) ER tablet Take  by mouth Daily (before breakfast).  aspirin delayed-release 81 mg tablet Take 81 mg by mouth daily.  rosuvastatin (CRESTOR) 5 mg tablet Take 0.5 Tabs by mouth nightly. No current facility-administered medications for this visit. Vitals:    19 1351 19 1412   BP: 112/62 119/70   Pulse: 90    Resp: 16    SpO2: 97%    Weight: 245 lb (111.1 kg)    Height: 5' 9\" (1.753 m)      Social History     Socioeconomic History    Marital status:      Spouse name: Not on file    Number of children: Not on file    Years of education: Not on file    Highest education level: Not on file   Social Needs    Financial resource strain: Not on file    Food insecurity - worry: Not on file    Food insecurity - inability: Not on file   [employer] needs - medical: Not on file   [employer] needs - non-medical: Not on file   Occupational History    Not on file   Tobacco Use    Smoking status: Former Smoker     Packs/day: 0.50     Years: 40.00     Pack years: 20.00     Last attempt to quit: 2016     Years since quittin.4    Smokeless tobacco: Never Used   Substance and Sexual Activity    Alcohol use: No     Alcohol/week: 0.0 oz     Comment: OCCASIONAL    Drug use: No    Sexual activity: Not on file   Other Topics Concern    Not on file   Social History Narrative    Not on file       I have reviewed the nurses notes, vitals, problem list, allergy list, medical history, family, social history and medications. Review of Symptoms:    General: Pt denies excessive weight gain or loss.  Pt is able to conduct ADL's  HEENT: Denies blurred vision, headaches, epistaxis and difficulty swallowing. Respiratory: Denies shortness of breath, TALBOT, wheezing or stridor. Cardiovascular: Denies precordial pain, palpitations, edema or PND  Gastrointestinal: Denies poor appetite, indigestion, abdominal pain or blood in stool  Musculoskeletal: Denies pain or swelling from muscles or joints  Neurologic: Denies tremor, paresthesias, or sensory motor disturbance  Skin: Denies rash, itching or texture change. Physical Exam:      General: Well developed, in no acute distress, cooperative and alert  HEENT: No carotid bruits, no JVD, trach is midline. Neck Supple, PEERL, EOM intact. Heart:  Normal S1/S2 negative S3 or S4. Regular, no murmur, gallop or rub.   Respiratory: Clear bilaterally x 4, no wheezing or rales  Abdomen:   Soft, non-tender, no masses, bowel sounds are active.  Left ileostomy present, right urostomy present  Extremities:  No edema, normal cap refill, no cyanosis, atraumatic. Neuro: A&Ox3, speech clear, gait stable. Skin: Skin color is normal. No rashes or lesions.  Non diaphoretic  Vascular: 2+ pulses symmetric in all extremities    Cardiographics    ECG: Sinus rhythm  Results for orders placed or performed during the hospital encounter of 10/15/16   EKG, 12 LEAD, INITIAL   Result Value Ref Range    Ventricular Rate 96 BPM    Atrial Rate 96 BPM    P-R Interval 146 ms    QRS Duration 86 ms    Q-T Interval 346 ms    QTC Calculation (Bezet) 437 ms    Calculated P Axis 54 degrees    Calculated R Axis 38 degrees    Calculated T Axis 138 degrees    Diagnosis       Normal sinus rhythm  Possible Left atrial enlargement  T wave abnormality, consider lateral ischemia    Confirmed by Cecille Coronado MD, Maggie Lizama ([id number]) on 10/16/2016 4:00:06 PM           Cardiology Labs:  Lab Results   Component Value Date/Time    Cholesterol, total 131 05/16/2012 10:14 AM    HDL Cholesterol 49 05/16/2012 10:14 AM    LDL, calculated 66 05/16/2012 10:14 AM Triglyceride 78 05/16/2012 10:14 AM       Lab Results   Component Value Date/Time    Sodium 143 12/17/2018 11:43 AM    Potassium 5.0 12/17/2018 11:43 AM    Chloride 104 12/17/2018 11:43 AM    CO2 23 12/17/2018 11:43 AM    Anion gap 8 01/22/2018 06:32 PM    Glucose 286 (H) 12/17/2018 11:43 AM    BUN 12 12/17/2018 11:43 AM    Creatinine 0.94 12/17/2018 11:43 AM    BUN/Creatinine ratio 13 12/17/2018 11:43 AM    GFR est AA 95 12/17/2018 11:43 AM    GFR est non-AA 82 12/17/2018 11:43 AM    Calcium 9.4 12/17/2018 11:43 AM    Bilirubin, total 1.0 12/17/2018 11:43 AM    AST (SGOT) 99 (H) 12/17/2018 11:43 AM    Alk. phosphatase 163 (H) 12/17/2018 11:43 AM    Protein, total 7.0 12/17/2018 11:43 AM    Albumin 4.2 12/17/2018 11:43 AM    Globulin 5.2 (H) 01/22/2018 06:32 PM    A-G Ratio 1.5 12/17/2018 11:43 AM    ALT (SGPT) 78 (H) 12/17/2018 11:43 AM           Assessment:     Assessment:     Diagnoses and all orders for this visit:    1. Pre-operative cardiovascular examination    2. Coronary artery disease involving native coronary artery of native heart without angina pectoris  -     AMB POC EKG ROUTINE W/ 12 LEADS, INTER & REP    3. Mixed hyperlipidemia        ICD-10-CM ICD-9-CM    1. Pre-operative cardiovascular examination Z01.810 V72.81    2. Coronary artery disease involving native coronary artery of native heart without angina pectoris I25.10 414.01 AMB POC EKG ROUTINE W/ 12 LEADS, INTER & REP   3. Mixed hyperlipidemia E78.2 272.2      Orders Placed This Encounter    AMB POC EKG ROUTINE W/ 12 LEADS, INTER & REP     Order Specific Question:   Reason for Exam:     Answer:   routine    bevacizumab (AVASTIN IV)     Sig: by IntraVENous route.  capecitabine (XELODA) 500 mg tablet     Sig: Take 3 tablets for a total of 1500 mg twice daily for 14 days then take 7 days off. Refill:  2        Plan:     1. Atherosclerotic heart disease: Clinically stable asymptomatic, has known distal lesion in the RCA clinically stable. EKG unchanged  2. Hypertension: Controlled continue current therapy  3. Hyperlipidemia: On statin therapy on statin therapy. Plans to see primary care for repeat labs. Patient is cleared from cardiology to proceed with urology procedure. Follow-up in 6 months  Shelly Kohler NP    This note was created using voice recognition software. Despite editing, there may be syntax errors.

## 2019-02-27 NOTE — PROGRESS NOTES
Chief Complaint   Patient presents with    Surgical Clearance     clearance for Ureteroscopy 3/6/2019     1. Have you been to the ER, urgent care clinic since your last visit? Hospitalized since your last visit? No    2. Have you seen or consulted any other health care providers outside of the [address] [place] [address] since your last visit? Include any pap smears or colon screening.  No

## 2019-03-06 NOTE — TELEPHONE ENCOUNTER
----- Message from Alisha Hull sent at 3/4/2019  8:46 AM EST -----  [facility] called, the pt have surgery on wed 3/6/19, malini seen the pt for cardiac clearance on 2/27, They need the clearance faxed over so the pt can have surgery as scheduled.  Fax number is [phone] [place] [phone] [place] 2/27/2019 pt cleared for surgical procedure per Bud Arana NP

## 2019-04-09 PROBLEM — E11.3599 TYPE 2 DIABETES MELLITUS WITH PROLIFERATIVE RETINOPATHY (HCC): Status: ACTIVE | Noted: 2019-01-01

## 2019-06-02 PROBLEM — C79.9 METASTATIC CANCER (HCC): Status: ACTIVE | Noted: 2019-01-01

## 2019-06-02 PROBLEM — K56.609 BOWEL OBSTRUCTION (HCC): Status: ACTIVE | Noted: 2019-01-01

## 2019-06-02 NOTE — ED NOTES
Assumed care of pt from triage. Pt with hx of colorectal CA, urostomy, and ileostomy. States no output to ileostomy since Thursday. He reports recent recurrence of CA to rectum and started chemo oxaliplatin 2 weeks ago. One of the side effects of this chemo he reports is constipation. Patient is followed by oncologist Dr. Gibson Branham and colorectal DrLuciano Hardwick. Patient also reports some blood noted to ileostomy output. Patient placed on monitor x2. VSS.

## 2019-06-02 NOTE — H&P
Hospitalist Admission Note    NAME: Ami Tipton   :  1950   MRN:  [id number]     Date/Time:  2019 4:20 PM    Patient PCP: Mireille Nicole MD  ______________________________________________________________________  Given the patient's current clinical presentation, I have a high level of concern for decompensation if discharged from the emergency department. Complex decision making was performed, which includes reviewing the patient's available past medical records, laboratory results, and x-ray films. My assessment of this patient's clinical condition and my plan of care is as follows. Assessment / Plan:  Small Bowel Obstruction  Colon Cancer s/p resection with recurrence/Rectal Adenocarcinoma on Chemo  -admit to telemetry  -IVF's   -NG to suction  -consult CRS and Onc  -await CT A/P  -KUB ordered for tomorrow am  -IV dilaudid prn pain and IV antiemetics prn  -NPO except can do ice chips 1/2 cup q6h once NG is in (ordered placed)    Mild BOBBI  -hold home ACEI  -IVF's  -monitor renal fucntion    Leukocytosis: suspect reactive  -monitor    Hypertension  -holding PO meds  -place on clonidine patch and scheduled Nitrobid  -prn IV hydralazine ordered    Diabetes  -reduce home basal insulin to NPH 30 units BID  -place on SSI    Code Status: Full  Surrogate Decision Maker: Wife    DVT Prophylaxis: SCDs  GI Prophylaxis: IV PPI    Baseline: Fucntional      Subjective:   CHIEF COMPLAINT: abdominal pain    HISTORY OF PRESENT ILLNESS:     Alexx Nolan is a 70 y.o.  male who presents with abdominal pain. Patient's symptoms started on Thursday evening. He say his oncologist on Friday and was complaining of abdominal bloating and constipation and was instructed to take senna and miralax. Patient has not yet had a BM. He also denies passing gas. Patient reports vomiting on Saturday and this am. On presentation here patient was found to have a small bowel obstruction.     We were asked to admit for work up and evaluation of the above problems. Past Medical History:   Diagnosis Date    Abnormal nuclear stress test 2017    Arthritis     Asthma     childhood    BPH (benign prostatic hypertrophy)     CAD (coronary artery disease)     M.I., Stents x2    Cancer ([facility] [address]     Rectal CA     Chronic pain     Colon polyp     DM (diabetes mellitus) ([facility] [address] 2011    Gout     Hemorrhoids     HTN (hypertension) 2011    Hyperlipidemia 2011    Ill-defined condition     Colostomy, iliostomy    Rectal adenocarcinoma ([facility] [address] 2018    S/p surg.  S/P cardiac cath 2017        Past Surgical History:   Procedure Laterality Date    CARDIAC SURG PROCEDURE UNLIST  3208/4693    stent x2    COLONOSCOPY N/A 10/14/2016    COLONOSCOPY/EGD performed by Shahnaz Mckeon MD at [address] [facility] N/A 2/3/2017    COLONOSCOPY performed by Levon Burch MD at [address] COLONOSCOPY N/A 3/27/2018    COLONOSCOPY performed by Levon Burch MD at [facility] ENDOSCOPY    ENDOSCOPY, COLON, DIAGNOSTIC  2011    HX GI      Prostate, Bladder, rectum.  colon removed    HX HERNIA REPAIR      AS INFANT    HX ORTHOPAEDIC  2016    hip replacement , left    HX PROSTATECTOMY  X2    biopsy benign    HX TONSILLECTOMY      HX UROLOGICAL      Bladder removed    SIGMOIDOSCOPY,BIOPSY  10/14/2016         UPPER GI ENDOSCOPY,DIAGNOSIS  10/14/2016            Social History     Tobacco Use    Smoking status: Former Smoker     Packs/day: 0.50     Years: 40.00     Pack years: 20.00     Last attempt to quit: 2016     Years since quittin.7    Smokeless tobacco: Never Used   Substance Use Topics    Alcohol use: No     Alcohol/week: 0.0 oz     Comment: OCCASIONAL        Family History   Problem Relation Age of Onset    Heart Disease Mother     COPD Mother     COPD Father     Diabetes Father     Hypertension Father     Alcohol abuse Sister     Diabetes Brother     High Cholesterol Brother     Hypertension Brother     Anesth Problems Neg Hx      Allergies   Allergen Reactions    Atorvastatin Myalgia    Pcn [Penicillins] Hives        Prior to Admission medications    Medication Sig Start Date End Date Taking? Authorizing Provider   metFORMIN (GLUCOPHAGE) 500 mg tablet TAKE 2 TABLETS BY MOUTH TWICE DAILY WITH MEALS 5/27/19   Aleyda Kenny MD   LANESPERANZAUS SOLOSTAR U-100 INSULIN 100 unit/mL (3 mL) inpn INJECT 52 UNITS SUBCUTANEOUSLY EVERY DAY 5/3/19   Aleyda Kenny MD   bevacizumab (AVASTIN IV) by IntraVENous route. Provider, Historical   capecitabine (XELODA) 500 mg tablet Take 3 tablets for a total of 1500 mg twice daily for 14 days then take 7 days off. 1/30/19   Provider, Historical   amLODIPine (NORVASC) 10 mg tablet TAKE 1 TABLET BY MOUTH EVERY DAY 2/18/19   Darius Jimenez MD   [facility] INSULIN 100 unit/mL kwikpen USE FOUR TIMES DAILY PER SLIDING SCALE 1/1/19   Aleyda Kenny MD   lisinopril (PRINIVIL, ZESTRIL) 10 mg tablet Take 10 mg by mouth daily. Provider, Historical   glucose blood VI test strips (ASCENSIA CONTOUR) strip Check three times daily as directed       E11.9 11/27/18   Aleyda Kenny MD   BD ULTRA-FINE SHORT PEN NEEDLE 31 gauge x 5/16\" ndle USE TO INJECT INSULIN AS DIRECTED 8/30/18   Aleyda Kenny MD   labetalol (NORMODYNE) 100 mg tablet TAKE 2 TABLETS BY MOUTH TWICE A DAY 8/13/18   Aleyda Kenny MD   isosorbide mononitrate ER (IMDUR) 60 mg CR tablet TAKE 1 TABLET BY MOUTH DAILY. 8/12/18   Milad Hinton MD   magnesium oxide (MAGOX) 400 mg tablet Take 400 mg by mouth two (2) times a day. Provider, Historical   cloNIDine HCl (CATAPRES) 0.2 mg tablet Take 1 Tab by mouth two (2) times a day.  8/8/17   Aleyda Kenny MD   multivitamins-minerals-lutein (MULTIVITAMIN 50 PLUS) tab tablet   See Instructions, daily, 0 Refills 10/21/16   Provider, Historical   ferrous sulfate (SLOW FE) 142 mg (45 mg iron) ER tablet Take  by mouth Daily (before breakfast). Provider, Historical   aspirin delayed-release 81 mg tablet Take 81 mg by mouth daily. Provider, Historical       REVIEW OF SYSTEMS:     Total of 12 systems reviewed as follows:       POSITIVE= underlined text  Negative = text not underlined  General:  fever, chills, sweats, generalized weakness, weight loss/gain,      loss of appetite   Eyes:    blurred vision, eye pain, loss of vision, double vision  ENT:    rhinorrhea, pharyngitis   Respiratory:   cough, sputum production, SOB, TALBOT, wheezing, pleuritic pain   Cardiology:   chest pain, palpitations, orthopnea, PND, edema, syncope   Gastrointestinal:  abdominal pain , N/V, diarrhea, dysphagia, constipation, bleeding (a little blood in ostomy bag this am)  Genitourinary:  frequency, urgency, dysuria, hematuria, incontinence   Muskuloskeletal :  arthralgia, myalgia, back pain  Hematology:  easy bruising, nose or gum bleeding, lymphadenopathy   Dermatological: rash, ulceration, pruritis, color change / jaundice  Endocrine:   hot flashes or polydipsia   Neurological:  headache, dizziness, confusion, focal weakness, paresthesia,     Speech difficulties, memory loss, gait difficulty  Psychological: Feelings of anxiety, depression, agitation    Objective:   VITALS:    Visit Vitals  /87   Pulse (!) 101   Temp 98 °F (36.7 °C)   Resp 18   Ht 5' 10\" (1.778 m)   Wt 107 kg (235 lb 14.3 oz)   SpO2 94%   BMI 33.85 kg/m²       PHYSICAL EXAM:    General:    Alert, cooperative, no distress, appears stated age. HEENT: Atraumatic, anicteric sclerae, pink conjunctivae     No oral ulcers, mucosa dry, throat clear, dentition fair  Neck:  Supple, symmetrical,  Thyroid not enlarged  Lungs:   Clear to auscultation bilaterally. No Wheezing or Rhonchi. No rales. Chest wall:  No tenderness  No Accessory muscle use.   Heart:   Regular  Rhythm with mild tachycardia,  No  murmur   No edema  Abdomen: Soft, tender. + distended. Bowel sounds hypoactive  Extremities: No cyanosis. No clubbing,      Skin turgor normal, Capillary refill normal  Skin:     Not pale. Not Jaundiced  No rashes   Psych:  Good insight. Not depressed. Not anxious or agitated. Neurologic: EOMs intact. No facial asymmetry. No aphasia or slurred speech. Symmetrical strength, No focal neurologic deficits. Alert and oriented X 4.     _______________________________________________________________________  Care Plan discussed with:    Comments   Patient x    Family  x    RN x    Care Manager                    Consultant:      _______________________________________________________________________  Expected  Disposition:   Home with Family x   HH/PT/OT/RN ? HH   SNF/LTC    RAKESH    ________________________________________________________________________  TOTAL TIME:  54 Minutes    Critical Care Provided     Minutes non procedure based      Comments    x Reviewed previous records   >50% of visit spent in counseling and coordination of care x Discussion with patient and/or family and questions answered       ________________________________________________________________________  Signed: Rl Gamez MD    Procedures: see electronic medical records for all procedures/Xrays and details which were not copied into this note but were reviewed prior to creation of Plan.     LAB DATA REVIEWED:    Recent Results (from the past 24 hour(s))   CBC WITH AUTOMATED DIFF    Collection Time: 06/02/19  1:56 PM   Result Value Ref Range    WBC 11.3 (H) 4.1 - 11.1 K/uL    RBC 4.72 4.10 - 5.70 M/uL    HGB 14.7 12.1 - 17.0 g/dL    HCT 43.0 36.6 - 50.3 %    MCV 91.1 80.0 - 99.0 FL    MCH 31.1 26.0 - 34.0 PG    MCHC 34.2 30.0 - 36.5 g/dL    RDW 16.4 (H) 11.5 - 14.5 %    PLATELET 405 487 - 145 K/uL    MPV 9.6 8.9 - 12.9 FL    NRBC 0.0 0  WBC    ABSOLUTE NRBC 0.00 0.00 - 0.01 K/uL    NEUTROPHILS 85 (H) 32 - 75 %    LYMPHOCYTES 7 (L) 12 - 49 %    MONOCYTES 7 5 - 13 %    EOSINOPHILS 1 0 - 7 %    BASOPHILS 0 0 - 1 %    IMMATURE GRANULOCYTES 0 0.0 - 0.5 %    ABS. NEUTROPHILS 9.6 (H) 1.8 - 8.0 K/UL    ABS. LYMPHOCYTES 0.8 0.8 - 3.5 K/UL    ABS. MONOCYTES 0.8 0.0 - 1.0 K/UL    ABS. EOSINOPHILS 0.1 0.0 - 0.4 K/UL    ABS. BASOPHILS 0.0 0.0 - 0.1 K/UL    ABS. IMM. GRANS. 0.0 0.00 - 0.04 K/UL    DF AUTOMATED      RBC COMMENTS ANISOCYTOSIS  1+        WBC COMMENTS REACTIVE LYMPHS     METABOLIC PANEL, COMPREHENSIVE    Collection Time: 06/02/19  1:56 PM   Result Value Ref Range    Sodium 135 (L) 136 - 145 mmol/L    Potassium 4.8 3.5 - 5.1 mmol/L    Chloride 102 97 - 108 mmol/L    CO2 25 21 - 32 mmol/L    Anion gap 8 5 - 15 mmol/L    Glucose 203 (H) 65 - 100 mg/dL    BUN 24 (H) 6 - 20 MG/DL    Creatinine 1.47 (H) 0.70 - 1.30 MG/DL    BUN/Creatinine ratio 16 12 - 20      GFR est AA 58 (L) >60 ml/min/1.73m2    GFR est non-AA 47 (L) >60 ml/min/1.73m2    Calcium 9.1 8.5 - 10.1 MG/DL    Bilirubin, total 2.6 (H) 0.2 - 1.0 MG/DL    ALT (SGPT) 38 12 - 78 U/L    AST (SGOT) 47 (H) 15 - 37 U/L    Alk.  phosphatase 153 (H) 45 - 117 U/L    Protein, total 8.7 (H) 6.4 - 8.2 g/dL    Albumin 3.9 3.5 - 5.0 g/dL    Globulin 4.8 (H) 2.0 - 4.0 g/dL    A-G Ratio 0.8 (L) 1.1 - 2.2     LIPASE    Collection Time: 06/02/19  1:56 PM   Result Value Ref Range    Lipase 101 73 - 393 U/L

## 2019-06-02 NOTE — ED PROVIDER NOTES
EMERGENCY DEPARTMENT HISTORY AND PHYSICAL EXAM      Date: 6/2/2019  Patient Name: Juliette Souza    History of Presenting Illness     Chief Complaint   Patient presents with    Constipation     LBM 05/30/19       History Provided By: Patient    HPI: Juliette Souza, 70 y.o. male with PMHx significant for metastatic rectal adenocarcinoma, presents to the ED with cc of constipation and abdominal distention x 2 days. He also had nausea today but no vomiting until he came to the ED day where he vomited once. Emesis is nonbloody nonbilious. Last BM was 3 days ago. Still passing gas. There are no other complaints, changes, or physical findings at this time. Colorectal surgeon: Edel Montes MD       PCP: Rosa Roberts MD    No current facility-administered medications on file prior to encounter. Current Outpatient Medications on File Prior to Encounter   Medication Sig Dispense Refill    metFORMIN (GLUCOPHAGE) 500 mg tablet TAKE 2 TABLETS BY MOUTH TWICE DAILY WITH MEALS 360 Tab 3    LANTUS SOLOSTAR U-100 INSULIN 100 unit/mL (3 mL) inpn INJECT 52 UNITS SUBCUTANEOUSLY EVERY DAY 15 Adjustable Dose Pre-filled Pen Syringe 6    bevacizumab (AVASTIN IV) by IntraVENous route.  capecitabine (XELODA) 500 mg tablet Take 3 tablets for a total of 1500 mg twice daily for 14 days then take 7 days off.  2    amLODIPine (NORVASC) 10 mg tablet TAKE 1 TABLET BY MOUTH EVERY DAY 90 Tab 2    HUMALOG KWIKPEN INSULIN 100 unit/mL kwikpen USE FOUR TIMES DAILY PER SLIDING SCALE 9 Adjustable Dose Pre-filled Pen Syringe 11    lisinopril (PRINIVIL, ZESTRIL) 10 mg tablet Take 10 mg by mouth daily.       glucose blood VI test strips (ASCENSIA CONTOUR) strip Check three times daily as directed       E11.9 300 Strip 3    BD ULTRA-FINE SHORT PEN NEEDLE 31 gauge x 5/16\" ndle USE TO INJECT INSULIN AS DIRECTED 100 Pen Needle 5    labetalol (NORMODYNE) 100 mg tablet TAKE 2 TABLETS BY MOUTH TWICE A  Tab 9    isosorbide mononitrate ER (IMDUR) 60 mg CR tablet TAKE 1 TABLET BY MOUTH DAILY. 30 Tab 9    magnesium oxide (MAGOX) 400 mg tablet Take 400 mg by mouth two (2) times a day.  cloNIDine HCl (CATAPRES) 0.2 mg tablet Take 1 Tab by mouth two (2) times a day. 60 Tab 11    multivitamins-minerals-lutein (MULTIVITAMIN 50 PLUS) tab tablet   See Instructions, daily, 0 Refills      ferrous sulfate (SLOW FE) 142 mg (45 mg iron) ER tablet Take  by mouth Daily (before breakfast).  aspirin delayed-release 81 mg tablet Take 81 mg by mouth daily. Past History     Past Medical History:  Past Medical History:   Diagnosis Date    Abnormal nuclear stress test 4/27/2017    Arthritis     Asthma     childhood    BPH (benign prostatic hypertrophy)     CAD (coronary artery disease) 1996    M.I., Stents x2    Cancer ([facility] [address]     Rectal CA     Chronic pain     Colon polyp     DM (diabetes mellitus) ([facility] [address] 5/17/2011    Gout     Hemorrhoids     HTN (hypertension) 5/17/2011    Hyperlipidemia 5/17/2011    Ill-defined condition     Colostomy, iliostomy    Rectal adenocarcinoma ([facility] [address] 1/16/2018    S/p surg.  S/P cardiac cath 4/27/2017       Past Surgical History:  Past Surgical History:   Procedure Laterality Date    CARDIAC SURG PROCEDURE UNLIST  9863/[id number]    stent x2    COLONOSCOPY N/A 10/14/2016    COLONOSCOPY/EGD performed by Boubacar Sorenson MD at [address] [facility] N/A 2/3/2017    COLONOSCOPY performed by Arlinda Shone, MD at [address] COLONOSCOPY N/A 3/27/2018    COLONOSCOPY performed by Arlinda Shone, MD at [facility] ENDOSCOPY    ENDOSCOPY, COLON, DIAGNOSTIC  6/2011    HX GI      Prostate, Bladder, rectum.  colon removed    HX HERNIA REPAIR      AS INFANT    HX ORTHOPAEDIC  02/2016    hip replacement , left    HX PROSTATECTOMY  X2    biopsy benign    HX TONSILLECTOMY      HX UROLOGICAL      Bladder removed    SIGMOIDOSCOPY,BIOPSY  10/14/2016         UPPER GI ENDOSCOPY,DIAGNOSIS 10/14/2016            Family History:  Family History   Problem Relation Age of Onset    Heart Disease Mother     COPD Mother     COPD Father     Diabetes Father     Hypertension Father     Alcohol abuse Sister     Diabetes Brother     High Cholesterol Brother     Hypertension Brother     Anesth Problems Neg Hx        Social History:  Social History     Tobacco Use    Smoking status: Former Smoker     Packs/day: 0.50     Years: 40.00     Pack years: 20.00     Last attempt to quit: 2016     Years since quittin.7    Smokeless tobacco: Never Used   Substance Use Topics    Alcohol use: No     Alcohol/week: 0.0 oz     Comment: OCCASIONAL    Drug use: No       Allergies: Allergies   Allergen Reactions    Atorvastatin Myalgia    Pcn [Penicillins] Hives         Review of Systems   Review of Systems   Constitutional: Negative for appetite change, chills and fever. Respiratory: Negative for cough, chest tightness and shortness of breath. Cardiovascular: Negative for chest pain, palpitations and leg swelling. Gastrointestinal: Positive for abdominal distention, constipation, nausea and vomiting. Negative for abdominal pain, blood in stool and diarrhea. Genitourinary: Negative for decreased urine volume, difficulty urinating, dysuria, flank pain, frequency and hematuria. Musculoskeletal: Negative for arthralgias and myalgias. Neurological: Negative for dizziness, weakness, light-headedness, numbness and headaches. Hematological: Negative for adenopathy. All other systems reviewed and are negative. Physical Exam   Physical Exam   Constitutional: He is oriented to person, place, and time. He appears well-developed and well-nourished. No distress. HENT:   Head: Atraumatic. Mouth/Throat: Oropharynx is clear and moist.   Eyes: Pupils are equal, round, and reactive to light. Conjunctivae and EOM are normal. No scleral icterus. Neck: Normal range of motion. Neck supple.  No JVD present. Cardiovascular: Normal rate, regular rhythm, normal heart sounds and intact distal pulses. Pulmonary/Chest: Effort normal and breath sounds normal. He exhibits no tenderness. Abdominal: Soft. He exhibits distension. There is tenderness (generalized tenderness but not peritoneal). Musculoskeletal: Normal range of motion. He exhibits no edema. Neurological: He is alert and oriented to person, place, and time. No cranial nerve deficit. Skin: Skin is warm and dry. He is not diaphoretic. Nursing note and vitals reviewed. Diagnostic Study Results     Labs -     Recent Results (from the past 24 hour(s))   CBC WITH AUTOMATED DIFF    Collection Time: 06/02/19  1:56 PM   Result Value Ref Range    WBC 11.3 (H) 4.1 - 11.1 K/uL    RBC 4.72 4.10 - 5.70 M/uL    HGB 14.7 12.1 - 17.0 g/dL    HCT 43.0 36.6 - 50.3 %    MCV 91.1 80.0 - 99.0 FL    MCH 31.1 26.0 - 34.0 PG    MCHC 34.2 30.0 - 36.5 g/dL    RDW 16.4 (H) 11.5 - 14.5 %    PLATELET 603 846 - 561 K/uL    MPV 9.6 8.9 - 12.9 FL    NRBC 0.0 0  WBC    ABSOLUTE NRBC 0.00 0.00 - 0.01 K/uL    NEUTROPHILS 85 (H) 32 - 75 %    LYMPHOCYTES 7 (L) 12 - 49 %    MONOCYTES 7 5 - 13 %    EOSINOPHILS 1 0 - 7 %    BASOPHILS 0 0 - 1 %    IMMATURE GRANULOCYTES 0 0.0 - 0.5 %    ABS. NEUTROPHILS 9.6 (H) 1.8 - 8.0 K/UL    ABS. LYMPHOCYTES 0.8 0.8 - 3.5 K/UL    ABS. MONOCYTES 0.8 0.0 - 1.0 K/UL    ABS. EOSINOPHILS 0.1 0.0 - 0.4 K/UL    ABS. BASOPHILS 0.0 0.0 - 0.1 K/UL    ABS. IMM.  GRANS. 0.0 0.00 - 0.04 K/UL    DF AUTOMATED      RBC COMMENTS ANISOCYTOSIS  1+        WBC COMMENTS REACTIVE LYMPHS     METABOLIC PANEL, COMPREHENSIVE    Collection Time: 06/02/19  1:56 PM   Result Value Ref Range    Sodium 135 (L) 136 - 145 mmol/L    Potassium 4.8 3.5 - 5.1 mmol/L    Chloride 102 97 - 108 mmol/L    CO2 25 21 - 32 mmol/L    Anion gap 8 5 - 15 mmol/L    Glucose 203 (H) 65 - 100 mg/dL    BUN 24 (H) 6 - 20 MG/DL    Creatinine 1.47 (H) 0.70 - 1.30 MG/DL    BUN/Creatinine ratio 16 12 - 20      GFR est AA 58 (L) >60 ml/min/1.73m2    GFR est non-AA 47 (L) >60 ml/min/1.73m2    Calcium 9.1 8.5 - 10.1 MG/DL    Bilirubin, total 2.6 (H) 0.2 - 1.0 MG/DL    ALT (SGPT) 38 12 - 78 U/L    AST (SGOT) 47 (H) 15 - 37 U/L    Alk. phosphatase 153 (H) 45 - 117 U/L    Protein, total 8.7 (H) 6.4 - 8.2 g/dL    Albumin 3.9 3.5 - 5.0 g/dL    Globulin 4.8 (H) 2.0 - 4.0 g/dL    A-G Ratio 0.8 (L) 1.1 - 2.2     LIPASE    Collection Time: 06/02/19  1:56 PM   Result Value Ref Range    Lipase 101 73 - 393 U/L       Radiologic Studies -   XR ABD ACUTE W 1 V CHEST   Final Result   IMPRESSION: Proximal small bowel distention is concerning for small bowel   obstruction. Lungs are clear. CT Results  (Last 48 hours)    None        CXR Results  (Last 48 hours)               06/02/19 1250  XR ABD ACUTE W 1 V CHEST Final result    Impression:  IMPRESSION: Proximal small bowel distention is concerning for small bowel   obstruction. Lungs are clear. Narrative:  EXAM: XR ABD ACUTE W 1 V CHEST       INDICATION: constipation       COMPARISON: 3/23/2018. FINDINGS: The upright chest radiograph demonstrates clear lungs and normal   cardiac and mediastinal contours. There is no pleural effusion or free air under   the diaphragm. Port-A-Cath tip projects over the SVC. Supine and upright views of the abdomen demonstrate proximal small bowel   distention. There is no free intraperitoneal air. The patient is status post   right total hip replacement. Mild degenerative changes are seen in the thoracic   and lumbar spine. Medical Decision Making   I am the first provider for this patient. I reviewed the vital signs, available nursing notes, past medical history, past surgical history, family history and social history. Vital Signs-Reviewed the patient's vital signs.   Patient Vitals for the past 24 hrs:   Temp Pulse Resp BP SpO2   06/02/19 1215    Anahy Deal ) 172/97 97 %   06/02/19 1205     100 %   06/02/19 1204    (!) 165/93    06/02/19 1041 98 °F (36.7 °C) (!) 101 18 155/87 100 %       Records Reviewed: Nursing Notes and Old Medical Records    Differential Diagnosis: constipation, sbo, worsening metastatic lesions    Provider Notes (Medical Decision Making):   70-year-old hemodynamically stable man presents with a 3-day history of constipation, abdominal distention, nausea. His x-ray today is concerning for an SBO. He has a complex medical and surgical history and his colorectal surgeons are within our system. He is currently receiving chemo at the [facility] (Dr. Lawrence Kenny). Last treatment was May 21st, next scheduled for June 4th. ED Course:     Initial assessment performed. The patients presenting problems have been discussed, and they are in agreement with the care plan formulated and outlined with them. I have encouraged them to ask questions as they arise throughout their visit. Patient returned from [employer]. Pain and nausea are controlled. - discussed with surgery who will follow. No immediate interventions. - ng tube to be placed  - NPO, maint. Fluids  - admit to hospitalist.        Disposition:  admit      Diagnosis     Clinical Impression:   1.  Small bowel obstruction ([address]

## 2019-06-02 NOTE — PROGRESS NOTES
Oncology End of Shift Note      Bedside shift change report given to Yolanda Casas (incoming nurse) by Mari Villalobos (outgoing nurse) on [facility]. Report included the following information SBAR, Kardex and MAR. Shift Summary: Patient was oriented to RN, tech and room. Admissions database and dual skin assessment were completed. NG tube was hooked up to suction. Patient was vomiting, RN administered zofran once. Issues for Physician to Address:  None. Patient on Cardiac Monitoring?     [x] Yes  [] No    Rhythm: Telemetry: normal sinus rhythm             Lilia Zavala

## 2019-06-02 NOTE — ROUTINE PROCESS
TRANSFER - OUT REPORT:    Verbal report given to [facility], RN(name) on Edith Wynne  being transferred to Oncology(unit) for routine progression of care       Report consisted of patients Situation, Background, Assessment and   Recommendations(SBAR). Information from the following report(s) SBAR, ED Summary, [facility] and Recent Results was reviewed with the receiving nurse. Lines:   Peripheral IV 06/02/19 Right Antecubital (Active)   Site Assessment Clean, dry, & intact 6/2/2019  1:55 PM   Phlebitis Assessment 0 6/2/2019  1:55 PM   Infiltration Assessment 0 6/2/2019  1:55 PM   Dressing Status Clean, dry, & intact 6/2/2019  1:55 PM   Dressing Type Transparent 6/2/2019  1:55 PM   Hub Color/Line Status Pink 6/2/2019  1:55 PM        Opportunity for questions and clarification was provided.

## 2019-06-03 PROBLEM — E11.3599 TYPE 2 DIABETES MELLITUS WITH PROLIFERATIVE RETINOPATHY (HCC): Chronic | Status: ACTIVE | Noted: 2019-01-01

## 2019-06-03 PROBLEM — E11.59 HYPERTENSION COMPLICATING DIABETES (HCC): Chronic | Status: ACTIVE | Noted: 2017-08-08

## 2019-06-03 PROBLEM — I15.2 HYPERTENSION COMPLICATING DIABETES (HCC): Chronic | Status: ACTIVE | Noted: 2017-08-08

## 2019-06-03 PROBLEM — C79.9 METASTATIC CANCER (HCC): Chronic | Status: ACTIVE | Noted: 2019-01-01

## 2019-06-03 NOTE — PROGRESS NOTES
Hospitalist Progress Note    NAME: Erika Parra   :  1950   MRN:  [id number]       Assessment / Plan:  Small Bowel Obstruction POA- s/p NGT to suction for now  Colon Cancer s/p resection with recurrence/Rectal Adenocarcinoma on Chemotx    Cont IVF  Cont NG to suction, NPO for now  Serial KUBs to follow SBO till resolution  IP CRSurgery consult appreciated- cont supportive care for now, no role for surgical intervention at this time  IP Oncology consult awaited- Dr Cesia Velasquez pt ([facility])  Cont IV dilaudid prn pain and IV antiemetics prn     BOBBI POA- Cr still at 1.4 today, likely pre-renal  -hold home ACEI  Cont IVF's  -monitor renal function daily     Non-sepcific Leukocytosis POA: suspect reactive, resolved today  -monitor     Hypertension  -holding PO meds  -place on clonidine patch and scheduled Nitrobid  -prn IV hydralazine ordered     Diabetes  -reduce home basal insulin to NPH 30 units BID  -place on SSI    Baseline:functional      30.0 - 39.9 Obese / Body mass index is 33.85 kg/m². Weight loss recommended- Exercise    Code status: Full   Surrogate Decision Maker: Wife     Prophylaxis: Hep SQ and H2B/PPI  Recommended Disposition: Home w/Family when SBO resolved & eating - few days anticipated     Subjective:     Chief Complaint / Reason for Physician Visit: F/U Dehydration/BOBBI, SBO, s/p NGT decompression, recurrent Colorectal cancer  \"I am better with this NGT\". Discussed with RN events overnight. Review of Systems:  Symptom Y/N Comments  Symptom Y/N Comments   Fever/Chills n   Chest Pain n    Poor Appetite y   Edema n    Cough n   Abdominal Pain n    Sputum n   Joint Pain     SOB/TALBOT n   Pruritis/Rash     Nausea/vomit n   Tolerating PT/OT y    Diarrhea    Tolerating Diet  NPO with NGT to suction at present   Constipation    Other       Could NOT obtain due to:      Objective:     VITALS:   Last 24hrs VS reviewed since prior progress note.  Most recent are:  Patient Vitals for the past 24 hrs:   Temp Pulse Resp BP SpO2   06/03/19 0800 97.8 °F (36.6 °C) 96 18 160/84 100 %   06/03/19 0602    153/82    06/03/19 0318 99 °F (37.2 °C) 91 18 169/89 97 %   06/02/19 2310 98.3 °F (36.8 °C) (!) 104 18 178/88 98 %   06/02/19 2001 98.2 °F (36.8 °C) (!) 102 18 179/84 100 %   06/02/19 1747 98.5 °F (36.9 °C) (!) 104 18 (!) 158/91 100 %   06/02/19 1648 98.1 °F (36.7 °C) 97 17 196/89 100 %   06/02/19 1600    174/87 94 %   06/02/19 1500    191/80 96 %   06/02/19 1400    (!) 187/99 98 %   06/02/19 1215    (!) 172/97 97 %   06/02/19 1205     100 %   06/02/19 1204    (!) 165/93    06/02/19 1041 98 °F (36.7 °C) (!) 101 18 155/87 100 %       Intake/Output Summary (Last 24 hours) at 6/3/2019 0905  Last data filed at 6/3/2019 0603  Gross per 24 hour   Intake 1302.09 ml   Output 1600 ml   Net -297.91 ml        PHYSICAL EXAM:  General: WD, WN. Alert, cooperative, no acute distress, Obese +    EENT:  EOMI. Anicteric sclerae. MMM  Resp:  CTA bilaterally, no wheezing or rales. No accessory muscle use  CV:  Regular  rhythm,  No edema  GI:  Soft, Non distended, Non tender.  +Bowel sounds, Colostomy noted with some stools today AM +,   Neurologic:  Alert and oriented X 3, normal speech,   Psych:   Good insight. Not anxious nor agitated  Skin:  No rashes. No jaundice    Reviewed most current lab test results and cultures  YES  Reviewed most current radiology test results   YES  Review and summation of old records today    NO  Reviewed patient's current orders and MAR    YES  PMH/SH reviewed - no change compared to H&P  ________________________________________________________________________  Care Plan discussed with:    Comments   Patient x    Family  x Wife at bedside   RN x    Care Manager     Consultant  x Dr Kandace Mejía ([address])                     Multidiciplinary team rounds were held today with , nursing, pharmacist and clinical coordinator.   Patient's plan of care was discussed; medications were reviewed and discharge planning was addressed. ________________________________________________________________________  Total NON critical care TIME:  36   Minutes    Total CRITICAL CARE TIME Spent:   Minutes non procedure based      Comments   >50% of visit spent in counseling and coordination of care     ________________________________________________________________________  Martha Elizabeth MD     Procedures: see electronic medical records for all procedures/Xrays and details which were not copied into this note but were reviewed prior to creation of Plan. LABS:  I reviewed today's most current labs and imaging studies.   Pertinent labs include:  Recent Labs     06/03/19  0326 06/02/19  1356   WBC 9.2 11.3*   HGB 13.8 14.7   HCT 40.9 43.0    185     Recent Labs     06/03/19  0326 06/02/19  1356    135*   K 4.0 4.8    102   CO2 28 25   * 203*   BUN 24* 24*   CREA 1.43* 1.47*   CA 8.9 9.1   MG 3.0*  --    PHOS 4.3  --    ALB 3.7 3.9   TBILI 2.5* 2.6*   SGOT 40* 47*   ALT 36 38       Signed: Martha Elizabeth MD

## 2019-06-03 NOTE — PROGRESS NOTES
Problem: Pressure Injury - Risk of  Goal: *Prevention of pressure injury  Description  Document Vishnu Scale and appropriate interventions in the flowsheet.   Outcome: Progressing Towards Goal  Note:   Pressure Injury Interventions:  Sensory Interventions: Assess changes in LOC, Keep linens dry and wrinkle-free, Maintain/enhance activity level, Minimize linen layers, Pressure redistribution bed/mattress (bed type)    Moisture Interventions: Minimize layers    Activity Interventions: Increase time out of bed    Mobility Interventions: HOB 30 degrees or less    Nutrition Interventions: Document food/fluid/supplement intake, Offer support with meals,snacks and hydration    Friction and Shear Interventions: Minimize layers, HOB 30 degrees or less

## 2019-06-03 NOTE — CONSULTS
Consult    Patient: Keagan Heller MRN: [id number]  SSN: [id number]    YOB: 1950  Age: 70 y.o. Sex: male      Subjective:      Keagan Heller is a 70 y.o. male who is being seen for SBO in the setting of metastatic rectal cancer. He was diagnosed in 2016 and underwent nCRT followed by pelvic exenteration. I don't have the records from [address] [place] [address] but it seems he had an APR, cystectomy, and ileal conduit. His wife seems to describe an R1 resection with \"microscopic\" disease left behind with malignant ascites. She also states that he now has a recurrence in his perineum. He has started a new course of chemotherapy and received 1 round, but they do not recall the name. Past Medical History:   Diagnosis Date    Abnormal nuclear stress test 4/27/2017    Arthritis     Asthma     childhood    BPH (benign prostatic hypertrophy)     CAD (coronary artery disease) 1996    M.I., Stents x2    Cancer ([address]     Rectal CA     Chronic pain     Colon polyp     DM (diabetes mellitus) ([address] 5/17/2011    Gout     Hemorrhoids     HTN (hypertension) 5/17/2011    Hyperlipidemia 5/17/2011    Ill-defined condition     Colostomy, iliostomy    Rectal adenocarcinoma ([facility] 1/16/2018    S/p surg.  S/P cardiac cath 4/27/2017     Past Surgical History:   Procedure Laterality Date    CARDIAC SURG PROCEDURE UNLIST  0966/6999    stent x2    COLONOSCOPY N/A 10/14/2016    COLONOSCOPY/EGD performed by Aggie Billings MD at [facility] N/A 2/3/2017    COLONOSCOPY performed by Elizbeth Carrel, MD at [address] COLONOSCOPY N/A 3/27/2018    COLONOSCOPY performed by Elizbeth Carrel, MD at [facility] ENDOSCOPY    ENDOSCOPY, COLON, DIAGNOSTIC  6/2011    HX GI      Prostate, Bladder, rectum.  colon removed    HX HERNIA REPAIR      AS INFANT    HX ORTHOPAEDIC  02/2016    hip replacement , left    HX PROSTATECTOMY  X2    biopsy benign    HX TONSILLECTOMY      HX UROLOGICAL Bladder removed    SIGMOIDOSCOPY,BIOPSY  10/14/2016         UPPER GI ENDOSCOPY,DIAGNOSIS  10/14/2016           Family History   Problem Relation Age of Onset    Heart Disease Mother     COPD Mother     COPD Father     Diabetes Father     Hypertension Father     Alcohol abuse Sister     Diabetes Brother     High Cholesterol Brother     Hypertension Brother     Anesth Problems Neg Hx      Social History     Tobacco Use    Smoking status: Former Smoker     Packs/day: 0.50     Years: 40.00     Pack years: 20.00     Last attempt to quit: 2016     Years since quittin.7    Smokeless tobacco: Never Used   Substance Use Topics    Alcohol use: No     Alcohol/week: 0.0 oz     Comment: OCCASIONAL      Current Facility-Administered Medications   Medication Dose Route Frequency Provider Last Rate Last Dose    cloNIDine (CATAPRES) 0.3 mg/24 hr patch 1 Patch  1 Patch TransDERmal Q7D Jose Andrews MD   1 Patch at 19    insulin NPH (NOVOLIN N, HUMULIN N) injection 30 Units  30 Units SubCUTAneous ACB&D Jose Andrews MD        insulin lispro (HUMALOG) injection   SubCUTAneous Q6H Jose Andrews MD   3 Units at 19 0603    glucose chewable tablet 16 g  4 Tab Oral PRN Jose Andrews MD        glucagon (GLUCAGEN) injection 1 mg  1 mg IntraMUSCular PRN Jose Andrews MD        sodium chloride (NS) flush 5-40 mL  5-40 mL IntraVENous Q8H Jose Andrews MD   10 mL at 19 0604    sodium chloride (NS) flush 5-40 mL  5-40 mL IntraVENous PRN Jose Andrews MD        nitroglycerin (NITROBID) 2 % ointment 1 Inch  1 Inch Topical Q6H Jose Andrews MD   1 Inch at 19 0604    HYDROmorphone (PF) (DILAUDID) injection 0.5 mg  0.5 mg IntraVENous Q3H PRN Jose Andrews MD        naloxone (NARCAN) injection 0.4 mg  0.4 mg IntraVENous PRN Jose Andrews MD        ondansetron (ZOFRAN) injection 4 mg  4 mg IntraVENous Q4H PRN Jose Andrews MD   4 mg at 19 1600  hydrALAZINE (APRESOLINE) 20 mg/mL injection 10 mg  10 mg IntraVENous Q4H PRN Edvin Ferrera MD   10 mg at 06/02/19 1654    0.9% sodium chloride infusion  125 mL/hr IntraVENous CONTINUOUS CucaAracely  mL/hr at 06/02/19 1653 125 mL/hr at 06/02/19 1653    pantoprazole (PROTONIX) 40 mg in sodium chloride 0.9% 10 mL injection  40 mg IntraVENous DAILY Fabian Gray MD            Allergies   Allergen Reactions    Atorvastatin Myalgia    Pcn [Penicillins] Hives       Review of Systems:  A comprehensive review of systems was negative except for that written in the History of Present Illness. Objective:     Vitals:    06/02/19 2310 06/03/19 0318 06/03/19 0602 06/03/19 0800   BP: 178/88 169/89 153/82 160/84   Pulse: (!) 104 91  96   Resp: 18 18  18   Temp: 98.3 °F (36.8 °C) 99 °F (37.2 °C)  97.8 °F (36.6 °C)   SpO2: 98% 97%  100%   Weight:       Height:            Physical Exam:  General:  Alert, cooperative, no distress, appears stated age. Eyes:  Conjunctivae/corneas clear. PERRL, EOMs intact. Fundi benign   Ears:  Normal TMs and external ear canals both ears. Nose: Nares normal. Septum midline. Mucosa normal. No drainage or sinus tenderness. Mouth/Throat: Lips, mucosa, and tongue normal. Teeth and gums normal.   Neck: Supple, symmetrical, trachea midline, no adenopathy, thyroid: no enlargment/tenderness/nodules, no carotid bruit and no JVD. Back:   Symmetric, no curvature. ROM normal. No CVA tenderness. Lungs:   Clear to auscultation bilaterally. Heart:  Regular rate and rhythm, S1, S2 normal, no murmur, click, rub or gallop. Abdomen:   Soft, non-tender. Bowel sounds normal. No masses,  No organomegaly. Extremities: Extremities normal, atraumatic, no cyanosis or edema. Pulses: 2+ and symmetric all extremities. Skin: Skin color, texture, turgor normal. No rashes or lesions   Lymph nodes: Cervical, supraclavicular, and axillary nodes normal.   Neurologic: CNII-XII intact. Normal strength, sensation and reflexes throughout. Assessment:     Hospital Problems  Date Reviewed: 6/3/2019          Codes Class Noted POA    Bowel obstruction ([facility] ICD-10-CM: H80.192  ICD-9-CM: 560.9  6/2/2019 Yes        Metastatic cancer ([facility] (Chronic) ICD-10-CM: C79.9  ICD-9-CM: 199.1  6/2/2019 Yes        Type 2 diabetes mellitus with proliferative retinopathy ([facility] (Chronic) ICD-10-CM: E01.4952  ICD-9-CM: 250.50, 362.02  4/9/2019 Yes        Hypertension complicating diabetes ([facility] (Chronic) ICD-10-CM: E11.59, I10  ICD-9-CM: 250.80, 401.9  8/8/2017 Yes              Plan:     68M with SBO in setting of metastatic rectal cancer s/p pelvic exenteration in 2017. He has known recurrent disease and has started a new course of chemo. Recommend NG tube decompression. Encouraging that he is starting to have stool in colostomy bag. No abdominal pain and WBC normalized. No signs for bowel ischemia/compromise. Will continue to follow.       Signed By: Gagan Peres MD     Juanita 3, 2019

## 2019-06-03 NOTE — ACP (ADVANCE CARE PLANNING)
Advance Care Planning Note    Name: Kym Parker  YOB: 1950  MRN: [id number]  Admission Date: 6/2/2019 11:49 AM    Date of discussion: 6/3/2019    Active Diagnoses:    Hospital Problems  Date Reviewed: 6/3/2019          Codes Class Noted POA    Bowel obstruction ([facility] ICD-10-CM: Q69.592  ICD-9-CM: 560.9  6/2/2019 Yes        Metastatic cancer ([facility] (Chronic) ICD-10-CM: C79.9  ICD-9-CM: 199.1  6/2/2019 Yes        Type 2 diabetes mellitus with proliferative retinopathy ([facility]) (Chronic) ICD-10-CM: E11.3599  ICD-9-CM: 250.50, 362.02  4/9/2019 Yes        Hypertension complicating diabetes ([facility] (Chronic) ICD-10-CM: E11.59, I10  ICD-9-CM: 250.80, 401.9  8/8/2017 Yes               These active diagnoses are of sufficient risk that focused discussion on advance care planning is indicated in order to allow the patient to thoughtfully consider personal goals of care, and if situations arise that prevent the ability to personally give input, to ensure appropriate representation of their personal desires for different levels and aggressiveness of care. Discussion:     Persons present and participating in discussion: Broderick Olivas MD, Stan Rahman (wife)    Discussion:   Had a detailed discussion of the current acute issue- SBO & the long term issues/comorbidites- recurrent colorectal Cancer with patient & wife at bedside today AM.  Pt very clear in his wishes of continuing full care for his recurrent malignancy & wants to continue recently re-started palliative Chemotherapy with Dr Martinez David ([facility]). Pt says wife/mPOA is well aware of his wishes in all settings that may arise in future. Pt to remain Full Code at this time with full restorative care plan. Time Spent:     Total time spent face-to-face in education and discussion: 16 minutes.      Bridget Feliciano MD  6/3/2019  9:54 AM

## 2019-06-03 NOTE — CONSULTS
Hematology Oncology Consultation    Reason for consult: metastatic rectal cancer    Admitting Diagnosis: Bowel obstruction ([facility] [address] [facility] [N08.750]  [facility] [C79.9]    Impression: metastatic rectal cancer - currently admitted with small bowel obstruction. Hopefully he will respond to conservative measures. He just started a brand new regimen, FOLFOX, on 5/21/19, so it is far too early to  success or failure yet. I have reviewed recent events with him and his wife and notified Dr. Destiny Ambrosio of his admission and current health issues. His chemotherapy will be delayed at a minimum until next week. He has noticed increased discomfort and bloating since his NG suction was reduced earlier today. Will resume earlier settings. He has a history of gross hematuria Worked up very thoroughly by Dr. Sol Jung from [facility] in the form of a loopogram. No anatomical abnormalities found. He has not had recurrence of hematuria. He has a history of UTI with Proteus vulgaris and intercoccal species. UTI diagnosed based on urine culture taken 1/30/19. Case was discussed at the time with Dr. Keshav Alvarado from ID, given that this is a complicated UTI, albeit, the culture was drawn from his ileal conduit. Treated with 7 days of levofloxacin and 1 dose of fosfomycin. No current symptoms of UTI. He has a history of proteinuria for which he is on Lisinopril. Only trace proteinuria recently in office. Likely secondary to Avastin and diabetes. He has diabetes mellitus - continue current regimen as ordered by hospitalist.     He has mildly abnormal LFT's , both in office and in hospital.  No evidence of any focal liver lesions. Continue to monitor. He has mild renal insufficiency. Continue hydration. Discussion: Edith Wynne is a  70y.o. year old seen in consultation at the request of Dr. Maricarmen Dodd for small bowel obstruction in the setting of recent chemotherapy for his recurrent metastatic rectal adenocarcinoma. He started to develop symptoms of bloating and constipation on Thursday night. He was instructed to try senna and miralax on Friday but his symptoms failed to improve and he developed nausea with vomiting on Saturday. He presented to the ER on 6/2/19 where he was found to have a small bowel obstruction. 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 His CEA level was extremely low at 4.7. His treatment History is as follows: Ruiz catheter for urinary retention, 9/29/16. Loop colostomy placed 10/15/16 by Dr. Lamine Espinoza at [facility]. Then concurrent chemoradiation using Xeloda 1500 mg PO q12 hrs, initiated 11/8/16 with radiation therapy, completed in mid 12/2016. pelvic exenteration type surgery (APR, cystectomy, ileal conduit) in 1/2017. Adjuvant CAPOX initiated 7/2017 and completed 12/28/2017 after all planned therapy was finished. 1/25/2018 - Attempt made via u/s guidance to obtain ascitic fluid for diagnostic paracentesis. Not enough fluid was present at the time. 4/4/2018 - Omental implant biopsy revealed the presence of a metastatic deposit secondary to rectal adenocarcinoma. FOLFIRI-Avastin- initiated 4/18/2018, - 10/24/18 with a short drug holiday from 7/5 - 8/15/18. Xeloda and Avastin maintenance, initiated on 11/7/18. Avastin dose held 1/30/19 - 3/2019 for hematuria with single agent Xeloda resumed 3/8/19. He then had a PET/CT scan and pelvic MRI concerning for pelvic floor recurrence at the anorectal bed. No other foci of hypermetabolism seen on the PET/CT scan, however his omental disease in the abdomen has never been hypermetabolic, but has been bx proven. Dr. Veronique Eubanks discussed case with Dr. Lamine Espinoza at length.  This is a complicated situation in terms of whether any surgical tx options can be offered to him, given that he  had practically a pelvic exenteration type surgery in the past in 1/2017. He was switched to FOLFOX, given that he had never really progressed on oxaliplatin. He last received oxaliplatin in the adjuvant setting in the form of Capox. He tolerated the infusion (5/21 through 5/23/19) without issue but at the time of his followup on 5/31/19, reported constipation. Imaging:    CT abd and pelvis on 6/2/19  FINDINGS:   LUNG BASES: Clear. INCIDENTALLY IMAGED HEART AND MEDIASTINUM: Unremarkable. LIVER: No mass or biliary dilatation. GALLBLADDER: Unremarkable. SPLEEN: No mass. PANCREAS: No mass or ductal dilatation. ADRENALS: 16 mm left adrenal nodule is unchanged. KIDNEYS: Subcentimeter cyst is seen in the lower pole left kidney. STOMACH: Unremarkable. SMALL BOWEL: Small bowel distention is seen in the mid abdomen with decompressed  loops distally compatible with obstruction likely related to adhesion formation  in the mid lower abdomen. COLON: Status post partial colectomy. APPENDIX: Unremarkable. PERITONEUM: Mild free fluid is noted. RETROPERITONEUM: No lymphadenopathy or aortic aneurysm. REPRODUCTIVE ORGANS: Prostate is surgically absent. Status post cystectomy. URINARY BLADDER: No mass or calculus. BONES: The patient is status post right total hip replacement. Degenerative  changes are seen in the lumbar spine. ADDITIONAL COMMENTS: Bilateral lower quadrant ostomies are noted.     IMPRESSION:  Small bowel distention with decompressed loops distally is compatible with  obstruction likely related to effusion formation in the mid lower abdomen. Mild  free fluid. Status post prostatectomy, partial colectomy, and cystectomy.     Labs:    Recent Results (from the past 24 hour(s))   CBC WITH AUTOMATED DIFF    Collection Time: 06/02/19  1:56 PM   Result Value Ref Range    WBC 11.3 (H) 4.1 - 11.1 K/uL    RBC 4.72 4.10 - 5.70 M/uL    HGB 14.7 12.1 - 17.0 g/dL    HCT 43.0 36.6 - 50.3 %    MCV 91.1 80.0 - 99.0 FL    MCH 31.1 26.0 - 34.0 PG    MCHC 34.2 30.0 - 36.5 g/dL    RDW 16.4 (H) 11.5 - 14.5 %    PLATELET 250 217 - 528 K/uL    MPV 9.6 8.9 - 12.9 FL    NRBC 0.0 0  WBC    ABSOLUTE NRBC 0.00 0.00 - 0.01 K/uL    NEUTROPHILS 85 (H) 32 - 75 %    LYMPHOCYTES 7 (L) 12 - 49 %    MONOCYTES 7 5 - 13 %    EOSINOPHILS 1 0 - 7 %    BASOPHILS 0 0 - 1 %    IMMATURE GRANULOCYTES 0 0.0 - 0.5 %    ABS. NEUTROPHILS 9.6 (H) 1.8 - 8.0 K/UL    ABS. LYMPHOCYTES 0.8 0.8 - 3.5 K/UL    ABS. MONOCYTES 0.8 0.0 - 1.0 K/UL    ABS. EOSINOPHILS 0.1 0.0 - 0.4 K/UL    ABS. BASOPHILS 0.0 0.0 - 0.1 K/UL    ABS. IMM. GRANS. 0.0 0.00 - 0.04 K/UL    DF AUTOMATED      RBC COMMENTS ANISOCYTOSIS  1+        WBC COMMENTS REACTIVE LYMPHS     METABOLIC PANEL, COMPREHENSIVE    Collection Time: 06/02/19  1:56 PM   Result Value Ref Range    Sodium 135 (L) 136 - 145 mmol/L    Potassium 4.8 3.5 - 5.1 mmol/L    Chloride 102 97 - 108 mmol/L    CO2 25 21 - 32 mmol/L    Anion gap 8 5 - 15 mmol/L    Glucose 203 (H) 65 - 100 mg/dL    BUN 24 (H) 6 - 20 MG/DL    Creatinine 1.47 (H) 0.70 - 1.30 MG/DL    BUN/Creatinine ratio 16 12 - 20      GFR est AA 58 (L) >60 ml/min/1.73m2    GFR est non-AA 47 (L) >60 ml/min/1.73m2    Calcium 9.1 8.5 - 10.1 MG/DL    Bilirubin, total 2.6 (H) 0.2 - 1.0 MG/DL    ALT (SGPT) 38 12 - 78 U/L    AST (SGOT) 47 (H) 15 - 37 U/L    Alk.  phosphatase 153 (H) 45 - 117 U/L    Protein, total 8.7 (H) 6.4 - 8.2 g/dL    Albumin 3.9 3.5 - 5.0 g/dL    Globulin 4.8 (H) 2.0 - 4.0 g/dL    A-G Ratio 0.8 (L) 1.1 - 2.2     LIPASE    Collection Time: 06/02/19  1:56 PM   Result Value Ref Range    Lipase 101 73 - 393 U/L   LACTIC ACID    Collection Time: 06/02/19  4:01 PM   Result Value Ref Range    Lactic acid 1.6 0.4 - 2.0 MMOL/L   GLUCOSE, POC    Collection Time: 06/02/19  6:43 PM   Result Value Ref Range    Glucose (POC) 173 (H) 65 - 100 mg/dL    Performed by [address] [place] [address] ([facility]) PCT    GLUCOSE, POC    Collection Time: 06/02/19 11:41 PM Result Value Ref Range    Glucose (POC) 149 (H) 65 - 100 mg/dL    Performed by Kendrick Mccall    HEMOGLOBIN A1C WITH EAG    Collection Time: 06/03/19  3:26 AM   Result Value Ref Range    Hemoglobin A1c 6.8 (H) 4.2 - 6.3 %    Est. average glucose 039 mg/dL   METABOLIC PANEL, COMPREHENSIVE    Collection Time: 06/03/19  3:26 AM   Result Value Ref Range    Sodium 143 136 - 145 mmol/L    Potassium 4.0 3.5 - 5.1 mmol/L    Chloride 106 97 - 108 mmol/L    CO2 28 21 - 32 mmol/L    Anion gap 9 5 - 15 mmol/L    Glucose 147 (H) 65 - 100 mg/dL    BUN 24 (H) 6 - 20 MG/DL    Creatinine 1.43 (H) 0.70 - 1.30 MG/DL    BUN/Creatinine ratio 17 12 - 20      GFR est AA 59 (L) >60 ml/min/1.73m2    GFR est non-AA 49 (L) >60 ml/min/1.73m2    Calcium 8.9 8.5 - 10.1 MG/DL    Bilirubin, total 2.5 (H) 0.2 - 1.0 MG/DL    ALT (SGPT) 36 12 - 78 U/L    AST (SGOT) 40 (H) 15 - 37 U/L    Alk. phosphatase 137 (H) 45 - 117 U/L    Protein, total 8.1 6.4 - 8.2 g/dL    Albumin 3.7 3.5 - 5.0 g/dL    Globulin 4.4 (H) 2.0 - 4.0 g/dL    A-G Ratio 0.8 (L) 1.1 - 2.2     CBC WITH AUTOMATED DIFF    Collection Time: 06/03/19  3:26 AM   Result Value Ref Range    WBC 9.2 4.1 - 11.1 K/uL    RBC 4.47 4.10 - 5.70 M/uL    HGB 13.8 12.1 - 17.0 g/dL    HCT 40.9 36.6 - 50.3 %    MCV 91.5 80.0 - 99.0 FL    MCH 30.9 26.0 - 34.0 PG    MCHC 33.7 30.0 - 36.5 g/dL    RDW 16.5 (H) 11.5 - 14.5 %    PLATELET 616 397 - 883 K/uL    MPV 9.9 8.9 - 12.9 FL    NRBC 0.0 0  WBC    ABSOLUTE NRBC 0.00 0.00 - 0.01 K/uL    NEUTROPHILS 82 (H) 32 - 75 %    LYMPHOCYTES 8 (L) 12 - 49 %    MONOCYTES 9 5 - 13 %    EOSINOPHILS 1 0 - 7 %    BASOPHILS 0 0 - 1 %    IMMATURE GRANULOCYTES 0 0.0 - 0.5 %    ABS. NEUTROPHILS 7.6 1.8 - 8.0 K/UL    ABS. LYMPHOCYTES 0.7 (L) 0.8 - 3.5 K/UL    ABS. MONOCYTES 0.8 0.0 - 1.0 K/UL    ABS. EOSINOPHILS 0.1 0.0 - 0.4 K/UL    ABS. BASOPHILS 0.0 0.0 - 0.1 K/UL    ABS. IMM.  GRANS. 0.0 0.00 - 0.04 K/UL    DF SMEAR SCANNED      RBC COMMENTS NORMOCYTIC, NORMOCHROMIC WBC COMMENTS REACTIVE LYMPHS     MAGNESIUM    Collection Time: 06/03/19  3:26 AM   Result Value Ref Range    Magnesium 3.0 (H) 1.6 - 2.4 mg/dL   PHOSPHORUS    Collection Time: 06/03/19  3:26 AM   Result Value Ref Range    Phosphorus 4.3 2.6 - 4.7 MG/DL   GLUCOSE, POC    Collection Time: 06/03/19  5:50 AM   Result Value Ref Range    Glucose (POC) 156 (H) 65 - 100 mg/dL    Performed by Joshua Portillo (PCT)        History:  Past Medical History:   Diagnosis Date    Abnormal nuclear stress test 4/27/2017    Arthritis     Asthma     childhood    BPH (benign prostatic hypertrophy)     CAD (coronary artery disease) 1996    M.I., Stents x2    Cancer ([facility] [address] [facility]     Rectal CA     Chronic pain     Colon polyp     DM (diabetes mellitus) ([facility] [address] 5/17/2011    Gout     Hemorrhoids     HTN (hypertension) 5/17/2011    Hyperlipidemia 5/17/2011    Ill-defined condition     Colostomy, iliostomy    Rectal adenocarcinoma ([facility] [address] 1/16/2018    S/p surg.  S/P cardiac cath 4/27/2017      Past Surgical History:   Procedure Laterality Date    CARDIAC SURG PROCEDURE UNLIST  2731/9337    stent x2    COLONOSCOPY N/A 10/14/2016    COLONOSCOPY/EGD performed by Bipin Ibrahim MD at [address] [facility] [address] N/A 2/3/2017    COLONOSCOPY performed by Jaky Hameed MD at [address] COLONOSCOPY N/A 3/27/2018    COLONOSCOPY performed by [facility]ed [facility] ENDOSCOPY    ENDOSCOPY, COLON, DIAGNOSTIC  6/2011    HX GI      Prostate, Bladder, rectum. colon removed    HX HERNIA REPAIR      AS INFANT    HX ORTHOPAEDIC  02/2016    hip replacement , left    HX PROSTATECTOMY  X2    biopsy benign    HX TONSILLECTOMY      HX UROLOGICAL      Bladder removed    SIGMOIDOSCOPY,BIOPSY  10/14/2016         UPPER GI ENDOSCOPY,DIAGNOSIS  10/14/2016           Prior to Admission medications    Medication Sig Start Date End Date Taking?  Authorizing Provider   amLODIPine (NORVASC) 10 mg tablet TAKE 1 TABLET BY MOUTH EVERY DAY 2/18/19  Yes Brian Fernández MD   metFORMIN (GLUCOPHAGE) 500 mg tablet TAKE 2 TABLETS BY MOUTH TWICE DAILY WITH MEALS 5/27/19   Viral Burgos MD   LANTUS SOLOSTAR U-100 INSULIN 100 unit/mL (3 mL) inpn INJECT 52 UNITS SUBCUTANEOUSLY EVERY DAY 5/3/19   Viral Burgos MD   bevacizumab (AVASTIN IV) by IntraVENous route. Provider, Historical   capecitabine (XELODA) 500 mg tablet Take 3 tablets for a total of 1500 mg twice daily for 14 days then take 7 days off. 1/30/19   Provider, Historical   HUMALOG KWIKPEN INSULIN 100 unit/mL kwikpen USE FOUR TIMES DAILY PER SLIDING SCALE 1/1/19   Viral Burgos MD   lisinopril (PRINIVIL, ZESTRIL) 10 mg tablet Take 10 mg by mouth daily. Provider, Historical   glucose blood VI test strips (ASCENSIA CONTOUR) strip Check three times daily as directed       E11.9 11/27/18   Viral Burgos MD   BD ULTRA-FINE SHORT PEN NEEDLE 31 gauge x 5/16\" ndle USE TO INJECT INSULIN AS DIRECTED 8/30/18   Viral Burgos MD   labetalol (NORMODYNE) 100 mg tablet TAKE 2 TABLETS BY MOUTH TWICE A DAY 8/13/18   Viral Burgos MD   isosorbide mononitrate ER (IMDUR) 60 mg CR tablet TAKE 1 TABLET BY MOUTH DAILY. 8/12/18   Merlene Hinton MD   magnesium oxide (MAGOX) 400 mg tablet Take 400 mg by mouth two (2) times a day. Provider, Historical   cloNIDine HCl (CATAPRES) 0.2 mg tablet Take 1 Tab by mouth two (2) times a day. 8/8/17   Viral Burgos MD   multivitamins-minerals-lutein (MULTIVITAMIN 50 PLUS) tab tablet   See Instructions, daily, 0 Refills 10/21/16   Provider, Historical   ferrous sulfate (SLOW FE) 142 mg (45 mg iron) ER tablet Take  by mouth Daily (before breakfast). Provider, Historical   aspirin delayed-release 81 mg tablet Take 81 mg by mouth daily.     Provider, Historical     Allergies   Allergen Reactions    Atorvastatin Myalgia    Pcn [Penicillins] Hives      Social History     Tobacco Use    Smoking status: Former Smoker     Packs/day: 0.50     Years: 40.00     Pack years: 20.00     Last attempt to quit: 2016     Years since quittin.7    Smokeless tobacco: Never Used   Substance Use Topics    Alcohol use: No     Alcohol/week: 0.0 oz     Comment: OCCASIONAL      Family History   Problem Relation Age of Onset    Heart Disease Mother     COPD Mother     COPD Father     Diabetes Father     Hypertension Father     Alcohol abuse Sister     Diabetes Brother     High Cholesterol Brother     Hypertension Brother     Anesth Problems Neg Hx         Current Medications:  Current Facility-Administered Medications   Medication Dose Route Frequency    cloNIDine (CATAPRES) 0.3 mg/24 hr patch 1 Patch  1 Patch TransDERmal Q7D    insulin NPH (NOVOLIN N, HUMULIN N) injection 30 Units  30 Units SubCUTAneous ACB&D    insulin lispro (HUMALOG) injection   SubCUTAneous Q6H    glucose chewable tablet 16 g  4 Tab Oral PRN    glucagon (GLUCAGEN) injection 1 mg  1 mg IntraMUSCular PRN    sodium chloride (NS) flush 5-40 mL  5-40 mL IntraVENous Q8H    sodium chloride (NS) flush 5-40 mL  5-40 mL IntraVENous PRN    nitroglycerin (NITROBID) 2 % ointment 1 Inch  1 Inch Topical Q6H    HYDROmorphone (PF) (DILAUDID) injection 0.5 mg  0.5 mg IntraVENous Q3H PRN    naloxone (NARCAN) injection 0.4 mg  0.4 mg IntraVENous PRN    ondansetron (ZOFRAN) injection 4 mg  4 mg IntraVENous Q4H PRN    hydrALAZINE (APRESOLINE) 20 mg/mL injection 10 mg  10 mg IntraVENous Q4H PRN    0.9% sodium chloride infusion  125 mL/hr IntraVENous CONTINUOUS    pantoprazole (PROTONIX) 40 mg in sodium chloride 0.9% 10 mL injection  40 mg IntraVENous DAILY         ROS negative for 11 organ systems except as noted above. Physical Exam:  Constitutional Alert, cooperative, oriented. Mood and affect appropriate. Appears close to chronological age. Well nourished/ overweight. Well developed.    Head Normocephalic; no scars   Eyes Conjunctivae and sclerae are clear and without icterus. Pupils are round   ENMT Sinuses are nontender. No oral exudates, ulcers, masses, thrush or mucositis. Oropharynx clear. Tongue normal.   Neck Supple without masses or thyromegaly. No jugular venous distension. Hematologic/Lymphatic No petechiae or purpura. No tender or palpable lymph nodes noted   Respiratory Lungs are clear to auscultation without rhonchi or wheezing. Cardiovascular Regular rate and rhythm of heart without murmurs, gallops or rubs. Chest / Line Site Chest is symmetric with no chest wall deformities. Abdomen Non-tender, mildly distended, no masses, ascites or hepatosplenomegaly. Good bowel sounds. No guarding or rebound tenderness. Bilateral lower quadrant ostomies. Musculoskeletal No tenderness or swelling, normal range of motion without obvious weakness. Extremities No visible deformities, no cyanosis, clubbing or edema. Skin No rashes, scars, or lesions suggestive of malignancy. No petechiae, purpura, or ecchymoses. No excoriations. Neurologic No sensory or motor deficits noted but not specifically tested. Psychiatric Alert and oriented. Coherent speech. Verbalizes understanding of our discussions today.

## 2019-06-03 NOTE — PROGRESS NOTES
Oncology End of Shift Note      Bedside shift change report given to Ramon Whitt (incoming nurse) by Ainsley Flores (outgoing nurse) [facility]. Report included the following information SBAR, Kardex and MAR. Shift Summary: Patient had x ray completed. NG tube was advanced 10 cm. MD started patient on heparin. Patient has not been nauseated. Oncology consult was completed as well as information with cancer resources. Issues for Physician to Address:  None. Patient on Cardiac Monitoring?     [x] Yes  [] No    Rhythm: Telemetry: normal sinus rhythm               Lilia Zavala

## 2019-06-03 NOTE — PROGRESS NOTES
Problem: Falls - Risk of  Goal: *Absence of Falls  Description  Document Monse Louise Fall Risk and appropriate interventions in the flowsheet.   Outcome: Progressing Towards Goal     Problem: Patient Education: Go to Patient Education Activity  Goal: Patient/Family Education  Outcome: Progressing Towards Goal

## 2019-06-03 NOTE — PROGRESS NOTES
Reason for Admission:   Bowel Obstruction & Metastatic Cancer               RRAT Score:     38             Resources/supports as identified by patient/family:   Family support is in place                 Top Challenges facing patient (as identified by patient/family and CM): Finances/Medication cost?  Pt voiced no concerns regarding costs for medication. Transportation? Pt drives himself to all medical appointments. At time of discharge pt will be transported home by spouse. Support system or lack thereof? Support system is the patient's spouse/family. Living arrangements? Lives in a ranch style home w/five steps to enter the front door. Self-care/ADLs/Cognition? Pt is independent w/ADL's. Requires no assistance. Current Advanced Directive/Advance Care Plan:  FULL Code. No Advanced Medical Directive. Spouse is primary decision maker. Plan for utilizing home health:    [place] in the past.  No prior SNF in the past.                        Likelihood of readmission:   High                  Transition of Care Plan:  Pt to discharge home w/spouse to transport home. Pt last seen by PCP on 4/9/2019. Pt will require a post hospital discharge visit. Pt to continue following Dr. Jackie Evans (Oncologist) @ [facility]. SW to continue to assist.     Care Management Interventions  PCP Verified by CM: Yes(4/9/2019)  Mode of Transport at Discharge: Other (see comment)(Family)  Transition of Care Consult (CM Consult): Discharge Planning  Discharge Durable Medical Equipment: (No O2.   DME (cane, walker, and shower chair))  Current Support Network: Lives with Spouse, Own Home(Lives in ranch style home w/five steps to enter the front door. )  Confirm Follow Up Transport: Family  Plan discussed with Pt/Family/Caregiver: Yes  Discharge Location  Discharge Placement: (Home)      [facility] Ivette Herrera

## 2019-06-03 NOTE — PROGRESS NOTES
Oncology End of Shift Note      Bedside shift change report given to Barrington Jett RN (incoming nurse) by Orly Woods (outgoing nurse) on Arminda Ramírez. Report included the following information SBAR, Kardex, MAR and Recent Results. Shift Summary: no acute changes; intermittent NG tube suction, output 1600mL; no further c/o nausea      Issues for Physician to Address:       Patient on Cardiac Monitoring?   Onc 6  [x] Yes  [] No    Rhythm: Telemetry: normal sinus rhythm/ Sinus tach         Shift Events        Orly Woods

## 2019-06-04 NOTE — PROGRESS NOTES
Hospitalist Progress Note    NAME: Nilda Hatchkeeper   :  1950   MRN:  [id number]       Assessment / Plan:  Small Bowel Obstruction POA- s/p NGT to suction for now  Colon Cancer s/p resection with recurrence/Rectal Adenocarcinoma on Chemotx  KUB today= There are several dilated small bowel loops in the abdomen. Degree of small bowel dilatation appears to have worsened as compared to prior plain radiographs dated Juanita 3, 2019    Cont IVF  Cont NG to suction, NPO for now  Serial KUBs to follow SBO till resolution  IP CRSurgery consult appreciated- cont supportive care for now, no role for surgical intervention at this time, follow up with Surgery today due to worsened KUB today  IP Oncology consult appreciated & noted- Chemo delayed for now  Cont IV dilaudid prn pain and IV antiemetics prn     BOBBI POA- Cr was 1.4 yesterday, likely pre-renal  -hold home ACEI  Cont IVF's  -monitor renal function daily- BMP now & daily     Non-sepcific Leukocytosis POA: suspect reactive, resolved now  -monitor     Hypertension  -holding PO meds  -place on clonidine patch and scheduled Nitrobid  -prn IV hydralazine ordered  Decreased IVF today     Diabetes  -reduced basal insulin NPH to 25 units BID today  -place on SSI    Baseline:functional      30.0 - 39.9 Obese / Body mass index is 33.85 kg/m². Weight loss recommended- Exercise    Code status: Full   Surrogate Decision Maker: Wife     Prophylaxis: Hep SQ and H2B/PPI  Recommended Disposition: Home w/Family when SBO resolved & eating - few days anticipated     Subjective:     Chief Complaint / Reason for Physician Visit: F/U Dehydration/BOBBI, SBO, s/p NGT decompression, recurrent Colorectal cancer  \"I am better with this NGT\". Discussed with RN events overnight.      Review of Systems:  Symptom Y/N Comments  Symptom Y/N Comments   Fever/Chills n   Chest Pain n    Poor Appetite y   Edema n    Cough n   Abdominal Pain n    Sputum n   Joint Pain     SOB/TALBOT n   Pruritis/Rash Nausea/vomit n   Tolerating PT/OT y    Diarrhea    Tolerating Diet  NPO with NGT to suction at present   Constipation    Other       Could NOT obtain due to:      Objective:     VITALS:   Last 24hrs VS reviewed since prior progress note. Most recent are:  Patient Vitals for the past 24 hrs:   Temp Pulse Resp BP SpO2   06/04/19 0821 97.6 °F (36.4 °C) 89 16 169/84 100 %   06/04/19 0745 97.6 °F (36.4 °C) 91 18 176/88 100 %   06/04/19 0311 99 °F (37.2 °C) 93 17 161/89    06/03/19 2354  (!) 104  175/85    06/03/19 2241 98.4 °F (36.9 °C) 99 18 167/83 99 %   06/03/19 1910 97.8 °F (36.6 °C) 91 18 (!) 185/97 98 %   06/03/19 1519 98.2 °F (36.8 °C) 97 16 185/89 99 %   06/03/19 1130 98.3 °F (36.8 °C) 96 18 166/81 96 %       Intake/Output Summary (Last 24 hours) at 6/4/2019 0902  Last data filed at 6/4/2019 0523  Gross per 24 hour   Intake 3070.84 ml   Output 700 ml   Net 2370.84 ml        PHYSICAL EXAM:  General: WD, WN. Alert, cooperative, no acute distress, Obese +    EENT:  EOMI. Anicteric sclerae. MMM  Resp:  CTA bilaterally, no wheezing or rales. No accessory muscle use  CV:  Regular  rhythm,  No edema  GI:  Little tense, persistently distended +, Non tender.  +Bowel sounds, Colostomy noted  +,   Neurologic:  Alert and oriented X 3, normal speech,   Psych:   Good insight. Not anxious nor agitated  Skin:  No rashes.   No jaundice    Reviewed most current lab test results and cultures  YES  Reviewed most current radiology test results   YES  Review and summation of old records today    NO  Reviewed patient's current orders and MAR    YES  PMH/SH reviewed - no change compared to H&P  ________________________________________________________________________  Care Plan discussed with:    Comments   Patient x    Family  x Wife at bedside   RN x    Care Manager     Consultant  x Dr Cassie Llanes ([address]) yesterday, Dr Nayla Lamb ([place]) yesterday                     Multidiciplinary team rounds were held today with , nursing, pharmacist and clinical coordinator. Patient's plan of care was discussed; medications were reviewed and discharge planning was addressed. ________________________________________________________________________  Total NON critical care TIME:  26   Minutes    Total CRITICAL CARE TIME Spent:   Minutes non procedure based      Comments   >50% of visit spent in counseling and coordination of care     ________________________________________________________________________  Shaylee Patterson MD     Procedures: see electronic medical records for all procedures/Xrays and details which were not copied into this note but were reviewed prior to creation of Plan. LABS:  I reviewed today's most current labs and imaging studies.   Pertinent labs include:  Recent Labs     06/03/19  0326 06/02/19  1356   WBC 9.2 11.3*   HGB 13.8 14.7   HCT 40.9 43.0    185     Recent Labs     06/03/19  0326 06/02/19  1356    135*   K 4.0 4.8    102   CO2 28 25   * 203*   BUN 24* 24*   CREA 1.43* 1.47*   CA 8.9 9.1   MG 3.0*  --    PHOS 4.3  --    ALB 3.7 3.9   TBILI 2.5* 2.6*   SGOT 40* 47*   ALT 36 38       Signed: Shaylee Patterson MD

## 2019-06-04 NOTE — PROGRESS NOTES
CRS Progress note    Feels much better today. Denies any abdominal pain. NG still with high output. Starting to have thick stool in colostomy bag    /83 (BP 1 Location: Left arm, BP Patient Position: At rest)   Pulse 93   Temp 97.9 °F (36.6 °C)   Resp 18   Ht 5' 10\" (1.778 m)   Wt 107 kg (235 lb 14.3 oz)   SpO2 99%   BMI 33.85 kg/m²     NAD, AAOx3  Abd soft, nontender, less distended  Ostomy with thick stool in bag    Plan  Continue NPO with NG tube  Agree with TPN  Await return of bowel function.

## 2019-06-04 NOTE — PROGRESS NOTES
Hematology Oncology Progress Note       Follow up for: metastatic rectal cancer    Chart notes reviewed since last visit. Case discussed with following: nursing staff. Patient complains of the following: feeling slightly better than yesterday AM. Has not changed ostomy bag since admit - stool is looser at present which he thinks is an improvement. Still feeling bloated. Additional concerns noted by the staff:     Patient Vitals for the past 24 hrs:   BP Temp Pulse Resp SpO2   06/04/19 0821 169/84 97.6 °F (36.4 °C) 89 16 100 %   06/04/19 0745 176/88 97.6 °F (36.4 °C) 91 18 100 %   06/04/19 0311 161/89 99 °F (37.2 °C) 93 17    06/03/19 2354 175/85  (!) 104     06/03/19 2241 167/83 98.4 °F (36.9 °C) 99 18 99 %   06/03/19 1910 (!) 185/97 97.8 °F (36.6 °C) 91 18 98 %   06/03/19 1519 185/89 98.2 °F (36.8 °C) 97 16 99 %   06/03/19 1130 166/81 98.3 °F (36.8 °C) 96 18 96 %       ROS negative for 11 organ systems except as ntoed above. Physical Examination:  Constitutional Alert, cooperative, oriented. Mood and affect appropriate. Appears close to chronological age. Well nourished. Well developed. Head Normocephalic; no scars   Eyes Conjunctivae and sclerae are clear and without icterus. Pupils are round   ENMT Sinuses are nontender. No oral exudates, ulcers, masses, thrush or mucositis. Oropharynx clear. Tongue normal.   Neck Supple without masses or thyromegaly. No jugular venous distension. Hematologic/Lymphatic No petechiae or purpura. No tender or palpable lymph nodes noted. Respiratory Lungs are clear to auscultation without rhonchi or wheezing. Cardiovascular Regular rate and rhythm of heart    Chest / Line Site Chest is symmetric with no chest wall deformities. Abdomen Non-tender, mildly distended, no masses, ascites or hepatosplenomegaly. I do hear bowel sounds. Bilateral ostomies    Musculoskeletal No tenderness or swelling, normal range of motion without obvious weakness. Extremities No visible deformities, no cyanosis, clubbing or edema. Skin No rashes, scars, or lesions suggestive of malignancy. No petechiae, purpura, or ecchymoses. No excoriations. Neurologic No sensory or motor deficits noted but not tested. Psychiatric Alert and oriented. Coherent speech. Verbalizes understanding of our discussions today. Labs:  Recent Results (from the past 24 hour(s))   GLUCOSE, POC    Collection Time: 06/03/19 11:37 AM   Result Value Ref Range    Glucose (POC) 148 (H) 65 - 100 mg/dL    Performed by Arleth Rogers, POC    Collection Time: 06/03/19  5:39 PM   Result Value Ref Range    Glucose (POC) 123 (H) 65 - 100 mg/dL    Performed by Mohsen Mei, POC    Collection Time: 06/03/19 11:51 PM   Result Value Ref Range    Glucose (POC) 102 (H) 65 - 100 mg/dL    Performed by Kendrick Mccall    GLUCOSE, POC    Collection Time: 06/04/19  6:11 AM   Result Value Ref Range    Glucose (POC) 79 65 - 100 mg/dL    Performed by Taylor JOHNSON (CON) PCT    GLUCOSE, POC    Collection Time: 06/04/19  6:27 AM   Result Value Ref Range    Glucose (POC) 104 (H) 65 - 100 mg/dL    Performed by Taylor JOHNSON (CON) PCT    CBC W/O DIFF    Collection Time: 06/04/19 10:28 AM   Result Value Ref Range    WBC 8.7 4.1 - 11.1 K/uL    RBC 4.50 4. 10 - 5.70 M/uL    HGB 13.9 12.1 - 17.0 g/dL    HCT 42.0 36.6 - 50.3 %    MCV 93.3 80.0 - 99.0 FL    MCH 30.9 26.0 - 34.0 PG    MCHC 33.1 30.0 - 36.5 g/dL    RDW 16.9 (H) 11.5 - 14.5 %    PLATELET 650 154 - 299 K/uL    MPV 9.3 8.9 - 12.9 FL    NRBC 0.0 0  WBC    ABSOLUTE NRBC 0.00 0.00 - 0.01 K/uL       Imaging:  KUB:  Direct comparison is made to prior plain radiographs dated Juanita 3, 2019.     Nasogastric tube and side port overlie the stomach. There are several dilated  small bowel loops in the abdomen. Degree of small bowel dilatation appears to  have worsened as compared to prior plain radiographs dated Juanita 3, 2019.  No free  intraperitoneal gas is visualized on supine views. No pathologic calcification  is seen.     IMPRESSION: Multiple dilated loops of small bowel. Assessment and Plan:   SBO: slightly worse this AM. Will try changing NG to continuous rather than intermittent suction for the next hour or so to see if this helps in his degree of bloating/comfort. Discussed with nurse and with him and wife. DM - on insulin    Hypertension - clonidine patch. nitobid ordered. Labs remain acceptable - need to make sure IVF compensating for losses. At what point do we ponder TPN?

## 2019-06-04 NOTE — DIABETES MGMT
[facility]    [facility] Progress Note    Recommendations/ Comments:  Chart reviewed for hypoglycemia this morning. Note NPH has been decreased to 25 units BID. DTC will continue to follow. Chart reviewed on [facility]. Patient is a 70 y.o. male with known diabetes on Lantus 52 units daily, Lispro correctional insulin per scale 4 times daily, Metformin 1000 mg BID at home. A1c:   Lab Results   Component Value Date/Time    Hemoglobin A1c 6.8 (H) 06/03/2019 03:26 AM    Hemoglobin A1c 6.5 (H) 10/17/2016 02:40 AM       Recent Glucose Results:   Lab Results   Component Value Date/Time     (H) 06/04/2019 10:28 AM    GLUCPOC 96 06/04/2019 11:23 AM    GLUCPOC 104 (H) 06/04/2019 06:27 AM    GLUCPOC 79 06/04/2019 06:11 AM        Lab Results   Component Value Date/Time    Creatinine 1.41 (H) 06/04/2019 10:28 AM     Estimated Creatinine Clearance: 60.6 mL/min (A) (based on SCr of 1.41 mg/dL (H)). Active Orders   Diet    DIET NPO        PO intake: No data found. Current hospital DM medication: NPH 25 units BID, Lispro Correctional insulin with insulin resistant sensitivity      Will continue to follow as needed. Thank you.   Fely Crespo RD, [address] [facility]  Office:  [phone]            Time spent: 5 minutes

## 2019-06-04 NOTE — PROGRESS NOTES
Prior to administering NPH insulin the patients Blood sugar was assessed to be 67. Md notified and stated while rounding on patient to try PO glucose tabs. Upon rechecking the patients blood sugar 15 minutes later the patient presented with a blood sugar of 93. All insulin held.

## 2019-06-04 NOTE — PROGRESS NOTES
Problem: Pressure Injury - Risk of  Goal: *Prevention of pressure injury  Description  Document Vishnu Scale and appropriate interventions in the flowsheet.   Outcome: Progressing Towards Goal  Note:   Pressure Injury Interventions:  Sensory Interventions: Assess changes in LOC, Keep linens dry and wrinkle-free, Maintain/enhance activity level, Minimize linen layers, Pressure redistribution bed/mattress (bed type)    Moisture Interventions: Absorbent underpads, Minimize layers    Activity Interventions: Increase time out of bed    Mobility Interventions: HOB 30 degrees or less, Pressure redistribution bed/mattress (bed type)    Nutrition Interventions: Document food/fluid/supplement intake    Friction and Shear Interventions: Minimize layers, HOB 30 degrees or less

## 2019-06-04 NOTE — PROGRESS NOTES
Initial Nutrition Assessment:    INTERVENTIONS/RECOMMENDATIONS:   · Parenteral Nutrition: Initiate parenteral nutrition  · Day 1: D20 5%AA @ 42 ml/hr  · Day 2: D20 5%AA @ 63 ml/hr   · Lipid emulsion 3x per week  · Provides: 1544 kcal and 75 g protein (~90% of estimate kcal and protein needs)    ASSESSMENT:   Chart reviewed, medically noted for SBO in the setting of metastatic rectal cancer, NG tube decompression and PMH shown below. Nutrition consulted for TPN recs, shown above. Past Medical History:   Diagnosis Date    Abnormal nuclear stress test 4/27/2017    Arthritis     Asthma     childhood    BPH (benign prostatic hypertrophy)     CAD (coronary artery disease) 1996    M.I., Stents x2    Cancer ([facility]     Rectal CA     Chronic pain     Colon polyp     DM (diabetes mellitus) ([facility] 5/17/2011    Gout     Hemorrhoids     HTN (hypertension) 5/17/2011    Hyperlipidemia 5/17/2011    Ill-defined condition     Colostomy, iliostomy    Rectal adenocarcinoma ([facility] 1/16/2018    S/p surg.  S/P cardiac cath 4/27/2017       Diet Order: NPO  % Eaten:  No data found.   Pertinent Medications: [x]Reviewed: NS, humalog, NPH, PPI,   Pertinent Labs: [x]Reviewed: Na 146, HA1C 6.8  Food Allergies: [x]NKFA  []Other   Last BM: ostomy, SBO  Edema:   n/a     []RUE   []LUE   []RLE   []LLE      Pressure Injury:  n/a    [] Stage I   [] Stage II   [] Stage III   [] Stage IV      Wt Readings from Last 30 Encounters:   06/02/19 107 kg (235 lb 14.3 oz)   04/18/19 111.1 kg (245 lb)   04/09/19 111.1 kg (245 lb)   02/27/19 111.1 kg (245 lb)   12/17/18 112.9 kg (249 lb)   08/21/18 112 kg (247 lb)   04/17/18 110.7 kg (244 lb)   04/04/18 113.4 kg (250 lb)   03/27/18 111.4 kg (245 lb 9 oz)   03/05/18 113.4 kg (250 lb)   02/02/18 114.5 kg (252 lb 6.4 oz)   01/30/18 113.4 kg (250 lb)   01/22/18 112.7 kg (248 lb 7.3 oz)   01/16/18 114.3 kg (252 lb)   10/16/17 114.8 kg (253 lb)   09/26/17 114.8 kg (253 lb)   08/08/17 111.6 kg (246 lb) 06/27/17 106 kg (233 lb 11.2 oz)   05/26/17 108.4 kg (239 lb)   04/27/17 117.9 kg (260 lb)   04/26/17 119.3 kg (263 lb 1.6 oz)   02/08/17 118.1 kg (260 lb 4.8 oz)   02/03/17 111.1 kg (245 lb)   10/25/16 105.7 kg (233 lb)   10/22/16 107 kg (236 lb)   10/15/16 107.1 kg (236 lb 1.8 oz)   10/14/16 109 kg (240 lb 6 oz)   10/04/16 110.7 kg (244 lb)   08/18/16 111.9 kg (246 lb 11.1 oz)   07/13/16 113.7 kg (250 lb 11.2 oz)       Anthropometrics:   Height: 5' 10\" (177.8 cm) Weight: 107 kg (235 lb 14.3 oz)   IBW (%IBW):   ( ) UBW (%UBW):   (  %)   Last Weight Metrics:  Weight Loss Metrics 6/2/2019 4/18/2019 4/9/2019 2/27/2019 12/17/2018 8/21/2018 4/17/2018   Today's Wt 235 lb 14.3 oz 245 lb 245 lb 245 lb 249 lb 247 lb 244 lb   BMI 33.85 kg/m2 36.18 kg/m2 36.18 kg/m2 36.18 kg/m2 36.77 kg/m2 36.48 kg/m2 36.03 kg/m2       BMI: Body mass index is 33.85 kg/m². This BMI is indicative of:   []Underweight    []Normal    []Overweight    [x] Obesity   [] Extreme Obesity (BMI>40)     Estimated Nutrition Needs (Based on):   1700 Kcals/day(20 kcal/kg Adj BW) , 85 g(0.8 g/kg) Protein  Carbohydrate: At Least 130 g/day  Fluids: 1700 mL/day (1ml/kcal) or per primary team    NUTRITION DIAGNOSES:   Problem:  Altered GI function      Etiology: related to metastatic rectal cancer and SBO     Signs/Symptoms: as evidenced by need for TPN to meet nutrition needs      NUTRITION INTERVENTIONS:    Enteral/Parenteral Nutrition: Initiate parenteral nutrition                GOAL:   intiate TPN in 24-48 hrs    LEARNING NEEDS (Diet, Food/Nutrient-Drug Interaction):    [x] None Identified   [] Identified and Education Provided/Documented   [] Identified and Pt declined/was not appropriate     Cultureal, Advent, OR Ethnic Dietary Needs:    [x] None Identified   [] Identified and Addressed     [x] Interdisciplinary Care Plan Reviewed/Documented    [x] Discharge Planning:  TBD     MONITORING /EVALUATION:      Food/Nutrient Intake Outcomes:  Total energy intake  Physical Signs/Symptoms Outcomes: Weight/weight change, Electrolyte and renal profile, Glucose profile, GI    NUTRITION RISK:    [x] High              [] Moderate           []  Low  []  Minimal/Uncompromised    PT SEEN FOR:    [x]  MD Consult: []Calorie Count      []Diabetic Diet Education        []Diet Education     []Electrolyte Management     []General Nutrition Management and Supplements     []Management of Tube Feeding     [x]TPN Recommendations    []  RN Referral:  []MST score >=2     []Enteral/Parenteral Nutrition PTA     []Pregnant: Gestational DM or Multigestation     []Pressure Ulcer/Wound Care needs        []  Low BMI  []  LOS Referral       Wendy Ayers RDN  Pager [phone]  Weekend Pager [phone]

## 2019-06-04 NOTE — PROGRESS NOTES
Oncology End of Shift Note      Bedside shift change report given to Bao Christy (incoming nurse) by Saul Clements (outgoing nurse) on [facility]. Report included the following information SBAR, Kardex and MAR. Shift Summary:   Patient tolerated care well throughout shift. No new complaints. Md changed NPH insulin order from 30 units to 25 units. Patient labs drawn. Colon and rectal MD Dr Kalie Dill contacted per order. NG tube patent and suctioning. BS assessed before second dose of NPH insulin. BS low at 67, MD notified and stated to try Oral Glucose tablets and not the IM injection. Reassessed BS after 15 minutes and it was 80 (MD notified). Patient up to chair for two hours of the day. Port assessed by Rafa Messina. TPN delivered to unit at 1858. Issues for Physician to Address:       Patient on Cardiac Monitoring?     [x] Yes  [] No    Rhythm: Telemetry: normal sinus rhythm         Shift Events        Saul Clements

## 2019-06-04 NOTE — INTERDISCIPLINARY ROUNDS
Oncology Interdisciplinary rounds were held today to discuss patient plan of care and outcomes.  The following members were present: Nursing, Physician, Case Management, Pharmacy, and PT/OT    Actual Length of Stay: 2    DRG GLOS: 3.3    Expected Length of Stay: 3d 7h                       Plan            Discharge    Continue NG tube  Daily Xrays Home when stable

## 2019-06-05 NOTE — DIABETES MGMT
[facility]    D[facility] Progress Note    Recommendations/ Comments:  Pt's pm dose of NPH held and this has result in elevated BG today. If concern for hypoglycemia, consider reducing dose vs holding insulin all together. Chart reviewed on [facility] Aquino. Current hospital DM medication: NPH 25 units BID, Lispro Correctional insulin with insulin resistant sensitivity    Patient is a 70 y.o. male with known diabetes on Lantus 52 units daily, Lispro correctional insulin per scale 4 times daily, Metformin 1000 mg BID at home. A1c:   Lab Results   Component Value Date/Time    Hemoglobin A1c 6.8 (H) 06/03/2019 03:26 AM    Hemoglobin A1c 6.5 (H) 10/17/2016 02:40 AM       Recent Glucose Results:   Lab Results   Component Value Date/Time     (H) 06/05/2019 02:55 AM    GLUCPOC 260 (H) 06/05/2019 10:59 AM    GLUCPOC 213 (H) 06/05/2019 05:43 AM    GLUCPOC 184 (H) 06/05/2019 12:05 AM        Lab Results   Component Value Date/Time    Creatinine 1.36 (H) 06/05/2019 02:55 AM     Estimated Creatinine Clearance: 63.7 mL/min (A) (based on SCr of 1.36 mg/dL (H)). Active Orders   Diet    DIET NPO With Ice Chips        PO intake: No data found. Will continue to follow as needed. Thank you.   Rosana Bernardo S [facility]  Office:  [phone]            Time spent: 5 minutes

## 2019-06-05 NOTE — PROGRESS NOTES
CRS Progress note    Feels slightly better today. Denies any abdominal pain. NG still with high output. Starting to have thick stool in colostomy bag    /86 (BP 1 Location: Left arm, BP Patient Position: At rest)   Pulse 95   Temp 97.9 °F (36.6 °C)   Resp 16   Ht 5' 10\" (1.778 m)   Wt 110 kg (242 lb 8.1 oz)   SpO2 99%   BMI 34.80 kg/m²     NAD, AAOx3  Abd soft, nontender, less distended  Ostomy with thick stool in bag    Plan  Continue NPO with NG tube  Continue TPN  Await return of bowel function.

## 2019-06-05 NOTE — PROGRESS NOTES
Problem: Falls - Risk of  Goal: *Absence of Falls  Description  Document Monse Harrisifton Fall Risk and appropriate interventions in the flowsheet.   Outcome: Progressing Towards Goal  Note:   Fall Risk Interventions:  Mobility Interventions: Communicate number of staff needed for ambulation/transfer, Patient to call before getting OOB, PT Consult for mobility concerns, PT Consult for assist device competence         Medication Interventions: Evaluate medications/consider consulting pharmacy, Patient to call before getting OOB, Teach patient to arise slowly    Elimination Interventions: Call light in reach, Urinal in reach

## 2019-06-05 NOTE — PROGRESS NOTES
Oncology End of Shift Note Bedside shift change report given to JUS Milligan (incoming nurse) by Aimee Nieto (outgoing nurse) on [facility]. Report included the following information SBAR, Kardex and MAR. Shift Summary:  Patient canister from NG tube was replaced. Bed linens were changed. Patient needed insulin coverage for every meal time. Issues for Physician to Address:  None. Patient on Cardiac Monitoring? [x] Yes 
[] No 
 
Rhythm: Telemetry: normal sinus rhythm Aimee Nieto

## 2019-06-05 NOTE — PROGRESS NOTES
Hematology Oncology Progress Note       Follow up for: metastatic rectal cancer    Chart notes reviewed since last visit. Case discussed with following: nursing staff. Patient complains of the following: feeling slightly better than the last two days. Still has not changed ostomy bag since admit and wife says she will do this today for more accurate assessment of actual BM volume. Still feeling bloated. Additional concerns noted by the staff:     Patient Vitals for the past 24 hrs:   BP Temp Pulse Resp SpO2 Weight   06/05/19 1056 161/86 97.9 °F (36.6 °C) 95 16 99 %    06/05/19 0749 176/81 97.5 °F (36.4 °C) 98 16 100 %    06/05/19 0600 (!) 179/95  93      06/05/19 0253 (!) 166/95 97.7 °F (36.5 °C) 98 16 98 %    06/05/19 0007      110 kg (242 lb 8.1 oz)   06/05/19 0000 170/90 97.7 °F (36.5 °C) 94 16 100 %    06/04/19 2020 158/86 97.5 °F (36.4 °C) 98 16 100 %    06/04/19 1814 168/80 97.7 °F (36.5 °C) 82 16 100 %    06/04/19 1530 154/87 98 °F (36.7 °C) 93 18 98 %        ROS negative for 11 organ systems except as ntoed above. Physical Examination:  Constitutional Alert, cooperative, oriented. Mood and affect appropriate. Appears close to chronological age. Well nourished. Well developed. Head Normocephalic; no scars   Eyes Conjunctivae and sclerae are clear and without icterus. Pupils are round   ENMT Sinuses are nontender. No oral exudates, ulcers, masses, thrush or mucositis. Oropharynx clear. Tongue normal.   Neck Supple without masses or thyromegaly. No jugular venous distension. Hematologic/Lymphatic No petechiae or purpura. No tender or palpable lymph nodes noted. Respiratory Lungs are clear to auscultation without rhonchi or wheezing. Cardiovascular Regular rate and rhythm of heart    Chest / Line Site Chest is symmetric with no chest wall deformities. Abdomen Non-tender, mildly distended, no masses, ascites or hepatosplenomegaly. I do hear bowel sounds.  Bilateral ostomies    Musculoskeletal No tenderness or swelling, normal range of motion without obvious weakness. Extremities No visible deformities, no cyanosis, clubbing or edema. Skin No rashes, scars, or lesions suggestive of malignancy. No petechiae, purpura, or ecchymoses. No excoriations. Neurologic No sensory or motor deficits noted but not tested. Psychiatric Alert and oriented. Coherent speech. Verbalizes understanding of our discussions today.        Labs:  Recent Results (from the past 24 hour(s))   GLUCOSE, POC    Collection Time: 06/04/19  5:03 PM   Result Value Ref Range    Glucose (POC) 67 65 - 100 mg/dL    Performed by Mohsen Cifuentes 76, POC    Collection Time: 06/04/19  5:33 PM   Result Value Ref Range    Glucose (POC) 93 65 - 100 mg/dL    Performed by Mohsen Cifuentes 76, POC    Collection Time: 06/05/19 12:05 AM   Result Value Ref Range    Glucose (POC) 184 (H) 65 - 100 mg/dL    Performed by Maranda JOHNSON (CON) PCT    CBC W/O DIFF    Collection Time: 06/05/19  2:55 AM   Result Value Ref Range    WBC 7.0 4.1 - 11.1 K/uL    RBC 4.14 4.10 - 5.70 M/uL    HGB 13.1 12.1 - 17.0 g/dL    HCT 39.1 36.6 - 50.3 %    MCV 94.4 80.0 - 99.0 FL    MCH 31.6 26.0 - 34.0 PG    MCHC 33.5 30.0 - 36.5 g/dL    RDW 16.7 (H) 11.5 - 14.5 %    PLATELET 355 799 - 897 K/uL    MPV 9.7 8.9 - 12.9 FL    NRBC 0.0 0  WBC    ABSOLUTE NRBC 0.00 0.00 - 8.60 K/uL   METABOLIC PANEL, COMPREHENSIVE    Collection Time: 06/05/19  2:55 AM   Result Value Ref Range    Sodium 146 (H) 136 - 145 mmol/L    Potassium 3.9 3.5 - 5.1 mmol/L    Chloride 113 (H) 97 - 108 mmol/L    CO2 25 21 - 32 mmol/L    Anion gap 8 5 - 15 mmol/L    Glucose 219 (H) 65 - 100 mg/dL    BUN 32 (H) 6 - 20 MG/DL    Creatinine 1.36 (H) 0.70 - 1.30 MG/DL    BUN/Creatinine ratio 24 (H) 12 - 20      GFR est AA >60 >60 ml/min/1.73m2    GFR est non-AA 52 (L) >60 ml/min/1.73m2    Calcium 8.4 (L) 8.5 - 10.1 MG/DL    Bilirubin, total 2.7 (H) 0.2 - 1.0 MG/DL ALT (SGPT) 38 12 - 78 U/L    AST (SGOT) 38 (H) 15 - 37 U/L    Alk. phosphatase 144 (H) 45 - 117 U/L    Protein, total 7.8 6.4 - 8.2 g/dL    Albumin 3.5 3.5 - 5.0 g/dL    Globulin 4.3 (H) 2.0 - 4.0 g/dL    A-G Ratio 0.8 (L) 1.1 - 2.2     MAGNESIUM    Collection Time: 06/05/19  2:55 AM   Result Value Ref Range    Magnesium 3.2 (H) 1.6 - 2.4 mg/dL   PHOSPHORUS    Collection Time: 06/05/19  2:55 AM   Result Value Ref Range    Phosphorus 3.8 2.6 - 4.7 MG/DL   GLUCOSE, POC    Collection Time: 06/05/19  5:43 AM   Result Value Ref Range    Glucose (POC) 213 (H) 65 - 100 mg/dL    Performed by [address] [place] [address] (CON) PCT    GLUCOSE, POC    Collection Time: 06/05/19 10:59 AM   Result Value Ref Range    Glucose (POC) 260 (H) 65 - 100 mg/dL    Performed by Gretchen Hook (PCT)        Imaging:  KUB:  Direct comparison is made to prior plain radiographs dated Juanita 3, 2019.     Nasogastric tube and side port overlie the stomach. There are several dilated  small bowel loops in the abdomen. Degree of small bowel dilatation appears to  have worsened as compared to prior plain radiographs dated Juanita 3, 2019. No free  intraperitoneal gas is visualized on supine views. No pathologic calcification  is seen.     IMPRESSION: Multiple dilated loops of small bowel. Assessment and Plan:   SBO: persistent. Continue NG and TPN    DM - on insulin    Hypertension - clonidine patch. nitobid ordered. Labs remain acceptable - need to make sure IVF compensating for losses. Sodium climbing and BUN a bit high.

## 2019-06-05 NOTE — PROGRESS NOTES
Hospitalist Progress Note    NAME: Eugene Wang   :  1950   MRN:  [id number]       Assessment / Plan:  Small Bowel Obstruction POA- s/p NGT to suction for now  Colon Cancer s/p resection with recurrence/Rectal Adenocarcinoma on Chemotx  KUB today= There are several dilated small bowel loops in the abdomen. Degree of small bowel dilatation appears to have worsened as compared to prior plain radiographs dated Juanita 3, 2019    Cont IVF  Cont NG to suction, NPO for now  Serial KUBs to follow SBO till resolution  IP CRSurgery consult appreciated- cont supportive care for now, no role for surgical intervention at this time, follow up with Surgery today due to worsened KUB today  IP Oncology consult appreciated & noted- Chemo delayed for now  Cont IV dilaudid prn pain and IV antiemetics prn  Will KUB results for today     BOBBI POA- Cr was 1.4 yesterday, likely pre-renal  -hold home ACEI  Cont IVF's  -monitor renal function daily- BMP now & daily  -creat 1.36 today     Non-sepcific Leukocytosis POA: suspect reactive, resolved now  -monitor     Hypertension  -holding PO meds  -place on clonidine patch and scheduled Nitrobid  -prn IV hydralazine ordered  Decreased IVF on      Diabetes  -reduced basal insulin NPH to 25 units BID on   -place on SSI    Mild hypernatremia  -will monitor    Baseline:functional      30.0 - 39.9 Obese / Body mass index is 34.8 kg/m². Weight loss recommended- Exercise    Code status: Full   Surrogate Decision Maker: Wife     Prophylaxis: Hep SQ and H2B/PPI  Recommended Disposition: Home w/Family when SBO resolved & eating - few days anticipated     Subjective:     Chief Complaint / Reason for Physician Visit: F/U Dehydration/BOBBI, SBO, s/p NGT decompression, recurrent Colorectal cancer  \"Feeling better than [previous days\". Discussed with RN events overnight.      Review of Systems:  Symptom Y/N Comments  Symptom Y/N Comments   Fever/Chills n   Chest Pain n    Poor Appetite y Edema n    Cough n   Abdominal Pain n    Sputum n   Joint Pain     SOB/TALBOT n   Pruritis/Rash     Nausea/vomit n   Tolerating PT/OT y    Diarrhea    Tolerating Diet  NPO with NGT to suction at present   Constipation    Other       Could NOT obtain due to:      Objective:     VITALS:   Last 24hrs VS reviewed since prior progress note. Most recent are:  Patient Vitals for the past 24 hrs:   Temp Pulse Resp BP SpO2   06/05/19 1056 97.9 °F (36.6 °C) 95 16 161/86 99 %   06/05/19 0749 97.5 °F (36.4 °C) 98 16 176/81 100 %   06/05/19 0600  93  (!) 179/95    06/05/19 0253 97.7 °F (36.5 °C) 98 16 (!) 166/95 98 %   06/05/19 0000 97.7 °F (36.5 °C) 94 16 170/90 100 %   06/04/19 2020 97.5 °F (36.4 °C) 98 16 158/86 100 %   06/04/19 1814 97.7 °F (36.5 °C) 82 16 168/80 100 %   06/04/19 1530 98 °F (36.7 °C) 93 18 154/87 98 %       Intake/Output Summary (Last 24 hours) at 6/5/2019 1331  Last data filed at 6/5/2019 0830  Gross per 24 hour   Intake 1510.42 ml   Output 2125 ml   Net -614.58 ml        PHYSICAL EXAM:  General: WD, WN. Alert, cooperative, no acute distress, Obese +    EENT:  EOMI. Anicteric sclerae. MMM  Resp:  CTA bilaterally, no wheezing or rales. No accessory muscle use  CV:  Regular  rhythm,  No edema  GI:  Little tense, persistently distended +, Non tender.  +Bowel sounds, Colostomy noted  +,   Neurologic:  Alert and oriented X 3, normal speech,   Psych:   Good insight. Not anxious nor agitated  Skin:  No rashes.   No jaundice    Reviewed most current lab test results and cultures  YES  Reviewed most current radiology test results   YES  Review and summation of old records today    NO  Reviewed patient's current orders and MAR    YES  PMH/SH reviewed - no change compared to H&P  ________________________________________________________________________  Care Plan discussed with:    Comments   Patient x    Family  x Wife at bedside   RN x    Care Manager     Consultant  x Dr Behzad Foss [facility]) on 6/3, Dr Neha Farmer (onco) 6/3 Multidiciplinary team rounds were held today with , nursing, pharmacist and clinical coordinator. Patient's plan of care was discussed; medications were reviewed and discharge planning was addressed. ________________________________________________________________________  Total NON critical care TIME:  26   Minutes    Total CRITICAL CARE TIME Spent:   Minutes non procedure based      Comments   >50% of visit spent in counseling and coordination of care     ________________________________________________________________________  Aaron De La Torre MD     Procedures: see electronic medical records for all procedures/Xrays and details which were not copied into this note but were reviewed prior to creation of Plan. LABS:  I reviewed today's most current labs and imaging studies.   Pertinent labs include:  Recent Labs     06/05/19  0255 06/04/19  1028 06/03/19  0326   WBC 7.0 8.7 9.2   HGB 13.1 13.9 13.8   HCT 39.1 42.0 40.9    174 185     Recent Labs     06/05/19  0255 06/04/19  1028 06/03/19  0326 06/02/19  1356   * 146* 143 135*   K 3.9 3.7 4.0 4.8   * 111* 106 102   CO2 25 26 28 25   * 115* 147* 203*   BUN 32* 32* 24* 24*   CREA 1.36* 1.41* 1.43* 1.47*   CA 8.4* 8.6 8.9 9.1   MG 3.2*  --  3.0*  --    PHOS 3.8  --  4.3  --    ALB 3.5  --  3.7 3.9   TBILI 2.7*  --  2.5* 2.6*   SGOT 38*  --  40* 47*   ALT 38  --  36 38       Signed: Aaron Fent, MD

## 2019-06-05 NOTE — INTERDISCIPLINARY ROUNDS
Oncology Interdisciplinary rounds were held today to discuss patient plan of care and outcomes.  The following members were present: Nursing, Physician, Case Management, Pharmacy, and PT/OT    Actual Length of Stay: 3    DRG GLOS: 3.3    Expected Length of Stay: 3d 7h                       Plan            Discharge    TPN   Continue NG tube Home with family

## 2019-06-05 NOTE — PROGRESS NOTES
Oncology End of Shift Note      Bedside shift change report given to Regina Del Real RN (incoming nurse) by Alba Jackson (outgoing nurse) on [facility]. Report included the following information SBAR, Kardex, MAR and Recent Results. Shift Summary: No acute changes; states he is feels a bit better than yesterday; continuous medium suction; output 675mL; labs drawn      Issues for Physician to Address:       Patient on Cardiac Monitoring?   Onc 6  [x] Yes  [] No    Rhythm: Telemetry: normal sinus rhythm         Shift Events        Alba Jackson

## 2019-06-06 NOTE — PROGRESS NOTES
Pt tachy up to 130s per Tele. Pt bp and HR elevated. On call MD paged. .. New orders for scheduled BP/HR meds received, will admin and cont to monitor.

## 2019-06-06 NOTE — PROGRESS NOTES
Oncology End of Shift Note Bedside shift change report given to Reanna Rodriguez RN (incoming nurse) by Alcario Dakins (outgoing nurse) on [facility]. Report included the following information SBAR, Kardex and MAR. Shift Summary: New IV fluid bag was hung. New TPN bag was started. Patient removed NG tube twice. Abdominal x ray was completed. NG tube canister was changed four times. Metoprolol IV was started for patient. Insulin coverage was needed for every sugar check. Issues for Physician to Address:  None. Patient on Cardiac Monitoring? [x] Yes 
[] No 
 
Rhythm: Telemetry: normal sinus rhythm Alcario Dakins

## 2019-06-06 NOTE — CONSULTS
Palliative Medicine Consult Markus: [phone]) Patient Name: Sony Wang YOB: 1950 Date of Initial Consult: 6/6/19 Reason for Consult: care decisions Requesting Provider: Bairon Pineda MD 
Primary Care Physician: Dakota Campos MD 
 
 SUMMARY:  
Sony Wang is a 70 y.o. Male with a past history of colon  Cancer  Diagnosed 2016 , s/p resection ,by pelvic exenteration(partial colectomy , prostatectomy and cystectomy ) with colostomy and urostomy concurrent chemoradiation completed 2017 , ,  recurrent rectal adenocarcinoma on chemo with folfox, last chemo 5/23 , under the direction of Dr Hugo Cuba, who was admitted on 6/2/2019 from home with a diagnosis of Small bowel obstruction and mild BOBBI , Presented with abdominal pain and constipation . Current medical issues leading to Palliative Medicine involvement include: recurrent metastatic colon rectal cancer , now with malignant bowel obstruction  For care goals , No AMD . 
 other PMH : CAD , HTN, DM , Chronic pain Oncology following  Plans to continue with chemotherapy next week . was diagnosed in 2016 and underwent nCRT followed by pelvic exenteration 
 oncology history : 
 
Interim history : 
Feels slightly better today. Denies any abdominal pain. NG still with high output. Starting to have thick stool in colostomy bag , colostomy with thick stools. He lives with his wife , retired from [employer] 20 year ago , quit smoking 3 years ago , has two children . 
  
 
 PALLIATIVE DIAGNOSES:  
1. Advance directives counseling 2. Psychosocail support (wife is overwhelmed ) 3. Recurrent metastatic rectal cancer 4. Small bowel obstruction ( on NGT suction and TPN ). 5. Constipation PLAN:  
 
1. Prior to visit I performed extensive chart review 2. Met with patient and his wife Dinh, wife seems anxious and overprotective for patient . 3. I introduced myself , she perceives palliative care as \" precursor \" for hospice , explained  In detail role of palliative care to support patient and families through difficult course of illness , managing pain and symptoms , expertise in communication and delineating goals of care within the context  Of progressive disease , wife felt much better and appreciative of palliative care involvement . 4. Patient feels he is getting better . 5. He has understanding  of medical issues and treatment plan . 6. Advance directives : briefly discussed  its importance and we can assist to complete , plan to follow up in completing document once patient feels more better . 7. We plan to build rapport and continue with support and assist with advance directives and goals . 8. Psychosocial  support : patient is coping well , wife seems overwhelmed ,  emotional support offered , I let them know team social  worker will follow (refer to [facility] ) 9. Initial consult note routed to primary continuity provider and/or primary health care team members 10. Communicated plan of care with: Palliative [facility] Team 
 
 GOALS OF CARE / TREATMENT PREFERENCES:  
 
GOALS OF CARE: 
Patient/Health Care Proxy Stated Goals: (not discussed yet ) TREATMENT PREFERENCES:  
Code Status: Full Code Advance Care Planning: 
[] [facility] Interdisciplinary Team has updated the ACP Navigator with Lala 8 and Patient Capacity Advance Care Planning 6/2/2019 Patient's Healthcare Decision Maker is: -  
Primary Decision Maker Name -  
Primary Decision Maker Phone Number -  
Primary Decision Maker Relationship to Patient -  
Confirm Advance Directive None Patient Would Like to Complete Advance Directive No  
 
 
Medical Interventions: Full interventions Other Instructions: Other: As far as possible, the palliative care team has discussed with patient / health care proxy about goals of care / treatment preferences for patient. HISTORY:  
 
History obtained from: patient and his wife . CHIEF COMPLAINT: \" feeling better \" HPI/SUBJECTIVE: The patient is:  
[] Verbal and participatory Patient notes he has constipation  x 2 weeks and some nausea, initially relieved with mirala , it recurred , despite taking miralax , he was instructed to take senna with miralax, without any response for two days , associated with abdominal pain . He is on NGT suction , his NGT came out accidentally , and was replaced today . Yesterday he had some nausea , he denies any abdominal pain or discomfort , has some lower extremity swelling , he denies any sob , his bowels are moving , he denies any anxiety or being depressed . Clinical Pain Assessment (nonverbal scale for severity on nonverbal patients):  
Clinical Pain Assessment Severity: 0 Location: right flank radiating to abdomen Character: unable to describe Duration: weeks Effect: function Factors: dilaudid helps Frequency: comes and go Duration: for how long has pt been experiencing pain (e.g., 2 days, 1 month, years) Frequency: how often pain is an issue (e.g., several times per day, once every few days, constant) FUNCTIONAL ASSESSMENT:  
 
Palliative Performance Scale (PPS): PPS: 50 PSYCHOSOCIAL/SPIRITUAL SCREENING:  
 
Palliative IDT has assessed this patient for cultural preferences / practices and a referral made as appropriate to needs (Cultural Services, Patient Advocacy, Ethics, etc.) Any spiritual / Amish concerns: 
[] Yes /  [x] No 
 
Caregiver Burnout: 
[] Yes /  [x] No /  [] No Caregiver Present Anticipatory grief assessment:  
[x] Normal  / [] Maladaptive ESAS Anxiety: Anxiety: 0 
 
ESAS Depression: Depression: 0 REVIEW OF SYSTEMS:  
 
Positive and pertinent negative findings in ROS are noted above in HPI. The following systems were [x] reviewed / [] unable to be reviewed as noted in HPI Other findings are noted below. Systems: constitutional, ears/nose/mouth/throat, respiratory, gastrointestinal, genitourinary, musculoskeletal, integumentary, neurologic, psychiatric, endocrine. Positive findings noted below. Modified ESAS Completed by: provider Fatigue: 6 Drowsiness: 0 Depression: 0 Pain: 0 Anxiety: 0 Nausea: 3 Anorexia: 5 Dyspnea: 0 Constipation: No  
     
 
 
 PHYSICAL EXAM:  
 
From RN flowsheet: 
Wt Readings from Last 3 Encounters:  
06/06/19 242 lb 8.1 oz (110 kg) 04/18/19 245 lb (111.1 kg) 04/09/19 245 lb (111.1 kg) Blood pressure 134/69, pulse 78, temperature 97.5 °F (36.4 °C), resp. rate 16, height 5' 10\" (1.778 m), weight 242 lb 8.1 oz (110 kg), SpO2 96 %. Pain Scale 1: Numeric (0 - 10) Pain Intensity 1: 0 Pain Onset 1: four days ago Pain Location 1: Abdomen Pain Description 1: Shooting Pain Intervention(s) 1: Rest 
Last bowel movement, if known:  
 
Constitutional: alert , oriented x3 Eyes: pupils equal, anicteric ENMT: no nasal discharge, moist mucous membranes Cardiovascular: regular rhythm, distal pulses intact Respiratory: breathing not labored, symmetric Gastrointestinal: soft, distended , non tender , colostomy and urostomy . Musculoskeletal: no deformity, no tenderness to palpation Skin: warm, dry Neurologic: following commands, moving all extremities Psychiatric: full affect, no hallucinations Other: 
 
 
 HISTORY:  
 
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  
Diagnosis Date  Abnormal nuclear stress test 4/27/2017  Arthritis  Asthma   
 childhood  BPH (benign prostatic hypertrophy)  CAD (coronary artery disease) 1996 M.I., Stents x2  Cancer ([facility] Rectal CA  Chronic pain  Colon polyp  DM (diabetes mellitus) ([facility] 2011  Gout  Hemorrhoids  HTN (hypertension) 2011  Hyperlipidemia 2011  Ill-defined condition Colostomy, iliostomy  Rectal adenocarcinoma ([facility] 2018 S/p surg.  S/P cardiac cath 2017 Past Surgical History:  
Procedure Laterality Date  CARDIAC SURG PROCEDURE UNLIST  8903/9012  
 stent x2  
 COLONOSCOPY N/A 10/14/2016 COLONOSCOPY/EGD performed by Tim Taylor MD at [facility] ENDOSCOPY  COLONOSCOPY N/A 2/3/2017 COLONOSCOPY performed by Meribeth Mcardle, MD at [facility] ENDOSCOPY  COLONOSCOPY N/A 3/27/2018 COLONOSCOPY performed by Meribeth Mcardle, MD at [facility] ENDOSCOPY  ENDOSCOPY, COLON, DIAGNOSTIC  2011  HX GI    
 Prostate, Bladder, rectum. colon removed  HX HERNIA REPAIR    
 AS INFANT  HX ORTHOPAEDIC  2016  
 hip replacement , left  HX PROSTATECTOMY  X2  
 biopsy benign  HX TONSILLECTOMY  HX UROLOGICAL Bladder removed  SIGMOIDOSCOPY,BIOPSY  10/14/2016  UPPER GI ENDOSCOPY,DIAGNOSIS  10/14/2016 Family History Problem Relation Age of Onset  Heart Disease Mother  COPD Mother  COPD Father  Diabetes Father  Hypertension Father  Alcohol abuse Sister  Diabetes Brother  High Cholesterol Brother  Hypertension Brother  Anesth Problems Neg Hx History reviewed, no pertinent family history. Social History Tobacco Use  Smoking status: Former Smoker Packs/day: 0.50 Years: 40.00 Pack years: 20.00 Last attempt to quit: 2016 Years since quittin.7  Smokeless tobacco: Never Used Substance Use Topics  Alcohol use: No  
  Alcohol/week: 0.0 oz  
  Comment: OCCASIONAL Allergies Allergen Reactions  Atorvastatin Myalgia  Pcn [Penicillins] Hives Current Facility-Administered Medications Medication Dose Route Frequency  metoprolol (LOPRESSOR) injection 5 mg  5 mg IntraVENous Q6H  
 TPN ADULT - CENTRAL   IntraVENous CONTINUOUS  
 TPN ADULT - CENTRAL   IntraVENous CONTINUOUS  
 insulin NPH (NOVOLIN N, HUMULIN N) injection 25 Units  25 Units SubCUTAneous ACB&D  
 enoxaparin (LOVENOX) injection 40 mg  40 mg SubCUTAneous Q24H  cloNIDine (CATAPRES) 0.3 mg/24 hr patch 1 Patch  1 Patch TransDERmal Q7D  
 insulin lispro (HUMALOG) injection   SubCUTAneous Q6H  
 glucose chewable tablet 16 g  4 Tab Oral PRN  
 glucagon (GLUCAGEN) injection 1 mg  1 mg IntraMUSCular PRN  
 sodium chloride (NS) flush 5-40 mL  5-40 mL IntraVENous Q8H  
 sodium chloride (NS) flush 5-40 mL  5-40 mL IntraVENous PRN  
 nitroglycerin (NITROBID) 2 % ointment 1 Inch  1 Inch Topical Q6H  
 HYDROmorphone (PF) (DILAUDID) injection 0.5 mg  0.5 mg IntraVENous Q3H PRN  
 naloxone (NARCAN) injection 0.4 mg  0.4 mg IntraVENous PRN  
 ondansetron (ZOFRAN) injection 4 mg  4 mg IntraVENous Q4H PRN  
 hydrALAZINE (APRESOLINE) 20 mg/mL injection 10 mg  10 mg IntraVENous Q4H PRN  
 0.9% sodium chloride infusion  75 mL/hr IntraVENous CONTINUOUS  
 pantoprazole (PROTONIX) 40 mg in sodium chloride 0.9% 10 mL injection  40 mg IntraVENous DAILY  
 
 
 
 LAB AND IMAGING FINDINGS:  
 
Lab Results Component Value Date/Time WBC 7.0 06/05/2019 02:55 AM  
 HGB 13.1 06/05/2019 02:55 AM  
 PLATELET 240 84/63/8448 02:55 AM  
 
Lab Results Component Value Date/Time Sodium 146 (H) 06/05/2019 02:55 AM  
 Potassium 3.9 06/05/2019 02:55 AM  
 Chloride 113 (H) 06/05/2019 02:55 AM  
 CO2 25 06/05/2019 02:55 AM  
 BUN 32 (H) 06/05/2019 02:55 AM  
 Creatinine 1.36 (H) 06/05/2019 02:55 AM  
 Calcium 8.4 (L) 06/05/2019 02:55 AM  
 Magnesium 3.2 (H) 06/05/2019 02:55 AM  
 Phosphorus 3.8 06/05/2019 02:55 AM  
  
Lab Results Component Value Date/Time AST (SGOT) 38 (H) 06/05/2019 02:55 AM  
 Alk.  phosphatase 144 (H) 06/05/2019 02:55 AM  
 Protein, total 7.8 06/05/2019 02:55 AM  
 Albumin 3.5 06/05/2019 02:55 AM  
 Globulin 4.3 (H) 06/05/2019 02:55 AM  
 
Lab Results Component Value Date/Time INR 1.0 04/26/2017 12:24 PM  
 INR (POC) 1.2 (H) 03/05/2018 10:47 AM  
 Prothrombin time 10.1 04/26/2017 12:24 PM  
 aPTT 28.6 10/25/2016 05:07 PM  
  
No results found for: IRON, FE, TIBC, IBCT, PSAT, FERR No results found for: PH, PCO2, PO2 No components found for: [place] No results found for: CPK, CKMB Total time:  
Counseling / coordination time, spent as noted above:  
> 50% counseling / coordination?:  
 
Prolonged service was provided for  []30 min   []75 min in face to face time in the presence of the patient, spent as noted above. Time Start:  
Time End:  
Note: this can only be billed with 74394 (initial) or 32736 (follow up). If multiple start / stop times, list each separately.

## 2019-06-06 NOTE — PROGRESS NOTES
Bedside shift change report given to me (oncoming nurse) by Liz Sorensen RN (offgoing nurse). Report included the following information SBAR, Kardex, Intake/Output, MAR and Recent Results.

## 2019-06-06 NOTE — INTERDISCIPLINARY ROUNDS
Oncology Interdisciplinary rounds were held today to discuss patient plan of care and outcomes. The following members were present: Nursing, Physician, Case Management, Pharmacy, and PT/OT Actual Length of Stay: 4 DRG GLOS: 3.3 Expected Length of Stay: 3d 7h 
 
 
 
               Plan            Discharge Continue NG tube Daily X rays Palliative Care Consult Home with family Transport Family

## 2019-06-06 NOTE — PROGRESS NOTES
Nutrition Assessment: 
 
INTERVENTIONS/RECOMMENDATIONS:  
Parenteral Nutrition: · Continue current TPN order of D20 5%AA @ 63 ml/hr · Consider adding 250 ml of 20% lipids 3x per week to better meet kcal needs and provide essential fatty acids ASSESSMENT:  
Chart reviewed. TPN (D20 5%AA) @ goal of 63 ml/hr. Lipids have not been started. Without lipids current TPN meets ~80% of estimated kcal needs. NG output of 2625 ml yesterday, noted for hypernatremia. Diet Order: NPO 
% Eaten:  No data found. Pertinent Medications: [x]Reviewed: humalog, NPH, PPI Pertinent Labs: [x]Reviewed: B-260, Na 146 Food Allergies: [x]NKFA  []Other Last BM:   Edema:  n/a    []RUE   []LUE   []RLE   []LLE Pressure Ulcer:   n/a   [] Stage I   [] Stage II   [] Stage III   [] Stage IV Anthropometrics: Height: 5' 10\" (177.8 cm) Weight: 110 kg (242 lb 8.1 oz) IBW (%IBW):   ( ) UBW (%UBW):   (  %) BMI: Body mass index is 34.8 kg/m². This BMI is indicative of: 
[]Underweight   []Normal   []Overweight   [x] Obesity   [] Extreme Obesity (BMI>40) Last Weight Metrics: 
Weight Loss Metrics 2018 Today's Wt 242 lb 8.1 oz 245 lb 245 lb 245 lb 249 lb 247 lb 244 lb BMI 34.8 kg/m2 36.18 kg/m2 36.18 kg/m2 36.18 kg/m2 36.77 kg/m2 36.48 kg/m2 36.03 kg/m2 Estimated Nutrition Needs (Based on): 1700 Kcals/day(20 kcal/kg Adj BW) , 85 g(0.8 g/kg) Protein Carbohydrate: At Least 130 g/day  Fluids: 1700 mL/day or per primary team 
 
NUTRITION DIAGNOSES:  
Problem:  Altered GI function Etiology: related to metastatic rectal cancer and SBO Signs/Symptoms: as evidenced by need for TPN to meet nutrition needs Previous Nutrition Dx:  [] Resolved  [] Unresolved           [x] Progressing NUTRITION INTERVENTIONS: 
  Enteral/Parenteral Nutrition: Initiate parenteral nutrition GOAL:  
add lipids 3x per week in 24-48 hrs NUTRITION MONITORING AND EVALUATION Food/Nutrient Intake Outcomes: Total energy intake Physical Signs/Symptoms Outcomes: Weight/weight change, Electrolyte and renal profile, Glucose profile, GI 
 
Previous Goal Met: 
 [x] Met              [] Progressing Towards Goal              [] Not Progressing Towards Goal  
Previous Recommendations: 
 [x] Implemented          [] Not Implemented          [] Not Applicable LEARNING NEEDS (Diet, Food/Nutrient-Drug Interaction):  
 [x] None Identified 
 [] Identified and Education Provided/Documented 
 [] Identified and Pt declined/was not appropriate Cultural, Gnosticist, OR Ethnic Dietary Needs:  
 [x] None Identified 
 [] Identified and Addressed 
 
 [x] Interdisciplinary Care Plan Reviewed/Documented  
 [x] Discharge Planning: Consistent carb diet 
 [] Participated in Interdisciplinary Rounds NUTRITION RISK:  
 [x] High              [] Moderate           []  Low  []  Minimal/Uncompromised Ashley Akers RDN Pager [phone] Weekend Pager [phone]

## 2019-06-06 NOTE — PROGRESS NOTES
Spiritual Care Assessment/Progress Note [address] 
 
 
NAME: Mike Gonzales      MRN: [id number] AGE: 70 y.o. SEX: male Mandaen Affiliation: [facility]  
Language: [place] 6/6/2019     Total Time (in minutes): 7 Spiritual Assessment begun in [facility]COL[facility] through conversation with: 
  
    [x]Patient        [] Family    [] Friend(s) Reason for Consult: Palliative Care, Initial/Spiritual Assessment Spiritual beliefs: (Please include comment if needed) [x] Identifies with a leanne tradition:     
   [x] Supported by a leanne community:        
   [] Claims no spiritual orientation:       
   [] Seeking spiritual identity:            
   [] Adheres to an individual form of spirituality:       
   [] Not able to assess:                   
 
    
Identified resources for coping:  
   [] Prayer                           
   [] Music                  [] Guided Imagery [x] Family/friends                 [] Pet visits [] Devotional reading                         [] Unknown 
   [] Other:                                         
 
 
Interventions offered during this visit: (See comments for more details) Patient Interventions: Affirmation of emotions/emotional suffering, Affirmation of leanne, Coping skills reviewed/reinforced, Initial/Spiritual assessment, patient floor, Normalization of emotional/spiritual concerns Plan of Care: 
 
 [x] Support spiritual and/or cultural needs  
 [] Support AMD and/or advance care planning process    
 [] Support grieving process 
 [] Coordinate Rites and/or Rituals  
 [] Coordination with community clergy 
 [x] No spiritual needs identified at this time 
 [] Detailed Plan of Care below (See Comments)  [] Make referral to Music Therapy 
[] Make referral to Pet Therapy    
[] Make referral to Addiction services 
[] Make referral to [facility] 
[] Make referral to Spiritual Care Partner [] No future visits requested       
[x] Follow up visits as needed Comments:  
Initial spiritual assessment in Oncology. Patient shared about leanne. Provided effective listening. Consulted with patient. Advised of  Availability. [facility]  Paging Service [phone])

## 2019-06-06 NOTE — DIABETES MGMT
[facility] Progress Note Recommendations/ Comments:  If appropriate, please consider: adding regular insulin to TPN, consider 16 units/L for a total of total of 24 units delivered. Chart reviewed on Bhavna Calero. Current hospital DM medication: NPH 25 units BID, Lispro Correctional insulin with insulin resistant sensitivity Patient is a 70 y.o. male with known diabetes on Lantus 52 units daily, Lispro correctional insulin per scale 4 times daily, Metformin 1000 mg BID at home. A1c:  
Lab Results Component Value Date/Time Hemoglobin A1c 6.8 (H) 06/03/2019 03:26 AM  
 Hemoglobin A1c 6.5 (H) 10/17/2016 02:40 AM  
 
 
Recent Glucose Results:  
Lab Results Component Value Date/Time GLUCPOC 249 (H) 06/06/2019 11:47 AM  
 GLUCPOC 216 (H) 06/06/2019 05:14 AM  
 GLUCPOC 250 (H) 06/05/2019 11:59 PM  
  
 
Lab Results Component Value Date/Time Creatinine 1.36 (H) 06/05/2019 02:55 AM  
 
Estimated Creatinine Clearance: 63.7 mL/min (A) (based on SCr of 1.36 mg/dL (H)). Active Orders Diet DIET NPO With [employer] PO intake: No data found. Will continue to follow as needed. Thank you. Juan Diego Wynn RD [facility] Office:  [phone] Time spent: 5 minutes

## 2019-06-06 NOTE — PROGRESS NOTES
Palliative Medicine [place]: [phone] [facility]: [phone] ([phone]) Yue Rodriguez is a 71 yo ,  gentleman with a diagnosis of metastatic rectal cancer. He was admitted due to a SBO. Patient was diagnosed with Colon Cancer in 2016 per his wife Anjum Roldan. Patient currently with NG tube and is NPO. Patient reports he has a very supportive family with two children and five grand kids, ages 9 - 17 yo and a very loving dog \"Peppy\" (who he calls \"Worthless\" as a joke). Palliative team was asked to see patient and his wife for Care Decisions. Patient is Full code and does not have an AMD. Dr Juan Gray met with patient and wife initially to introduce role of Palliative team. Wife, Anjum Roldan initially equated Palliative with hospice and was somewhat taken aback. Palliative team did not discuss any care decisions today, will follow up, main role today was to establish a relationship with family. Patient is alert, oriented, noted that he feels \"better than yesterday\". He seemed a little tired, closed his eyes on and off. He shared that he joe by \"mind over [place]" per his verbalization. Anjum Roldan indicated \"we're making it\". Palliative LCSW spent time building a relationship and we will follow up as appropriate to provide support and address care decisions (AMD, Code Status) if family willing to engage in that conversation.

## 2019-06-06 NOTE — PROGRESS NOTES
Hospitalist Progress Note NAME: Juliette Souza :  1950 MRN:  [id number] Assessment / Plan: 
Small Bowel Obstruction POA- s/p NGT to suction for now Colon Cancer s/p resection with recurrence/Rectal Adenocarcinoma on Chemotx CT abd/pelvis on :Small bowel distention with decompressed loops distally is compatible with obstruction likely related to effusion formation in the mid lower abdomen. Mild free fluid KUB on :Multiple dilated loops of small bowel. KUB on :Diminished small bowel distention Cont NG to suction, NPO for now Serial KUBs to follow SBO till resolution IP CRSurgery consult appreciated- cont supportive care for now, no role for surgical intervention at this time, follow up with Surgery today due to worsened KUB today IP Oncology consult appreciated & noted- Chemo delayed for now Cont IV dilaudid prn pain and IV antiemetics prn On iv fluid On TPN. Pt feeling better,noted small amount of stool in colostomy bag today Will continue to follow surgery recommendation. 
  
BOBBI POA- Cr was 1.4 yesterday, likely pre-renal 
-hold home ACEI Cont IVF's at 75 cc/hr 
-monitor renal function daily- BMP daily 
-creat 1.36 on  
  
Non-sepcific Leukocytosis POA: suspect reactive, resolved now 
-monitor 
-No indication for infection. 
  
Hypertension 
-holding PO meds 
-place on clonidine patch and scheduled Nitrobid 
-prn IV hydralazine ordered Decreased IVF on  
  
Diabetes 
-reduced basal insulin NPH to 25 units BID on  
-placed on SSI Mild hypernatremia 
-will monitor Baseline:functional 
 
 
30.0 - 39.9 Obese / Body mass index is 34.8 kg/m². Weight loss recommended- Exercise Code status: Full Surrogate Decision Maker: Wife 
  
Prophylaxis: Hep SQ and H2B/PPI Recommended Disposition: Home w/Family when SBO resolved & eating - few days anticipated Subjective: Chief Complaint / Reason for Physician Visit: F/U Dehydration/BOBBI, SBO, s/p NGT decompression, recurrent Colorectal cancer \"Feeling better than previous days\". Discussed with RN events overnight. Review of Systems: 
Symptom Y/N Comments  Symptom Y/N Comments Fever/Chills n   Chest Pain n   
Poor Appetite y   Edema n   
Cough n   Abdominal Pain n   
Sputum n   Joint Pain SOB/TALBOT n   Pruritis/Rash Nausea/vomit n   Tolerating PT/OT y Diarrhea    Tolerating Diet  NPO with NGT to suction at present Constipation    Other Could NOT obtain due to:   
 
Objective: VITALS:  
Last 24hrs VS reviewed since prior progress note. Most recent are: 
Patient Vitals for the past 24 hrs: 
 Temp Pulse Resp BP SpO2  
06/06/19 0800 100.3 °F (37.9 °C) (!) 119 18 128/78 95 % 06/06/19 0452 99.1 °F (37.3 °C) (!) 129 18 185/86 97 % 06/06/19 0255 98.2 °F (36.8 °C) (!) 101  169/87   
06/05/19 2349 98 °F (36.7 °C) 99 18 151/82 99 % 06/05/19 1943 97.8 °F (36.6 °C) (!) 106 18 164/84 100 % 06/05/19 1520 97.5 °F (36.4 °C) (!) 101 16 (!) 166/91 99 % Intake/Output Summary (Last 24 hours) at 6/6/2019 1436 Last data filed at 6/6/2019 1425 Gross per 24 hour Intake 3193.75 ml Output 4125 ml Net -931.25 ml PHYSICAL EXAM: 
General: WD, WN. Alert, cooperative, no acute distress, Obese +   
EENT:  EOMI. Anicteric sclerae. MMM Resp:  CTA bilaterally, no wheezing or rales. No accessory muscle use CV:  Regular  rhythm,  No edema GI:  Abdomen soft, Non tender.  +Bowel sounds, Colostomy noted without stool in. Neurologic:  Alert and oriented X 3, normal speech, Psych:   Good insight. Not anxious nor agitated Skin:  No rashes. No jaundice Reviewed most current lab test results and cultures  YES Reviewed most current radiology test results   YES Review and summation of old records today    NO Reviewed patient's current orders and MAR    YES 
PMH/SH reviewed - no change compared to H&P 
________________________________________________________________________ Care Plan discussed with: 
  Comments Patient x Family  x Wife at bedside RN x Care Manager Consultant  x Dr Alanis Ahumada ([address]) on 6/3, Dr Miguel A Washington (onco) 6/3 Multidiciplinary team rounds were held today with , nursing, pharmacist and clinical coordinator. Patient's plan of care was discussed; medications were reviewed and discharge planning was addressed. ________________________________________________________________________ Total NON critical care TIME:  26   Minutes Total CRITICAL CARE TIME Spent:   Minutes non procedure based Comments >50% of visit spent in counseling and coordination of care    
________________________________________________________________________ Codi Candelaria MD  
 
Procedures: see electronic medical records for all procedures/Xrays and details which were not copied into this note but were reviewed prior to creation of Plan. LABS: 
I reviewed today's most current labs and imaging studies. Pertinent labs include: 
Recent Labs  
  06/05/19 
0255 06/04/19 
1028 WBC 7.0 8.7 HGB 13.1 13.9 HCT 39.1 42.0  174 Recent Labs  
  06/05/19 
0255 06/04/19 
1028 * 146*  
K 3.9 3.7 * 111* CO2 25 26 * 115* BUN 32* 32* CREA 1.36* 1.41* CA 8.4* 8.6 MG 3.2*  --   
PHOS 3.8  --   
ALB 3.5  --   
TBILI 2.7*  --   
SGOT 38*  --   
ALT 38  --   
 
 
Signed: Codi Candelaria MD

## 2019-06-06 NOTE — PROGRESS NOTES
Hematology Oncology Progress Note Follow up for: metastatic rectal cancer Chart notes reviewed since last visit. Case discussed with following: nursing staff. Oncology nurse navigator. Patient complains of the following: NG came out inadvertently. Does not feel bloated at present. Small amount of serosangionous material present in ostomy bag Additional concerns noted by the staff:  
 
Patient Vitals for the past 24 hrs: 
 BP Temp Pulse Resp SpO2 Weight 06/06/19 0800 128/78 100.3 °F (37.9 °C) (!) 119 18 95 %   
06/06/19 0559      110 kg (242 lb 8.1 oz) 06/06/19 0452 185/86 99.1 °F (37.3 °C) (!) 129 18 97 %   
06/06/19 0255 169/87 98.2 °F (36.8 °C) (!) 101     
06/05/19 2349 151/82 98 °F (36.7 °C) 99 18 99 %   
06/05/19 1943 164/84 97.8 °F (36.6 °C) (!) 106 18 100 %   
06/05/19 1520 (!) 166/91 97.5 °F (36.4 °C) (!) 101 16 99 %   
06/05/19 1056 161/86 97.9 °F (36.6 °C) 95 16 99 %   
 
 
ROS negative for 11 organ systems except as ntoed above. Physical Examination: 
Constitutional Alert, cooperative, oriented. Mood and affect appropriate. Appears close to chronological age. Well nourished. Well developed. Head Normocephalic; no scars Eyes Conjunctivae and sclerae are clear and without icterus. Pupils are round ENMT Sinuses are nontender. No oral exudates, ulcers, masses, thrush or mucositis. Oropharynx clear. Tongue normal.  
Neck Supple without masses or thyromegaly. No jugular venous distension. Hematologic/Lymphatic No petechiae or purpura. No tender or palpable lymph nodes noted. Respiratory Lungs are clear to auscultation without rhonchi or wheezing. Cardiovascular Regular rate and rhythm of heart Chest / Line Site Chest is symmetric with no chest wall deformities. Abdomen Non-tender, mildly distended, no masses, ascites or hepatosplenomegaly. hypoactive bowel sounds. Bilateral ostomies Musculoskeletal No tenderness or swelling, normal range of motion without obvious weakness. Extremities No visible deformities, no cyanosis, clubbing or edema. Skin No rashes, scars, or lesions suggestive of malignancy. No petechiae, purpura, or ecchymoses. No excoriations. Neurologic No sensory or motor deficits noted but not tested. Psychiatric Alert and oriented. Coherent speech. Verbalizes understanding of our discussions today. Labs: 
Recent Results (from the past 24 hour(s)) GLUCOSE, POC Collection Time: 06/05/19 10:59 AM  
Result Value Ref Range Glucose (POC) 260 (H) 65 - 100 mg/dL Performed by Prasanth Mcgarry (PCT) GLUCOSE, POC Collection Time: 06/05/19  5:36 PM  
Result Value Ref Range Glucose (POC) 248 (H) 65 - 100 mg/dL Performed by Hoda Moncada GLUCOSE, POC Collection Time: 06/05/19 11:59 PM  
Result Value Ref Range Glucose (POC) 250 (H) 65 - 100 mg/dL Performed by [address] [place] [address] ([facility]) PCT   
GLUCOSE, POC Collection Time: 06/06/19  5:14 AM  
Result Value Ref Range Glucose (POC) 216 (H) 65 - 100 mg/dL Performed by Osman Schmidt Imaging: 
KUB: 
Direct comparison is made to prior plain radiographs dated Juanita 3, 2019. 
  
Nasogastric tube and side port overlie the stomach. There are several dilated 
small bowel loops in the abdomen. Degree of small bowel dilatation appears to 
have worsened as compared to prior plain radiographs dated Juanita 3, 2019. No free 
intraperitoneal gas is visualized on supine views. No pathologic calcification 
is seen. 
  
IMPRESSION: Multiple dilated loops of small bowel. Assessment and Plan: SBO: persistent. Appears distended to me but reports not feeling bloated this AM. NG replaced just now. Xrays pending. Continue NG and TPN 
 
DM - on insulin Hypertension - clonidine patch. nitobid ordered. No labs thus far today.  I have ordered a BMP - need to make sure IVF compensating for losses. Sodium climbing and BUN a bit high yesterday.

## 2019-06-07 NOTE — PROGRESS NOTES
Talked to him and nursing on the phone today. SBO sounds like it persists but may be slowly improving. He feels better but ngt still putting out quite a bit. Suspect this to be a malignant process, but hoping it will resolve spontaneously. If it doesn't, then he will need a resection vs small bowel bypass vs hospice.

## 2019-06-07 NOTE — PROGRESS NOTES
CRYSTAL Plan: 
 
A) Follow up w/PCP & Oncologist.   
 
 
SW met w/patient and spouse. Inquired if he'd be interested in home health. Pt declined at this time. Writer will follow back up. Pt to discharge home w/family when medically stable. Cinderella Osgood, MSW 
[phone]

## 2019-06-07 NOTE — PROGRESS NOTES
Bedside and Verbal shift change report given to Howard Ferguson RN (oncoming nurse) by Jatinder Valencia RN (offgoing nurse). Report included the following information SBAR, Kardex and MAR.

## 2019-06-07 NOTE — DIABETES MGMT
[facility] Progress Note Recommendations/ Comments: noted NPH increased to 28 units BID. Pt already receiving home dose of insulin needs (hgb A1c controlled) so pt's elevated BG likely r/t TPN. If insulin were in TPN and TPN were to be turned off or weaned, risk for variable BG or low BG is lower with insulin in TPN vs using NPH to cover BG elevations from TPN. therefore recommend: continuing pt on NPH 25 units BID and  adding regular insulin to TPN, consider 16 units/L for a total of total of 24 units delivered. Message sent to Dr. Dilia Manzanares Chart reviewed on Melissa Garcia. Current hospital DM medication: NPH 25 units BID, Lispro Correctional insulin with insulin resistant sensitivity Patient is a 70 y.o. male with known diabetes on Lantus 52 units daily, Lispro correctional insulin per scale 4 times daily, Metformin 1000 mg BID at home. A1c:  
Lab Results Component Value Date/Time Hemoglobin A1c 6.8 (H) 06/03/2019 03:26 AM  
 Hemoglobin A1c 6.5 (H) 10/17/2016 02:40 AM  
 
 
Recent Glucose Results:  
Lab Results Component Value Date/Time  (H) 06/07/2019 03:41 AM  
 GLUCPOC 271 (H) 06/07/2019 11:01 AM  
 GLUCPOC 231 (H) 06/07/2019 06:05 AM  
 GLUCPOC 233 (H) 06/06/2019 11:48 PM  
  
 
Lab Results Component Value Date/Time Creatinine 1.73 (H) 06/07/2019 03:41 AM  
 
Estimated Creatinine Clearance: 50 mL/min (A) (based on SCr of 1.73 mg/dL (H)). Active Orders Diet DIET NPO With [employer] PO intake: No data found. Will continue to follow as needed. Thank you. Shahbaz Baptiste RD [facility] Office:  [phone] Time spent: 5 minutes

## 2019-06-07 NOTE — PROGRESS NOTES
Hospitalist Progress Note NAME: Deyvi Aquino :  1950 MRN:  [id number] Assessment / Plan: 
Small Bowel Obstruction POA- s/p NGT to suction for now Colon Cancer s/p resection with recurrence/Rectal Adenocarcinoma on Chemotx CT abd/pelvis on :Small bowel distention with decompressed loops distally is compatible with obstruction likely related to effusion formation in the mid lower abdomen. Mild free fluid KUB on :Multiple dilated loops of small bowel. KUB on :Diminished small bowel distention Cont NG to suction, NPO for now Serial KUBs to follow SBO till resolution IP CRSurgery consult appreciated- cont supportive care for now, no role for surgical intervention at this time, follow up with Surgery today due to worsened KUB today IP Oncology consult appreciated & noted- Chemo delayed for now Cont IV dilaudid prn pain and IV antiemetics prn On iv fluid On TPN. Pt feeling better,not much colostomy output Will continue to follow surgery recommendation. 
  
BOBBI POA- Cr was 1.73 today, likely pre-renal 
Increase IVF rate. Monitor renal function. -monitor renal function daily- BMP daily 
 
  
Non-sepcific Leukocytosis POA: suspect reactive, resolved now 
-monitor 
-No indication for infection. 
  
Hypertension 
-holding PO meds 
-place on clonidine patch and scheduled Nitrobid 
-prn IV hydralazine ordered 
 
  
Diabetes Mellitus with hyperglycemia 
-Increased basal insulin NPH to 28 units BID on  
-placed on SSI Mild hypernatremia 
-will monitor Baseline:functional 
 
 
30.0 - 39.9 Obese / Body mass index is 34.8 kg/m². Weight loss recommended- Exercise Code status: Full Surrogate Decision Maker: Wife 
  
Prophylaxis: Hep SQ and H2B/PPI Recommended Disposition: Home w/Family when SBO resolved & eating - few days anticipated Subjective: Chief Complaint / Reason for Physician Visit: F/U Dehydration/BOBBI, SBO, s/p NGT decompression, recurrent Colorectal cancer \"Feeling better than previous days\". Objective: VITALS:  
Last 24hrs VS reviewed since prior progress note. Most recent are: 
Patient Vitals for the past 24 hrs: 
 Temp Pulse Resp BP SpO2  
06/07/19 0800 98 °F (36.7 °C) 86 18 132/71 97 % 06/07/19 0316 98.3 °F (36.8 °C) 91 16 145/75 95 % 06/06/19 2315 98.5 °F (36.9 °C) 96 16 146/77 97 % 06/06/19 1946 98.4 °F (36.9 °C) 89 16 140/75 97 % 06/06/19 1449 97.5 °F (36.4 °C) 78 16 134/69 96 % Intake/Output Summary (Last 24 hours) at 6/7/2019 0957 Last data filed at 6/7/2019 5054 Gross per 24 hour Intake  Output 4450 ml Net -4450 ml PHYSICAL EXAM: 
General: WD, WN. Alert, cooperative, no acute distress, Obese +   
EENT:  EOMI. Anicteric sclerae. MMM Resp:  CTA bilaterally, no wheezing or rales. No accessory muscle use CV:  Regular  rhythm,  No edema GI:  Abdomen soft, Non tender. , Colostomy noted without stool in. Neurologic:  Alert and oriented X 3, normal speech, Psych:   Good insight. Not anxious nor agitated Skin:  No rashes. No jaundice Reviewed most current lab test results and cultures  YES Reviewed most current radiology test results   YES Review and summation of old records today    NO Reviewed patient's current orders and MAR    YES 
PMH/SH reviewed - no change compared to H&P 
________________________________________________________________________ Care Plan discussed with: 
  Comments Patient x Family  x Wife at bedside RN Care Manager Consultant   Dr Gigi Sanchez ([address]) on 6/3, Dr Brien Dos Santos (onco) 6/3 Multidiciplinary team rounds were held today with , nursing, pharmacist and clinical coordinator. Patient's plan of care was discussed; medications were reviewed and discharge planning was addressed. ________________________________________________________________________ ________________________________________________________________________ Brendan Huang MD  
 
Procedures: see electronic medical records for all procedures/Xrays and details which were not copied into this note but were reviewed prior to creation of Plan. LABS: 
I reviewed today's most current labs and imaging studies. Pertinent labs include: 
Recent Labs  
  06/07/19 0341 06/05/19 
0255 06/04/19 
1028 WBC 9.5 7.0 8.7 HGB 12.4 13.1 13.9 HCT 37.1 39.1 42.0  184 174 Recent Labs  
  06/07/19 0341 06/05/19 
0255 06/04/19 
1028 * 146* 146*  
K 4.1 3.9 3.7 * 113* 111* CO2 24 25 26 * 219* 115* BUN 43* 32* 32* CREA 1.73* 1.36* 1.41* CA 8.7 8.4* 8.6 MG 3.2* 3.2*  --   
PHOS  --  3.8  --   
ALB 3.2* 3.5  --   
TBILI 2.6* 2.7*  --   
SGOT 32 38*  --   
ALT 44 38  --   
 
 
Signed: Brendan Huang MD

## 2019-06-07 NOTE — PROGRESS NOTES
Hematology Oncology Progress Note Follow up for: metastatic rectal cancer Chart notes reviewed since last visit. Case discussed with following: wife at bedside, pharmacist.  
 
Patient complains of the following: fairly comfortable at present. Does not feel bloated. Small amount of serosangionous material present in ostomy bag - unchanged from yesterday - did not appreciate any stool in bag yesterday or today. Additional concerns noted by the staff:  
 
Patient Vitals for the past 24 hrs: 
 BP Temp Pulse Resp SpO2  
06/07/19 0800 132/71 98 °F (36.7 °C) 86 18 97 % 06/07/19 0316 145/75 98.3 °F (36.8 °C) 91 16 95 % 06/06/19 2315 146/77 98.5 °F (36.9 °C) 96 16 97 % 06/06/19 1946 140/75 98.4 °F (36.9 °C) 89 16 97 % 06/06/19 1449 134/69 97.5 °F (36.4 °C) 78 16 96 % ROS negative for 11 organ systems except as ntoed above. Physical Examination: 
Constitutional Alert, cooperative, oriented. Mood and affect appropriate. Appears close to chronological age. Well nourished. Well developed. Head Normocephalic; no scars Eyes Conjunctivae and sclerae are clear and without icterus. Pupils are round ENMT Sinuses are nontender. No oral exudates, ulcers, masses, thrush or mucositis. Oropharynx clear. Tongue normal.  
Neck Supple without masses or thyromegaly. No jugular venous distension. Hematologic/Lymphatic No petechiae or purpura. No tender or palpable lymph nodes noted. Respiratory Lungs are clear to auscultation without rhonchi or wheezing. Cardiovascular Regular rate and rhythm of heart Chest / Line Site Chest is symmetric with no chest wall deformities. Abdomen Non-tender, mildly distended, no masses, ascites or hepatosplenomegaly. Very hypoactive bowel sounds. Bilateral ostomies Musculoskeletal No tenderness or swelling, normal range of motion without obvious weakness. Extremities No visible deformities, no cyanosis, clubbing or edema. Skin No rashes, scars, or lesions suggestive of malignancy. No petechiae, purpura, or ecchymoses. No excoriations. Neurologic No sensory or motor deficits noted but not tested. Psychiatric Alert and oriented. Coherent speech. Verbalizes understanding of our discussions today. Labs: 
Recent Results (from the past 24 hour(s)) GLUCOSE, POC Collection Time: 06/06/19 11:47 AM  
Result Value Ref Range Glucose (POC) 249 (H) 65 - 100 mg/dL Performed by Gwendlyn Koyanagi GLUCOSE, POC Collection Time: 06/06/19  6:20 PM  
Result Value Ref Range Glucose (POC) 231 (H) 65 - 100 mg/dL Performed by Mary Ellen STEEN)   
GLUCOSE, POC Collection Time: 06/06/19 11:48 PM  
Result Value Ref Range Glucose (POC) 233 (H) 65 - 100 mg/dL Performed by Maranda WEISS) PCT   
CBC WITH AUTOMATED DIFF Collection Time: 06/07/19  3:41 AM  
Result Value Ref Range WBC 9.5 4.1 - 11.1 K/uL  
 RBC 3.87 (L) 4.10 - 5.70 M/uL  
 HGB 12.4 12.1 - 17.0 g/dL HCT 37.1 36.6 - 50.3 % MCV 95.9 80.0 - 99.0 FL  
 MCH 32.0 26.0 - 34.0 PG  
 MCHC 33.4 30.0 - 36.5 g/dL  
 RDW 17.2 (H) 11.5 - 14.5 % PLATELET 742 018 - 195 K/uL MPV 10.2 8.9 - 12.9 FL  
 NRBC 0.0 0  WBC ABSOLUTE NRBC 0.00 0.00 - 0.01 K/uL NEUTROPHILS 76 (H) 32 - 75 % LYMPHOCYTES 9 (L) 12 - 49 % MONOCYTES 10 5 - 13 % EOSINOPHILS 4 0 - 7 % BASOPHILS 0 0 - 1 % IMMATURE GRANULOCYTES 1 (H) 0.0 - 0.5 % ABS. NEUTROPHILS 7.2 1.8 - 8.0 K/UL  
 ABS. LYMPHOCYTES 0.9 0.8 - 3.5 K/UL  
 ABS. MONOCYTES 1.0 0.0 - 1.0 K/UL  
 ABS. EOSINOPHILS 0.4 0.0 - 0.4 K/UL  
 ABS. BASOPHILS 0.0 0.0 - 0.1 K/UL  
 ABS. IMM. GRANS. 0.1 (H) 0.00 - 0.04 K/UL  
 DF AUTOMATED METABOLIC PANEL, COMPREHENSIVE Collection Time: 06/07/19  3:41 AM  
Result Value Ref Range Sodium 147 (H) 136 - 145 mmol/L Potassium 4.1 3.5 - 5.1 mmol/L Chloride 116 (H) 97 - 108 mmol/L  
 CO2 24 21 - 32 mmol/L  Anion gap 7 5 - 15 mmol/L  
 Glucose 256 (H) 65 - 100 mg/dL BUN 43 (H) 6 - 20 MG/DL Creatinine 1.73 (H) 0.70 - 1.30 MG/DL  
 BUN/Creatinine ratio 25 (H) 12 - 20 GFR est AA 48 (L) >60 ml/min/1.73m2 GFR est non-AA 39 (L) >60 ml/min/1.73m2 Calcium 8.7 8.5 - 10.1 MG/DL Bilirubin, total 2.6 (H) 0.2 - 1.0 MG/DL  
 ALT (SGPT) 44 12 - 78 U/L  
 AST (SGOT) 32 15 - 37 U/L Alk. phosphatase 146 (H) 45 - 117 U/L Protein, total 7.6 6.4 - 8.2 g/dL Albumin 3.2 (L) 3.5 - 5.0 g/dL Globulin 4.4 (H) 2.0 - 4.0 g/dL A-G Ratio 0.7 (L) 1.1 - 2.2 MAGNESIUM Collection Time: 06/07/19  3:41 AM  
Result Value Ref Range Magnesium 3.2 (H) 1.6 - 2.4 mg/dL GLUCOSE, POC Collection Time: 06/07/19  6:05 AM  
Result Value Ref Range Glucose (POC) 231 (H) 65 - 100 mg/dL Performed by [address] [place] [address] ([facility]) PCT Imaging: 
KUB: 
Direct comparison is made to prior plain radiographs dated Juanita 3, 2019. 
  
Nasogastric tube and side port overlie the stomach. There are several dilated 
small bowel loops in the abdomen. Degree of small bowel dilatation appears to 
have worsened as compared to prior plain radiographs dated Juanita 3, 2019. No free 
intraperitoneal gas is visualized on supine views. No pathologic calcification 
is seen. 
  
IMPRESSION: Multiple dilated loops of small bowel. Assessment and Plan: SBO: persistent. reports not feeling bloated this AM.   Continue NG and TPN - he put out 3200 ml via NG yesterday, 750 ml of urinary output and 500 ml of \"other\" and nursing notes indicate an output of 4450 ml over 24 hours. . He has IVF at 75 ml/hr (per charting although ordered at 100 ml/hr) and TPN at 63 ml/hr for 24 hour intake of 3312 so he is running a deficit at present. His serum sodium, BUN, and creatinine are rising and his magnesium is too high.  Suspect we need to improve his fluid status and give him greater volume to compensate for output and lower the sodium (maybe change maintenance IVF from NSS to 1/2 NSS). Would suggest deleting the magnesium from his TPN as well. Dietician has recommended 250 ml three times a week of 20% lipids which will help meet his kcal needs better. He still has several chemotherapeutic options ahead for treatment so would favor a more aggressive approach if needed. He has a very good performance status should a decision need to be made re surgery or not. DM - on insulin (might be able to add his basal insulin to hyperal as well). Hypertension - clonidine patch. nitobid ordered. Jasbir Challenger

## 2019-06-07 NOTE — PROGRESS NOTES
Bedside shift change report given to Alex Horton RN (incoming nurse) by Viktor Peoples (outgoing nurse) on [facility].  Report included the following information SBAR, Kardex and [facility]

## 2019-06-07 NOTE — INTERDISCIPLINARY ROUNDS
Oncology Interdisciplinary rounds were held today to discuss patient plan of care and outcomes. The following members were present: Nursing, Physician, Case Management, Pharmacy, and PT/OT Actual Length of Stay: 5 DRG GLOS: 3.3 Expected Length of Stay: 3d 7h 
 
 
 
               Plan            Discharge Continue NG. Chemo on hold for now, Chemo in future. Discharge home with family when SBO resolves and patient is eating.

## 2019-06-07 NOTE — PROGRESS NOTES
Feels better when he has ice chips. No output from stoma Visit Vitals /74 (BP 1 Location: Left arm, BP Patient Position: At rest) Pulse 92 Temp 97.2 °F (36.2 °C) Resp 16 Ht 5' 10\" (1.778 m) Wt 110 kg (242 lb 8.1 oz) SpO2 96% BMI 34.80 kg/m² Date 06/06/19 0700 - 06/07/19 5824 06/07/19 0700 - 06/08/19 5308 Shift 4654-7918 6155-7528 24 Hour Total 6663-8987 3990-7286 24 Hour Total  
INTAKE Shift Total(mL/kg) OUTPUT Urine(mL/kg/hr)  750(0.6) 750(0.3) 1000  1000 Urine Voided    1000 Emesis/NG output 2700 500 3200 Output (ml) (Nasogastric Tube 06/06/19) 2700 500 3200 Other  500 500 Other Output  500 500 Shift Total(mL/kg) 2700(24.5) 1750(15.9) 4450(40.5) 1000(9.1)  1000(9.1) NET -2700 -1750 -4450 -1000  -1000 Weight (kg) 110 110 110 110 110 110  
 
abd soft A/p likely obstruction from malignancy in the pelvis or scar tissue in the pelvis or both. Given his need for further chemotherapy, I would prefer to manage this conservatively and hope he will resolve the obstruction spontaneously. If he does not resolve, then we will be forced to take him to the OR for internal bypass vs bowel resection.  This would delay further chemotherapy which would be less ideal.

## 2019-06-08 NOTE — PROGRESS NOTES
Oncology End of Shift Note Bedside shift change report given to JUS velásquez (incoming nurse) by Roscoe Abreu (outgoing nurse) on Prince Lewis. Report included the following information SBAR, Kardex, ED Summary, Intake/Output and MAR. Shift Summary:  
No change in patient condition throughout shift. NG tube continuous suction, canister changed twice. Pt ate ice chips with no difficulty. TPN running. No complaints of pain. Insulin coverage provided. Leads changed. Family at bedside. Half NS started. Issues for Physician to Address:  none Patient on Cardiac Monitoring? [x] Yes 
[] No 
 
Rhythm: sinus Shift Events Roscoe Abreu

## 2019-06-08 NOTE — PROGRESS NOTES
Hospitalist Progress Note NAME: Melissa Garcia :  1950 MRN:  [id number] Assessment / Plan: 
Small Bowel Obstruction POA- s/p NGT to suction for now Colon Cancer s/p resection with recurrence/Rectal Adenocarcinoma on Chemotx CT abd/pelvis on :Small bowel distention with decompressed loops distally is compatible with obstruction likely related to effusion formation in the mid lower abdomen. Mild free fluid KUB on :Multiple dilated loops of small bowel. KUB on :Diminished small bowel distention Cont NG to suction, NPO for now IP CRSurgery consult appreciated- cont supportive care for now, may need surgery if does not improve IP Oncology consult appreciated & noted- Chemo delayed for now Cont IV dilaudid prn pain and IV antiemetics prn On iv fluid On TPN. Pt feeling better,not much colostomy output Will continue to follow surgery recommendation. 
  
BOBBI POA- Cr was 1.64 today down from yesterday, likely pre-renal 
Continue IVF. Monitor renal function. -monitor renal function daily- BMP daily Hypermagnesemia: Remove magnesium from TPN 
 
  
Non-sepcific Leukocytosis POA: suspect reactive, resolved now 
-monitor 
-No indication for infection. 
  
Hypertension 
-holding PO meds 
-place on clonidine patch and scheduled Nitrobid 
-prn IV hydralazine ordered 
 
  
Diabetes Mellitus with hyperglycemia 
-Continue current dose of NPH, increase regular insulin in TPN 
-continue SSI Mild hypernatremia 
-Change IVF to hypotonics. Baseline:functional 
 
 
30.0 - 39.9 Obese / Body mass index is 34.8 kg/m². Weight loss recommended- Exercise Code status: Full Surrogate Decision Maker: Wife 
  
Prophylaxis: Hep SQ and H2B/PPI Recommended Disposition: Home w/Family when SBO resolved & eating - few days anticipated Subjective: Chief Complaint / Reason for Physician Visit: F/U Dehydration/BOBBI, SBO, s/p NGT decompression, recurrent Colorectal cancer \"Feeling better than previous days\". Still no colostomy output Objective: VITALS:  
Last 24hrs VS reviewed since prior progress note. Most recent are: 
Patient Vitals for the past 24 hrs: 
 Temp Pulse Resp BP SpO2  
06/08/19 0719 97.6 °F (36.4 °C) 80 18 148/68 96 % 06/08/19 0616    141/75   
06/08/19 0246 98.1 °F (36.7 °C) 88 18 147/82 100 % 06/08/19 0039    145/73   
06/07/19 2225 98.2 °F (36.8 °C) 63 18 133/71 100 % 06/07/19 1917 97.3 °F (36.3 °C) 83 16 149/71 96 % 06/07/19 1453 97.2 °F (36.2 °C) 92 16 146/74 96 % 06/07/19 1100 97.5 °F (36.4 °C) 93 16 139/67 96 % Intake/Output Summary (Last 24 hours) at 6/8/2019 9318 Last data filed at 6/8/2019 4578 Gross per 24 hour Intake 233.1 ml Output 3450 ml Net -3216.9 ml PHYSICAL EXAM: 
General: WD, WN. Alert, cooperative, no acute distress, Obese +   
EENT:  EOMI. Anicteric sclerae. MMM Resp:  CTA bilaterally, no wheezing or rales. No accessory muscle use CV:  Regular  rhythm,  No edema GI:  Abdomen soft, Non tender. , Colostomy noted without stool in. Neurologic:  Alert and oriented X 3, normal speech, Psych:   Good insight. Not anxious nor agitated Skin:  No rashes. No jaundice Reviewed most current lab test results and cultures  YES Reviewed most current radiology test results   YES Review and summation of old records today    NO Reviewed patient's current orders and MAR    YES 
PMH/SH reviewed - no change compared to H&P 
________________________________________________________________________ Care Plan discussed with: 
  Comments Patient x Family   Wife at bedside RN Care Manager Consultant   Dr Crys Marin ([facility]) on 6/3, Dr Valery Wilkins (onco) 6/3 Multidiciplinary team rounds were held today with , nursing, pharmacist and clinical coordinator. Patient's plan of care was discussed; medications were reviewed and discharge planning was addressed. ________________________________________________________________________ 
 
________________________________________________________________________ Mana Chauhan MD  
 
Procedures: see electronic medical records for all procedures/Xrays and details which were not copied into this note but were reviewed prior to creation of Plan. LABS: 
I reviewed today's most current labs and imaging studies. Pertinent labs include: 
Recent Labs  
  06/08/19 0248 06/07/19 
0341 WBC 7.3 9.5 HGB 12.4 12.4 HCT 39.2 37.1  188 Recent Labs  
  06/08/19 0248 06/07/19 
0341 * 147* K 4.3 4.1 * 116* CO2 24 24 * 256* BUN 37* 43* CREA 1.64* 1.73* CA 8.8 8.7 MG 3.1* 3.2*  
PHOS 4.2  --   
ALB 3.0* 3.2* TBILI 1.9* 2.6* SGOT 29 32 ALT 37 44 Signed: Mana Chauhan MD

## 2019-06-08 NOTE — PROGRESS NOTES
Problem: Falls - Risk of 
Goal: *Absence of Falls Description Document [facility] Fall Risk and appropriate interventions in the flowsheet.  
Outcome: Progressing Towards Goal

## 2019-06-08 NOTE — PROGRESS NOTES
Feels better when he has ice chips. No output from stoma Visit Vitals /68 (BP 1 Location: Left arm, BP Patient Position: At rest) Pulse 80 Temp 97.6 °F (36.4 °C) Resp 18 Ht 5' 10\" (1.778 m) Wt 110 kg (242 lb 8.1 oz) SpO2 96% BMI 34.80 kg/m² Date 06/07/19 0700 - 06/08/19 4652 06/08/19 0700 - 06/09/19 4420 Shift 9604-6161 9309-4143 24 Hour Total 3717-4422 9809-8990 24 Hour Total  
INTAKE  
P.O.  0 0     
  P. O.  0 0     
I. V.(mL/kg/hr)  233.1(0.2) 233.1(0.1) Volume (TPN ADULT - CENTRAL)  233.1 233.1 Shift Total(mL/kg)  233.1(2.1) 233.1(2.1) OUTPUT Urine(mL/kg/hr) 1000(0.8) 1300(1) 2300(0.9) Urine Voided 1000 1300 2300 Emesis/NG output  850 850 300  300 Output (ml) (Nasogastric Tube 06/06/19)  850 850 300  300 Shift Total(mL/kg) 1000(9.1) 0246(51.9) 3150(28.6) 300(2.7)  300(2.7) NET -1000 -1916.9 -2916.9 -300  -300 Weight (kg) 110 110 110 110 110 110  
 
abd soft A/p Continue TPN 
NPO with NG tube Await return of bowel function

## 2019-06-09 NOTE — PROGRESS NOTES
Oncology End of Shift Note Bedside shift change report given to JUS Solis (incoming nurse) by Shane Feliciano (outgoing nurse) on Janeen Prince. Report included the following information SBAR, Kardex, Intake/Output and MAR. Shift Summary: am labs drawn, no complaints of pain, no abdominal discomfort, wife stayed till around 10pm 
 
 
Issues for Physician to Address:   
 
Patient on Cardiac Monitoring? [x] Yes 
[] No 
 
Rhythm: NSR/ SV Shift Events Shane Feliciano

## 2019-06-09 NOTE — PROGRESS NOTES
Problem: Falls - Risk of 
Goal: *Absence of Falls Description Document [facility] Fall Risk and appropriate interventions in the flowsheet.  
Outcome: Progressing Towards Goal

## 2019-06-09 NOTE — PROGRESS NOTES
Oncology End of Shift Note Bedside shift change report given to JUS velásquez (incoming nurse) by Andres Hobbs (outgoing nurse) on Bhavna Calero. Report included the following information SBAR, Kardex, Intake/Output and MAR. Shift Summary:  
No change in patient condition throughout shift. Tpn given. Ng tube canister changed multiple times. Tele leads and monitor replaced. Family at bedside. No complaints of pain. Insulin coverage provided. Issues for Physician to Address:  none Patient on Cardiac Monitoring? [x] Yes 
[] No 
 
Rhythm: sinus Shift Events Andres Hobbs

## 2019-06-09 NOTE — PROGRESS NOTES
No issues overnight - still no output from ostomy. Visit Vitals /78 (BP 1 Location: Left arm, BP Patient Position: At rest) Pulse 79 Temp 97.7 °F (36.5 °C) Resp 18 Ht 5' 10\" (1.778 m) Wt 110 kg (242 lb 8.1 oz) SpO2 100% BMI 34.80 kg/m² Date 06/08/19 0700 - 06/09/19 8386 06/09/19 0700 - 06/10/19 7845 Shift 0700-1859 1900-0659 24 Hour Total 6674-6460 4957-2035 24 Hour Total  
INTAKE  
P.O.  500 500     
  P. O.  500 500 Shift Total(mL/kg)  500(4.5) 500(4.5) OUTPUT Urine(mL/kg/hr) 900(0.7) 850(0.6) 1750(0.7) 600  600 Urine Voided  600  600 Emesis/NG output 1675 2650 4325 700  700 Output (ml) (Nasogastric Tube 06/06/19) 1675 2650 4325 700  700 Shift Total(mL/kg) 9231(61.8) 3500(31.8) 3382(77.6) 1300(11.8)  1300(11.8) NET -2171 -3000 -0340 -1300  -1300 Weight (kg) 110 110 110 110 110 110  
 
abd soft, distended, NT 
Ostomy with no stool in the bag A/p Continue TPN 
NPO with NG tube Cont sonservative management for now with bowel rest and ngt decompression

## 2019-06-09 NOTE — PROGRESS NOTES
Problem: Falls - Risk of 
Goal: *Absence of Falls Description Document Lola Argueta Fall Risk and appropriate interventions in the flowsheet. 6/9/2019 1443 by Cal Terry Outcome: Progressing Towards Goal 
6/9/2019 1440 by Cal Terry Outcome: Progressing Towards Goal

## 2019-06-09 NOTE — PROGRESS NOTES
Hospitalist Progress Note NAME: Deyvi Aquino :  1950 MRN:  [id number] Assessment / Plan: 
Small Bowel Obstruction POA- s/p NGT to suction for now Colon Cancer s/p resection with recurrence/Rectal Adenocarcinoma on Chemotx CT abd/pelvis on :Small bowel distention with decompressed loops distally is compatible with obstruction likely related to effusion formation in the mid lower abdomen. Mild free fluid KUB on :Multiple dilated loops of small bowel. KUB on :Diminished small bowel distention Cont NG to suction, NPO for now IP CRSurgery consult appreciated- cont supportive care for now, may need surgery if does not improve IP Oncology consult appreciated & noted- Chemo delayed for now Cont IV dilaudid prn pain and IV antiemetics prn On iv fluid On TPN. Pt feeling better,not much colostomy output Will continue to follow surgery recommendation. 
  
BOBBI POA- Cr was 1.48 today down from yesterday, likely pre-renal 
Continue IVF. Monitor renal function. -monitor renal function daily- BMP daily Hypermagnesemia: Improving after Removing magnesium from TPN 
 
  
Non-sepcific Leukocytosis POA: suspect reactive, resolved now 
-monitor 
-No indication for infection. 
  
Hypertension 
-holding PO meds 
-place on clonidine patch and scheduled Nitrobid 
-prn IV hydralazine ordered 
 
  
Diabetes Mellitus with hyperglycemia 
-Continue current dose of NPH, increase regular insulin in TPN. Cut down dextrose in TPN 
-continue SSI Mild hypernatremia 
-Improving with hypotonic IVF Baseline:functional 
 
 
30.0 - 39.9 Obese / Body mass index is 34.8 kg/m². Weight loss recommended- Exercise Code status: Full Surrogate Decision Maker: Wife 
  
Prophylaxis: Hep SQ and H2B/PPI Recommended Disposition: Home w/Family when SBO resolved & eating - few days anticipated Subjective: Chief Complaint / Reason for Physician Visit: F/U Dehydration/BOBBI, SBO, s/p NGT decompression, recurrent Colorectal cancer \"Feeling better than previous days\". Still no colostomy output Objective: VITALS:  
Last 24hrs VS reviewed since prior progress note. Most recent are: 
Patient Vitals for the past 24 hrs: 
 Temp Pulse Resp BP SpO2  
06/09/19 0237 97.3 °F (36.3 °C) 85 18 148/87 99 % 06/08/19 2251 97.8 °F (36.6 °C) 93 18 148/88 99 % 06/08/19 1902 97.8 °F (36.6 °C) 86 18 142/83 99 % 06/08/19 1530 97.4 °F (36.3 °C) 90 18 148/77 98 % 06/08/19 1138 97.5 °F (36.4 °C) 89 18 147/80 98 % Intake/Output Summary (Last 24 hours) at 6/9/2019 0720 Last data filed at 6/9/2019 3049 Gross per 24 hour Intake 500 ml Output 5775 ml Net -5275 ml PHYSICAL EXAM: 
General: WD, WN. Alert, cooperative, no acute distress, Obese +   
EENT:  EOMI. Anicteric sclerae. MMM Resp:  CTA bilaterally, no wheezing or rales. No accessory muscle use CV:  Regular  rhythm,  No edema GI:  Abdomen soft, Non tender. , Colostomy noted without stool in. Neurologic:  Alert and oriented X 3, normal speech, Psych:   Good insight. Not anxious nor agitated Skin:  No rashes. No jaundice Reviewed most current lab test results and cultures  YES Reviewed most current radiology test results   YES Review and summation of old records today    NO Reviewed patient's current orders and MAR    YES 
PMH/SH reviewed - no change compared to H&P 
________________________________________________________________________ Care Plan discussed with: 
  Comments Patient x Family  x Wife at bedside RN Care Manager Consultant   Dr Bishop Bernard ([facility]) on 6/3, Dr Christine England (onco) 6/3 Multidiciplinary team rounds were held today with , nursing, pharmacist and clinical coordinator. Patient's plan of care was discussed; medications were reviewed and discharge planning was addressed. ________________________________________________________________________ ________________________________________________________________________ Kelsey Guzman MD  
 
Procedures: see electronic medical records for all procedures/Xrays and details which were not copied into this note but were reviewed prior to creation of Plan. LABS: 
I reviewed today's most current labs and imaging studies. Pertinent labs include: 
Recent Labs  
  06/09/19 0622 06/08/19 0248 06/07/19 
0341 WBC 5.5 7.3 9.5 HGB 12.2 12.4 12.4 HCT 39.3 39.2 37.1  174 188 Recent Labs  
  06/09/19 0622 06/08/19 0248 06/07/19 
0341 * 148* 147* K 4.4 4.3 4.1 * 119* 116* CO2 23 24 24 * 249* 256* BUN 37* 37* 43* CREA 1.48* 1.64* 1.73* CA 8.8 8.8 8.7 MG 2.5* 3.1* 3.2*  
PHOS  --  4.2  --   
ALB 2.9* 3.0* 3.2* TBILI 1.4* 1.9* 2.6* SGOT 40* 29 32 ALT 42 37 44 Signed: Kelsey Guzman MD

## 2019-06-09 NOTE — PROGRESS NOTES
Problem: Falls - Risk of 
Goal: *Absence of Falls Description Document Macie Rutledge Fall Risk and appropriate interventions in the flowsheet.  
Outcome: Progressing Towards Goal

## 2019-06-10 NOTE — PROGRESS NOTES
No issues overnight - still no output from ostomy. Visit Vitals /80 (BP 1 Location: Left arm, BP Patient Position: At rest) Pulse 82 Temp 98.1 °F (36.7 °C) Resp 16 Ht 5' 10\" (1.778 m) Wt 110.2 kg (242 lb 15.2 oz) SpO2 99% BMI 34.86 kg/m² Date 06/09/19 0700 - 06/10/19 3714 06/10/19 0700 - 06/11/19 1371 Shift 0030-2986 4404-5133 24 Hour Total 0768-9912 3956-9040 24 Hour Total  
INTAKE  
P.O. 1250 1800 3050     
  P. O. 1250 1800 3050 Shift Total(mL/kg) 5399(48.0) 1800(16.3) [id number]) OUTPUT Urine(mL/kg/hr) 600(0.5) 850(0.6) 1450(0.5) Urine Voided  Emesis/NG output 1350 2600 3950 1000  1000 Output (ml) (Nasogastric Tube 06/06/19) 1350 2600 3950 1000  1000 Other  475 475 Other Output  475 475 Shift Total(mL/kg) 1950(17.7) 3925(35.6) 5875(53.3) 1000(9.1)  1000(9.1) NET -700 -2125 -2825 -1000  -1000 Weight (kg) 110 110.2 110.2 110.2 110.2 110.2  
 
abd soft, distended, NT 
Ostomy with no stool in the bag A/p Continue TPN 
NPO with NG tube Cont sonservative management for now with bowel rest and ngt decompression

## 2019-06-10 NOTE — CONSULTS
Palliative Medicine Consult Markus: [id number] ([id number]) Patient Name: Jag Ortiz YOB: 1950 Date of Initial Consult: 6/6/19 Reason for Consult: care decisions Requesting Provider: Aravind Arredondo MD 
Primary Care Physician: Jamar Urbina MD 
 
 SUMMARY:  
Jag Ortiz is a 70 y.o. Male with a past history of colon  Cancer  Diagnosed 2016 , s/p resection ,by pelvic exenteration(partial colectomy , prostatectomy and cystectomy ) with colostomy and urostomy concurrent chemoradiation completed 2017 , ,  recurrent rectal adenocarcinoma on chemo with folfox, last chemo 5/23 , under the direction of Dr Mary Vieira, who was admitted on 6/2/2019 from home with a diagnosis of Small bowel obstruction and mild BOBBI , Presented with abdominal pain and constipation . Current medical issues leading to Palliative Medicine involvement include: recurrent metastatic colon rectal cancer , now with malignant bowel obstruction  For care goals , No AMD . 
 other PMH : CAD , HTN, DM , Chronic pain Oncology following  Plans to continue with chemotherapy next week . was diagnosed in 2016 and underwent nCRT followed by pelvic exenteration 
 oncology history : 
 
Interim history : 
Feels slightly better today. Denies any abdominal pain. NG still with high output. Starting to have thick stool in colostomy bag , colostomy with thick stools. He lives with his wife , retired from [employer] 20 year ago , quit smoking 3 years ago , has two children . 
  
 
 PALLIATIVE DIAGNOSES:  
1. Advance directives counseling 2. Full code status review 3. Psychosocail support (wife is overwhelmed ) 4. Recurrent metastatic rectal cancer 5. Small bowel obstruction ( on NGT suction and TPN ). 6. Constipation PLAN:  
 
1. Met with patient and his wife Jameel Granado, 
2. Patient overal is feeling beetter . 3.  He and his wife are well updated on his current health issues , they note surgical team wants to wait few days if his bowel returns to function with suction and bowel rest , if not there is a option of surgery for breaking adhesions . 4.  Goals of care : They shared , he had survived extensive laparoscopic 10 hrs surgery in past  and recovered amazingly and he would consider another surgery to relieve obstruction if need be , his outlook is very positive , he wants to give every chance to fight cancer . 5. Advance directives : follow up , he wants  to discuss amongst themselves , does not seem very interested , however does not mind checking back in few days . 6. Code status : he wants  to be resuscitated and be kept alive on machine as long as his ulices is \" functioning \" 7. We will continue to support . 8. Initial consult note routed to primary continuity provider and/or primary health care team members 9. Communicated plan of care with: Esdras Morillo 192 Team 
 
 GOALS OF CARE / TREATMENT PREFERENCES:  
 
GOALS OF CARE: 
Patient/Health Care Proxy Stated Goals: Prolong life TREATMENT PREFERENCES:  
Code Status: Full Code Advance Care Planning: 
[] [facility] Interdisciplinary Team has updated the ACP Navigator with Lala 8 and Patient Capacity Advance Care Planning 6/2/2019 Patient's Healthcare Decision Maker is: -  
Primary Decision Maker Name -  
Primary Decision Maker Phone Number -  
Primary Decision Maker Relationship to Patient -  
Confirm Advance Directive None Patient Would Like to Complete Advance Directive No  
 
 
Medical Interventions: Full interventions Other Instructions: Other: As far as possible, the palliative care team has discussed with patient / health care proxy about goals of care / treatment preferences for patient. HISTORY:  
 
History obtained from: patient and his wife . CHIEF COMPLAINT: \" feeling better \" HPI/SUBJECTIVE: The patient is:  
[] Verbal and participatory Patient notes he has constipation  x 2 weeks and some nausea, initially relieved with mirala , it recurred , despite taking miralax , he was instructed to take senna with miralax, without any response for two days , associated with abdominal pain . He is on NGT suction , his NGT came out accidentally , and was replaced today . Yesterday he had some nausea , he denies any abdominal pain or discomfort , has some lower extremity swelling , he denies any sob , his bowels are moving , he denies any anxiety or being depressed . 6/10/19 : overall feeling better , denies any pain , c/o constipation . Clinical Pain Assessment (nonverbal scale for severity on nonverbal patients):  
Clinical Pain Assessment Severity: 0 Location: right flank radiating to abdomen Character: unable to describe Duration: weeks Effect: function Factors: dilaudid helps Frequency: comes and go Duration: for how long has pt been experiencing pain (e.g., 2 days, 1 month, years) Frequency: how often pain is an issue (e.g., several times per day, once every few days, constant) FUNCTIONAL ASSESSMENT:  
 
Palliative Performance Scale (PPS): PPS: 40 PSYCHOSOCIAL/SPIRITUAL SCREENING:  
 
Palliative IDT has assessed this patient for cultural preferences / practices and a referral made as appropriate to needs (Cultural Services, Patient Advocacy, Ethics, etc.) Any spiritual / Methodist concerns: 
[] Yes /  [x] No 
 
Caregiver Burnout: 
[] Yes /  [x] No /  [] No Caregiver Present Anticipatory grief assessment:  
[x] Normal  / [] Maladaptive ESAS Anxiety: Anxiety: 0 
 
ESAS Depression: Depression: 0 REVIEW OF SYSTEMS:  
 
Positive and pertinent negative findings in ROS are noted above in HPI. The following systems were [x] reviewed / [] unable to be reviewed as noted in HPI Other findings are noted below.  
Systems: constitutional, ears/nose/mouth/throat, respiratory, gastrointestinal, genitourinary, musculoskeletal, integumentary, neurologic, psychiatric, endocrine. Positive findings noted below. Modified ESAS Completed by: provider Fatigue: 6 Drowsiness: 0 Depression: 0 Pain: 0 Anxiety: 0 Nausea: 2 Anorexia: 4 Dyspnea: 0 Constipation: Yes(colostomy with no out put ) PHYSICAL EXAM:  
 
From RN flowsheet: 
Wt Readings from Last 3 Encounters:  
06/10/19 242 lb 15.2 oz (110.2 kg) 04/18/19 245 lb (111.1 kg) 04/09/19 245 lb (111.1 kg) Blood pressure 129/89, pulse 91, temperature 97.7 °F (36.5 °C), resp. rate 16, height 5' 10\" (1.778 m), weight 242 lb 15.2 oz (110.2 kg), SpO2 100 %. Pain Scale 1: Numeric (0 - 10) Pain Intensity 1: 0 Pain Onset 1: four days ago Pain Location 1: Abdomen Pain Description 1: Shooting Pain Intervention(s) 1: Rest 
Last bowel movement, if known:  
 
Constitutional: alert , oriented x3 Eyes: pupils equal, anicteric ENMT: no nasal discharge, moist mucous membranes Cardiovascular: regular rhythm, distal pulses intact Respiratory: breathing not labored, symmetric Gastrointestinal: soft, distended , non tender , colostomy and urostomy . Colostomy bag without stools. Musculoskeletal: no deformity, no tenderness to palpation Skin: warm, dry Neurologic: following commands, moving all extremities Psychiatric: full affect, no hallucinations Other: 
 
 
 HISTORY:  
 
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  
Diagnosis Date  Abnormal nuclear stress test 4/27/2017  Arthritis  Asthma   
 childhood  BPH (benign prostatic hypertrophy)  CAD (coronary artery disease) 1996 M.I., Stents x2  Cancer ([address] Rectal CA  Chronic pain  Colon polyp  DM (diabetes mellitus) ([facility] 2011  Gout  Hemorrhoids  HTN (hypertension) 2011  Hyperlipidemia 2011  Ill-defined condition Colostomy, iliostomy  Rectal adenocarcinoma ([facility] 2018 S/p surg.  S/P cardiac cath 2017 Past Surgical History:  
Procedure Laterality Date  CARDIAC SURG PROCEDURE UNLIST  8067/7706  
 stent x2  
 COLONOSCOPY N/A 10/14/2016 COLONOSCOPY/EGD performed by Ida Montenegro MD at [facility] ENDOSCOPY  COLONOSCOPY N/A 2/3/2017 COLONOSCOPY performed by Lorna Cox MD at [facility] ENDOSCOPY  COLONOSCOPY N/A 3/27/2018 COLONOSCOPY performed by Lorna Cox MD at [facility] ENDOSCOPY  ENDOSCOPY, COLON, DIAGNOSTIC  2011  HX GI    
 Prostate, Bladder, rectum. colon removed  HX HERNIA REPAIR    
 AS INFANT  HX ORTHOPAEDIC  2016  
 hip replacement , left  HX PROSTATECTOMY  X2  
 biopsy benign  HX TONSILLECTOMY  HX UROLOGICAL Bladder removed  SIGMOIDOSCOPY,BIOPSY  10/14/2016  UPPER GI ENDOSCOPY,DIAGNOSIS  10/14/2016 Family History Problem Relation Age of Onset  Heart Disease Mother  COPD Mother  COPD Father  Diabetes Father  Hypertension Father  Alcohol abuse Sister  Diabetes Brother  High Cholesterol Brother  Hypertension Brother  Anesth Problems Neg Hx History reviewed, no pertinent family history. Social History Tobacco Use  Smoking status: Former Smoker Packs/day: 0.50 Years: 40.00 Pack years: 20.00 Last attempt to quit: 2016 Years since quittin.7  Smokeless tobacco: Never Used Substance Use Topics  Alcohol use: No  
  Alcohol/week: 0.0 oz  
  Comment: OCCASIONAL Allergies Allergen Reactions  Atorvastatin Myalgia  Pcn [Penicillins] Hives Current Facility-Administered Medications Medication Dose Route Frequency  TPN ADULT - CENTRAL   IntraVENous CONTINUOUS  
  0.45% sodium chloride infusion  175 mL/hr IntraVENous CONTINUOUS  
 insulin NPH (NOVOLIN N, HUMULIN N) injection 25 Units  25 Units SubCUTAneous Q12H  
 metoprolol (LOPRESSOR) injection 5 mg  5 mg IntraVENous Q6H  
 enoxaparin (LOVENOX) injection 40 mg  40 mg SubCUTAneous Q24H  cloNIDine (CATAPRES) 0.3 mg/24 hr patch 1 Patch  1 Patch TransDERmal Q7D  
 insulin lispro (HUMALOG) injection   SubCUTAneous Q6H  
 glucose chewable tablet 16 g  4 Tab Oral PRN  
 glucagon (GLUCAGEN) injection 1 mg  1 mg IntraMUSCular PRN  
 sodium chloride (NS) flush 5-40 mL  5-40 mL IntraVENous Q8H  
 sodium chloride (NS) flush 5-40 mL  5-40 mL IntraVENous PRN  
 nitroglycerin (NITROBID) 2 % ointment 1 Inch  1 Inch Topical Q6H  
 HYDROmorphone (PF) (DILAUDID) injection 0.5 mg  0.5 mg IntraVENous Q3H PRN  
 naloxone (NARCAN) injection 0.4 mg  0.4 mg IntraVENous PRN  
 ondansetron (ZOFRAN) injection 4 mg  4 mg IntraVENous Q4H PRN  
 hydrALAZINE (APRESOLINE) 20 mg/mL injection 10 mg  10 mg IntraVENous Q4H PRN  pantoprazole (PROTONIX) 40 mg in sodium chloride 0.9% 10 mL injection  40 mg IntraVENous DAILY  
 
 
 
 LAB AND IMAGING FINDINGS:  
 
Lab Results Component Value Date/Time WBC 7.4 06/10/2019 06:24 AM  
 HGB 12.7 06/10/2019 06:24 AM  
 PLATELET 951 11/88/2892 06:24 AM  
 
Lab Results Component Value Date/Time Sodium 148 (H) 06/10/2019 06:24 AM  
 Potassium 4.6 06/10/2019 06:24 AM  
 Chloride 117 (H) 06/10/2019 06:24 AM  
 CO2 24 06/10/2019 06:24 AM  
 BUN 42 (H) 06/10/2019 06:24 AM  
 Creatinine 1.55 (H) 06/10/2019 06:24 AM  
 Calcium 9.2 06/10/2019 06:24 AM  
 Magnesium 2.4 06/10/2019 06:24 AM  
 Phosphorus 4.2 06/08/2019 02:48 AM  
  
Lab Results Component Value Date/Time AST (SGOT) 51 (H) 06/10/2019 06:24 AM  
 Alk. phosphatase 182 (H) 06/10/2019 06:24 AM  
 Protein, total 8.3 (H) 06/10/2019 06:24 AM  
 Albumin 3.0 (L) 06/10/2019 06:24 AM  
 Globulin 5.3 (H) 06/10/2019 06:24 AM  
 
Lab Results Component Value Date/Time INR 1.0 04/26/2017 12:24 PM  
 INR (POC) 1.2 (H) 03/05/2018 10:47 AM  
 Prothrombin time 10.1 04/26/2017 12:24 PM  
 aPTT 28.6 10/25/2016 05:07 PM  
  
No results found for: IRON, FE, TIBC, IBCT, PSAT, FERR No results found for: PH, PCO2, PO2 No components found for: [place] No results found for: CPK, CKMB Total time:  
Counseling / coordination time, spent as noted above:  
> 50% counseling / coordination?:  
 
Prolonged service was provided for  []30 min   []75 min in face to face time in the presence of the patient, spent as noted above. Time Start:  
Time End:  
Note: this can only be billed with 33511 (initial) or 70459 (follow up). If multiple start / stop times, list each separately.

## 2019-06-10 NOTE — PROGRESS NOTES
Problem: Pressure Injury - Risk of 
Goal: *Prevention of pressure injury Description Document Vishnu Scale and appropriate interventions in the flowsheet.  
Outcome: Progressing Towards Goal

## 2019-06-10 NOTE — PROGRESS NOTES
Nutrition Assessment: 
 
INTERVENTIONS/RECOMMENDATIONS:  
· Consider adding 250 ml of 20% lipid emulsion 3x per week to current TPN order which will meet ~75% of estimated kcal needs as well as provide essential fatty acids · To better meet estimated nutrition needs consider increasing TPN (D15 5%AA) rate to 75 ml/hr as well as add lipids 3x per week. · Provides: 1493 kcal 90 g protein, 270 g CHO · 88% and 100% of estimated kcal and protein needs respectively ASSESSMENT:  
Chart reviewed. Pt continues with NG to suction and TPN for nutrition. Noted that dextrose concentration was changed to 15% from 20% due to elevated BG. Rate continues @ 63 ml/hr but lipids (250 ml 20% 3x per week) have not been added. Current TPN order only meets ~63% of pt estimated kcal needs and does not provide essential fatty acids. If 250 ml of 20% lipid emulsion was added 3x per week, this would meet ~75% of estimated kcal needs. Na elevated and BUN and Creatinine are trending up, may need more fluid. Diet Order: NPO 
% Eaten:   
Patient Vitals for the past 72 hrs: 
 % Diet Eaten 06/10/19 0814 0 % 06/10/19 0232 0 %  
06/10/19 0034 0 % 06/09/19 2000 0 % 06/09/19 0146 0 % 06/08/19 2010 0 % Pertinent Medications: [x]Reviewed:  
Pertinent Labs: [x]Reviewed: Na, BUN 42, Creat 1.55, BG >180, Food Allergies: [x]NKFA  []Other Last BM:  6/4 Edema:  n/a    []RUE   []LUE   []RLE   []LLE Pressure Ulcer:   n/a   [] Stage I   [] Stage II   [] Stage III   [] Stage IV Anthropometrics: Height: 5' 10\" (177.8 cm) Weight: 110.2 kg (242 lb 15.2 oz) IBW (%IBW):   ( ) UBW (%UBW):   (  %) BMI: Body mass index is 34.86 kg/m². This BMI is indicative of: 
[]Underweight   []Normal   []Overweight   [x] Obesity   [] Extreme Obesity (BMI>40) Last Weight Metrics: 
Weight Loss Metrics 6/10/2019 4/18/2019 4/9/2019 2/27/2019 12/17/2018 8/21/2018 4/17/2018 Today's Wt 242 lb 15.2 oz 245 lb 245 lb 245 lb 249 lb 247 lb 244 lb BMI 34.86 kg/m2 36.18 kg/m2 36.18 kg/m2 36.18 kg/m2 36.77 kg/m2 36.48 kg/m2 36.03 kg/m2 Estimated Nutrition Needs (Based on): 1700 Kcals/day(20 kcal/kg Adj BW) , 85 g(0.8 g/kg) Protein Carbohydrate: At Least 130 g/day  Fluids: 1700 mL/day or per primary team 
 
NUTRITION DIAGNOSES:  
Problem:  Altered GI function Less than optimal parenteral nutrition Etiology: related to metastatic rectal cancer and SBO change on dextrose concentration and lipids have not been added Signs/Symptoms: as evidenced by need for TPN to meet nutrition needs current TPN order only meets ~63% of estimated kcal needs Previous Nutrition Dx:  [] Resolved  [] Unresolved           [x] Progressing NUTRITION INTERVENTIONS: 
  Enteral/Parenteral Nutrition: Initiate parenteral nutrition GOAL:  
add lipids 3x per week in 24-48 hrs NUTRITION MONITORING AND EVALUATION Food/Nutrient Intake Outcomes: Total energy intake Physical Signs/Symptoms Outcomes: Weight/weight change, Electrolyte and renal profile, Glucose profile, GI 
 
Previous Goal Met: 
 [] Met              [] Progressing Towards Goal              [x] Not Progressing Towards Goal  
Previous Recommendations: 
 [] Implemented          [x] Not Implemented          [] Not Applicable LEARNING NEEDS (Diet, Food/Nutrient-Drug Interaction):  
 [x] None Identified 
 [] Identified and Education Provided/Documented 
 [] Identified and Pt declined/was not appropriate Cultural, Restoration, OR Ethnic Dietary Needs:  
 [x] None Identified 
 [] Identified and Addressed 
 
 [x] Interdisciplinary Care Plan Reviewed/Documented  
 [x] Discharge Planning: TBD [] Participated in Interdisciplinary Rounds NUTRITION RISK:  
 [x] High              [] Moderate           []  Low  []  Minimal/Uncompromised Shane Connelly RDN Pager [phone] Weekend Pager [phone]

## 2019-06-10 NOTE — INTERDISCIPLINARY ROUNDS
Oncology Interdisciplinary rounds were held today to discuss patient plan of care and outcomes. The following members were present: Nursing, Physician, Case Management, Pharmacy, and PT/OT Actual Length of Stay: 8 DRG GLOS: 3.3 Expected Length of Stay: 3d 7h 
 
 
 
               Plan            Discharge Continue TPN Continue NG tube Home with family when stable. Patient declined [address].

## 2019-06-10 NOTE — PROGRESS NOTES
Oncology End of Shift Note Bedside shift change report given to Antonio Navas RN (incoming nurse) by Luisa Anderson (outgoing nurse) on Mike Gonzales. Report included the following information SBAR, Kardex and MAR. Shift Summary: Patient's canister for NG tube was changed 5 times. MD ordered fluids for patient, .46 normal saline at 175 mL/hr. No significant changes with patient. Patient is not being compliant with NPO except for 1/2 cup of ice chips every 6 hours. 2 bags of IV fluids were hung as well as TPN. Daily abdominal x ray was completed. Patient waits for ice to melt and will drink water when RN or staff are not in the room. RN educated patient on importance of NPO with ice chips but patient is still not compliant. Issues for Physician to Address:  None. Patient on Cardiac Monitoring? [x] Yes 
[] No 
 
Rhythm: Telemetry: normal sinus rhythm Luisa Anderson

## 2019-06-10 NOTE — PROGRESS NOTES
Oncology End of Shift Note Bedside shift change report given to [facility], RN (incoming nurse) by Shane Feliciano (outgoing nurse) on Janeen Prince. Report included the following information SBAR, Kardex, Intake/Output and MAR. Shift Summary: am labs drawn, no complaints of pain, wife stayed to around 11pm, slept a few hours, BS a little better (staying below 200 most of the time) Issues for Physician to Address:   
 
Patient on Cardiac Monitoring? [x] Yes 
[] No 
 
Rhythm: NSR Shift Events Shane Feliciano

## 2019-06-10 NOTE — PROGRESS NOTES
Hematology Oncology Progress Note Follow up for: metastatic rectal cancer CC: \" I don't have any pain. \" 
 
Chart notes reviewed since last visit. Case discussed with following: wife at bedside Patient complains of the following: fairly comfortable at present. Today, he denies pain, bloating, N/V. NG in place and is uncomfortable. States nothing out of ostomy bag. Additional concerns noted by the staff:  
 
Patient Vitals for the past 24 hrs: 
 BP Temp Pulse Resp SpO2 Weight  
06/10/19 0845  98.1 °F (36.7 °C)      
06/10/19 0723 145/80  82 16 99 %   
06/10/19 0231 133/81 98 °F (36.7 °C) 74 16 98 %   
06/10/19 0124      110.2 kg (242 lb 15.2 oz) 06/09/19 2222 142/82 98 °F (36.7 °C) 82 16 93 %   
06/09/19 1911 134/77 98.7 °F (37.1 °C) 82 16 100 %   
06/09/19 1522 150/83 97.5 °F (36.4 °C) 92 16 100 %   
 
 
ROS negative for 11 organ systems except as ntoed above. Physical Examination: 
Constitutional Alert, cooperative, oriented. Mood and affect appropriate. Appears close to chronological age. Well nourished. Well developed. Head Normocephalic; no scars Eyes Conjunctivae and sclerae are clear and without icterus. Pupils are round ENMT Sinuses are nontender. No oral exudates, ulcers, masses, thrush or mucositis. Oropharynx clear. Tongue normal.  
Neck Supple without masses or thyromegaly. No jugular venous distension. Hematologic/Lymphatic No petechiae or purpura. No tender or palpable lymph nodes noted. Respiratory Lungs are clear to auscultation without rhonchi or wheezing. Cardiovascular Regular rate and rhythm of heart Chest / Line Site Chest is symmetric with no chest wall deformities. Abdomen Non-tender, mildly distended, no masses, ascites or hepatosplenomegaly. High pitched sounds in LLQ and RUQ with low bowel sounds that seem hyperactive in RLQ Bilateral ostomies Musculoskeletal No tenderness or swelling, normal range of motion without obvious weakness. Extremities No visible deformities, no cyanosis, clubbing or edema. Skin No rashes, scars, or lesions suggestive of malignancy. No petechiae, purpura, or ecchymoses. No excoriations. Neurologic No sensory or motor deficits noted but not tested. Psychiatric Alert and oriented. Coherent speech. Verbalizes understanding of our discussions today. Labs: 
Recent Results (from the past 24 hour(s)) GLUCOSE, POC Collection Time: 06/09/19 11:44 AM  
Result Value Ref Range Glucose (POC) 283 (H) 65 - 100 mg/dL Performed by Julián Linder GLUCOSE, POC Collection Time: 06/09/19  4:36 PM  
Result Value Ref Range Glucose (POC) 231 (H) 65 - 100 mg/dL Performed by Corie Gilmore, POC Collection Time: 06/09/19  6:09 PM  
Result Value Ref Range Glucose (POC) 229 (H) 65 - 100 mg/dL Performed by Westley STEEN)   
GLUCOSE, POC Collection Time: 06/10/19 12:27 AM  
Result Value Ref Range Glucose (POC) 171 (H) 65 - 100 mg/dL Performed by Radha Parsons GLUCOSE, POC Collection Time: 06/10/19  6:00 AM  
Result Value Ref Range Glucose (POC) 193 (H) 65 - 100 mg/dL Performed by Radha Parsons CBC WITH AUTOMATED DIFF Collection Time: 06/10/19  6:24 AM  
Result Value Ref Range WBC 7.4 4.1 - 11.1 K/uL  
 RBC 4.13 4.10 - 5.70 M/uL  
 HGB 12.7 12.1 - 17.0 g/dL HCT 39.2 36.6 - 50.3 % MCV 94.9 80.0 - 99.0 FL  
 MCH 30.8 26.0 - 34.0 PG  
 MCHC 32.4 30.0 - 36.5 g/dL  
 RDW 16.8 (H) 11.5 - 14.5 % PLATELET 388 052 - 385 K/uL MPV 10.5 8.9 - 12.9 FL  
 NRBC 0.0 0  WBC ABSOLUTE NRBC 0.00 0.00 - 0.01 K/uL NEUTROPHILS 68 32 - 75 % LYMPHOCYTES 12 12 - 49 % MONOCYTES 12 5 - 13 % EOSINOPHILS 7 0 - 7 % BASOPHILS 0 0 - 1 % IMMATURE GRANULOCYTES 1 (H) 0.0 - 0.5 % ABS. NEUTROPHILS 5.0 1.8 - 8.0 K/UL  
 ABS. LYMPHOCYTES 0.9 0.8 - 3.5 K/UL  
 ABS. MONOCYTES 0.9 0.0 - 1.0 K/UL ABS. EOSINOPHILS 0.5 (H) 0.0 - 0.4 K/UL  
 ABS. BASOPHILS 0.0 0.0 - 0.1 K/UL  
 ABS. IMM. GRANS. 0.1 (H) 0.00 - 0.04 K/UL  
 DF AUTOMATED METABOLIC PANEL, COMPREHENSIVE Collection Time: 06/10/19  6:24 AM  
Result Value Ref Range Sodium 148 (H) 136 - 145 mmol/L Potassium 4.6 3.5 - 5.1 mmol/L Chloride 117 (H) 97 - 108 mmol/L  
 CO2 24 21 - 32 mmol/L Anion gap 7 5 - 15 mmol/L Glucose 210 (H) 65 - 100 mg/dL BUN 42 (H) 6 - 20 MG/DL Creatinine 1.55 (H) 0.70 - 1.30 MG/DL  
 BUN/Creatinine ratio 27 (H) 12 - 20 GFR est AA 54 (L) >60 ml/min/1.73m2 GFR est non-AA 45 (L) >60 ml/min/1.73m2 Calcium 9.2 8.5 - 10.1 MG/DL Bilirubin, total 1.3 (H) 0.2 - 1.0 MG/DL  
 ALT (SGPT) 53 12 - 78 U/L  
 AST (SGOT) 51 (H) 15 - 37 U/L Alk. phosphatase 182 (H) 45 - 117 U/L Protein, total 8.3 (H) 6.4 - 8.2 g/dL Albumin 3.0 (L) 3.5 - 5.0 g/dL Globulin 5.3 (H) 2.0 - 4.0 g/dL A-G Ratio 0.6 (L) 1.1 - 2.2 MAGNESIUM Collection Time: 06/10/19  6:24 AM  
Result Value Ref Range Magnesium 2.4 1.6 - 2.4 mg/dL Imaging: 
KUB: 
Direct comparison is made to prior plain radiographs dated Juanita 3, 2019. 
  
Nasogastric tube and side port overlie the stomach. There are several dilated 
small bowel loops in the abdomen. Degree of small bowel dilatation appears to 
have worsened as compared to prior plain radiographs dated Juanita 3, 2019. No free 
intraperitoneal gas is visualized on supine views. No pathologic calcification 
is seen. 
  
IMPRESSION: Multiple dilated loops of small bowel. Assessment and Plan: SBO: 
- Dr. Vivi Montoya on board 
- NG in place and TPN going. 
- conservative management for now - No pain reported BOBBI: 
- Likely prerenal, Dr Keny Heredia increasing IVF today 
- 24 I/O reviewed and negative 2.8L over last 24 hours which is actually improved from day prior, agree with increasing IVF rate Metastatic rectal cancer: - Follows with Dr. Efrain Demarco in [facility] office,  several chemotherapeutic options ahead for treatment so would favor a more aggressive approach if needed. He has a very good performance status should a decision need to be made re surgery or not. DM - on insulin Hypertension - clonidine, metoprolol, and nitro bid ordered. Armida Bueno

## 2019-06-10 NOTE — PROGRESS NOTES
Music Therapy Assessment [address] [employer] [id number]    
1950  70 y.o.  male Patient Telephone Number: [phone] (home) [facility] Affiliation: [facility]  
Language: [place] Patient Active Problem List  
 Diagnosis Date Noted  Status post colostomy ([facility] 10/15/2016 Priority: 2 - Two Class: Acute  Bowel obstruction ([facility] [address] 06/02/2019  Metastatic cancer ([facility] [address] 06/02/2019  Type 2 diabetes mellitus with proliferative retinopathy ([address] 04/09/2019  Type 2 diabetes with nephropathy ([address] 08/21/2018  Severe obesity (BMI 35.0-39. 9) with comorbidity ([facility] [address] 04/17/2018  Encounter for long-term (current) drug use 04/17/2018  Rectal adenocarcinoma ([facility] [address] [facility] 01/16/2018  Hypertension complicating diabetes ([facility] [address] 08/08/2017  Abnormal nuclear stress test 04/27/2017  S/P cardiac cath 04/27/2017  Rectal mass 10/15/2016  Coronary artery disease involving native coronary artery of native heart without angina pectoris 02/25/2016  Osteoarthritis of right hip 02/03/2015  Radial nerve compression 05/09/2014  Diabetic polyneuropathy ([facility] [address] [facility] 05/09/2014  Hyperlipidemia 05/17/2011 Date: 6/10/2019            Total Time (in minutes): 10          MRM 1 MEDICAL ONCOLOGY Mental Status:   [x] Alert [  ] [place] [  ]  Confused  [  ] Minimally responsive  [  ] Sleeping Communication Status: [  ] Impaired Speech [  ] Nonverbal -N/A Physical Status:   [  ] Oxygen in use  [  ] Hard of Hearing [  ] Vision Impaired [  ] Ambulatory  [  ] Ambulatory with assistance [  ] Non-ambulatory -N/A Music Preferences, Background: Blue, Jazz, including [employer] and Deondre Newness, and [place]. Pt shared that he used to played drum set and usually play this with his brother who played bass. Clinical Problem to be addressed: support healthy pt/family coping, spiritual support Goal(s) met in session: 
Physical/Pain management (Scale of 1-10):   
 Pre-session rating: Pt denied pain. Post-session rating: N/A: Please see Session Observations below. [  ] Increased relaxation   [  ] Affected breathing patterns [  ] Decreased muscle tension   [  ] Decreased agitation [  ] Affected heart rate    [  ] Increased alertness Emotional/Psychological: 
[  ] Increased self-expression   [  ] Decreased aggressive behavior [  ] Decreased feelings of stress  [x] Discussed healthy coping skills [  ] Improved mood    [  ] Decreased withdrawn behavior Social: 
[  ] Decreased feelings of isolation/loneliness [x] Positive social interaction [  ] Provided support and/or comfort for family/friends Spiritual: 
[  ] Spiritual support    [  ] Expressed peace [x] Expressed leanne    [  ] Discussed beliefs Techniques Utilized (Check all that apply):  
[  ] Procedural support MT [  ] Music for relaxation [  ] Patient preferred music 
[  ] Rachel analysis  [  ] Song choice  [  ] Music for validation [  ] Entrainment  [  ] Movement to music [  ] Guided visualization [  ] De Paz Brow  [  ] Patient instrument playing [  ] Raudel Muro writing [  ] Chet Lobo along   [  ] Bri Guy  [  ] Sensory stimulation 
[x] Active Listening  [  ] Music for spiritual support [  ] Making of CDs as gifts Session Observations:  Referral from [facility] . Patient (pt) was alert lying in bed and pt's spouse Andie Busch was at bedside. This music therapy intern (MT intern) asked pt how he was feeling and about music preferences and background. Pt shared this with bright affect. Pt and pt's spouse shared that they have gone through a lot lately and their strong leanne has helped them with coping. Pt declined having music therapy today saying this is not a good time for music though was open to a follow up visit. Pt and pt's spouse expressed gratitude for the visit. MT intern expressed understanding and will follow as able. Howard \"Mylene\" Bryant Canas, Music Therapy Intern Spiritual Care Department Referral-based service

## 2019-06-10 NOTE — PROGRESS NOTES
Hospitalist Progress Note NAME: Danial Chacon :  1950 MRN:  [id number] Assessment / Plan: 
Small Bowel Obstruction POA- s/p NGT to suction for now Colon Cancer s/p resection with recurrence/Rectal Adenocarcinoma on Chemotx CT abd/pelvis on :Small bowel distention with decompressed loops distally is compatible with obstruction likely related to effusion formation in the mid lower abdomen. Mild free fluid KUB on :Multiple dilated loops of small bowel. KUB on :Diminished small bowel distention Cont NG to suction, NPO for now IP CRSurgery consult appreciated- cont supportive care for now, may need surgery if does not improve IP Oncology consult appreciated & noted- Chemo delayed for now Cont IV dilaudid prn pain and IV antiemetics prn On iv fluid On TPN. Pt feeling better,no colostomy output Will continue to follow surgery recommendation. 
  
BOBBI POA- Cr was 1.55 today slightly higher from yesterday, likely pre-renal 
Increase IVF. Monitor renal function. -monitor renal function daily- BMP daily Hypermagnesemia: resolved 
 
  
Non-sepcific Leukocytosis POA: suspect reactive, resolved now 
-monitor 
-No indication for infection. 
  
Hypertension 
-holding PO meds 
-place on clonidine patch and scheduled Nitrobid 
-prn IV hydralazine ordered 
 
  
Diabetes Mellitus with hyperglycemia 
-Continue current dose of NPH, increase regular insulin in TPN. -continue SSI Mild hypernatremia 
-continue  with hypotonic IVF Baseline:functional 
 
 
30.0 - 39.9 Obese / Body mass index is 34.86 kg/m². Weight loss recommended- Exercise Code status: Full Surrogate Decision Maker: Wife 
  
Prophylaxis: Hep SQ and H2B/PPI Recommended Disposition: Home w/Family when SBO resolved & eating - few days anticipated Subjective: Chief Complaint / Reason for Physician Visit: F/U Dehydration/BOBBI, SBO, s/p NGT decompression, recurrent Colorectal cancer \"denies abdominal pain\". Still no colostomy output Objective: VITALS:  
Last 24hrs VS reviewed since prior progress note. Most recent are: 
Patient Vitals for the past 24 hrs: 
 Temp Pulse Resp BP SpO2  
06/10/19 0231 98 °F (36.7 °C) 74 16 133/81 98 % 06/09/19 2222 98 °F (36.7 °C) 82 16 142/82 93 % 06/09/19 1911 98.7 °F (37.1 °C) 82 16 134/77 100 % 06/09/19 1522 97.5 °F (36.4 °C) 92 16 150/83 100 % 06/09/19 0829 97.7 °F (36.5 °C) 79 18 154/78 100 % Intake/Output Summary (Last 24 hours) at 6/10/2019 2121 Last data filed at 6/10/2019 2152 Gross per 24 hour Intake 2050 ml Output 5875 ml Net -3825 ml PHYSICAL EXAM: 
General: WD, WN. Alert, cooperative, no acute distress, Obese +   
EENT:  EOMI. Anicteric sclerae. MMM Resp:  CTA bilaterally, no wheezing or rales. No accessory muscle use CV:  Regular  rhythm,  No edema GI:  Abdomen soft, Non tender. , Colostomy noted without stool in. Neurologic:  Alert and oriented X 3, normal speech, Psych:   Good insight. Not anxious nor agitated Skin:  No rashes. No jaundice Reviewed most current lab test results and cultures  YES Reviewed most current radiology test results   YES Review and summation of old records today    NO Reviewed patient's current orders and MAR    YES 
PMH/SH reviewed - no change compared to H&P 
________________________________________________________________________ Care Plan discussed with: 
  Comments Patient x Family  x Wife at bedside RN x Care Manager Consultant   Dr Ramila Zaidi ([facility]) on 6/3, Dr Mehreen Garduno (onco) 6/3 Multidiciplinary team rounds were held today with , nursing, pharmacist and clinical coordinator. Patient's plan of care was discussed; medications were reviewed and discharge planning was addressed. ________________________________________________________________________ ________________________________________________________________________ Brody Ambrocio MD  
 
Procedures: see electronic medical records for all procedures/Xrays and details which were not copied into this note but were reviewed prior to creation of Plan. LABS: 
I reviewed today's most current labs and imaging studies. Pertinent labs include: 
Recent Labs  
  06/10/19 
0624 06/09/19 
0622 06/08/19 
0248 WBC 7.4 5.5 7.3 HGB 12.7 12.2 12.4 HCT 39.2 39.3 39.2  202 174 Recent Labs  
  06/10/19 
8305 06/09/19 
0622 06/08/19 
0248 * 147* 148* K 4.6 4.4 4.3 * 117* 119* CO2 24 23 24 * 292* 249* BUN 42* 37* 37* CREA 1.55* 1.48* 1.64* CA 9.2 8.8 8.8 MG 2.4 2.5* 3.1*  
PHOS  --   --  4.2 ALB 3.0* 2.9* 3.0* TBILI 1.3* 1.4* 1.9*  
SGOT 51* 40* 29 ALT 53 42 37 Signed: Brody Ambrocio MD

## 2019-06-11 NOTE — PROGRESS NOTES
Oncology End of Shift Note Bedside shift change report given to JUS Houser (incoming nurse) by Barbie Fernandes (outgoing nurse) on [facility]. Report included the following information SBAR, Kardex and MAR. Shift Summary: 2 bags of IV fluids was hung. NG tube canister was changed 4 times. Patient is asking staff for ice and will wait until ice melts to drink the water. Patient also asked staff for a cup of water. Issues for Physician to Address:  Non-compliance with NPO and ice chips. Patient on Cardiac Monitoring? [x] Yes 
[] No 
 
Rhythm: Telemetry: normal sinus rhythm Barbie Fernandes

## 2019-06-11 NOTE — PROGRESS NOTES
No issues overnight - still no output from ostomy. Visit Vitals /84 (BP 1 Location: Left arm, BP Patient Position: At rest) Pulse 84 Temp 98.9 °F (37.2 °C) Resp 18 Ht 5' 10\" (1.778 m) Wt 109.6 kg (241 lb 10 oz) SpO2 97% BMI 34.67 kg/m² Date 06/10/19 0700 - 06/11/19 6928 06/11/19 0700 - 06/12/19 5857 Shift 0390-8143 4107-0344 24 Hour Total 3698-0438 2981-0998 24 Hour Total  
INTAKE  
I.V.(mL/kg/hr)  3077.1(2.3) 3077.1(1.2) Volume (0.45% sodium chloride infusion)  3077.1 3077.1 Shift Total(mL/kg)  [id number]. 1(28.1) [id number]. 1(28.1) OUTPUT Urine(mL/kg/hr)  1800(1.4) 1800(0.7) Urine Output (mL) (Urinary Ostomy Abdomen)  1800 1800 Emesis/NG output 3400 3200 6600 800  800 Output (ml) (Nasogastric Tube 06/06/19) 3400 3200 6600 800  800 Shift Total(mL/kg) 3400(30.9) 5000(45.6) 8400(76.6) 800(7.3)  800(7.3) NET -3400 -1922.9 -5322.9 -800  -800 Weight (kg) 110.2 109.6 109.6 109.6 109.6 109.6  
 
abd soft, distended, NT 
Ostomy with no stool in the bag A/p Continue TPN 
NPO with NG tube Cont sonservative management for now with bowel rest and ngt decompression Dr. Liliam Delgadillo to return Thursday and discuss possible surgery (adhesiolysis vs resection and/or SB bypass)

## 2019-06-11 NOTE — PROGRESS NOTES
Oncology End of Shift Note Bedside shift change report given to [facility], RN (incoming nurse) by Hernando Fowler (outgoing nurse) on [facility]. Report included the following information SBAR, Kardex, MAR and Recent Results. Shift Summary: No acute changes; BP shows improvement, no c/o of pain or discomfort throughout night; increased NG tube output throughout night; patient not compliant with NPO status, witnessed patient drinking water, reinforced education regarding NPO status with only ice chips, patient stated \"they told me I can do that\" Issues for Physician to Address:  Diet orders, NPO with ice chips needs to be reinforced Patient on Cardiac Monitoring? Onc 6 [x] Yes 
[] No 
 
Rhythm: Telemetry: normal sinus rhythm Shift Events Hernando Fowler

## 2019-06-11 NOTE — PROGRESS NOTES
Hospitalist Progress Note NAME: Yumiko Reddy :  1950 MRN:  [id number] Assessment / Plan: 
Small Bowel Obstruction POA- s/p NGT to suction for now Colon Cancer s/p resection with recurrence/Rectal Adenocarcinoma on ChemotX On TPN  
CT abd/pelvis on :Small bowel distention with decompressed loops distally is compatible with obstruction likely related to effusion formation in the mid lower abdomen. Mild free fluid KUB on :Multiple dilated loops of small bowel. KUB on :Diminished small bowel distention  
kub :Persistent SBO pattern. NG tube has retracted into the proximal 
stomach. Cont NG to suction, NPO for now IP CRSurgery consult appreciated- cont supportive care for now, may need surgery if does not improve IP Oncology consult appreciated & noted- Chemo delayed for now Cont IV dilaudid prn pain and IV antiemetics prn On iv fluid. On TPN. Pt feeling better,no colostomy output Fu surgery :  
Dr Luciano Ching to return Thursday and discuss possible surgery (adhesiolysis vs resection and/or SB bypass) Daily CMP while on TPN   
  
   
BOBBI POA- prerenal Cr was 1.44 today C/w ivf -monitor renal function daily- BMP daily Hypermagnesemia: resolved 
  
Leukocytosis POA: suspect reactive, resolved now 
-monitor 
-No indication for infection. 
  
Hypertension 
-holding PO meds 
-place on clonidine patch and scheduled Nitrobid 
-prn IV hydralazine ordered 
  
Diabetes Mellitus with hyperglycemia - bs ok  
-Continue current dose of NPH, increase regular insulin in TPN. -continue SSI Mild hypernatremia resolved Na 142 Baseline:functional 
 
 
30.0 - 39.9 Obese / Body mass index is 34.67 kg/m². Weight loss recommended- Exercise Code status: Full Surrogate Decision Maker: Wife 
  
Prophylaxis: Hep SQ and H2B/PPI Recommended Disposition: Home w/Family when SBO resolved & eating - few days anticipated Subjective: Chief Complaint / Reason for Physician Visit: F/U Dehydration/BOBBI, SBO, s/p NGT decompression, recurrent Colorectal cancer No acute complaints Objective: VITALS:  
Last 24hrs VS reviewed since prior progress note. Most recent are: 
Patient Vitals for the past 24 hrs: 
 Temp Pulse Resp BP SpO2  
06/11/19 1145 98.9 °F (37.2 °C) 84 18 149/84 97 % 06/11/19 0755 98 °F (36.7 °C) 86 18 151/89 99 % 06/11/19 0551  92  143/87   
06/11/19 0335 96.9 °F (36.1 °C) 91 16 129/85 100 % 06/11/19 0002 97.7 °F (36.5 °C) 83 16 (!) 141/92 100 % 06/10/19 2018 98.4 °F (36.9 °C) 86 16 130/87 100 % Intake/Output Summary (Last 24 hours) at 6/11/2019 1608 Last data filed at 6/11/2019 1515 Gross per 24 hour Intake 3077.09 ml Output 8200 ml Net -5122.91 ml PHYSICAL EXAM: 
General: WD, WN. Alert, cooperative, no acute distress, Obese +   
EENT:  EOMI. Anicteric sclerae. MMM Resp:  CTA bilaterally, no wheezing or rales. No accessory muscle use CV:  Regular  rhythm,  No edema GI:  Abdomen soft, Non tender. , Colostomy noted without stool in. Neurologic:  Alert and oriented X 3, normal speech, Psych:   Good insight. Not anxious nor agitated Skin:  No rashes. No jaundice Reviewed most current lab test results and cultures  YES Reviewed most current radiology test results   YES Review and summation of old records today    NO Reviewed patient's current orders and MAR    YES 
PMH/SH reviewed - no change compared to H&P 
________________________________________________________________________ Care Plan discussed with: 
  Comments Patient x Family  x Wife at bedside RN x Care Manager Consultant Multidiciplinary team rounds were held today with , nursing, pharmacist and clinical coordinator. Patient's plan of care was discussed; medications were reviewed and discharge planning was addressed. ________________________________________________________________________ 
 
________________________________________________________________________ Veronica Aviles MD  
 
Procedures: see electronic medical records for all procedures/Xrays and details which were not copied into this note but were reviewed prior to creation of Plan. LABS: 
I reviewed today's most current labs and imaging studies. Pertinent labs include: 
Recent Labs  
  06/11/19 
0603 06/10/19 
8468 06/09/19 
9607 WBC 8.9 7.4 5.5 HGB 12.4 12.7 12.2 HCT 38.4 39.2 39.3  230 202 Recent Labs  
  06/11/19 
0603 06/10/19 
3876 06/09/19 
1273  148* 147* K 4.3 4.6 4.4  
* 117* 117* CO2 22 24 23 * 210* 292* BUN 44* 42* 37* CREA 1.44* 1.55* 1.48* CA 8.5 9.2 8.8 MG 2.2 2.4 2.5* PHOS 4.3  --   --   
ALB 2.7* 3.0* 2.9* TBILI 1.5* 1.3* 1.4* SGOT 49* 51* 40* ALT 51 53 42 Signed: Veronica Aviles MD

## 2019-06-11 NOTE — PROGRESS NOTES
Problem: Falls - Risk of 
Goal: *Absence of Falls Description Document Donita Chavis Fall Risk and appropriate interventions in the flowsheet. Outcome: Progressing Towards Goal 
Note:  
Fall Risk Interventions: 
Mobility Interventions: Communicate number of staff needed for ambulation/transfer, Patient to call before getting OOB, PT Consult for mobility concerns, PT Consult for assist device competence Medication Interventions: Evaluate medications/consider consulting pharmacy, Patient to call before getting OOB, Teach patient to arise slowly Elimination Interventions: Call light in reach

## 2019-06-11 NOTE — PROGRESS NOTES
Problem: Falls - Risk of 
Goal: *Absence of Falls Description Document Kym Yariel Fall Risk and appropriate interventions in the flowsheet. 6/11/2019 1943 by Quiana Strauss Outcome: Progressing Towards Goal 
Note:  
Fall Risk Interventions: 
Mobility Interventions: Communicate number of staff needed for ambulation/transfer, Patient to call before getting OOB, PT Consult for mobility concerns, PT Consult for assist device competence Medication Interventions: Evaluate medications/consider consulting pharmacy, Patient to call before getting OOB, Teach patient to arise slowly Elimination Interventions: Call light in reach 6/11/2019 1943 by Quiana Strauss Outcome: Progressing Towards Goal

## 2019-06-11 NOTE — PROGRESS NOTES
Hematology Oncology Progress Note Follow up for: metastatic rectal cancer CC: \" I feel the same. \" 
 
Chart notes reviewed since last visit. Case discussed with following: wife at bedside Patient complains of the following: No new complaints today. denies pain, bloating, N/V. NG in place and is tolerable. Apparently, nursing states he has had sips of water. nothing out of ostomy bag. Additional concerns noted by the staff:  
 
Patient Vitals for the past 24 hrs: 
 BP Temp Pulse Resp SpO2 Weight  
06/11/19 0755 151/89 98 °F (36.7 °C) 86 18 99 %   
06/11/19 0553      109.6 kg (241 lb 10 oz) 06/11/19 0551 143/87  92     
06/11/19 0335 129/85 96.9 °F (36.1 °C) 91 16 100 %   
06/11/19 0002 (!) 141/92 97.7 °F (36.5 °C) 83 16 100 %   
06/10/19 2018 130/87 98.4 °F (36.9 °C) 86 16 100 %   
06/10/19 1525 129/89 97.7 °F (36.5 °C) 91 16 100 %   
06/10/19 1130 151/81 98.1 °F (36.7 °C) 96 16 100 %   
06/10/19 0845  98.1 °F (36.7 °C)      
 
 
ROS negative for 11 organ systems except as ntoed above. Physical Examination: 
Constitutional Alert, cooperative, oriented. Mood and affect appropriate. Appears close to chronological age. Well nourished. Well developed. Head Normocephalic; no scars Eyes Conjunctivae and sclerae are clear and without icterus. Pupils are round ENMT Sinuses are nontender. No oral exudates, ulcers, masses, thrush or mucositis. Oropharynx clear. Tongue normal.  
Neck Supple without masses or thyromegaly. No jugular venous distension. Hematologic/Lymphatic No petechiae or purpura. No tender or palpable lymph nodes noted. Respiratory Lungs are clear to auscultation without rhonchi or wheezing. Cardiovascular Regular rate and rhythm of heart Chest / Line Site Chest is symmetric with no chest wall deformities. Abdomen Non-tender, mildly distended, no masses, ascites or hepatosplenomegaly. Bowel sounds present. Bilateral ostomies Musculoskeletal No tenderness or swelling, normal range of motion without obvious weakness. Extremities No visible deformities, no cyanosis, clubbing or edema. Skin No rashes, scars, or lesions suggestive of malignancy. No petechiae, purpura, or ecchymoses. No excoriations. Neurologic No sensory or motor deficits noted but not tested. Psychiatric Alert and oriented. Coherent speech. Verbalizes understanding of our discussions today. Labs: 
Recent Results (from the past 24 hour(s)) GLUCOSE, POC Collection Time: 06/10/19 11:33 AM  
Result Value Ref Range Glucose (POC) 161 (H) 65 - 100 mg/dL Performed by Yung Bond GLUCOSE, POC Collection Time: 06/10/19  5:49 PM  
Result Value Ref Range Glucose (POC) 151 (H) 65 - 100 mg/dL Performed by Christiana Landers (PCT) GLUCOSE, POC Collection Time: 06/11/19 12:06 AM  
Result Value Ref Range Glucose (POC) 162 (H) 65 - 100 mg/dL Performed by Refugio Pelletier GLUCOSE, POC Collection Time: 06/11/19  5:41 AM  
Result Value Ref Range Glucose (POC) 174 (H) 65 - 100 mg/dL Performed by Meghan JOHNSON (CON) PCT   
CBC WITH AUTOMATED DIFF Collection Time: 06/11/19  6:03 AM  
Result Value Ref Range WBC 8.9 4.1 - 11.1 K/uL  
 RBC 4.04 (L) 4.10 - 5.70 M/uL  
 HGB 12.4 12.1 - 17.0 g/dL HCT 38.4 36.6 - 50.3 % MCV 95.0 80.0 - 99.0 FL  
 MCH 30.7 26.0 - 34.0 PG  
 MCHC 32.3 30.0 - 36.5 g/dL  
 RDW 17.0 (H) 11.5 - 14.5 % PLATELET 981 118 - 688 K/uL MPV 10.8 8.9 - 12.9 FL  
 NRBC 0.0 0  WBC ABSOLUTE NRBC 0.00 0.00 - 0.01 K/uL NEUTROPHILS 74 32 - 75 % LYMPHOCYTES 11 (L) 12 - 49 % MONOCYTES 10 5 - 13 % EOSINOPHILS 4 0 - 7 % BASOPHILS 0 0 - 1 % IMMATURE GRANULOCYTES 1 (H) 0.0 - 0.5 % ABS. NEUTROPHILS 6.6 1.8 - 8.0 K/UL  
 ABS. LYMPHOCYTES 0.9 0.8 - 3.5 K/UL  
 ABS. MONOCYTES 0.9 0.0 - 1.0 K/UL  
 ABS. EOSINOPHILS 0.4 0.0 - 0.4 K/UL  
 ABS. BASOPHILS 0.0 0.0 - 0.1 K/UL ABS. IMM. GRANS. 0.1 (H) 0.00 - 0.04 K/UL  
 DF AUTOMATED METABOLIC PANEL, COMPREHENSIVE Collection Time: 06/11/19  6:03 AM  
Result Value Ref Range Sodium 142 136 - 145 mmol/L Potassium 4.3 3.5 - 5.1 mmol/L Chloride 114 (H) 97 - 108 mmol/L  
 CO2 22 21 - 32 mmol/L Anion gap 6 5 - 15 mmol/L Glucose 177 (H) 65 - 100 mg/dL BUN 44 (H) 6 - 20 MG/DL Creatinine 1.44 (H) 0.70 - 1.30 MG/DL  
 BUN/Creatinine ratio 31 (H) 12 - 20 GFR est AA 59 (L) >60 ml/min/1.73m2 GFR est non-AA 49 (L) >60 ml/min/1.73m2 Calcium 8.5 8.5 - 10.1 MG/DL Bilirubin, total 1.5 (H) 0.2 - 1.0 MG/DL  
 ALT (SGPT) 51 12 - 78 U/L  
 AST (SGOT) 49 (H) 15 - 37 U/L Alk. phosphatase 200 (H) 45 - 117 U/L Protein, total 7.7 6.4 - 8.2 g/dL Albumin 2.7 (L) 3.5 - 5.0 g/dL Globulin 5.0 (H) 2.0 - 4.0 g/dL A-G Ratio 0.5 (L) 1.1 - 2.2 MAGNESIUM Collection Time: 06/11/19  6:03 AM  
Result Value Ref Range Magnesium 2.2 1.6 - 2.4 mg/dL PHOSPHORUS Collection Time: 06/11/19  6:03 AM  
Result Value Ref Range Phosphorus 4.3 2.6 - 4.7 MG/DL Imaging: 
KUB: 
Direct comparison is made to prior plain radiographs dated Juanita 3, 2019. 
  
Nasogastric tube and side port overlie the stomach. There are several dilated 
small bowel loops in the abdomen. Degree of small bowel dilatation appears to 
have worsened as compared to prior plain radiographs dated Juanita 3, 2019. No free 
intraperitoneal gas is visualized on supine views. No pathologic calcification 
is seen. 
  
IMPRESSION: Multiple dilated loops of small bowel. Assessment and Plan: SBO: 
- Dr. Veloz Scriptclayton on board 
- NG in place and TPN going. 
- conservative management for now - No pain reported BOBBI: 
- Likely prerenal, cr slighly improved with increase in IVF yesterday. - 24 I/O reviewed and states he is -5.2L last 24 hours with 8400 out. - 1/2NS running at 175/hr Metastatic rectal cancer: - Follows with Dr. Luis Eduardo Hudson in [facility] office,  several chemotherapeutic options ahead for treatment so would favor a more aggressive approach if needed. He has a very good performance status should a decision need to be made re surgery or not. DM - on insulin Hypertension - clonidine, metoprolol, and nitro bid ordered. Amy Thomson

## 2019-06-12 NOTE — PROGRESS NOTES
No issues overnight - still no output from ostomy. He has been drinking which has caused the increased output from NG tube Visit Vitals /85 (BP 1 Location: Left arm, BP Patient Position: At rest) Pulse 88 Temp 98.5 °F (36.9 °C) Resp 16 Ht 5' 10\" (1.778 m) Wt 112 kg (246 lb 14.6 oz) SpO2 100% BMI 35.43 kg/m² Date 06/11/19 0700 - 06/12/19 8659 06/12/19 0700 - 06/13/19 3522 Shift 7833-4749 6725-1756 24 Hour Total 2846-8789 2875-0716 24 Hour Total  
INTAKE  
P.O. 500  500     
  P. O. 500  500     
I. V.(mL/kg/hr) 9935.0(7.5) 1333.3(1) 5046.7(1.9) Volume (0.45% sodium chloride infusion) 2210.8 1333.3 3544. 2 Volume (TPN ADULT - CENTRAL) 1502.6  1502.6 Shift Total(mL/kg) 4213. 4(38.4) 1333. 3(11.9) 5546. 7(49.5) OUTPUT Urine(mL/kg/hr) 1250(1) 1000(0.7) 2250(0.8) Urine Output (mL) (Urinary Ostomy Abdomen) 1250 1000 2250 Emesis/NG output 4200 1750 5950 Output (ml) (Nasogastric Tube 06/06/19) 4200 1750 5950 Shift Total(mL/kg) [id number]) 0300(28.2) 5980(02.3) NET -1236.6 -1416.7 -2653.3 Weight (kg) 109.6 112 112 112 112 112  
 
abd soft, distended, NT 
Ostomy with no stool in the bag A/p Continue TPN 
NPO with NG tube Cont conservative management for now with bowel rest and ngt decompression Dr. Diaz Standing to return Thursday and discuss possible surgery (adhesiolysis vs resection and/or SB bypass)

## 2019-06-12 NOTE — PROGRESS NOTES
Oncology End of Shift Note Bedside shift change report given to [facility], RN (incoming nurse) by Meredith Umana (outgoing nurse) on [facility]. Report included the following information SBAR, Kardex, MAR and Recent Results. Shift Summary: No acute changes; NG out decreased compared to previous night; labs drawn Issues for Physician to Address:   
 
Patient on Cardiac Monitoring?  onc 6 [x] Yes 
[] No 
 
Rhythm: Telemetry: normal sinus rhythm Shift Events Meredith Umana

## 2019-06-12 NOTE — PROGRESS NOTES
Hematology Oncology Progress Note Follow up for: metastatic rectal cancer CC: \" I feel Signa Majano today\" Chart notes reviewed since last visit. Case discussed with following: wife at bedside Patient complains of the following: Feeling more quesy today, NG outpt more cloudy today. Feels more fatigued today as well. Additional concerns noted by the staff:  
 
Patient Vitals for the past 24 hrs: 
 BP Temp Pulse Resp SpO2 Weight  
06/12/19 0718 155/84 98.1 °F (36.7 °C) 77 16 100 %   
06/12/19 0532      112 kg (246 lb 14.6 oz) 06/12/19 0305 153/87 98.1 °F (36.7 °C) 90 16 100 %   
06/11/19 2340 131/80 98.1 °F (36.7 °C) 94 16 99 %   
06/11/19 2017 146/84 98.6 °F (37 °C) 83 16 98 %   
06/11/19 1600 137/84 97.9 °F (36.6 °C) 88 18 99 %   
06/11/19 1145 149/84 98.9 °F (37.2 °C) 84 18 97 %   
 
 
ROS negative for 11 organ systems except as ntoed above. Physical Examination: 
Constitutional Alert, cooperative, oriented. Mood and affect appropriate. Appears close to chronological age. Well nourished. Well developed. Head Normocephalic; no scars Eyes Conjunctivae and sclerae are clear and without icterus. Pupils are round ENMT Sinuses are nontender. No oral exudates, ulcers, masses, thrush or mucositis. Oropharynx clear. Tongue normal.  
Neck Supple without masses or thyromegaly. No jugular venous distension. Hematologic/Lymphatic No petechiae or purpura. No tender or palpable lymph nodes noted. Respiratory Lungs are clear to auscultation without rhonchi or wheezing. Cardiovascular Regular rate and rhythm of heart Chest / Line Site Chest is symmetric with no chest wall deformities. Abdomen Non-tender, mildly distended, no masses, ascites or hepatosplenomegaly. Hypoactive BS today. Bilateral ostomies Musculoskeletal No tenderness or swelling, normal range of motion without obvious weakness. Extremities No visible deformities, no cyanosis, clubbing or edema. Skin No rashes, scars, or lesions suggestive of malignancy. No petechiae, purpura, or ecchymoses. No excoriations. Neurologic No sensory or motor deficits noted but not tested. Psychiatric Alert and oriented. Coherent speech. Verbalizes understanding of our discussions today. Labs: 
Recent Results (from the past 24 hour(s)) GLUCOSE, POC Collection Time: 06/11/19 11:27 AM  
Result Value Ref Range Glucose (POC) 157 (H) 65 - 100 mg/dL Performed by Loreda Kayser GLUCOSE, POC Collection Time: 06/11/19  6:40 PM  
Result Value Ref Range Glucose (POC) 116 (H) 65 - 100 mg/dL Performed by Loreda Kayser GLUCOSE, POC Collection Time: 06/11/19 11:23 PM  
Result Value Ref Range Glucose (POC) 151 (H) 65 - 100 mg/dL Performed by [facility] METABOLIC PANEL, BASIC Collection Time: 06/12/19  2:55 AM  
Result Value Ref Range Sodium 142 136 - 145 mmol/L Potassium 4.2 3.5 - 5.1 mmol/L Chloride 114 (H) 97 - 108 mmol/L  
 CO2 21 21 - 32 mmol/L Anion gap 7 5 - 15 mmol/L Glucose 143 (H) 65 - 100 mg/dL BUN 37 (H) 6 - 20 MG/DL Creatinine 1.33 (H) 0.70 - 1.30 MG/DL  
 BUN/Creatinine ratio 28 (H) 12 - 20 GFR est AA >60 >60 ml/min/1.73m2 GFR est non-AA 53 (L) >60 ml/min/1.73m2 Calcium 8.4 (L) 8.5 - 10.1 MG/DL MAGNESIUM Collection Time: 06/12/19  2:55 AM  
Result Value Ref Range Magnesium 1.9 1.6 - 2.4 mg/dL PHOSPHORUS Collection Time: 06/12/19  2:55 AM  
Result Value Ref Range Phosphorus 3.6 2.6 - 4.7 MG/DL  
HEPATIC FUNCTION PANEL Collection Time: 06/12/19  2:55 AM  
Result Value Ref Range Protein, total 7.6 6.4 - 8.2 g/dL Albumin 2.7 (L) 3.5 - 5.0 g/dL Globulin 4.9 (H) 2.0 - 4.0 g/dL A-G Ratio 0.6 (L) 1.1 - 2.2 Bilirubin, total 1.5 (H) 0.2 - 1.0 MG/DL Bilirubin, direct 0.5 (H) 0.0 - 0.2 MG/DL Alk.  phosphatase 206 (H) 45 - 117 U/L  
 AST (SGOT) 50 (H) 15 - 37 U/L  
 ALT (SGPT) 50 12 - 78 U/L  
GLUCOSE, POC  
 Collection Time: 06/12/19  5:31 AM  
Result Value Ref Range Glucose (POC) 164 (H) 65 - 100 mg/dL Performed by [id number] PCT Imaging: 
KUB: 
Direct comparison is made to prior plain radiographs dated Juanita 3, 2019. 
  
Nasogastric tube and side port overlie the stomach. There are several dilated 
small bowel loops in the abdomen. Degree of small bowel dilatation appears to 
have worsened as compared to prior plain radiographs dated Juanita 3, 2019. No free 
intraperitoneal gas is visualized on supine views. No pathologic calcification 
is seen. 
  
IMPRESSION: Multiple dilated loops of small bowel. Assessment and Plan: SBO: 
- Dr. Francis Salas on board, continuing conservative mgt. Dr Zuniga Senior to follow upon return Thurs per Dr Francis Salas. 
- NG in place and TPN going. BS more hypoactive today. - conservative management for now - No pain reported BOBBI: 
- Likely prerenal, cr improved - 1/2NS running at 175/hr Metastatic rectal cancer: - Follows with Dr. Yamilka Collins in [facility] office,  several chemotherapeutic options ahead for treatment so would favor a more aggressive approach if needed. He has a very good performance status should a decision need to be made re surgery or not. DM - on insulin Hypertension - clonidine, metoprolol, and nitro bid ordered. Cordell Villa

## 2019-06-12 NOTE — PROGRESS NOTES
Hospitalist Progress Note NAME: Juliette Souza :  1950 MRN:  [id number] Assessment / Plan: 
Small Bowel Obstruction POA- s/p NGT to suction for now Colon Cancer s/p resection with recurrence/Rectal Adenocarcinoma on ChemotX On TPN  
CT abd/pelvis on :Small bowel distention with decompressed loops distally is compatible with obstruction likely related to effusion formation in the mid lower abdomen. Mild free fluid KUB on :Multiple dilated loops of small bowel. KUB on :Diminished small bowel distention  
kub :Persistent SBO pattern. NG tube has retracted into the proximal 
stomach. Cont NG to suction, NPO for now IP CRSurgery consult appreciated- cont supportive care for now, may need surgery if does not improve IP Oncology consult appreciated & noted- Chemo delayed for now Cont IV dilaudid prn pain and IV antiemetics prn On iv fluid. On TPN. Pt feeling better,no colostomy output Fu surgery :  
Dr Luciano Gilmore to return Thursday and discuss possible surgery (adhesiolysis vs resection and/or SB bypass) Daily CMP while on TPN   
  
   
BOBBI POA- prerenal Cr was 1.33 C/w ivf -monitor renal function daily- BMP daily Hypermagnesemia: resolved 
  
Leukocytosis POA: suspect reactive, resolved now 
-monitor 
-No indication for infection. 
  
Hypertension 
-holding PO meds 
-place on clonidine patch and scheduled Nitrobid 
-prn IV hydralazine ordered 
  
Diabetes Mellitus with hyperglycemia - bs ok  
-Continue current dose of NPH, increase regular insulin in TPN. -continue SSI Mild hypernatremia resolved Na 142 Baseline:functional 
 
 
30.0 - 39.9 Obese / Body mass index is 35.43 kg/m². Weight loss recommended- Exercise Code status: Full Surrogate Decision Maker: Wife 
  
Prophylaxis: Hep SQ and H2B/PPI Recommended Disposition: Home w/Family when SBO resolved & eating - few days anticipated Subjective: Chief Complaint / Reason for Physician Visit: F/U Dehydration/BOBBI, SBO, s/p NGT decompression, recurrent Colorectal cancer No acute complaints Objective: VITALS:  
Last 24hrs VS reviewed since prior progress note. Most recent are: 
Patient Vitals for the past 24 hrs: 
 Temp Pulse Resp BP SpO2  
06/12/19 0718 98.1 °F (36.7 °C) 77 16 155/84 100 % 06/12/19 0305 98.1 °F (36.7 °C) 90 16 153/87 100 % 06/11/19 2340 98.1 °F (36.7 °C) 94 16 131/80 99 % 06/11/19 2017 98.6 °F (37 °C) 83 16 146/84 98 % 06/11/19 1600 97.9 °F (36.6 °C) 88 18 137/84 99 % 06/11/19 1145 98.9 °F (37.2 °C) 84 18 149/84 97 % 06/11/19 0755 98 °F (36.7 °C) 86 18 151/89 99 % Intake/Output Summary (Last 24 hours) at 6/12/2019 0744 Last data filed at 6/12/2019 6628 Gross per 24 hour Intake 5546.71 ml Output 8200 ml Net -2653.29 ml PHYSICAL EXAM: 
General: WD, WN. Alert, cooperative, no acute distress, Obese +   
EENT:  EOMI. Anicteric sclerae. MMM Resp:  CTA bilaterally, no wheezing or rales. No accessory muscle use CV:  Regular  rhythm,  No edema GI:  Abdomen soft, Non tender. , no BS . Colostomy noted without stool in. Neurologic:  Alert and oriented X 3, normal speech, Psych:   Good insight. Not anxious nor agitated Skin:  No rashes. No jaundice Reviewed most current lab test results and cultures  YES Reviewed most current radiology test results   YES Review and summation of old records today    NO Reviewed patient's current orders and MAR    YES 
PMH/SH reviewed - no change compared to H&P 
________________________________________________________________________ Care Plan discussed with: 
  Comments Patient x Family  x Wife at bedside RN x Care Manager Consultant Multidiciplinary team rounds were held today with , nursing, pharmacist and clinical coordinator.   Patient's plan of care was discussed; medications were reviewed and discharge planning was addressed. ________________________________________________________________________ 
 
________________________________________________________________________ Alireza Narvaez MD  
 
Procedures: see electronic medical records for all procedures/Xrays and details which were not copied into this note but were reviewed prior to creation of Plan. LABS: 
I reviewed today's most current labs and imaging studies. Pertinent labs include: 
Recent Labs  
  06/11/19 
0603 06/10/19 
8340 WBC 8.9 7.4 HGB 12.4 12.7 HCT 38.4 39.2  230 Recent Labs  
  06/12/19 
0255 06/11/19 
0603 06/10/19 
3100  142 148* K 4.2 4.3 4.6 * 114* 117* CO2 21 22 24 * 177* 210* BUN 37* 44* 42* CREA 1.33* 1.44* 1.55* CA 8.4* 8.5 9.2 MG 1.9 2.2 2.4 PHOS 3.6 4.3  --   
ALB 2.7* 2.7* 3.0* TBILI 1.5* 1.5* 1.3*  
SGOT 50* 49* 51* ALT 50 51 53 Signed: Alireza Narvaez MD

## 2019-06-12 NOTE — PROGRESS NOTES
[facility] [phone] ([phone]) [facility]: [phone] ([phone]) Patient and wife Dinh seen for emotional support. Patient is fast asleep, did not rouse him. Dinh shared that because of the hospital ins and outs of staff, patient reports not sleeping too well at night and \"he feels he can relax when I am here\" per Dinh. Dinh usually spends all day in the hospital, going home at 10 pm and comes back in the morning. Active listening and emotional support provided to Dinh. Dinh shared today that they have good social support from their Orthodox and neighborhood friends. The two adult children are patient's kids (not Soila's) and although they have a good relationship, Dinh notes that Bettie Days have their life and families and they need to get on with their lives\". Dinh shared how they have been on this journey regarding patient's illness since 2016. She is hopeful to continue trying current recommendations and yet realistic that not all is in their control. Patient has noted he would let us know if he is interested in completing AMD, Dinh is Lorna Mckinnon anyway. At this time he is looking at continuing treatment that is recommended.

## 2019-06-12 NOTE — PROGRESS NOTES
Nutrition Assessment: 
 
INTERVENTIONS/RECOMMENDATIONS:  
· To better meet estimated nutrition needs consider increasing TPN (D15 5%AA) rate to 75 ml/hr as well as add lipids 3x per week. · Provides: 1493 kcal 90 g protein, 270 g CHO · 88% and 100% of estimated kcal and protein needs respectively ASSESSMENT:  
Chart reviewed. TPN (D15 5%AA) continues @ 63 ml/hr which only meets ~63% of pt estimated kcal needs and does not provide essential fatty acids. BG better controlled, Na improved. Diet Order: NPO 
% Eaten:   
Patient Vitals for the past 72 hrs: 
 % Diet Eaten 06/12/19 0738 0 % 06/11/19 1322 0 %  
06/11/19 0830 0 % 06/10/19 1603 0 % 06/10/19 0814 0 % 06/10/19 0232 0 %  
06/10/19 0034 0 % 06/09/19 2000 0 % Pertinent Medications: [x]Reviewed:  
Pertinent Labs: [x]Reviewed:  
Food Allergies: [x]NKFA  []Other Last BM:   
Edema:  n/a    []RUE   []LUE   []RLE   []LLE Pressure Ulcer:  n/a    [] Stage I   [] Stage II   [] Stage III   [] Stage IV Anthropometrics: Height: 5' 10\" (177.8 cm) Weight: 112 kg (246 lb 14.6 oz) IBW (%IBW):   ( ) UBW (%UBW):   (  %) BMI: Body mass index is 35.43 kg/m². This BMI is indicative of: 
[]Underweight   []Normal   []Overweight   [x] Obesity   [] Extreme Obesity (BMI>40) Last Weight Metrics: 
Weight Loss Metrics 6/12/2019 4/18/2019 4/9/2019 2/27/2019 12/17/2018 8/21/2018 4/17/2018 Today's Wt 246 lb 14.6 oz 245 lb 245 lb 245 lb 249 lb 247 lb 244 lb BMI 35.43 kg/m2 36.18 kg/m2 36.18 kg/m2 36.18 kg/m2 36.77 kg/m2 36.48 kg/m2 36.03 kg/m2 Estimated Nutrition Needs (Based on): 1700 Kcals/day(20 kcal/kg Adj BW) , 85 g(0.8 g/kg) Protein Carbohydrate: At Least 130 g/day  Fluids: 1700 mL/day or per primary team 
 
NUTRITION DIAGNOSES:  
Problem:  Altered GI function Less than optimal parenteral nutrition Etiology: related to metastatic rectal cancer and SBO change on dextrose concentration and lipids have not been added Signs/Symptoms: as evidenced by need for TPN to meet nutrition needs current TPN order only meets ~63% of estimated kcal needs Previous Nutrition Dx:  [] Resolved  [] Unresolved           [x] Progressing NUTRITION INTERVENTIONS: 
  Enteral/Parenteral Nutrition: Initiate parenteral nutrition GOAL:  
add lipids 3x per week in 24-48 hrs NUTRITION MONITORING AND EVALUATION Food/Nutrient Intake Outcomes: Total energy intake Physical Signs/Symptoms Outcomes: Weight/weight change, Electrolyte and renal profile, Glucose profile, GI 
 
Previous Goal Met: 
 [] Met              [x] Progressing Towards Goal              [] Not Progressing Towards Goal  
Previous Recommendations: 
 [] Implemented          [x] Not Implemented          [] Not Applicable LEARNING NEEDS (Diet, Food/Nutrient-Drug Interaction):  
 [x] None Identified 
 [] Identified and Education Provided/Documented 
 [] Identified and Pt declined/was not appropriate Cultural, Yazdanism, OR Ethnic Dietary Needs:  
 [x] None Identified 
 [] Identified and Addressed 
 
 [x] Interdisciplinary Care Plan Reviewed/Documented  
 [x] Discharge Planning: TBD [] Participated in Interdisciplinary Rounds NUTRITION RISK:  
 [x] High              [] Moderate           []  Low  []  Minimal/Uncompromised Lizandro Anthony RDN Pager [phone] Weekend Pager [phone]

## 2019-06-12 NOTE — PROGRESS NOTES
Oncology End of Shift Note Bedside shift change report given to Pat Rocha RN (incoming nurse) by Mari Villalobos (outgoing nurse[facility] Report included the following information SBAR, Kardex and MAR. Shift Summary: Emptied NG tube canister 3 times. Patient had a bath. In the morning patient stated that he felt queasy. Insulin coverage was needed for lunch and dinner time. Patient states he has no pain. IV fluid bag hung and TPN verified and hung. Issues for Physician to Address:  None. Patient on Cardiac Monitoring? [x] Yes 
[] No 
 
Rhythm: Telemetry: normal sinus rhythm Mari Villalobos

## 2019-06-13 NOTE — BRIEF OP NOTE
BRIEF OPERATIVE NOTE Date of Procedure: 6/13/2019 Preoperative Diagnosis: BOWEL OBSTRUCTION Postoperative Diagnosis: BOWEL OBSTRUCTION Procedure(s): 
ROBOTIC LYSIS OF ADHESIONS SMALL BOWEL RESECTION Surgeon(s) and Role: Cristi Condon MD - Primary * Jayro Hadley MD - Co-Surgeon Surgical Assistant: Lynn Marino Surgical Staff: 
Circ-1: Claudio Malhotra Circ-Relief: Tylor Hernández; Corby Vann Scrub Tech-1: Dave Remy Scrub Tech-Relief: Jessie Holley Surg Asst-1: Burnis Frank Event Time In Time Out Incision Start 8767 Incision Close Anesthesia: General  
Estimated Blood Loss: 150cc Specimens: * No specimens in log * Findings: frozen abdomen and ascites. Colon adherent to anterior abdominal wall. Small bowel bypass performed Complications: through and through colonic injury x3. 6 holes repaired in 2 layers each Implants: * No implants in log *

## 2019-06-13 NOTE — PROGRESS NOTES
Oncology End of Shift Note Bedside shift change report given to JUS cabello (incoming nurse) by Tom Deras (outgoing nurse) on [facility]. Report included the following information SBAR, Kardex, ED Summary, OR Summary, Intake/Output, MAR and Cardiac Rhythm sinus. Shift Summary: No complaints of pain. Patient taken to OR mid shift. Consent obtained. Chg wash before. Urostomy emptied. Insulin coverage provided. Issues for Physician to Address:   
 
Patient on Cardiac Monitoring? [x] Yes 
[] No 
 
Rhythm: sinus Shift Events Tom Deras

## 2019-06-13 NOTE — INTERDISCIPLINARY ROUNDS
Oncology Interdisciplinary rounds were held today to discuss patient plan of care and outcomes. The following members were present: Nursing, Physician, Case Management, Pharmacy, and PT/OT Actual Length of Stay: 11 DRG GLOS: 3.3 Expected Length of Stay: 3d 7h 
 
 
 
               Plan            Discharge Continue TPN Continue NG Tube Home with family when stable Transport family

## 2019-06-13 NOTE — PROGRESS NOTES
Pt is in the OR since 2PM , still not in his room at 7pm . I reviewed vital signs , labs . Unfortunately was not able to see him physically as he is in the OR  
A/p: 
Small Bowel Obstruction POA- s/p NGT to suction for now Colon Cancer s/p resection with recurrence/Rectal Adenocarcinoma on ChemotX On TPN Pt currently in OR , FU surgery recs  
   
BOBBI POA- prerenal Cr was 1.33 C/w ivf -monitor renal function daily- BMP daily 
  
Hypermagnesemia: resolved 
  
Leukocytosis POA: suspect reactive, resolved now 
-monitor 
-No indication for infection. 
  
Hypertension 
-holding PO meds 
-place on clonidine patch and scheduled Nitrobid 
-prn IV hydralazine ordered 
  
Diabetes Mellitus with hyperglycemia - bs ok  
-Continue current dose of NPH, increase regular insulin in TPN. -continue SSI 
  
Mild hypernatremia resolved Na 142 
  
Baseline:functional 
 
Time : 15mn Non billable round

## 2019-06-13 NOTE — ROUTINE PROCESS
Oncology End of Shift Note Bedside shift change report given to JUS Solis (incoming nurse) by Saroj Rodriguez (outgoing nurse) on [employer]. Report included the following information SBAR, Kardex, MAR and Recent Results. Shift Summary: No acute changes; labs drawn Issues for Physician to Address:    
 
Patient on Cardiac Monitoring? Onc 6 [x] Yes 
[] No 
 
Rhythm: Telemetry: normal sinus rhythm Shift Events Saroj Rodriguez

## 2019-06-13 NOTE — PERIOP NOTES
TRANSFER - IN REPORT: 
 
Verbal report received from Venita RN(name) on Jody Johnson  being received from Oncology Room 1114(unit) for ordered procedure Report consisted of patients Situation, Background, Assessment and  
Recommendations(SBAR). Information from the following report(s) SBAR, Kardex, Intake/Output, MAR, Recent Results, Cardiac Rhythm NSR, Pre Procedure Checklist and Procedure Verification was reviewed with the receiving nurse. Opportunity for questions and clarification was provided. Assessment completed upon patients arrival to unit and care assumed.

## 2019-06-13 NOTE — PROGRESS NOTES
Hematology Oncology Progress Note Follow up for: metastatic rectal cancer CC: \" I feel so so today\" Chart notes reviewed since last visit. Case discussed with following: wife at bedside Patient complains of the following: Started having some lower abdomen pains on and off that started overnight, no output from ostomy. Additional concerns noted by the staff:  
 
Patient Vitals for the past 24 hrs: 
 BP Temp Pulse Resp SpO2  
06/13/19 0745 168/82 98.4 °F (36.9 °C) 86 18 99 % 06/13/19 0244 146/85 98 °F (36.7 °C) 88 16 98 % 06/12/19 2349 156/87 98.7 °F (37.1 °C) 87 16 99 % 06/12/19 2013 157/87 98.5 °F (36.9 °C) 90 16 99 % 06/12/19 1539 155/82 98.6 °F (37 °C) 80 16 100 % 06/12/19 1049 162/85 98.5 °F (36.9 °C) 88 16 100 % ROS negative for 11 organ systems except as ntoed above. Physical Examination: 
Constitutional Alert, cooperative, oriented. Mood and affect appropriate. Appears close to chronological age. Well nourished. Well developed. Head Normocephalic; no scars Eyes Conjunctivae and sclerae are clear and without icterus. Pupils are round ENMT Sinuses are nontender. No oral exudates, ulcers, masses, thrush or mucositis. Oropharynx clear. Tongue normal.  
Neck Supple without masses or thyromegaly. No jugular venous distension. Hematologic/Lymphatic No petechiae or purpura. No tender or palpable lymph nodes noted. Respiratory Lungs are clear to auscultation without rhonchi or wheezing. Cardiovascular Regular rate and rhythm of heart Chest / Line Site Chest is symmetric with no chest wall deformities. Abdomen Non-tender, mildly distended, no masses, ascites or hepatosplenomegaly. Hypoactive BS today. Bilateral ostomies Musculoskeletal No tenderness or swelling, normal range of motion without obvious weakness. Extremities No visible deformities, no cyanosis, clubbing or edema. Skin No rashes, scars, or lesions suggestive of malignancy.  No petechiae, purpura, or ecchymoses. No excoriations. Neurologic No sensory or motor deficits noted but not tested. Psychiatric Alert and oriented. Coherent speech. Verbalizes understanding of our discussions today. Labs: 
Recent Results (from the past 24 hour(s)) GLUCOSE, POC Collection Time: 06/12/19 11:49 AM  
Result Value Ref Range Glucose (POC) 162 (H) 65 - 100 mg/dL Performed by [employer] GLUCOSE, POC Collection Time: 06/12/19  6:03 PM  
Result Value Ref Range Glucose (POC) 157 (H) 65 - 100 mg/dL Performed by [employer]jake Leonardo GLUCOSE, POC Collection Time: 06/12/19 11:44 PM  
Result Value Ref Range Glucose (POC) 158 (H) 65 - 100 mg/dL Performed by [facility] [place] ([facility] PCT METABOLIC PANEL, BASIC Collection Time: 06/13/19  4:09 AM  
Result Value Ref Range Sodium 140 136 - 145 mmol/L Potassium 4.1 3.5 - 5.1 mmol/L Chloride 113 (H) 97 - 108 mmol/L  
 CO2 22 21 - 32 mmol/L Anion gap 5 5 - 15 mmol/L Glucose 151 (H) 65 - 100 mg/dL BUN 30 (H) 6 - 20 MG/DL Creatinine 1.21 0.70 - 1.30 MG/DL  
 BUN/Creatinine ratio 25 (H) 12 - 20 GFR est AA >60 >60 ml/min/1.73m2 GFR est non-AA 59 (L) >60 ml/min/1.73m2 Calcium 8.3 (L) 8.5 - 10.1 MG/DL  
GLUCOSE, POC Collection Time: 06/13/19  5:29 AM  
Result Value Ref Range Glucose (POC) 157 (H) 65 - 100 mg/dL Performed by [employer] [place] ([facility]) PCT Imaging: 
KUB: 
Direct comparison is made to prior plain radiographs dated Juanita 3, 2019. 
  
Nasogastric tube and side port overlie the stomach. There are several dilated 
small bowel loops in the abdomen. Degree of small bowel dilatation appears to 
have worsened as compared to prior plain radiographs dated Juanita 3, 2019. No free 
intraperitoneal gas is visualized on supine views. No pathologic calcification 
is seen. 
  
IMPRESSION: Multiple dilated loops of small bowel. Assessment and Plan: SBO: 
 - Dr Marleen David to follow upon return today, per Dr Shital Phillip. 
- NG in place and TPN going. 
- conservative management for now, may need intervention? 
- started having some pains overnight and this am 
- xray this am with single dilated bowel loop, no bid change however BOBBI: 
- Likely prerenal, cr continues to improve - 1/2NS running at 175/hr Metastatic rectal cancer: - Follows with Dr. Jorge Templeton in [facility] office,  several chemotherapeutic options ahead for treatment so would favor a more aggressive approach if needed. He has a very good performance status should a decision need to be made re surgery or not. DM - on insulin Hypertension - clonidine, metoprolol, and nitro bid ordered. Jasbir Fishman

## 2019-06-13 NOTE — PROGRESS NOTES
Problem: Falls - Risk of 
Goal: *Absence of Falls Description Document [facility] Fall Risk and appropriate interventions in the flowsheet.  
Outcome: Progressing Towards Goal

## 2019-06-13 NOTE — ANESTHESIA PREPROCEDURE EVALUATION
Anesthetic History No history of anesthetic complications Review of Systems / Medical History Patient summary reviewed, nursing notes reviewed and pertinent labs reviewed Pulmonary Smoker Asthma : well controlled Neuro/Psych Within defined limits Cardiovascular Hypertension: well controlled Past MI (x2; 1996, 2001), CAD, cardiac stents (x2 1996) and hyperlipidemia Exercise tolerance: <4 METS Comments: Ejection fraction was estimated to be 80 %. GI/Hepatic/Renal 
Within defined limits Endo/Other Diabetes: type 2 Obesity, arthritis and cancer (rectal CA) Other Findings Comments: Gout Radial nerve compression Physical Exam 
 
Airway Mallampati: II 
TM Distance: 4 - 6 cm Neck ROM: normal range of motion Mouth opening: Normal 
 
 Cardiovascular Regular rate and rhythm,  S1 and S2 normal,  no murmur, click, rub, or gallop Dental 
 
Dentition: Full upper dentures and Full lower dentures Pulmonary Breath sounds clear to auscultation Abdominal 
GI exam deferred Other Findings Anesthetic Plan ASA: 3 Anesthesia type: general 
 
Monitoring Plan: BIS Induction: Intravenous Anesthetic plan and risks discussed with: Patient

## 2019-06-13 NOTE — PROGRESS NOTES
Problem: Pressure Injury - Risk of 
Goal: *Prevention of pressure injury Description Document Vishnu Scale and appropriate interventions in the flowsheet. Outcome: Progressing Towards Goal 
Note:  
Pressure Injury Interventions: 
Sensory Interventions: Assess changes in LOC, Keep linens dry and wrinkle-free, Maintain/enhance activity level, Minimize linen layers, Pressure redistribution bed/mattress (bed type) Moisture Interventions: Minimize layers Activity Interventions: Increase time out of bed, Pressure redistribution bed/mattress(bed type), PT/OT evaluation Mobility Interventions: HOB 30 degrees or less, Pressure redistribution bed/mattress (bed type), PT/OT evaluation Nutrition Interventions: Document food/fluid/supplement intake, Offer support with meals,snacks and hydration Friction and Shear Interventions: HOB 30 degrees or less, Lift sheet, Minimize layers

## 2019-06-14 NOTE — PROGRESS NOTES
Conner Persaud  
 
 
 
NAME:Benjamin Leonardo  ZVC:[id number]   :1950 Cr. Worse No urine out put 
k remained high - 6.2 Place kevin Start hd today Iv calcium Iv bicarb Consent obtained from wife and patient. Adri Wyatt, [address] [facility] [address] [place], [address] [facility] Phone - [phone] Fax - [phone] [address], [address] [facility] Phone - [phone] Fax - [phone]    
www. [facility]

## 2019-06-14 NOTE — PROGRESS NOTES
PCU charge nurse reviewed chart  Per bed placement pt requiring higher level of care and will be transferred to PCU

## 2019-06-14 NOTE — PROGRESS NOTES
Pharmacy Automatic Renal Dosing Protocol - Antimicrobials Indication for Antimicrobials: intra-abdominal infection Current Regimen of Each Antimicrobial: 
Vancomycin 2000 mg x 1 then 750 mg on HD (Start Date , Day 1) cefepime 1g q24 (Start Date ; Day # 1) Metronidazole 500mg q12h Previous Antimicrobial Therapy: 
NA Significant Cultures:  
Blodo cx pending Radiology / Imaging results: (X-ray, CT scan or MRI): NA 
 
Paralysis, amputations, malnutrition: NA 
 
Labs: 
Recent Labs  
  19 26 19 
1050 19 
1015 19 
0321 CREA 3.82* 3.67*  --  2.86* BUN 51* 50*  --  43* WBC  --   --  19.8*  -- Temp (24hrs), Av.9 °F (37.2 °C), Min:97.4 °F (36.3 °C), Max:101.3 °F (38.5 °C) Creatinine Clearance (mL/min) or Dialysis: ARF HD Impression/Plan:  
Vancomycin 2000 mg x 1 then 750 mg with HD Cefepime 1 gram Q24H per renal dosing protocol Monitor closely. Metronidazole 500mg q12h. Antimicrobial stop date to be determined. Pharmacy will follow daily and adjust medications as appropriate for renal function and/or serum levels. Thank you, 
Yaima Gracia, [facility] Recommended duration of therapy 
http://[facility]/[place]/[facility]/Pharmacy/Clinical%20Companion/Duration%20of%20ABX%20therapy. docx Renal Dosing 
http://[facility]/[place]/[facility]/Pharmacy/Clinical%20Companion/Renal%[id number]. pdf

## 2019-06-14 NOTE — PROGRESS NOTES
Pt arrived on the unit from [facility]. Pt placed on monitor x3. Pt has  MEWS of 4 with a temp of 101. Pt has R neck kevin in place. 1745:  Paged and spoke with Dr. Nini Kennedy, received order for IV acetaminophen for fever. Hung calcium gluconate through new 22g IV in R wrist.  IV sodium bicarb given. NG hooked up to LCWS. 
 
1800:  Spoke with Dr. Nini Kennedy about IV metoprolol and she stated to hold 1800 dose. She also stated that she added IV vanc. 1815:  Spoke with dialysis and they will be her in approx 10 minutes. SPoke with pt and wife about dialysis this evening. Answered wife's questions about the process. 1830:  Dialysis at bedside. IV acetaminophen hung. 1930: Bedside and Verbal shift change report given to [employer] RN (oncoming nurse) by Shae Goodwin RN (offgoing nurse). Report included the following information SBAR, Kardex, MAR, Recent Results and Cardiac Rhythm sinus tach.

## 2019-06-14 NOTE — ROUTINE PROCESS
0- Pt wife aware of risks and benefits and wishes to proceed. Consent obtained. 2960- Pt to room per this nurse. Report to primary nurse Naz Bingham.

## 2019-06-14 NOTE — PROGRESS NOTES
Pt. K 6.2, creatinine 3.83. Dr. Bao Hoff notified. MD stated he will come to see the Pt.  
1615 Pt. Transferred for kevin placement. 1630 report called to [address], PCU. 
1710 Pt. Transferred to PCU.

## 2019-06-14 NOTE — PROGRESS NOTES
Having some pain after very difficult exploration due to adhesions and carcinomatosis. Renal failure now on dialysis. Made a little bit of urine today. Visit Vitals BP 96/65 (BP 1 Location: Right arm, BP Patient Position: At rest) Pulse (!) 118 Temp (!) 101 °F (38.3 °C) Resp 20 Ht 5' 10\" (1.778 m) Wt 110.9 kg (244 lb 7.8 oz) SpO2 99% BMI 35.08 kg/m² Date 06/13/19 1900 - 06/14/19 2454 06/14/19 0700 - 06/15/19 4925 Shift 9243-6129 24 Hour Total 5276-4293 3844-0380 24 Hour Total  
INTAKE  
P.O.  750     
  P. O.  750     
I. V.(mL/kg/hr) 2423.4 3423.4 683.3(0.5)  683. 3 Volume (sodium bicarbonate 150 mEq/1000 mL D5W (premix))   683.3  683. 3 Volume (0.45% sodium chloride infusion) 1075 1075 Volume (lactated Ringers infusion) 900 1900 Volume (TPN ADULT - CENTRAL) 448. 4 448.4 NG/GT   0  0 Intake (ml) (Nasogastric Tube 06/06/19)   0  0 Shift Total(mL/kg) 1124. 4(21.6) 3049.7(39.2) 683. 3(6.2)  683. 3(6.2) OUTPUT Urine(mL/kg/hr)  1225 0(0)  0 Urine Voided  1225 Urine Output (mL) (Urinary Ostomy Abdomen)   0  0 Emesis/NG output 0 0 220  220 Output (ml) (Nasogastric Tube 06/06/19) 0 0 220  220 Drains 60 60 25  25 Output (ml) (Matthew-Morejon Drain 06/13/19 Abdomen) 60 60 25  25 Other 900 900 Other Output 900 900 Stool   6  6 Stool   1  1 Output (ml) (Colostomy 06/02/19)   5  5 Blood 150 150 Estimated Blood Loss 150 150 Shift Total(mL/kg) 1110(9.9) 8208(08.1) 251(2.3)  251(2.3) NET 1313.4 1838.4 432.3  432.3 Weight (kg) 112 112 110.9 110.9 110.9  
 
abd tender Stoma pink without stool or flatus 
monique serosang Urine dark A/P carcinomatosis Renal failure Colotomies x6 (3 through and through injuries) repaired and enterocolonic bypass performed. Approx 130cm of small bowel in line.  The rest has various internal hernias due to adhesions and carcinomatosis which could not be evaluated without risking significantly more enterotomies and possible fistulae. Supportive care through the weekend I think palliative/comfort care measures would be reasonable but the patient and family are having a hard time coming to  with this.

## 2019-06-14 NOTE — PROGRESS NOTES
Hematology Oncology Progress Note Follow up for: metastatic rectal cancer CC: \" I feel bad today\" Chart notes reviewed since last visit. Case discussed with following:  
 
Patient complains of the following: Underwent surgery yesterday with Dr. Francis Barone, today having incisional pain but is feeling a bit better with his abd pains he had yesterday. NG in place. Febrile ON Additional concerns noted by the staff:  
 
Patient Vitals for the past 24 hrs: 
 BP Temp Pulse Resp SpO2  
06/14/19 0826 106/76 98.6 °F (37 °C) 96 24 98 % 06/14/19 0542 107/64 98.6 °F (37 °C) 98 18 100 % 06/14/19 0331 119/68 (!) 101.3 °F (38.5 °C) (!) 103 19 100 % 06/13/19 2316 131/78 (!) 100.6 °F (38.1 °C) 97 19 99 % 06/13/19 2228 117/71 98 °F (36.7 °C) 95 18 100 % 06/13/19 2145 112/64 97.6 °F (36.4 °C) 92 17 98 % 06/13/19 2130 120/60  97 17 99 % 06/13/19 2115 109/64  94 16 99 % 06/13/19 2100 100/59  95 11 99 % 06/13/19 2050 106/66  94 14 99 % 06/13/19 2045 121/56  94 14 99 % 06/13/19 2040 117/65  95 15 99 % 06/13/19 2035 120/64 97.6 °F (36.4 °C) 96 15 98 % 06/13/19 2031 126/72      
06/13/19 1453 (!) 167/92 99.3 °F (37.4 °C) 97 18 98 % ROS negative for 11 organ systems except as ntoed above. Physical Examination: 
Constitutional Alert, cooperative, oriented. Mood and affect appropriate. Appears close to chronological age. Well nourished. Well developed. Head Normocephalic; no scars Eyes Conjunctivae and sclerae are clear and without icterus. Pupils are round ENMT Sinuses are nontender. No oral exudates, ulcers, masses, thrush or mucositis. Oropharynx clear. Tongue normal.  
Neck Supple without masses or thyromegaly. No jugular venous distension. Hematologic/Lymphatic No petechiae or purpura. No tender or palpable lymph nodes noted. Respiratory Lungs are clear to auscultation without rhonchi or wheezing. Cardiovascular Regular rate and rhythm of heart Chest / Line Site Chest is symmetric with no chest wall deformities. Abdomen Midline with vertical bandage. CATHERINE drain in LLQ with serosangounous output. Hypoactive BS today. Bilateral ostomies Musculoskeletal No tenderness or swelling, normal range of motion without obvious weakness. Extremities No visible deformities, no cyanosis, clubbing or edema. Skin No rashes, scars, or lesions suggestive of malignancy. No petechiae, purpura, or ecchymoses. No excoriations. Neurologic No sensory or motor deficits noted but not tested. Psychiatric Alert and oriented. Coherent speech. Verbalizes understanding of our discussions today. Labs: 
Recent Results (from the past 24 hour(s)) GLUCOSE, POC Collection Time: 06/13/19 11:38 AM  
Result Value Ref Range Glucose (POC) 158 (H) 65 - 100 mg/dL Performed by Maura Martinez (RN)   
GLUCOSE, POC Collection Time: 06/13/19  3:09 PM  
Result Value Ref Range Glucose (POC) 147 (H) 65 - 100 mg/dL Performed by Darwin Deluca* GLUCOSE, POC Collection Time: 06/13/19  8:44 PM  
Result Value Ref Range Glucose (POC) 217 (H) 65 - 100 mg/dL Performed by Wilmer Carter GLUCOSE, POC Collection Time: 06/13/19 10:40 PM  
Result Value Ref Range Glucose (POC) 249 (H) 65 - 100 mg/dL Performed by Kalyan Gan (PCT) GLUCOSE, POC Collection Time: 06/14/19 12:56 AM  
Result Value Ref Range Glucose (POC) 255 (H) 65 - 100 mg/dL Performed by Bi Rivera (PCT) METABOLIC PANEL, COMPREHENSIVE Collection Time: 06/14/19  3:21 AM  
Result Value Ref Range Sodium 136 136 - 145 mmol/L Potassium 6.4 (H) 3.5 - 5.1 mmol/L Chloride 110 (H) 97 - 108 mmol/L  
 CO2 17 (L) 21 - 32 mmol/L Anion gap 9 5 - 15 mmol/L Glucose 297 (H) 65 - 100 mg/dL BUN 43 (H) 6 - 20 MG/DL Creatinine 2.86 (H) 0.70 - 1.30 MG/DL  
 BUN/Creatinine ratio 15 12 - 20 GFR est AA 27 (L) >60 ml/min/1.73m2 GFR est non-AA 22 (L) >60 ml/min/1.73m2 Calcium 7.6 (L) 8.5 - 10.1 MG/DL Bilirubin, total 2.9 (H) 0.2 - 1.0 MG/DL  
 ALT (SGPT) 58 12 - 78 U/L  
 AST (SGOT) 63 (H) 15 - 37 U/L Alk. phosphatase 176 (H) 45 - 117 U/L Protein, total 6.4 6.4 - 8.2 g/dL Albumin 2.1 (L) 3.5 - 5.0 g/dL Globulin 4.3 (H) 2.0 - 4.0 g/dL A-G Ratio 0.5 (L) 1.1 - 2.2 PHOSPHORUS Collection Time: 06/14/19  3:21 AM  
Result Value Ref Range Phosphorus 4.2 2.6 - 4.7 MG/DL MAGNESIUM Collection Time: 06/14/19  3:21 AM  
Result Value Ref Range Magnesium 1.4 (L) 1.6 - 2.4 mg/dL CULTURE, BLOOD Collection Time: 06/14/19  3:21 AM  
Result Value Ref Range Special Requests: NO SPECIAL REQUESTS Culture result: NO GROWTH AFTER 1 HOUR    
GLUCOSE, POC Collection Time: 06/14/19  6:28 AM  
Result Value Ref Range Glucose (POC) 288 (H) 65 - 100 mg/dL Performed by Tunde Gabriel ([facility]) CBC WITH AUTOMATED DIFF Collection Time: 06/14/19 10:15 AM  
Result Value Ref Range WBC 19.8 (H) 4.1 - 11.1 K/uL  
 RBC 4.34 4.10 - 5.70 M/uL  
 HGB 12.9 12.1 - 17.0 g/dL HCT 39.1 36.6 - 50.3 % MCV 90.1 80.0 - 99.0 FL  
 MCH 29.7 26.0 - 34.0 PG  
 MCHC 33.0 30.0 - 36.5 g/dL  
 RDW 16.6 (H) 11.5 - 14.5 % PLATELET 674 784 - 768 K/uL MPV 11.3 8.9 - 12.9 FL  
 NRBC 0.0 0  WBC ABSOLUTE NRBC 0.00 0.00 - 0.01 K/uL NEUTROPHILS PENDING % LYMPHOCYTES PENDING % MONOCYTES PENDING % EOSINOPHILS PENDING % BASOPHILS PENDING % IMMATURE GRANULOCYTES PENDING %  
 ABS. NEUTROPHILS PENDING K/UL  
 ABS. LYMPHOCYTES PENDING K/UL  
 ABS. MONOCYTES PENDING K/UL  
 ABS. EOSINOPHILS PENDING K/UL  
 ABS. BASOPHILS PENDING K/UL  
 ABS. IMM. GRANS. PENDING K/UL  
 DF PENDING Imaging: 
KUB: 
Direct comparison is made to prior plain radiographs dated Juanita 3, 2019. 
  
Nasogastric tube and side port overlie the stomach. There are several dilated small bowel loops in the abdomen. Degree of small bowel dilatation appears to 
have worsened as compared to prior plain radiographs dated Juanita 3, 2019. No free 
intraperitoneal gas is visualized on supine views. No pathologic calcification 
is seen. 
  
IMPRESSION: Multiple dilated loops of small bowel. Assessment and Plan: SBO: 
- Had adhesions lysed in OR with Dr El Nicholson 6/13. 
- NG in place and TPN going. 
- Hopefully will see improvement Fever/leukocytosis: - Febrile ON to 101.3a nd wbc increased to 19k 
- blood cx pending, cxray unremarkable - Monitor closely BOBBI: 
- BOBBI earlier in admission, renal fxn was improving prior to surgery, now Cr radha to 2.8 
- Renal consulted Metastatic rectal cancer: - Follows with Dr. Efrain Demarco in [facility] office,  several chemotherapeutic options ahead for treatment - Will FU with Dr Efrain Demarco after DC 
 
DM - on insulin Hypertension - clonidine, metoprolol, and nitro bid ordered. Please call with any questions over the weekend, otherwise we will resume care Monday. Armida Bueno

## 2019-06-14 NOTE — OP NOTES
[address] 
OPERATIVE REPORT Name:  Kenia Camargo 
MR#:  [id number] :  1950 ACCOUNT #:  [de-identified] DATE OF SERVICE: 
 
 
PREOPERATIVE DIAGNOSIS:  Small bowel obstruction with metastatic colorectal cancer. POSTOPERATIVE DIAGNOSIS:  Carcinomatosis, small bowel obstruction, frozen abdomen, extensive adhesions due to scar tissue and carcinomatosis. PROCEDURE PERFORMED:  Diagnostic laparoscopy converted to open exploratory laparotomy with small bowel bypass and repair of colotomy x6. A 19 round Canelo drain left in the upper abdomen and the patient tolerated the procedure well and was transferred to the recovery room in stable condition. FINDINGS:  Extensive adhesions and carcinomatosis. There was about 130 cm of small bowel that was distal to the ligament of Treitz yet proximal to the area of the abdomen, which was frozen and below the stoma and the ileal conduit. The patient tolerated the procedure well and was transferred to the recovery room in stable condition. SURGEON:  Kenia Luna MD 
 
ASSISTANT:   
 
ANESTHESIA:  general 
 
COMPLICATIONS:  Colotomies x6 (3 through and through injuries SPECIMENS REMOVED:  none IMPLANTS:  None ESTIMATED BLOOD LOSS:  100cc INDICATIONS:  This is a 76-year-old male with a history of stage IV rectal cancer. He underwent a pelvic exenteration couple of years ago. He has been on chemotherapy ever since. He has been recently struggling with pain in the peritoneum. That was evaluated and found to be metastatic colorectal cancer in the pelvis. He developed a small bowel obstruction just over a week ago and has had an NG tube placement, has been on TPN. Attempting conservative therapy to allow for his bowel obstruction to resolve spontaneously. It unfortunately appears that the bowel obstruction is related to cancer rather than adhesions.   I did not feel that this was likely to resolve spontaneously given the amount of time we had given it. Therefore, we discussed taking him to the operating room to hopefully do a small bowel resection and lysis of adhesions with the possibility of doing a bypass if we found it was impossible to do anything more definitive. He was consented and taken to the operating room. PROCEDURE:  He was placed on the table in the normal supine position. After smooth induction of anesthesia, the abdomen was prepped and draped in the usual fashion. He had a stoma in ileal conduit and there was no stool coming out of the colostomy and not had any stool from his colostomy in over a week. Therefore, I placed a 4 x 4 and an Ioban drape. I placed the 4 x 4 over the ileal conduit as well. I had reviewed his CT scan prior to the operation. It appeared that the left upper quadrant was free of bowel and that may be a good place to place a Veress needle. After time-out had been performed and 0.25% Marcaine was injected, I placed a Veress needle and insufflated the abdomen. I insufflated the upper abdomen, but I was not getting any air down into the pelvis or into the lower abdomen. So, this was a concerning sign. I placed a trocar in the left upper quadrant. I then placed 3 other trocars in the right upper quadrant in order to try and go in around what I was hoping to be a loop of small bowel that we would be able to identify in the pelvis. Unfortunately, the abdomen below the level of the colostomy and the ileal conduit was frozen and there were dense adhesions and inflammatory adhesions from small bowel to the anterior abdominal wall and this precluded my even seeing deep into the pelvis or into the lower abdomen at all.   I was concerned as I was putting in the trocars in the right upper quadrant that did not appear to be going simply through abdominal wall as I was putting the trocars in, it almost looked like there was bowel plastered up to the anterior abdominal wall. I spent about 5 minutes trying to see and follow the colon up the right paracolic gutter to see if these other ports did indeed injure the transverse colon. I was concerned enough at this point that, 
1. I was not able to do what I was hoping, which would be to free up bowel in the pelvis and identify the loop of small bowel. I was stuck down creating the bowel obstruction. 2.  I was concerned that I may have traversed through the transverse colon and so, I elected to convert to an open procedure. It did not take very long for me to decide that given the difficult nature of the abdomen. I therefore made a vertical midline incision near the abdomen. There was some free fluid and ascites and I took my time entering the abdomen to avoid injuring the bowel. There was dilated small bowel, which took me a while to identify just which loops of bowel were involved or we were looking at in the upper abdomen. I did find the transverse colon was perforated with 3 trocars as the transverse colon was densely adherent to the anterior abdominal wall. I did have to peel the transverse colon off the anterior abdominal wall and then I found the 3 through-and-through bowel injuries. These were all repaired in 2 layers with 3-0 Vicryl sutures. The first layer was full-thickness and the second layer was an inverting Lembert suture. The 2 most proximal through-and-through injuries were closed in 2 layers with the first layer being full-thickness 3-0 Vicryl suture and the second layer being an inverting Lembert suture interrupted with 3-0 Vicryl. Once I had this 2 through-and through injuries closed, I then continued my exploration of the colon. I was able to identify the vast majority of the transverse colon and most of the hepatic flexure and most of the splenic flexure. The small bowel was fairly dilated.   I was able to identify the ligament of Treitz and at about 115 cm, the small bowel was adherent to the colostomy. There was a loop of bowel coming off the colostomy and then diving underneath the colostomy about 30 cm beyond that. I was able to free up a little bit more of that small bowel off of the colostomy and had about 130 cm of small bowel that I felt comfortable anastomosing to the transverse colon. It would not be possible to anastomose that to the ascending colon as that was fairly densely adherent to the retroperitoneum and given his body habitus, I has having a hard time mobilizing that up into the wound. I then performed a side-to-side small bowel to colon bypass with a single fire of a JORDAN 75-mm blue load stapler to create a common channel. I then closed the common enterotomy with a TX60 blue load. I reinforced the TX staple line with 3-0 PDS in a running Lembert fashion. I reinforced the crotch of both sides of the anastomosis with 2-0 silk sutures x2. I leak tested this, it did not leak. I did milk small intestine contents down through the bypass to make sure they easily passed. As I was doing so, milking stool up into the transverse colon, I found the third through-and-through colotomy and repaired that with 2 layers of 3-0 Vicryl. I did have a little bit of stool spillage as I found those through-and-through injuries, but once those injuries were closed, I did not see any other succus or stool being spilled. I ran the small bowel and I repaired a serosal tear on the distal most portion of that bypassed small bowel just above the anastomosis of the bypass. I repaired the serosal tear with 3-0 Vicryl in an interrupted Lembert fashion. Next, I irrigated the abdomen copiously with saline. There was no other leak.   The bowel that was distal to the bypass dove in between the ileal conduit and the colostomy, and I think that bowel then ran back up the left side of the colostomy and up into the splenic flexure area and then most likely back down into the pelvis. It was difficult to appreciate where all the bowel distal to the bypass was going. It was tethered in the pelvis. There were dilated loops. They were incomprehensible as to their pathway and for fear of making the situation worse, I did not dissect down into the pelvis. At this point, I elected to close. I placed a 19 round Canelo drain that ran underneath the transverse colon. I laid Seprafilm. I closed the incision using running #1 looped PDS. The skin was closed with staples. Gloves were changed 
after creation of the bypass. The patient tolerated the procedure well and was transferred to the recovery room in stable condition. Dr. Justice Velasquez did assist and was an assistant during the operation. Tucker Harry MD 
 
 
[id number] 
D:  06/13/2019 21:16 
T:  06/14/2019 4:14 
JOB #:  [id number]

## 2019-06-14 NOTE — PROGRESS NOTES
Hospitalist Progress Note NAME: Natalie Beasley :  1950 MRN:  [id number] Assessment / Plan: 
Severe Hyperkalemia Worsening BOBBI Metabolic acidosis Sepsis 2/2 unknown source , intraabdominal infection? Pt had long  surgery on , he is c/o abdominal pain and spiked fever overnigh Wbc up to 19k , blood cx sent Started on cefepime and flagyl Cont NG to suction, NPO for now Potassium 6.4, bicarb 17 creat up 2.8-3.8 EKG : no peaked T waves , given insulin + ca gluconate  
nephro consulted , appreciate management , started on bicarb drip Repeat BMP show persistent hyperK >> pt transferred to PCU to start emergent HD  
 
S/p ex-lap  ROBOTIC LYSIS OF ADHESIONS SMALL BOWEL RESECTION on  Small Bowel Obstruction POA-  
Colon Cancer s/p resection with recurrence/Rectal Adenocarcinoma on ChemotX s/p resection On TPN  
CT abd/pelvis on :Small bowel distention with decompressed loops distally is compatible with obstruction likely related to effusion formation in the mid lower abdomen. Mild free fluid Brief op note : Diagnostic laparoscopy converted to open exploratory laparotomy with small bowel bypass and repair of colotomy x6 
 
 
IP Oncology consult appreciated & noted- Chemo delayed for now Cont IV dilaudid prn pain and IV antiemetics prn On iv fluid. TPN to be adjusted : avoid K inside , daily CMP mag phos C/w humalog Sc q6h  
  
  
   
BOBBI POA- prerenal , worsening Creat up to 2.8-3.8 C/w ivf -monitor renal function daily- BMP daily C/w bicarb Hypermagnesemia: resolved 
  
Hypertension 
-holding PO meds 
-place on clonidine patch and scheduled Nitrobid 
-prn IV hydralazine ordered 
  
Diabetes Mellitus with hyperglycemia 
-blood sugars elevated  
-Continue current dose of NPH, increase regular insulin in TPN. -continue SSI Hyponatremia C/w IVF Baseline:functional 
 
 
30.0 - 39.9 Obese / Body mass index is 35.44 kg/m². Weight loss recommended- Exercise Code status: Full Surrogate Decision Maker: Wife 
  
Prophylaxis: Hep SQ and H2B/PPI Recommended Disposition: Home w/Family when SBO resolved & eating - few days anticipated Subjective: Chief Complaint / Reason for Physician Visit: F/U Dehydration/BOBBI, SBO, s/p NGT decompression, recurrent Colorectal cancer Overnight events noted , spiked fever , pt c/o abdominal pain . Reported not feeling well Objective: VITALS:  
Last 24hrs VS reviewed since prior progress note. Most recent are: 
Patient Vitals for the past 24 hrs: 
 Temp Pulse Resp BP SpO2  
06/14/19 0542 98.6 °F (37 °C) 98 18 107/64 100 % 06/14/19 0331 (!) 101.3 °F (38.5 °C) (!) 103 19 119/68 100 % 06/13/19 2316 (!) 100.6 °F (38.1 °C) 97 19 131/78 99 % 06/13/19 2228 98 °F (36.7 °C) 95 18 117/71 100 % 06/13/19 2145 97.6 °F (36.4 °C) 92 17 112/64 98 % 06/13/19 2130  97 17 120/60 99 % 06/13/19 2115  94 16 109/64 99 % 06/13/19 2100  95 11 100/59 99 % 06/13/19 2050  94 14 106/66 99 % 06/13/19 2045  94 14 121/56 99 % 06/13/19 2040  95 15 117/65 99 % 06/13/19 2035 97.6 °F (36.4 °C) 96 15 120/64 98 % 06/13/19 2031    126/72   
06/13/19 1453 99.3 °F (37.4 °C) 97 18 (!) 167/92 98 % Intake/Output Summary (Last 24 hours) at 6/14/2019 0951 Last data filed at 6/14/2019 8117 Gross per 24 hour Intake 4173.35 ml Output 2360 ml Net 1813.35 ml PHYSICAL EXAM: 
General: WD, WN. Alert, cooperative, in distress , Obese +   
EENT:  EOMI. Anicteric sclerae. MMM Resp:  CTA bilaterally, no wheezing or rales. No accessory muscle use CV:  Regular  rhythm,  No edema GI:  Abdomen soft, Non tender. , no BS . Ex-lap surgical dressings intact Neurologic:  Alert and oriented X 3, lethargic Psych:   Good insight. Not anxious nor agitated Skin:  No rashes. No jaundice Reviewed most current lab test results and cultures  YES 
 Reviewed most current radiology test results   YES Review and summation of old records today    NO Reviewed patient's current orders and MAR    YES 
PMH/SH reviewed - no change compared to H&P 
________________________________________________________________________ Care Plan discussed with: 
  Comments Patient x Family  x Wife at bedside RN x Care Manager Consultant  x nephro Multidiciplinary team rounds were held today with , nursing, pharmacist and clinical coordinator. Patient's plan of care was discussed; medications were reviewed and discharge planning was addressed. ________________________________________________________________________ 
 
________________________________________________________________________ Kurt Anaya MD  
 
Procedures: see electronic medical records for all procedures/Xrays and details which were not copied into this note but were reviewed prior to creation of Plan. LABS: 
I reviewed today's most current labs and imaging studies. Pertinent labs include: No results for input(s): WBC, HGB, HCT, PLT, HGBEXT, HCTEXT, PLTEXT, HGBEXT, HCTEXT, PLTEXT in the last 72 hours. Recent Labs  
  06/14/19 
0321 06/13/19 
0409 06/12/19 
0255  140 142 K 6.4* 4.1 4.2 * 113* 114* CO2 17* 22 21 * 151* 143* BUN 43* 30* 37* CREA 2.86* 1.21 1.33* CA 7.6* 8.3* 8.4* MG 1.4*  --  1.9 PHOS 4.2  --  3.6 ALB 2.1*  --  2.7* TBILI 2.9*  --  1.5* SGOT 63*  --  50* ALT 58  --  50 Signed: Kurt Anaya MD

## 2019-06-14 NOTE — PERIOP NOTES
2031-Handoff Report from Operating Room to PACU Report received from [place] and [place] Saab CRNA regarding Silvana Noel. Surgeon(s): 
MD Lucy Torres MD  And Procedure(s) (LRB): 
ROBOTIC LYSIS OF ADHESIONS SMALL BOWEL RESECTION (N/A)  confirmed  
with allergies, drains and dressings discussed. Anesthesia type, drugs, patient history, complications, estimated blood loss, vital signs, intake and output, and last pain medication, lines, reversal medications and temperature were reviewed. 2130- Family updated and sent to new room. 2150- TRANSFER - OUT REPORT: 
 
Verbal report given to [facility]) on Silvana Noel  being transferred to gen surg(unit) for routine post - op Report consisted of patients Situation, Background, Assessment and  
Recommendations(SBAR). Information from the following report(s) SBAR, Kardex, OR Summary, Procedure Summary, Intake/Output, MAR and Recent Results was reviewed with the receiving nurse. Opportunity for questions and clarification was provided. Patient transported with: 
 Monitor O2 @ 2 liters Registered Nursex2

## 2019-06-14 NOTE — ANESTHESIA POSTPROCEDURE EVALUATION
Procedure(s): 
ROBOTIC LYSIS OF ADHESIONS SMALL BOWEL RESECTION. general 
 
Anesthesia Post Evaluation Patient location during evaluation: PACU Note status: Adequate. Level of consciousness: responsive to verbal stimuli and sleepy but conscious Pain management: satisfactory to patient Airway patency: patent Anesthetic complications: no 
Cardiovascular status: acceptable Respiratory status: acceptable Hydration status: acceptable Comments: +Post-Anesthesia Evaluation and Assessment Patient: Pawan Lim MRN: [id number]  SSN: [id number] YOB: 1950  Age: 70 y.o. Sex: male Cardiovascular Function/Vital Signs /59   Pulse 95   Temp 36.4 °C (97.6 °F)   Resp 11   Ht 5' 10\" (1.778 m)   Wt 112 kg (247 lb)   SpO2 99%   BMI 35.44 kg/m² Patient is status post Procedure(s): 
ROBOTIC LYSIS OF ADHESIONS SMALL BOWEL RESECTION. Nausea/Vomiting: Controlled. Postoperative hydration reviewed and adequate. Pain: 
Pain Scale 1: FLACC (06/13/19 2100) Pain Intensity 1: 0 (06/13/19 2100) Managed. Neurological Status:  
Neuro (WDL): Exceptions to [facility] (06/13/19 2031) At baseline. Mental Status and Level of Consciousness: Arousable. Pulmonary Status:  
O2 Device: Nasal cannula;Nasal airway (06/13/19 2035) Adequate oxygenation and airway patent. Complications related to anesthesia: None Post-anesthesia assessment completed. No concerns. Signed By: Sherita Mccullough MD  
 6/13/2019 Post anesthesia nausea and vomiting:  controlled Vitals Value Taken Time /59 6/13/2019  9:00 PM  
Temp 36.4 °C (97.6 °F) 6/13/2019  8:35 PM  
Pulse 94 6/13/2019  9:15 PM  
Resp 16 6/13/2019  9:15 PM  
SpO2 99 % 6/13/2019  9:15 PM  
Vitals shown include unvalidated device data.

## 2019-06-14 NOTE — PROGRESS NOTES
CRYSTAL Plan:  Expected to return home with family support and follow up w/PCP & Oncologist.   
 
Patient transferred to CPU this afternoon.

## 2019-06-14 NOTE — PROGRESS NOTES
**Consult Information** 
Member Facility: [facility] Facility MRN: [id number] Consult ID: [id number] Facility Time Zone: ET 
Date and Time of Consult: 06/14/2019 07:27:02 PM 
Requesting Clinician: [place] Patient Name: Eduardo Ledesma Date of Birth: [id number] Gender: Male **Clinical Note** Clinical Note: - 66 yr old male with rectal cancer, colostomy, with renal failure now s/p HD. - stop IV fluids. repeat BMP. **Attestation** Interaction Mode: Electronic Interaction Interaction Attestation: Interprofessional internet consultation was delivered through telephonic and/ or electronic communication upon the request of the patients treating physician, while the requesting and the rendering provider were not in the same physical location. Written report was provided to the requesting provider. Evaluation Duration (mins): 5

## 2019-06-14 NOTE — PROGRESS NOTES
7:31 PM 
Pt starting HD. Placed page in to tele-hospitalist regarding continuous IVF (one for bicarb and one for normal saline) - orders written to d/c both. Scheduled Vancomycin will be given once HD is complete 11:30 PM 
HD complete - no fluid removed this session. Pt c/o pain, PRN given- see MAR.

## 2019-06-14 NOTE — PROGRESS NOTES
Nutrition Assessment: 
 
INTERVENTIONS/RECOMMENDATIONS:  
· To better meet estimated nutrition needs consider increasing TPN (D15 5%AA) rate to 75 ml/hr as well as add lipids 3x per week. ? Provides: 1493 kcal 90 g protein, 270 g CHO § 88% and 100% of estimated kcal and protein needs respectively ASSESSMENT:  
Chart reviewed. S/p lysis of adhesions small bowel resection yesterday. TPN to be restarted today. TPN recommendations shown above. Diet Order: NPO 
% Eaten:   
Patient Vitals for the past 72 hrs: 
 % Diet Eaten 06/12/19 1556 0 %  
06/12/19 0738 0 % 06/11/19 1322 0 % Pertinent Medications: [x]Reviewed:  
Pertinent Labs: [x]Reviewed:  
Food Allergies: [x]NKFA  []Other Last BM:  No ostomy output Edema:  n/a    []RUE   []LUE   []RLE   []LLE Pressure Ulcer:   n/a   [] Stage I   [] Stage II   [] Stage III   [] Stage IV Anthropometrics: Height: 5' 10\" (177.8 cm) Weight: 112 kg (247 lb) IBW (%IBW):   ( ) UBW (%UBW):   (  %) BMI: Body mass index is 35.44 kg/m². This BMI is indicative of: 
[]Underweight   []Normal   []Overweight   [x] Obesity   [] Extreme Obesity (BMI>40) Last Weight Metrics: 
Weight Loss Metrics 6/13/2019 4/18/2019 4/9/2019 2/27/2019 12/17/2018 8/21/2018 4/17/2018 Today's Wt 247 lb 245 lb 245 lb 245 lb 249 lb 247 lb 244 lb BMI 35.44 kg/m2 36.18 kg/m2 36.18 kg/m2 36.18 kg/m2 36.77 kg/m2 36.48 kg/m2 36.03 kg/m2 Estimated Nutrition Needs (Based on): 1700 Kcals/day(20 kcal/kg Adj BW) , 85 g(0.8 g/kg) Protein Carbohydrate: At Least 130 g/day  Fluids: 1700 mL/day or per primary team 
 
NUTRITION DIAGNOSES:  
Problem:  Altered GI function Less than optimal parenteral nutrition Etiology: related to metastatic rectal cancer and SBO change on dextrose concentration and lipids have not been added Signs/Symptoms: as evidenced by need for TPN to meet nutrition needs current TPN order only meets ~63% of estimated kcal needs Previous Nutrition Dx:  [] Resolved  [] Unresolved           [] Progressing NUTRITION INTERVENTIONS: 
  Enteral/Parenteral Nutrition: Initiate parenteral nutrition GOAL:  
add lipids 3x per week in 24-48 hrs NUTRITION MONITORING AND EVALUATION Food/Nutrient Intake Outcomes: Total energy intake Physical Signs/Symptoms Outcomes: Weight/weight change, Electrolyte and renal profile, Glucose profile, GI 
 
Previous Goal Met: 
 [] Met              [] Progressing Towards Goal              [x] Not Progressing Towards Goal  
Previous Recommendations: 
 [] Implemented          [x] Not Implemented          [] Not Applicable LEARNING NEEDS (Diet, Food/Nutrient-Drug Interaction):  
 [x] None Identified 
 [] Identified and Education Provided/Documented 
 [] Identified and Pt declined/was not appropriate Cultural, Jehovah's witness, OR Ethnic Dietary Needs:  
 [x] None Identified 
 [] Identified and Addressed 
 
 [x] Interdisciplinary Care Plan Reviewed/Documented  
 [x] Discharge Planning: TBD [] Participated in Interdisciplinary Rounds NUTRITION RISK:  
 [x] High              [] Moderate           []  Low  []  Minimal/Uncompromised Silvina Mock RDN Pager [phone] Weekend Pager [phone]

## 2019-06-14 NOTE — CONSULTS
Conner Persaud  
 
 
 
NAME:Benjamin Archer Sample  FDW:[id number]   :1950 Pt seen and examined. Detailed note to follow 
      
hyperkalemi 
arf 
ckd bl cr. 1.2-1.3 April Cooley, [address] [facility] [address] [facility] [address], [address] [facility] Phone - [phone] Fax - [phone] [address], [address] [facility] Phone - [phone] Fax - [phone]    
[email or web address]

## 2019-06-14 NOTE — WOUND CARE
Wound Care consult to assess for possible pressure injury to the gluteal cleft skin. Patient was rolled to the right side and assessed for the area of concern. His skin is severely macerated but there is no open wound. The skin is blanchable and patient has no pain at the site of the lesion area. Recommend ES Desitin cream to be applied to the gluteal cleft. Keep patient off loaded and place the pillow behind the upper back and thigh on the same side where he is turned. Yossi Nuñez, RN, BSN, CWON ([id number])

## 2019-06-14 NOTE — PROGRESS NOTES
General Surgery End of Shift Nursing Note Bedside shift change report given to Eric Mendenhall (oncoming nurse) by Chapincito Martinez (offgoing nurse). Report included the following information SBAR, Kardex, Intake/Output and MAR. Shift worked:   Destiny Grace Summary of shift:    Patient arrived from PACU, high temp noted, received IV tylenol, Temp lowered, high potassium noted,   
Issues for physician to address:   High potassium Number times ambulated in hallway past shift: 0 Number of times OOB to chair past shift: 0 Pain Management: 
Current medication: dilaudid Patient states pain is manageable on current pain medication: YES 
 
GI: 
 
Current diet:  DIET NPO With [employer] Tolerating current diet: YES Passing flatus: NO 
[place] Patient Safety: 
 
Falls Score: 3 Bed Alarm On? No 
Sitter? No 
 
Jolly Figueroa

## 2019-06-14 NOTE — CONSULTS
[address] [facility] Name:  Ochoa Holden 
MR#:  [id number] :  1950 ACCOUNT #:  [de-identified] DATE OF SERVICE:  2019 REFERRING PHYSICIAN:  Dr. Melvin Bar. REASON FOR CONSULTATION: 
1. Acute kidney injury. 2.  Hyperkalemia. HISTORY OF PRESENT ILLNESS:  The patient is a 39-year-old -American male with past medical history significant for rectal cancer, colostomy, ileal conduit, who presented to ER with abdominal pain. He was found to have renal failure with creatinine of 2.86 and potassium of 6.4. In ER, the patient was found to have a small bowel obstruction. He was taken to OR and had a lysis of adhesion and small bowel resection. The patient's labs from yesterday showed potassium of 4.1 and creatinine of 1.2. The patient has been getting TPN, which has potassium chloride, potassium phosphate, and potassium acetate. In last 48 hours, there is no hypotension, although his blood pressure has been running on low side compared to 2 days ago. He is on clonidine patch and Nitro patch. The patient is n.p.o. He has NG tube. The patient has been on chemotherapy for his renal carcinoma of colon. His last chemo was 2 weeks ago. The patient had CT with contrast done on 2019 on the day of admission. The patient reports that he had prostatectomy, colectomy, and cystectomy done in 2017 as a part of his rectal cancer surgery. He also reports history of diabetes, heart disease, and hypertension. He is on Norvasc, lisinopril, labetalol, and clonidine for his hypertension. PAST MEDICAL HISTORY: 
1. Colon cancer. 2.  Arthritis. 3.  BPH. 4.  Coronary artery disease. 5.  Rectal cancer. 6.  Diabetes mellitus type 2. 
7.  Gout. 8.  Hypertension. 9.  Hyperlipidemia. 10.  Cardiac cath. 11.  Colonoscopy. 12.  Prostate resection. 13.  Cystectomy, bladder resection. 14.  Colectomy with colostomy. 15.  Tonsillectomy. 16.  Endoscopy. SOCIAL HISTORY:  Lives with family. Former smoker. No alcohol abuse. No drug abuse. FAMILY HISTORY:  Positive for COPD and hypertension. No family history of end-stage renal disease. ALLERGIES:  ALLERGIC TO PENICILLIN AND LIPITOR. MEDICATIONS PRIOR TO ADMISSION:  Reviewed. REVIEW OF SYSTEMS:  As per HPI. Complained of abdominal pain and nausea. Denies any shortness of breath. PHYSICAL EXAMINATION: 
VITAL SIGNS:  On examination, temperature 98.6, pulse 96, blood pressure 106/76, respiratory rate 18 per minute. GENERAL:  The patient is lying in the bed, comfortable, in mild distress since in pain. NECK:  Supple. No palpable neck mass. HEENT:  Eyes:  No conjunctival pallor or scleral icterus. NG tube present. LUNGS:  Clear to auscultation bilaterally. No wheezing. CARDIAC:  Normal S1, S2.  Regular rate and rhythm. ABDOMEN:  Soft, tender. He has left-sided colostomy, right-sided ileal conduit. CATHERINE drain present. EXTREMITIES:  No edema. NEUROLOGIC:  Nonfocal.  Normal speech. SKIN:  No rash or joint effusion. LABS AND DIAGNOSTIC DATA:  Sodium 136, potassium 6.4, bicarb 17, BUN 43, creatinine 2.86, magnesium 1.4, phosphorus 4.2. White cell count 19.8 and hemoglobin 12.9. Labs on 06/13/2019, potassium 4.1 and creatinine 1.21. 
 
CT of abdomen and pelvis on admission on 06/02/2019 small bowel distension with decompressed loop distally compatible with obstruction, status post prostatectomy, partial colectomy and cystectomy. IMPRESSION: 
1. Hyperkalemia due to renal failure and KCl infusion with total parenteral nutrition. Metabolic acidosis will also cause extracellular shift of potassium. 2.  Acute kidney injury, likely related to hemodynamic changes although postrenal obstruction cannot be ruled out especially, he had a surgery done yesterday on 06/13/2019. 
3.  Chronic kidney disease stage II, baseline creatinine 1.1 in 03/3062. 
4.  Metabolic acidosis, anion gap. 5.  Diabetes. 6.  Hypertension. 7.  History of rectal cancer. 8.  History of prostatectomy, colostomy, and ileal conduit, and cystectomy. PLAN: 
1. Stop TPN. 2.  One amp of IV bicarb. 3.  Start bicarb drip. 4.  I have reordered TPN for tonight without any potassium. 5.  Check BMP at 2 p.m. 
6.  IV calcium gluconate. 7.  Give 10 units of R insulin IV along with 1 amp of D50. 
8. Avoid hypotension. 9.  Stop clonidine patch for now. 10.  If creatinine level remains high tomorrow, he will need a CT abdomen to rule out any postrenal obstruction. 11.  Treatment plan discussed with the patient and the nurse. Thanks for consultation. We will follow the patient with you. Olegario Lr MD 
 
 
[id number] 
D:  06/14/2019 10:53 T:  06/14/2019 12:45 JOB #:  [id number] CC:   Arlene Jones MD

## 2019-06-15 NOTE — PROGRESS NOTES
Spiritual Care Assessment/Progress Note [place] [address] 
 
 
NAME: Edith Wynne      MRN: [id number] AGE: 70 y.o. SEX: male Confucianist Affiliation: [facility]  
Language: [place] 6/15/2019     Total Time (in minutes): 9 Spiritual Assessment begun in [facility] 2 PROGRESSIVE CARE through conversation with: 
  
    []Patient        [x] Family    [] Friend(s) Reason for Consult: Palliative Care, Follow-up Spiritual beliefs: (Please include comment if needed) [x] Identifies with a leanne tradition:     
   [x] Supported by a leanne community:        
   [] Claims no spiritual orientation:       
   [] Seeking spiritual identity:            
   [] Adheres to an individual form of spirituality:       
   [] Not able to assess:                   
 
    
Identified resources for coping:  
   [] Prayer                           
   [] Music                  [] Guided Imagery [x] Family/friends                 [] Pet visits [] Devotional reading                         [] Unknown 
   [] Other:                                       
 
 
Interventions offered during this visit: (See comments for more details) Patient Interventions: Initial/Spiritual assessment, patient floor Family/Friend(s): Affirmation of emotions/emotional suffering, Coping skills reviewed/reinforced, Normalization of emotional/spiritual concerns Plan of Care: 
 
 [x] Support spiritual and/or cultural needs  
 [] Support AMD and/or advance care planning process    
 [] Support grieving process 
 [] Coordinate Rites and/or Rituals  
 [] Coordination with community clergy 
 [x] No spiritual needs identified at this time 
 [] Detailed Plan of Care below (See Comments)  [] Make referral to Music Therapy 
[] Make referral to Pet Therapy    
[] Make referral to Addiction services 
[] Make referral to [facility] 
[] Make referral to Spiritual Care Partner 
[] No future visits requested [x] Follow up visits as needed Comments:  met with patient's family in Progressive Care. Patient's wife talked about the good support the patient has in family, friends, and Jainism. Provided support while patient's family talked about the patient. Plan of care is to follow up as needed. CHRISTINE Francis  Paging Service [phone])

## 2019-06-15 NOTE — PROGRESS NOTES
Bedside shift change report given to Arzella Galeazzi RN by [facility] RN. Report included the following information SBAR, ED Summary, OR Summary, Intake/Output, MAR, Recent Results and Cardiac Rhythm sinus tach.

## 2019-06-15 NOTE — PROGRESS NOTES
[facility] 
 [address] [address] [address] [facility], [facility] [address] [facility] Phone: [phone]   [phone]   
  
Nephrology Progress Note Bhavna Calero     1950     [id number] Date of Admission : 6/2/2019 
06/15/19 CC:  Follow up for Hyperkalemia Assessment and Plan [place] Hyperkalemia due to renal failure and KCl infusion with total parenteral nutrition. Metabolic acidosis will also cause extracellular shift of potassium. 2.  Acute kidney injury, likely related to hemodynamic changes although postrenal obstruction cannot be ruled out especially, he had a surgery done yesterday on 06/13/2019. 
3.  Chronic kidney disease stage II, baseline creatinine 1.1 in 78/5139. 
4.  Metabolic acidosis, anion gap. 5.  Diabetes. 6.  Hypertension. 7.  History of rectal cancer. 8.  History of prostatectomy, colostomy, and ileal conduit, and cystectomy. 
  
PLAN: 
-Hold further dialysis - daily labs  
- overall poor prognosis Interval History: He had slightly more UOP Tolerated HD and K better No new Sx Review of Systems: Pertinent items are noted in HPI. Current Medications:  
Current Facility-Administered Medications Medication Dose Route Frequency  dextrose 10% infusion 125-250 mL  125-250 mL IntraVENous PRN  
 TPN ADULT - CENTRAL   IntraVENous CONTINUOUS  
 metroNIDAZOLE (FLAGYL) IVPB premix 500 mg  500 mg IntraVENous Q12H  
 heparin (porcine) injection 5,000 Units  5,000 Units SubCUTAneous Q8H  
 zinc oxide-cod liver oil (DESITIN) 40 % paste   Topical BID  acetaminophen (OFIRMEV) infusion 1,000 mg  1,000 mg IntraVENous Q8H PRN  
 cefepime (MAXIPIME) 1 g in 0.9% sodium chloride (MBP/ADV) 50 mL  1 g IntraVENous Q24H  
 vancomycin (VANCOCIN) 750 mg in 0.9% sodium chloride (MBP/ADV) 250 mL  750 mg IntraVENous DIALYSIS PRN And  
 VANCOMYCIN INFORMATION NOTE   Other DAILY  morphine injection 2 mg  2 mg IntraVENous Q3H PRN  
  insulin NPH (NOVOLIN N, HUMULIN N) injection 25 Units  25 Units SubCUTAneous Q12H  
 metoprolol (LOPRESSOR) injection 5 mg  5 mg IntraVENous Q6H  
 insulin lispro (HUMALOG) injection   SubCUTAneous Q6H  
 glucose chewable tablet 16 g  4 Tab Oral PRN  
 glucagon (GLUCAGEN) injection 1 mg  1 mg IntraMUSCular PRN  
 sodium chloride (NS) flush 5-40 mL  5-40 mL IntraVENous Q8H  
 sodium chloride (NS) flush 5-40 mL  5-40 mL IntraVENous PRN  
 nitroglycerin (NITROBID) 2 % ointment 1 Inch  1 Inch Topical Q6H  
 HYDROmorphone (PF) (DILAUDID) injection 0.5 mg  0.5 mg IntraVENous Q3H PRN  
 naloxone (NARCAN) injection 0.4 mg  0.4 mg IntraVENous PRN  
 ondansetron (ZOFRAN) injection 4 mg  4 mg IntraVENous Q4H PRN  
 hydrALAZINE (APRESOLINE) 20 mg/mL injection 10 mg  10 mg IntraVENous Q4H PRN  pantoprazole (PROTONIX) 40 mg in sodium chloride 0.9% 10 mL injection  40 mg IntraVENous DAILY Allergies Allergen Reactions  Atorvastatin Myalgia  Pcn [Penicillins] Hives Objective: 
Vitals:   
Vitals:  
 06/14/19 2310 06/15/19 0316 06/15/19 0735 06/15/19 1040 BP: 125/88 101/59 136/65 101/66 Pulse: (!) 133 (!) 120 (!) 115 (!) 122 Resp:  18 18 20 Temp: 97.7 °F (36.5 °C) 99 °F (37.2 °C) 100.4 °F (38 °C) (!) 101.2 °F (38.4 °C) TempSrc: Oral     
SpO2: 100% 100% 99% 99% Weight:      
Height:      
 
Intake and Output: 
06/15 0701 - 06/15 1900 In: 0 Out: 525 [Urine:425; Drains:100] 06/13 1901 - 06/15 0700 In: 3937.4 [I.V.:3937.4] Out: 7323 [Urine:150; Drains:120] Physical Examination: 
General: NAD,Conversant Neck:  Supple, no mass Resp:  Lungs CTA B/L, no wheezing , normal respiratory effort CV:  RRR,  no murmur or rub no LE edema GI:  Soft, NT, + Bowel sounds, no hepatosplenomegaly Neurologic:  Non focal 
 
[]    High complexity decision making was performed 
[]    Patient is at high-risk of decompensation with multiple organ involvement Lab Data Personally Reviewed: I have reviewed all the pertinent labs, microbiology data and radiology studies during assessment. Recent Labs  
  06/15/19 
0517 06/14/19 [id number] 79 06/14/19 
1050 06/14/19 
0321 06/13/19 
0409  134* 135* 136 140  
K 4.1 6.2* 6.5* 6.4* 4.1  108 110* 110* 113* CO2 28 19* 17* 17* 22 * 222* 225* 297* 151* BUN 36* 51* 50* 43* 30* CREA 3.46* 3.82* 3.67* 2.86* 1.21  
CA 7.7* 7.7* 7.8* 7.6* 8.3*  
MG 1.6  --   --  1.4*  --   
PHOS  --   --   --  4.2  --   
ALB  --   --  2.0* 2.1*  --   
SGOT  --   --  240* 63*  --   
ALT  --   --  165* 58  --   
 
Recent Labs  
  06/15/19 
0517 06/14/19 
1015 WBC 17.0* 19.8* HGB 9.9* 12.9 HCT 29.1* 39.1 * 185 No results found for: SDES Lab Results Component Value Date/Time Culture result: NO GROWTH 1 DAY 06/14/2019 03:21 AM  
 Culture result: NO FUNGUS ISOLATED 28 DAYS 03/05/2018 11:52 AM  
 Culture result: Culture performed on Fluid swab specimen 03/05/2018 10:52 AM  
 Culture result: NO GROWTH 4 DAYS 03/05/2018 10:52 AM  
 Culture result: NO GROWTH 4 DAYS 03/05/2018 10:52 AM  
 
Recent Results (from the past 24 hour(s)) METABOLIC PANEL, BASIC Collection Time: 06/14/19  2:15 PM  
Result Value Ref Range Sodium 134 (L) 136 - 145 mmol/L Potassium 6.2 (H) 3.5 - 5.1 mmol/L Chloride 108 97 - 108 mmol/L  
 CO2 19 (L) 21 - 32 mmol/L Anion gap 7 5 - 15 mmol/L Glucose 222 (H) 65 - 100 mg/dL BUN 51 (H) 6 - 20 MG/DL Creatinine 3.82 (H) 0.70 - 1.30 MG/DL  
 BUN/Creatinine ratio 13 12 - 20 GFR est AA 19 (L) >60 ml/min/1.73m2 GFR est non-AA 16 (L) >60 ml/min/1.73m2 Calcium 7.7 (L) 8.5 - 10.1 MG/DL  
GLUCOSE, POC Collection Time: 06/14/19  5:51 PM  
Result Value Ref Range Glucose (POC) 177 (H) 65 - 100 mg/dL Performed by Crow Alves HEP B SURFACE AG Collection Time: 06/14/19  6:59 PM  
Result Value Ref Range Hepatitis B surface Ag 0.43 Index Hep B surface Ag Interp. NEGATIVE  NEG    
HEP B SURFACE AB Collection Time: 06/14/19  6:59 PM  
Result Value Ref Range Hepatitis B surface Ab 4.37 mIU/mL Hep B surface Ab Interp. NONREACTIVE NR    
SAMPLES BEING HELD Collection Time: 06/14/19  6:59 PM  
Result Value Ref Range SAMPLES BEING HELD 1SST GOLD COMMENT Add-on orders for these samples will be processed based on acceptable specimen integrity and analyte stability, which may vary by analyte. GLUCOSE, POC Collection Time: 06/15/19 12:13 AM  
Result Value Ref Range Glucose (POC) 167 (H) 65 - 100 mg/dL Performed by Moralez Stephanie METABOLIC PANEL, BASIC Collection Time: 06/15/19  5:17 AM  
Result Value Ref Range Sodium 138 136 - 145 mmol/L Potassium 4.1 3.5 - 5.1 mmol/L Chloride 103 97 - 108 mmol/L  
 CO2 28 21 - 32 mmol/L Anion gap 7 5 - 15 mmol/L Glucose 183 (H) 65 - 100 mg/dL BUN 36 (H) 6 - 20 MG/DL Creatinine 3.46 (H) 0.70 - 1.30 MG/DL  
 BUN/Creatinine ratio 10 (L) 12 - 20 GFR est AA 21 (L) >60 ml/min/1.73m2 GFR est non-AA 18 (L) >60 ml/min/1.73m2 Calcium 7.7 (L) 8.5 - 10.1 MG/DL  
CBC WITH AUTOMATED DIFF Collection Time: 06/15/19  5:17 AM  
Result Value Ref Range WBC 17.0 (H) 4.1 - 11.1 K/uL  
 RBC 3.30 (L) 4.10 - 5.70 M/uL HGB 9.9 (L) 12.1 - 17.0 g/dL HCT 29.1 (L) 36.6 - 50.3 % MCV 88.2 80.0 - 99.0 FL  
 MCH 30.0 26.0 - 34.0 PG  
 MCHC 34.0 30.0 - 36.5 g/dL  
 RDW 16.9 (H) 11.5 - 14.5 % PLATELET 401 (L) 680 - 400 K/uL MPV 11.8 8.9 - 12.9 FL  
 NRBC 0.0 0  WBC ABSOLUTE NRBC 0.00 0.00 - 0.01 K/uL NEUTROPHILS 91 (H) 32 - 75 % LYMPHOCYTES 3 (L) 12 - 49 % MONOCYTES 5 5 - 13 % EOSINOPHILS 0 0 - 7 % BASOPHILS 0 0 - 1 % IMMATURE GRANULOCYTES 1 (H) 0.0 - 0.5 % ABS. NEUTROPHILS 15.5 (H) 1.8 - 8.0 K/UL  
 ABS. LYMPHOCYTES 0.6 (L) 0.8 - 3.5 K/UL  
 ABS. MONOCYTES 0.8 0.0 - 1.0 K/UL  
 ABS. EOSINOPHILS 0.0 0.0 - 0.4 K/UL ABS. BASOPHILS 0.0 0.0 - 0.1 K/UL  
 ABS. IMM. GRANS. 0.1 (H) 0.00 - 0.04 K/UL  
 DF AUTOMATED MAGNESIUM Collection Time: 06/15/19  5:17 AM  
Result Value Ref Range Magnesium 1.6 1.6 - 2.4 mg/dL GLUCOSE, POC Collection Time: 06/15/19  7:10 AM  
Result Value Ref Range Glucose (POC) 209 (H) 65 - 100 mg/dL Performed by Susie Montelongo GLUCOSE, POC Collection Time: 06/15/19 12:27 PM  
Result Value Ref Range Glucose (POC) 214 (H) 65 - 100 mg/dL Performed by Ellis Guevara I have reviewed the flowsheets. Chart and Pertinent Notes have been reviewed. No change in PMH ,family and social history from Consult note.  
 
 
Katie Hawkins MD

## 2019-06-15 NOTE — DIALYSIS
[facility] Dialysis Team [place]  [phone] Vitals   Pre   Post   Assessment   Pre   Post    
Temp  Temp: 97.9 °F (36.6 °C) (06/14/19 2032)  97.7 LOC  Alert and Oriented x4  Alert and Oriented x4 HR   Pulse (Heart Rate): (!) 114 (06/14/19 2032) 135 Lungs   Clear Diminished at the based   Clear/ Diminished B/P   BP: 109/61 (06/14/19 2032) 125/88 Cardiac   Bedside monitor   Bedside monitor Resp   Resp Rate: 20 (06/14/19 1930)  Skin   Dry/ Warm   Dry/ Warm Pain level  Pain Intensity 1: 0 (06/14/19 1930) 5/10 (Primary RN notified)  Edema Swelling in Extremities Swelling in all extremities Orders: Duration:   Start:   2025 End:   2202 Total:   2.5 hrs Dialyzer:   Dialyzer/Set Up Inspection: Ric Fuss (06/14/19 2032) K Bath:   Dialysate K (mEq/L): 1 (06/14/19 2032) Ca Bath:   Dialysate CA (mEq/L): 3.0 (06/14/19 2032) Na/Bicarb:   Dialysate NA (mEq/L): 140 (06/14/19 2032) Target Fluid Removal:   Goal/Amount of Fluid to Remove (mL): 0 mL (06/14/19 2032) Access Type & Location:   Right IJ CVC, +aspiration/+flush, lines and ports cleaned per protocol, good patency Labs Obtained/Reviewed Critical Results Called   Date when labs were drawn- 
Hgb-   
HGB Date Value Ref Range Status 06/14/2019 12.9 12.1 - 17.0 g/dL Final  
 
K-   
Potassium Date Value Ref Range Status 06/14/2019 6.2 (H) 3.5 - 5.1 mmol/L Final  
 
Ca-  
Calcium Date Value Ref Range Status 06/14/2019 7.7 (L) 8.5 - 10.1 MG/DL Final  
 
Bun-  
BUN Date Value Ref Range Status 06/14/2019 51 (H) 6 - 20 MG/DL Final  
 
Creat-  
Creatinine Date Value Ref Range Status 06/14/2019 3.82 (H) 0.70 - 1.30 MG/DL Final  
 
  
Medications/ Blood Products Given Name   Dose   Route and Time N/A Blood Volume Processed (BVP):    48.1 L  Net Fluid Removed:  0 Comments Time Out Done: 2015 Primary Nurse Rpt Pre:RADHA Verde RN  
Primary Nurse Rpt Post:RADHA Verde RN  
 Pt Education: Policies, Procedures and Safety Care Plan: Cont. HD treatment Plan as tolerated Tx Summary:Pt tolerated TX well all possible blood returned with normal saline rinseback, flushed both ports and cleaned lines per protocol. Dressing dry clean and intact (to be changed tomorrow after HD, was sore/ tender today was placed today) Admiting Diagnosis: Abdominal Pain/ SBO  
Pt's previous clinic- None [facility] Start Consent signed - Informed Consent Verified: Yes (06/14/19 2032) [facility] Consent - Obtained 06/14/19 Hepatitis Status- Unknown (Drawn 06/14/19) Machine #- Machine Number: [id number] (06/14/19 2032) Telemetry status- Bedside Monitor Pre-dialysis wt. - Pre-Dialysis Weight: 110.9 kg (244 lb 7.8 oz) (06/14/19 2032)

## 2019-06-15 NOTE — PROGRESS NOTES
TRANSFER - IN REPORT: 
 
Verbal report received from Beto RN (name) on Severiano Abe  being received from Gen Surg (unit) for routine progression of care Report consisted of patients Situation, Background, Assessment and  
Recommendations(SBAR). Information from the following report(s) SBAR, Kardex, MAR, Recent Results and Cardiac Rhythm sinus tach was reviewed with the receiving nurse. Opportunity for questions and clarification was provided. Assessment completed upon patients arrival to unit and care assumed.

## 2019-06-15 NOTE — PROGRESS NOTES
Hospitalist Progress Note NAME: Kym Parker :  1950 MRN:  [id number] Assessment / Plan: 
Severe Hyperkalemia resolved Worsening BOBBI Metabolic acidosis Sepsis 2/2 unknown source , intraabdominal infection? Pt had long  surgery on , he is c/o abdominal pain and spiked fever on  Wbc up to 19k , blood cx sent Started on cefepime and flagyl Cont NG to suction, NPO for now Potassium 6.4, bicarb 17 creat up 2.8-3.8 EKG : no peaked T waves , given insulin + ca gluconate  
nephro consulted , appreciate management , started on bicarb drip on  S/p HD on  and bicarb drip , K down to wnl and creat improving No further HD on 06/15 Wbc trending down . C/w vanco cefipime and flagyl BCx NTD  
 
S/p ex-lap  ROBOTIC LYSIS OF ADHESIONS SMALL BOWEL RESECTION on  Small Bowel Obstruction POA-  
Colon Cancer s/p resection with recurrence/Rectal Adenocarcinoma on ChemotX s/p resection On TPN  
CT abd/pelvis on :Small bowel distention with decompressed loops distally is compatible with obstruction likely related to effusion formation in the mid lower abdomen. Mild free fluid Brief op note : Diagnostic laparoscopy converted to open exploratory laparotomy with small bowel bypass and repair of colotomy x6 
 
 
IP Oncology consult appreciated & noted- Chemo delayed for now Cont IV dilaudid prn pain and IV antiemetics prn 
TPN to be adjusted : avoid K inside , daily CMP mag phos C/w humalog Sc q6h  
  
  
   
BOBBI POA- prerenal , worsening Creat up to 2.8-3.8, now 3.46 S/P iVF and bicarb drip y C/w bicarb Hypermagnesemia: resolved 
  
Hypertension 
-holding PO meds 
-place on clonidine patch and scheduled Nitrobid 
-prn IV hydralazine ordered 
  
Diabetes Mellitus with hyperglycemia 
-blood sugars elevated  
-Continue current dose of NPH, increase regular insulin in TPN. -continue SSI Hyponatremia C/w IVF Baseline:functional 
 
 
 30.0 - 39.9 Obese / Body mass index is 35.08 kg/m². Weight loss recommended- Exercise Code status: Full Surrogate Decision Maker: Wife 
  
Prophylaxis: Hep SQ and H2B/PPI Recommended Disposition: Home w/Family when SBO resolved & eating - few days anticipated Subjective: Chief Complaint / Reason for Physician Visit: F/U Dehydration/BOBBI, SBO, s/p NGT decompression, recurrent Colorectal cancer Pt reported feeling better . No further fever Objective: VITALS:  
Last 24hrs VS reviewed since prior progress note. Most recent are: 
Patient Vitals for the past 24 hrs: 
 Temp Pulse Resp BP SpO2  
06/15/19 0735 100.4 °F (38 °C) (!) 115 18 136/65 99 % 06/15/19 0316 99 °F (37.2 °C) (!) 120 18 101/59 100 % 06/14/19 2310 97.7 °F (36.5 °C) (!) 133  125/88 100 % 06/14/19 2243  (!) 128  111/66 100 % 06/14/19 2228  (!) 126  105/54 100 % 06/14/19 2214  (!) 107  105/54 100 % 06/14/19 2158  (!) 124  112/68 100 % 06/14/19 2142  (!) 124  125/68 100 % 06/14/19 2126  (!) 121  108/64 100 % 06/14/19 2111  (!) 121  114/73 100 % 06/14/19 2055  (!) 117  110/71 100 % 06/14/19 2041 98 °F (36.7 °C) (!) 115 20 115/57 100 % 06/14/19 2032 97.9 °F (36.6 °C) (!) 114  109/61 100 % 06/14/19 1930 97.9 °F (36.6 °C) (!) 118 20 96/65 99 % 06/14/19 1900  (!) 120  107/74 99 % 06/14/19 1845  (!) 122  106/59 99 % 06/14/19 1830    138/61   
06/14/19 1815    123/61   
06/14/19 1752  (!) 124  114/66 98 % 06/14/19 1729 (!) 101 °F (38.3 °C) (!) 120 24 112/61 100 % 06/14/19 1634  (!) 120 18 130/69 98 % 06/14/19 1547 98 °F (36.7 °C) (!) 118 24 107/68 100 % 06/14/19 1159 97.4 °F (36.3 °C) (!) 110 22 124/77 100 % Intake/Output Summary (Last 24 hours) at 6/15/2019 6352 Last data filed at 6/14/2019 2343 Gross per 24 hour Intake 1514.08 ml Output 186 ml Net 1328.08 ml PHYSICAL EXAM: 
General: WD, WN.  Alert, cooperative, in distress , Obese +   
 EENT:  EOMI. Anicteric sclerae. MMM Resp:  CTA bilaterally, no wheezing or rales. No accessory muscle use CV:  Regular  rhythm,  No edema GI:  Abdomen soft, Non tender. , no BS . Ex-lap surgical dressings intact Neurologic:  Alert and oriented X 3, lethargic Psych:   Good insight. Not anxious nor agitated Skin:  No rashes. No jaundice Reviewed most current lab test results and cultures  YES Reviewed most current radiology test results   YES Review and summation of old records today    NO Reviewed patient's current orders and MAR    YES 
PMH/SH reviewed - no change compared to H&P 
________________________________________________________________________ Care Plan discussed with: 
  Comments Patient x Family  x Wife at bedside RN x Care Manager Consultant Multidiciplinary team rounds were held today with , nursing, pharmacist and clinical coordinator. Patient's plan of care was discussed; medications were reviewed and discharge planning was addressed. ________________________________________________________________________ 
 
________________________________________________________________________ Alireza Narvaez MD  
 
Procedures: see electronic medical records for all procedures/Xrays and details which were not copied into this note but were reviewed prior to creation of Plan. LABS: 
I reviewed today's most current labs and imaging studies. Pertinent labs include: 
Recent Labs  
  06/15/19 
0517 06/14/19 
1015 WBC 17.0* 19.8* HGB 9.9* 12.9 HCT 29.1* 39.1 * 185 Recent Labs  
  06/15/19 
0517 06/14/19 [id number] 68 71 79 06/14/19 
1050 06/14/19 
0321  134* 135* 136  
K 4.1 6.2* 6.5* 6.4*  
 108 110* 110* CO2 28 19* 17* 17* * 222* 225* 297* BUN 36* 51* 50* 43* CREA 3.46* 3.82* 3.67* 2.86* CA 7.7* 7.7* 7.8* 7.6*  
MG 1.6  --   --  1.4* PHOS  --   --   --  4.2 ALB  --   --  2.0* 2.1*  
 TBILI  --   --  2.6* 2.9*  
SGOT  --   --  240* 63* ALT  --   --  165* 58 Signed: Moiz Maldonado MD

## 2019-06-16 NOTE — PROGRESS NOTES
K remained stable UOP better and Cr trending down Dr Janiya Grimes will see again tomorrow and we can remove kevin Waddell MD 
[facility] Office :[phone] Fax: [phone]

## 2019-06-16 NOTE — PROGRESS NOTES
Hospitalist Progress Note NAME: Vesta Junior :  1950 MRN:  [id number] Assessment / Plan: 
Severe Hyperkalemia resolved Worsening BOBBI Metabolic acidosis Sepsis 2/2 unknown source , intraabdominal infection? Pt had long  surgery on , he is c/o abdominal pain and spiked fever on  Wbc up to 19k , blood cx sent Started on cefepime and flagyl Cont NG to suction, NPO for now Potassium 6.4, bicarb 17 creat up 2.8-3.8 on  EKG : no peaked T waves , given insulin + ca gluconate  
nephro consulted , appreciate management , started on bicarb drip on  S/p HD on  and bicarb drip , K down to wnl and creat improving No further HD on 06/15 Wbc trending down . C/w vanco cefipime and flagyl BCx NTD  
 
S/p ex-lap  ROBOTIC LYSIS OF ADHESIONS SMALL BOWEL RESECTION on  Small Bowel Obstruction POA-  
Colon Cancer s/p resection with recurrence/Rectal Adenocarcinoma on ChemotX s/p resection On TPN  
CT abd/pelvis on :Small bowel distention with decompressed loops distally is compatible with obstruction likely related to effusion formation in the mid lower abdomen. Mild free fluid Brief op note : Diagnostic laparoscopy converted to open exploratory laparotomy with small bowel bypass and repair of colotomy x6 
 
 
IP Oncology consult appreciated & noted- Chemo delayed for now Cont IV dilaudid prn pain and IV antiemetics prn 
TPN to be adjusted : avoid K inside , daily CMP mag phos C/w humalog Sc q6h  
  
  
   
BOBBI POA- prerenal , 
Creat up to 2.8-3.8, down to 2.8 S/P iVF and bicarb drip y C/w bicarb Hypermagnesemia: resolved 
  
Hypertension 
-holding PO meds 
-place on clonidine patch and scheduled Nitrobid 
-prn IV hydralazine ordered 
  
Diabetes Mellitus with hyperglycemia 
-blood sugars elevated  
-Continue with increased dose of NPH 30units bid 9 increase from 25units bid) , increase regular insulin in TPN. -continue SSI Hyponatremia resolved ivf stopped Baseline:functional 
 
 
30.0 - 39.9 Obese / Body mass index is 35.89 kg/m². Weight loss recommended- Exercise Code status: Full Surrogate Decision Maker: Wife 
  
Prophylaxis: Hep SQ and H2B/PPI Recommended Disposition: Home w/Family when SBO resolved & eating - few days anticipated Subjective: Chief Complaint / Reason for Physician Visit: F/U Dehydration/BOBBI, SBO, s/p NGT decompression, recurrent Colorectal cancer Pt reported feeling better . No further fever Objective: VITALS:  
Last 24hrs VS reviewed since prior progress note. Most recent are: 
Patient Vitals for the past 24 hrs: 
 Temp Pulse Resp BP SpO2  
06/16/19 0756 98.2 °F (36.8 °C) (!) 101 18 141/66 96 % 06/16/19 0654  (!) 107  137/68 97 % 06/16/19 0424 98.6 °F (37 °C) (!) 103 20 138/63 98 % 06/16/19 0042  96  104/57 96 % 06/15/19 2326 98.4 °F (36.9 °C) (!) 106     
06/15/19 2223 99.2 °F (37.3 °C) (!) 112 20 104/52 97 % 06/15/19 2140 (!) 100.5 °F (38.1 °C) (!) 117 20 114/62 96 % 06/15/19 1936 100.1 °F (37.8 °C) (!) 111 20 114/84 98 % 06/15/19 1725  (!) 107  135/62 98 % 06/15/19 1630     97 % 06/15/19 1515 98.7 °F (37.1 °C) (!) 105 18 116/73 99 % Intake/Output Summary (Last 24 hours) at 6/16/2019 1043 Last data filed at 6/16/2019 0400 Gross per 24 hour Intake 1597.65 ml Output 1550 ml Net 47.65 ml PHYSICAL EXAM: 
General: WD, WN. Alert, cooperative, in distress , Obese +   
EENT:  EOMI. Anicteric sclerae. MMM Resp:  CTA bilaterally, no wheezing or rales. No accessory muscle use CV:  Regular  rhythm,  No edema GI:  Abdomen soft, Non tender. , no BS . Ex-lap surgical dressings intact  , ileostomy and colostomy bags Neurologic:  Alert and oriented X 3, lethargic Psych:   Good insight. Not anxious nor agitated Skin:  No rashes. No jaundice Reviewed most current lab test results and cultures  YES Reviewed most current radiology test results   YES 
 Review and summation of old records today    NO Reviewed patient's current orders and MAR    YES 
PMH/SH reviewed - no change compared to H&P 
________________________________________________________________________ Care Plan discussed with: 
  Comments Patient x Family  x Wife at bedside RN x Care Manager Consultant Multidiciplinary team rounds were held today with , nursing, pharmacist and clinical coordinator. Patient's plan of care was discussed; medications were reviewed and discharge planning was addressed. ________________________________________________________________________ 
 
________________________________________________________________________ Janiya Sharif MD  
 
Procedures: see electronic medical records for all procedures/Xrays and details which were not copied into this note but were reviewed prior to creation of Plan. LABS: 
I reviewed today's most current labs and imaging studies. Pertinent labs include: 
Recent Labs  
  06/16/19 
0517 06/15/19 
0517 06/14/19 
1015 WBC 15.9* 17.0* 19.8* HGB 8.3* 9.9* 12.9 HCT 24.7* 29.1* 39.1 * 146* 185 Recent Labs  
  06/16/19 
0517 06/15/19 
0517 06/14/19 26 06/14/19 
1050 06/14/19 
0321  138 134* 135* 136  
K 3.7 4.1 6.2* 6.5* 6.4*  
 103 108 110* 110* CO2 28 28 19* 17* 17* * 183* 222* 225* 297* BUN 57* 36* 51* 50* 43* CREA 2.84* 3.46* 3.82* 3.67* 2.86* CA 8.0* 7.7* 7.7* 7.8* 7.6*  
MG  --  1.6  --   --  1.4* PHOS 4.3  --   --   --  4.2 ALB 1.6*  --   --  2.0* 2.1* TBILI  --   --   --  2.6* 2.9*  
SGOT  --   --   --  240* 63* ALT  --   --   --  165* 58 Signed: Janiya Sharif MD

## 2019-06-16 NOTE — PROGRESS NOTES
CRS 
Pt more alert today. Less pain. Conversant Flowsheet, labs reviewed Abd: soft, approp tender Colostomy: no output Drains: serosang Plan: 
stage 4, progressive ca. Cont present mgmt

## 2019-06-17 NOTE — PROGRESS NOTES
Hematology Oncology Progress Note Follow up for: metastatic rectal cancer CC: \" I feel bad today\" Chart notes reviewed since last visit. Case discussed with following:  
 
Patient complains of the following: Underwent surgery Thursday with Dr. Akash Emery. having incisional pain but is feeling a bit better with his abd pains. NG in place. Additional concerns noted by the staff:  
 
Patient Vitals for the past 24 hrs: 
 BP Temp Pulse Resp SpO2  
06/17/19 0502 154/73 99 °F (37.2 °C) 100 16 99 % 06/16/19 2321 137/60 99.1 °F (37.3 °C) (!) 108 18 100 % 06/16/19 2044 141/72 98.7 °F (37.1 °C) (!) 105 18 96 % 06/16/19 1608 132/72 99.6 °F (37.6 °C) (!) 107 20 97 % 06/16/19 1042 140/73 97.6 °F (36.4 °C) (!) 104 20 97 % 06/16/19 0756 141/66 98.2 °F (36.8 °C) (!) 101 18 96 % 06/16/19 0654 137/68  (!) 107  97 % ROS negative for 11 organ systems except as ntoed above. Physical Examination: 
Constitutional Alert, cooperative, oriented. Mood and affect appropriate. Appears close to chronological age. Well nourished. Well developed. Head Normocephalic; no scars Eyes Conjunctivae and sclerae are clear and without icterus. Pupils are round ENMT Sinuses are nontender. No oral exudates, ulcers, masses, thrush or mucositis. Oropharynx clear. Tongue normal.  
Neck Supple without masses or thyromegaly. No jugular venous distension. Hematologic/Lymphatic No petechiae or purpura. No tender or palpable lymph nodes noted. Respiratory Lungs are clear to auscultation without rhonchi or wheezing. Cardiovascular Regular rate and rhythm of heart Chest / Line Site Chest is symmetric with no chest wall deformities. Abdomen Midline with vertical bandage. CATHERINE drain in LLQ. Did not appreciate BS today. Bilateral ostomies Musculoskeletal No tenderness or swelling, normal range of motion without obvious weakness. Extremities No visible deformities, no cyanosis, clubbing or edema. Skin No rashes, scars, or lesions suggestive of malignancy. No petechiae, purpura, or ecchymoses. No excoriations. Neurologic No sensory or motor deficits noted but not tested. Psychiatric Alert and oriented. Coherent speech. Verbalizes understanding of our discussions today. Labs: 
Recent Results (from the past 24 hour(s)) GLUCOSE, POC Collection Time: 06/16/19  6:53 AM  
Result Value Ref Range Glucose (POC) 193 (H) 65 - 100 mg/dL Performed by Susie Montelongo GLUCOSE, POC Collection Time: 06/16/19 11:36 AM  
Result Value Ref Range Glucose (POC) 199 (H) 65 - 100 mg/dL Performed by Yannick De Leon GLUCOSE, POC Collection Time: 06/16/19  5:44 PM  
Result Value Ref Range Glucose (POC) 183 (H) 65 - 100 mg/dL Performed by Yannick De Leon GLUCOSE, POC Collection Time: 06/16/19 11:46 PM  
Result Value Ref Range Glucose (POC) 227 (H) 65 - 100 mg/dL Performed by Jeff Gerardo (PCT) RENAL FUNCTION PANEL Collection Time: 06/17/19  6:01 AM  
Result Value Ref Range Sodium 142 136 - 145 mmol/L Potassium 3.2 (L) 3.5 - 5.1 mmol/L Chloride 108 97 - 108 mmol/L  
 CO2 27 21 - 32 mmol/L Anion gap 7 5 - 15 mmol/L Glucose 213 (H) 65 - 100 mg/dL BUN 59 (H) 6 - 20 MG/DL Creatinine 2.06 (H) 0.70 - 1.30 MG/DL  
 BUN/Creatinine ratio 29 (H) 12 - 20 GFR est AA 39 (L) >60 ml/min/1.73m2 GFR est non-AA 32 (L) >60 ml/min/1.73m2 Calcium 8.3 (L) 8.5 - 10.1 MG/DL Phosphorus 3.4 2.6 - 4.7 MG/DL Albumin 1.6 (L) 3.5 - 5.0 g/dL CBC WITH AUTOMATED DIFF Collection Time: 06/17/19  6:01 AM  
Result Value Ref Range WBC 12.9 (H) 4.1 - 11.1 K/uL  
 RBC 2.93 (L) 4.10 - 5.70 M/uL HGB 8.7 (L) 12.1 - 17.0 g/dL HCT 26.5 (L) 36.6 - 50.3 % MCV 90.4 80.0 - 99.0 FL  
 MCH 29.7 26.0 - 34.0 PG  
 MCHC 32.8 30.0 - 36.5 g/dL  
 RDW 17.2 (H) 11.5 - 14.5 % PLATELET 677 423 - 941 K/uL MPV 11.4 8.9 - 12.9 FL  
 NRBC 0.0 0  WBC ABSOLUTE NRBC 0.00 0.00 - 0.01 K/uL NEUTROPHILS 91 (H) 32 - 75 % LYMPHOCYTES 4 (L) 12 - 49 % MONOCYTES 4 (L) 5 - 13 % EOSINOPHILS 1 0 - 7 % BASOPHILS 0 0 - 1 % IMMATURE GRANULOCYTES 1 (H) 0.0 - 0.5 % ABS. NEUTROPHILS 11.6 (H) 1.8 - 8.0 K/UL  
 ABS. LYMPHOCYTES 0.6 (L) 0.8 - 3.5 K/UL  
 ABS. MONOCYTES 0.5 0.0 - 1.0 K/UL  
 ABS. EOSINOPHILS 0.1 0.0 - 0.4 K/UL  
 ABS. BASOPHILS 0.0 0.0 - 0.1 K/UL  
 ABS. IMM. GRANS. 0.1 (H) 0.00 - 0.04 K/UL  
 DF AUTOMATED    
GLUCOSE, POC Collection Time: 06/17/19  6:26 AM  
Result Value Ref Range Glucose (POC) 207 (H) 65 - 100 mg/dL Performed by Jeff Gerardo (PCT) Imaging: 
KUB: 
Direct comparison is made to prior plain radiographs dated Juanita 3, 2019. 
  
Nasogastric tube and side port overlie the stomach. There are several dilated 
small bowel loops in the abdomen. Degree of small bowel dilatation appears to 
have worsened as compared to prior plain radiographs dated Juanita 3, 2019. No free 
intraperitoneal gas is visualized on supine views. No pathologic calcification 
is seen. 
  
IMPRESSION: Multiple dilated loops of small bowel. Assessment and Plan: SBO: 
- Had adhesions lysed in OR with Dr Chitra Norton 6/13. 
- NG in place and TPN going. 
- await return of bowel function Fever/leukocytosis: - Febrile last week to 101.3 and wbc increased to 19k but has dropped to 12,900 
- blood cx no growth at 2 days per lab and cxray unremarkable - Monitor closely BOBBI: 
- BOBBI earlier in admission, renal fxn was improving prior to surgery, then radha postop and is down to 2.06 this AM.  
- Renal consulted and following. Had required HD earlier (hyperkalemia) Metastatic rectal cancer: - Follows with Dr. Jayson Fowler in [address] [place] [address],  several chemotherapeutic options ahead for treatment - Will FU with Dr Jayson Fowler after DC 
 
DM - on insulin Hypertension - clonidine, metoprolol, and nitro bid ordered. Hypokalemia - would adjust TPN Meredith Fields

## 2019-06-17 NOTE — PROGRESS NOTES
CRYSTAL - home with [place] and f/u appts CM reviewed chart and will continue to follow for discharge planning needs. Pt admitted on 6/2 with Bowel Obstruction and Metastatic Rectal CA. Palliative Care met with pt and wife on 6/6, to discuss goals of care, pt is a Full Code. Pt had a Robotic Lysis of Adhesions Small Bowel Resection on 6/13. Pt lives with his wife and was independent with all ADL's prior to admission. Pt transferred to PCU on 6/14. Elena Delgado, [id number]

## 2019-06-17 NOTE — PROGRESS NOTES
Music Therapy Assessment [facility] Natalie Beasley [id number]    
1950  70 y.o.  male Patient Telephone Number: [phone] (home) Nondenominational Affiliation: [facility]  
Language: [place] Patient Active Problem List  
 Diagnosis Date Noted  Status post colostomy ([facility] 10/15/2016 Priority: 2 - Two Class: Acute  Bowel obstruction ([facility] 06/02/2019  Metastatic cancer ([facility] 06/02/2019  Type 2 diabetes mellitus with proliferative retinopathy ([facility] 04/09/2019  Type 2 diabetes with nephropathy ([facility] 08/21/2018  Severe obesity (BMI 35.0-39. 9) [facility] 04/17/2018  Encounter for long-term (current) drug use 04/17/2018  Rectal adenocarcinoma ([facility] 01/16/2018  Hypertension complicating diabetes ([facility] 08/08/2017  Abnormal nuclear stress test 04/27/2017  S/P cardiac cath 04/27/2017  Rectal mass 10/15/2016  Coronary artery disease involving native coronary artery of native heart without angina pectoris 02/25/2016  Osteoarthritis of right hip 02/03/2015  Radial nerve compression 05/09/2014  Diabetic polyneuropathy ([facility] 05/09/2014  Hyperlipidemia 05/17/2011 Date: 6/17/2019            Total Time (in minutes): 7          MRM 2 PROGRESSIVE CARE Mental Status:   [  ] Alert [  ] Flory Deep [  ]  Confused  [  ] Minimally responsive  [  ] Sleeping-N/A: Please see Session Observations below. Communication Status: [  ] Impaired Speech [  ] Nonverbal -N/A Physical Status:   [  ] Oxygen in use  [  ] Hard of Hearing [  ] Vision Impaired [  ] Ambulatory  [  ] Ambulatory with assistance [  ] Non-ambulatory -N/A Music Preferences, Background: Blues, Jazz, including [employer] and [place], and [place]. 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 Clinical Problem addressed: Spiritual support, support healthy pt/family coping. Goal(s) met in session: N/A: Please see Session Observations below. Physical/Pain management (Scale of 1-10): Pre-session rating ___________    Post-session rating __________  
[  ] Increased relaxation   [  ] Affected breathing patterns [  ] Decreased muscle tension   [  ] Decreased agitation [  ] Affected heart rate    [  ] Increased alertness Emotional/Psychological: 
[  ] Increased self-expression   [  ] Decreased aggressive behavior [  ] Decreased feelings of stress  [  ] Discussed healthy coping skills [  ] Improved mood    [  ] Decreased withdrawn behavior Social: 
[  ] Decreased feelings of isolation/loneliness [  ] Positive social interaction [  ] Provided support and/or comfort for family/friends Spiritual: 
[  ] Spiritual support    [  ] Expressed peace [  ] Expressed leanne    [  ] Discussed beliefs Techniques Utilized (Check all that apply): N/A: Please see Session Observations below. [  ] Procedural support MT [  ] Music for relaxation [  ] Patient preferred music 
[  ] Rachel analysis  [  ] Song choice  [  ] Music for validation [  ] Entrainment  [  ] Movement to music [  ] Guided visualization [  ] Bebeto Corley  [  ] Patient instrument playing [  ] Renan Avendaño writing [  ] Beatrice Cake along   [  ] Bella Eden  [  ] Sensory stimulation [  ] Active Listening  [  ] Music for spiritual support [  ] Making of CDs as gifts Session Observations:  F/up visit; Patient (pt) was lying in bed with his eyes closed. His spouse Olu Tang, his sister-in-law and brother-in-law were at bedside. Pt's spouse remembered being offered music therapy by the music therapy intern last week. She declined music therapy for today. Will follow as able to support healthy pt/family coping and offer spiritual support. JOEY Lr (Music Therapist-Board Certified) Spiritual Care Department Referral-based service

## 2019-06-17 NOTE — PROGRESS NOTES
Nephrology Progress Note Conner Persaud  
 
[facility]. [facility]                  Phone - [phone] Patient: Arminda Ramírez Date- 6/17/2019 Admit Date: 6/2/2019 YOB: 1950 CC: Follow up for  Kashif, hyperkalemia Subjective: Interval History:  
-  k improved to 3.2 Cr. Continue to improve Good urine out put No c/o sob, chest pain C/o abdo pain No vomiting ROS:- as above Assessment: 1. Hyperkalemia due to renal failure and KCl infusion with total parenteral nutrition. Metabolic acidosis will also cause extracellular shift of potassium. 2.  Acute kidney injury, likely related to hemodynamic changes although postrenal obstruction cannot be ruled out especially, he had a surgery done yesterday on 06/13/2019. 
3.  Chronic kidney disease stage II, baseline creatinine 1.1 in 80/7561. 
4.  Metabolic acidosis, anion gap. 5.  Diabetes. 6.  Hypertension. 7.  History of rectal cancer. 8.  History of prostatectomy, colostomy, and ileal conduit, and cystectomy. 9. hypokalemia 
  
 
· Plan: · Give iv kcl · Remove bob cath · Follow bmp · Check mg level Physical Exam:  
GEN: NAD NECK- Supple, no mass, IJ BOB + 
RESP: Clear b/l, no wheezing, CVS: RRR,S1,S2 ABDO: soft , ileal conduit, colostomy + EXT: + Edema NEURO: normal speech, non focal 
 
Care Plan discussed with: patient Objective:  
Visit Vitals /83 (BP 1 Location: Left arm, BP Patient Position: At rest) Pulse 93 Temp 98.2 °F (36.8 °C) Resp 16 Ht 5' 10\" (1.778 m) Wt 110.2 kg (242 lb 15.2 oz) SpO2 97% BMI 34.86 kg/m² Last 3 Recorded Weights in this Encounter 06/14/19 1547 06/16/19 9095 06/17/19 3004 Weight: 110.9 kg (244 lb 7.8 oz) 113.4 kg (250 lb 1.6 oz) 110.2 kg (242 lb 15.2 oz) 06/15 1901 - 06/17 0700 In: 1677.7 [P.O.:224; I.V.:1453.7] Out: 2165 [Urine:2000; Drains:115] Chart reviewed. Pertinent Notes reviewed. Medication list  reviewed Current Facility-Administered Medications Medication  potassium chloride 10 mEq in 100 ml IVPB  insulin NPH (NOVOLIN N, HUMULIN N) injection 30 Units  TPN ADULT - CENTRAL  
 dextrose 10% infusion 125-250 mL  metroNIDAZOLE (FLAGYL) IVPB premix 500 mg  
 heparin (porcine) injection 5,000 Units  zinc oxide-cod liver oil (DESITIN) 40 % paste  cefepime (MAXIPIME) 1 g in 0.9% sodium chloride (MBP/ADV) 50 mL  vancomycin (VANCOCIN) 750 mg in 0.9% sodium chloride (MBP/ADV) 250 mL And  
 VANCOMYCIN INFORMATION NOTE  morphine injection 2 mg  metoprolol (LOPRESSOR) injection 5 mg  insulin lispro (HUMALOG) injection  glucose chewable tablet 16 g  
 glucagon (GLUCAGEN) injection 1 mg  sodium chloride (NS) flush 5-40 mL  sodium chloride (NS) flush 5-40 mL  nitroglycerin (NITROBID) 2 % ointment 1 Inch  
 HYDROmorphone (PF) (DILAUDID) injection 0.5 mg  
 naloxone (NARCAN) injection 0.4 mg  
 ondansetron (ZOFRAN) injection 4 mg  hydrALAZINE (APRESOLINE) 20 mg/mL injection 10 mg  
 pantoprazole (PROTONIX) 40 mg in sodium chloride 0.9% 10 mL injection Data Review : 
Recent Labs  
  06/17/19 
0601 06/16/19 
0517 06/15/19 
0517 06/14/19 10089 68 71 79 06/14/19 
1050  06/14/19 
1015 WBC 12.9* 15.9* 17.0*  --   --   --  19.8* HGB 8.7* 8.3* 9.9*  --   --   --  12.9  140* 146*  --   --   --  185 ANEU 11.6* 14.8* 15.5*  --   --   --  17.6*  142 138 134* 135*   < >  --   
K 3.2* 3.7 4.1 6.2* 6.5*   < >  --   
* 226* 183* 222* 225*   < >  --   
BUN 59* 57* 36* 51* 50*   < >  --   
CREA 2.06* 2.84* 3.46* 3.82* 3.67*   < >  --   
ALT  --   --   --   --  165*  --   --   
SGOT  --   --   --   --  240*  --   --   
TBILI  --   --   --   --  2.6*  --   --   
AP  --   --   --   --  158*  --   --   
CA 8.3* 8.0* 7.7* 7.7* 7.8*   < >  --   
MG  --   --  1.6  --   --   --   --   
 PHOS 3.4 4.3  --   --   --   --   --   
 < > = values in this interval not displayed. Lab Results Component Value Date/Time Culture result: NO GROWTH 3 DAYS 06/14/2019 03:21 AM  
 Culture result: NO FUNGUS ISOLATED 28 DAYS 03/05/2018 11:52 AM  
 Culture result: Culture performed on Fluid swab specimen 03/05/2018 10:52 AM  
 Culture result: NO GROWTH 4 DAYS 03/05/2018 10:52 AM  
 Culture result: NO GROWTH 4 DAYS 03/05/2018 10:52 AM  
 
No results found for: SDES No results for input(s): FE, TIBC, PSAT, FERR in the last 72 hours. Lab Results Component Value Date/Time Creatinine, urine 43.0 02/25/2016 04:01 PM  
 
Noman Malone MD 
[facility] 
 www. [facility] [employer] [address] [place], [address] Phone - [phone] Fax - [phone]

## 2019-06-17 NOTE — PROGRESS NOTES
Hospitalist Progress Note NAME: Sony Wang :  1950 MRN:  [id number] Assessment / Plan: 
Severe Hyperkalemia resolved , now hypok 3.2 Worsening BOBBI Metabolic acidosis Sepsis 2/2 unknown source , intraabdominal infection? Pt had long  surgery on , he is c/o abdominal pain and spiked fever on  Wbc up to 19k , Started on cefepime and flagyl and vanco  
Cont NG to suction, NPO for now Potassium 6.4, bicarb 17 creat up 2.8-3.8 on  EKG : no peaked T waves , given insulin + ca gluconate  
nephro consulted , appreciate management , started on bicarb drip on  S/p HD on  and bicarb drip , K down to wnl and creat improving No further HD on 06/15 Wbc trending down . C/w vanco cefipime and flagyl BCx NTD  
k down to 3.2 today , repleted with IV K . Check mg S/p ex-lap  ROBOTIC LYSIS OF ADHESIONS SMALL BOWEL RESECTION on  Small Bowel Obstruction POA-  
Colon Cancer s/p resection with recurrence/Rectal Adenocarcinoma on ChemotX s/p resection On TPN  
CT abd/pelvis on :Small bowel distention with decompressed loops distally is compatible with obstruction likely related to effusion formation in the mid lower abdomen. Mild free fluid Brief op note : Diagnostic laparoscopy converted to open exploratory laparotomy with small bowel bypass and repair of colotomy x6 
 
IP Oncology consult appreciated & noted- Chemo delayed for now Cont IV dilaudid prn pain and IV antiemetics prn 
TPN to be adjusted : avoid K inside , daily CMP mag phos C/w humalog Sc q6h  
  
  
   
BOBBI POA- prerenal , 
Creat up to 2.8-3.8, down to 2.8 S/P iVF and bicarb drip Hypermagnesemia: resolved 
  
Hypertension 
-holding PO meds 
-place on clonidine patch and scheduled Nitrobid 
-prn IV hydralazine ordered 
  
Diabetes Mellitus with hyperglycemia 
-blood sugars elevated  
-Continue with increased dose of NPH 30units bid 9 increase from 25units bid) , increase regular insulin in TPN. -continue SSI Hyponatremia resolved 
ivf stopped Baseline:functional 
 
 
30.0 - 39.9 Obese / Body mass index is 34.86 kg/m². Weight loss recommended- Exercise Code status: Full Surrogate Decision Maker: Wife 
  
Prophylaxis: Hep SQ and H2B/PPI Recommended Disposition: Home w/Family when SBO resolved & eating - few days anticipated Subjective: Chief Complaint / Reason for Physician Visit: F/U Dehydration/BOBBI, SBO, s/p NGT decompression, recurrent Colorectal cancer Pt reported feeling better . No further fever Objective: VITALS:  
Last 24hrs VS reviewed since prior progress note. Most recent are: 
Patient Vitals for the past 24 hrs: 
 Temp Pulse Resp BP SpO2  
06/17/19 1206  96     
06/17/19 1104 97.9 °F (36.6 °C) 94 16 157/77 96 % 06/17/19 0712 98.2 °F (36.8 °C) 93 16 151/83 97 % 06/17/19 0502 99 °F (37.2 °C) 100 16 154/73 99 % 06/16/19 2321 99.1 °F (37.3 °C) (!) 108 18 137/60 100 % 06/16/19 2044 98.7 °F (37.1 °C) (!) 105 18 141/72 96 % 06/16/19 1608 99.6 °F (37.6 °C) (!) 107 20 132/72 97 % Intake/Output Summary (Last 24 hours) at 6/17/2019 1442 Last data filed at 6/17/2019 1137 Gross per 24 hour Intake 1239.6 ml Output 1945 ml Net -705.4 ml PHYSICAL EXAM: 
General: WD, WN. Alert, cooperative, in distress , Obese +   
EENT:  EOMI. Anicteric sclerae. MMM Resp:  CTA bilaterally, no wheezing or rales. No accessory muscle use CV:  Regular  rhythm,  No edema GI:  Abdomen soft, Non tender. , no BS . Ex-lap surgical dressings intact  , ileostomy and colostomy bags Neurologic:  Alert and oriented X 3, lethargic Psych:   Good insight. Not anxious nor agitated Skin:  No rashes. No jaundice Reviewed most current lab test results and cultures  YES Reviewed most current radiology test results   YES Review and summation of old records today    NO Reviewed patient's current orders and MAR    YES 
 [facility]/ reviewed - no change compared to H&P 
________________________________________________________________________ Care Plan discussed with: 
  Comments Patient x Family  x Wife at bedside RN x Care Manager Consultant Multidiciplinary team rounds were held today with , nursing, pharmacist and clinical coordinator. Patient's plan of care was discussed; medications were reviewed and discharge planning was addressed. ________________________________________________________________________ 
 
________________________________________________________________________ Beatrice Domínguez MD  
 
Procedures: see electronic medical records for all procedures/Xrays and details which were not copied into this note but were reviewed prior to creation of Plan. LABS: 
I reviewed today's most current labs and imaging studies. Pertinent labs include: 
Recent Labs  
  06/17/19 
0601 06/16/19 
0517 06/15/19 
0517 WBC 12.9* 15.9* 17.0* HGB 8.7* 8.3* 9.9*  
HCT 26.5* 24.7* 29.1*  
 140* 146* Recent Labs  
  06/17/19 
0601 06/16/19 
0517 06/15/19 
5505  142 138  
K 3.2* 3.7 4.1  106 103 CO2 27 28 28 * 226* 183* BUN 59* 57* 36* CREA 2.06* 2.84* 3.46* CA 8.3* 8.0* 7.7* MG 2.3  --  1.6 PHOS 3.4 4.3  --   
ALB 1.6* 1.6*  --   
 
 
Signed: Beatrice Domínguez MD

## 2019-06-17 NOTE — PROGRESS NOTES
[facility]: [phone] [facility]: [phone] \"We are staying positive and taking one day at a time\" per wife Olu Tang. LCSW in to provide support to patient and wife. Patient is alert, but usually is in bed with his eyes closed. He stated he is \"doing better\" today. He has an NG tube, post surgery. Olu Tang shared that patient is \"making urine and has passed some stool\" which she feels is a step in the right direction. According to chart, patient's surgeon did speak to family about focusing on comfort. It does not appear at this time that either patient or wife are ready for this. Olu Tang noted several times, \"we are viewing things positively and that's how we are. I can't see beyond today\". LCSW did not ask her any questions or mention anything about comfort, she may have been reacting to previous conversations that she has had with surgeon. Patient and wife noted that all the grand kids and his children visited yesterday for Father's Day. The children range in age from 9, 15, 15, 16 and 25 yo. Patient and wife noted that the adult children are \"coping well\". The younger children have not been told about patient's serious illness, the older ones seem to know. Palliative team is available for support as needed. Spoke to Marty Ibrahim RN Navigator Oncology briefly regarding patient.

## 2019-06-17 NOTE — PROGRESS NOTES
Bedside and Verbal shift change report given to Sasha Murillo RN (oncoming nurse) by Rito Connor RN (offgoing nurse). Report included the following information SBAR, Kardex, MAR, Recent Results and Cardiac Rhythm NSR. 
 
1012:  Spoke with pharmacy about vancomycin being a PRN order after dialysis. Pt only received the 1 dialysis and not scheduled for any more. Pharmacy will look at med. 1115:  Pulled Andre cath per order. Removed 1 suture. Pulled Andre with pt in trendelenberg, and pt held breathe. Held pressure for 5 minutes. Covered with 4x4's and tegaderm. Left pt laying flat. Will continue to monitor pt. 
 
1500:  Changed urostomy bag on R abd side. Left ostomy bag on the L for now until surgery comes by to see pt. Stool is now present in the bag. Will continue to monitor. 1700:  Dr. Justin Ferrera in to see pt. OK to change abd dressings. Leave NG in for at least another day. Continue to monitor ostomy output. OK for ice chips and popsicles. 1830:  Changed dressings to abd. No signs of distress.

## 2019-06-17 NOTE — PROGRESS NOTES
Pharmacy Automatic Renal Dosing Protocol - Antimicrobials Indication for Antimicrobials: intra-abdominal infection Current Regimen of Each Antimicrobial: 
Vancomycin HD dosing (Start Date , Day #5) Cefepime 1g IV q24 (Start Date ; Day # 5) Metronidazole 500mg IV q12h (; day #5) Previous Antimicrobial Therapy: 
NA Significant Cultures:  
  Blood cx - ng - pending Radiology / Imaging results: (X-ray, CT scan or MRI):  
: CT scan: Small bowel distention with decompressed loops distally is compatible with obstruction likely related to effusion formation in the mid lower abdomen. Mild free fluid. Status post prostatectomy, partial colectomy, and cystectomy. : CXR -stable cardiomediastinal silhouette Paralysis, amputations, malnutrition: NA 
 
Labs: 
Recent Labs  
  19 
0601 19 
0517 06/15/19 
0517 CREA 2.06* 2.84* 3.46* BUN 59* 57* 36* WBC 12.9* 15.9* 17.0* Temp (24hrs), Av.8 °F (37.1 °C), Min:97.9 °F (36.6 °C), Max:99.6 °F (37.6 °C) Creatinine Clearance (mL/min) or Dialysis:  
Estimated Creatinine Clearance: 42.1 mL/min (A) (based on SCr of 2.06 mg/dL (H)). Estimated Creatinine Clearance (using IBW):34.9 mL/min Impression/Plan: · S/p ex-lap  ROBOTIC LYSIS OF ADHESIONS SMALL BOWEL RESECTION on ; Small Bowel Obstruction POA; Colon Cancer s/p resection with recurrence/Rectal Adenocarcinoma on ChemotX s/p resection · On TPN · Chemo delayed for now per HemOnc · Afebrile, leukocytosis resolving, SCr improve with no further plans for HD (kevin to be removed ) · Last dose of Vancomycin was the loading dose on  after HD. Discussed w/ Dr. Ascencion Gandhi and will continue Vancomycin dosing with 1500mg IV q24h and will order · Adjusted Cefepime per renal dosing protocol to 2gm IV q12h · Continue Metronidazole 500mg IV q12h. · Antimicrobial stop date to be determined Pharmacy will follow daily and adjust medications as appropriate for renal function and/or serum levels.  
 
Thank you, 
Osmin Osorio, [facility]

## 2019-06-17 NOTE — PROGRESS NOTES
Has some stool in bag Visit Vitals /81 (BP 1 Location: Left arm, BP Patient Position: At rest) Pulse 96 Temp 98.2 °F (36.8 °C) Resp 16 Ht 5' 10\" (1.778 m) Wt 110.2 kg (242 lb 15.2 oz) SpO2 98% BMI 34.86 kg/m² Date 06/16/19 0700 - 06/17/19 0457 06/17/19 0700 - 06/18/19 8431 Shift 0481-9220 9062-4468 24 Hour Total 4412-1404 1603-5576 24 Hour Total  
INTAKE  
P.O. 74 150 224 P. O. 74 150 224     
I.V.(mL/kg/hr)  817.2(0.6) 817.2(0.3) 198.5  198.5 Volume (metroNIDAZOLE (FLAGYL) IVPB premix 500 mg)  100 100 Volume (TPN ADULT - CENTRAL)  717.2 717.2 198.5  198.5 Shift Total(mL/kg) 74(0.7) 967.2(8.5) 1041.2(9.2) 198.5(1.8)  198.5(1.8) OUTPUT Urine(mL/kg/hr)  1100(0.8) 1100(0.4) 1350  1350 Urine Output (mL) (Urinary Ostomy Abdomen)  1100 1100 1350  1350 Emesis/NG output  50 50 300  300 Output (ml) (Nasogastric Tube 06/06/19)  50 50 300  300 Drains  75 75 35  35 Output (ml) (Matthew-Morejon Drain 06/13/19 Abdomen)  75 75 35  35 Stool  0 0 0  0 Output (ml) (Colostomy 06/02/19)  0 0 0  0 Shift Total(mL/kg)  3290(31.4) 1225(10.8) [id number])  [id number]) NET 74 -257.9 -183.9 -1486.6  -1486. 6 Weight (kg) 113.4 113.4 113.4 110.2 110.2 110.2  
 
abd soft Incision clean 
monique serosang Stoma with stool in bag A/P resolving ileus 
ngt is scant. Would leave ngt in for one more night. If all goes well tonight and more stool in stoma tomorrow, then I am ok with removing ngt and keeping him on a clear liquid diet. Would not advance diet beyond that for now Mobilize

## 2019-06-18 NOTE — PROGRESS NOTES
Nephrology Progress Note Conner Persaud  
 
[email or web address]                  Phone - [phone] Patient: Severiano Abe Date- 6/18/2019 Admit Date: 6/2/2019 YOB: 1950 CC: Follow up for  Kashif, hyperkalemia Subjective: Interval History:  
-cr. Continue to improve 
kevin cath removed yesterday 
k low Na high 146 BP HIGH Urine out put > 5000 ml 
 
C/o mild abdo. Pain No sob No fever ROS:- as above Assessment:  
· Hyperkalemia due to renal failure and KCl infusion with total parenteral nutrition. Metabolic acidosis will also cause extracellular shift of potassium. - RESOLVED after dialysis on 6-14-19 · hypokalemia ·  hypertension · Polyuria - post ATN diuresis · Acute kidney injury, likely related to hemodynamic changes although postrenal obstruction cannot be ruled out especially, he had a surgery done yesterday on 06/13/2019. ·  Chronic kidney disease stage II, baseline creatinine 1.1 in 04/2019. · Metabolic acidosis, anion gap. ·  Diabetes. ·  History of rectal cancer. ·   History of prostatectomy, colostomy, and ileal conduit, and cystectomy. · . hypokalemia 
  
 
 
 Plan:  
· Iv kcl 40 meq · Add kcl to TPN · Start clonidine patch · Give iv labetalol 20 mg now · Lower NA in TPN · Check bmp in am 
 
Physical Exam:  
GEN: NAD NECK- Supple,no mass RESP: clear b/l, no wheezing, CVS: RRR,s1,s2 ABDO: soft , ileal conduit,colostomy + EXT: + Edema NEURO: normal speech, non focal 
 
Care Plan discussed with: patient Objective:  
Visit Vitals /88 (BP 1 Location: Left arm, BP Patient Position: At rest) Pulse 82 Temp 97.9 °F (36.6 °C) Resp 16 Ht 5' 10\" (1.778 m) Wt 111.4 kg (245 lb 9.5 oz) SpO2 100% BMI 35.24 kg/m² Last 3 Recorded Weights in this Encounter 06/16/19 8292 06/17/19 1759 06/18/19 4965 Weight: 113.4 kg (250 lb 1.6 oz) 110.2 kg (242 lb 15.2 oz) 111.4 kg (245 lb 9.5 oz) 06/16 1901 - 06/18 0700 In: 1645.6 [P.O.:630; I.V.:1015.6] Out: 3461 [Urine:4350; Drains:125] Chart reviewed. Pertinent Notes reviewed. Medication list  reviewed Current Facility-Administered Medications Medication  potassium chloride 10 mEq in 100 ml IVPB  cloNIDine (CATAPRES) 0.3 mg/24 hr patch 1 Patch  hydrALAZINE (APRESOLINE) 20 mg/mL injection 20 mg  
 labetalol (NORMODYNE;TRANDATE) injection 20 mg  
 labetalol (NORMODYNE;TRANDATE) injection 20 mg  TPN ADULT - CENTRAL  TPN ADULT - CENTRAL  vancomycin (VANCOCIN) 1500 mg in  ml infusion  cefepime (MAXIPIME) 2 g in 0.9% sodium chloride (MBP/ADV) 100 mL  insulin NPH (NOVOLIN N, HUMULIN N) injection 30 Units  dextrose 10% infusion 125-250 mL  metroNIDAZOLE (FLAGYL) IVPB premix 500 mg  
 heparin (porcine) injection 5,000 Units  zinc oxide-cod liver oil (DESITIN) 40 % paste  morphine injection 2 mg  metoprolol (LOPRESSOR) injection 5 mg  insulin lispro (HUMALOG) injection  glucose chewable tablet 16 g  
 glucagon (GLUCAGEN) injection 1 mg  sodium chloride (NS) flush 5-40 mL  sodium chloride (NS) flush 5-40 mL  nitroglycerin (NITROBID) 2 % ointment 1 Inch  
 HYDROmorphone (PF) (DILAUDID) injection 0.5 mg  
 naloxone (NARCAN) injection 0.4 mg  
 ondansetron (ZOFRAN) injection 4 mg  pantoprazole (PROTONIX) 40 mg in sodium chloride 0.9% 10 mL injection Data Review : 
Recent Labs  
  06/18/19 
0429 06/17/19 
0601 06/16/19 2126 WBC 8.6 12.9* 15.9* HGB 8.6* 8.7* 8.3*  
 157 140* ANEU 7.3 11.6* 14.8* * 142 142  
K 3.1* 3.2* 3.7 * 213* 226* BUN 50* 59* 57* CREA 1.52* 2.06* 2.84* CA 8.5 8.3* 8.0*  
MG 2.3 2.3  --   
PHOS 3.3 3.4 4.3 Lab Results Component Value Date/Time  Culture result: NO GROWTH 4 DAYS 06/14/2019 03:21 AM  
 Culture result: NO FUNGUS ISOLATED 28 DAYS 03/05/2018 11:52 AM  
 Culture result: Culture performed on Fluid swab specimen 03/05/2018 10:52 AM  
 Culture result: NO GROWTH 4 DAYS 03/05/2018 10:52 AM  
 Culture result: NO GROWTH 4 DAYS 03/05/2018 10:52 AM  
 
No results found for: SDES No results for input(s): FE, TIBC, PSAT, FERR in the last 72 hours. Lab Results Component Value Date/Time Creatinine, urine 43.0 02/25/2016 04:01 PM  
 
Margot Baker MD 
[facility] 
 [email or web address] [facility] [place], [address] Phone - [phone] Fax - [phone]

## 2019-06-18 NOTE — PROGRESS NOTES
Feels more tired than yesterday. No nausea or vomiting. Higher NG tube output may be related to ice chips Visit Vitals /81 (BP 1 Location: Left arm, BP Patient Position: At rest) Pulse 91 Temp 97.7 °F (36.5 °C) Resp 20 Ht 5' 10\" (1.778 m) Wt 111.4 kg (245 lb 9.5 oz) SpO2 96% BMI 35.24 kg/m² Date 06/17/19 0700 - 06/18/19 4238 06/18/19 0700 - 06/19/19 0052 Shift 4823-9968 2303-5500 24 Hour Total 9095-7608 3384-1305 24 Hour Total  
INTAKE  
P.O.  480 480 800  800  
  P. O.  480 480 800  800  
I. V.(mL/kg/hr) 198.5(0.2)  198.5(0.1) Volume (TPN ADULT - CENTRAL) 198.5  198.5 Shift Total(mL/kg) 198.5(1.8) 480(4.3) 678. 5(6.1) 800(7.2)  800(7.2) OUTPUT Urine(mL/kg/hr) 1350(1) 1900(1.4) 3250(1.2) 1450  1450 Urine Output (mL) (Urinary Ostomy Abdomen) 1350 1900 3250 1450  1450 Emesis/NG output 300   1500 Output (ml) (Nasogastric Tube 06/06/19) 300   1500 Drains 45 5 50 4  4 Output (ml) (Matthew-Morejon Drain 06/13/19 Abdomen) 45 5 50 4  4 Stool 0 0 0 4  4 Output (ml) (Colostomy 06/02/19) 0 0 0 4  4 Shift Total(mL/kg) [id number]) 2345(03.5) 5989(63.7) 5792(28.0)  8410(62.4) NET -1496.6 -2025 -3521.6 -2158  -2158 Weight (kg) 110.2 111.4 111.4 111.4 111.4 111.4  
 
abd soft Incision clean 
monique serosang Stoma with stool in bag A/P resolving ileus High  NG output today compared to yesterday but this may be related to drinking a lot of ice chips. I told him to take it easy with the ice chips so we can get accurate measurements. Some stool in colostomy bag. Keep NG another day and monitor output. Hopefully it can come out tomorrow and we can start clear liquids PT /OT consulted

## 2019-06-18 NOTE — PROGRESS NOTES
Bedside and Verbal shift change report given to Pura Martinez RN (oncoming nurse) by Citlalli Mclaughlin RN (offgoing nurse). Report included the following information SBAR, Kardex, MAR and Recent Results. 2011: Assessed patient, MEWS 1, VSS, A&O x 4, patient is resting in bed with family at bedside. Patient is complaining of abdominal pain, will administered PRN Dilaudid for pain. Will continue to monitor. 2020: PRN 0.5 mg of Dilaudid given for abdominal pain 4/10. Will continue to monitor. 2226: Reassessed patient, no changes from previous assessment, MEWS 1, VSS, no complaints of pain at this time. Will continue to monitor. 56: Reassessed patient, no changes from previous assessment, MEWS 1, VSS, no complaints of pain at this time, AM labs obtained. Will continue to monitor. 0710: Bedside and Verbal shift change report given to Citlalli Mclaughlin RN (oncoming nurse) by Pura Martinez RN (offgoing nurse). Report included the following information SBAR, Kardex, MAR and Recent Results.

## 2019-06-18 NOTE — PROGRESS NOTES
Hematology Oncology Progress Note Follow up for: metastatic rectal cancer CC:He feels a little better Chart notes reviewed since last visit. Case discussed with following:  
 
Patient complains of the following: Underwent surgery Thursday with Dr. Gloria Craig. feeling a bit better with his abd pains. NG in place. Additional concerns noted by the staff:  
 
Patient Vitals for the past 24 hrs: 
 BP Temp Pulse Resp SpO2 Weight  
06/18/19 0919 190/88  92     
06/18/19 0714 187/88 97.9 °F (36.6 °C) 82 16 100 %   
06/18/19 0646 175/84  93     
06/18/19 0427 158/90 98.1 °F (36.7 °C) 93 16 99 %   
06/18/19 0420      111.4 kg (245 lb 9.5 oz) 06/18/19 0028 161/79  94     
06/17/19 2226 161/80 98.8 °F (37.1 °C) 92 16 98 %   
06/17/19 2011 159/77 99.4 °F (37.4 °C) 97 16 96 %   
06/17/19 1839 161/68  86     
06/17/19 1804 173/82  (!) 102     
06/17/19 1501 158/81 98.2 °F (36.8 °C) 96 16 98 %   
06/17/19 1206   96     
06/17/19 1104 157/77 97.9 °F (36.6 °C) 94 16 96 %   
 
 
ROS negative for 11 organ systems except as ntoed above. Physical Examination: 
Gen NAD 
CV reg Lungs clear Abd benign Labs: 
Recent Results (from the past 24 hour(s)) GLUCOSE, POC Collection Time: 06/17/19 11:17 AM  
Result Value Ref Range Glucose (POC) 182 (H) 65 - 100 mg/dL Performed by Ravinder Ryan GLUCOSE, POC Collection Time: 06/17/19  5:59 PM  
Result Value Ref Range Glucose (POC) 153 (H) 65 - 100 mg/dL Performed by Unkown  GLUCOSE, POC Collection Time: 06/17/19  9:31 PM  
Result Value Ref Range Glucose (POC) 173 (H) 65 - 100 mg/dL Performed by Sasha Pagan (CON) GLUCOSE, POC Collection Time: 06/17/19 11:58 PM  
Result Value Ref Range Glucose (POC) 171 (H) 65 - 100 mg/dL Performed by Kevyn Gordon (PCT) RENAL FUNCTION PANEL Collection Time: 06/18/19  4:29 AM  
Result Value Ref Range  Sodium 146 (H) 136 - 145 mmol/L  
 Potassium 3.1 (L) 3.5 - 5.1 mmol/L Chloride 113 (H) 97 - 108 mmol/L  
 CO2 27 21 - 32 mmol/L Anion gap 6 5 - 15 mmol/L Glucose 176 (H) 65 - 100 mg/dL BUN 50 (H) 6 - 20 MG/DL Creatinine 1.52 (H) 0.70 - 1.30 MG/DL  
 BUN/Creatinine ratio 33 (H) 12 - 20 GFR est AA 55 (L) >60 ml/min/1.73m2 GFR est non-AA 46 (L) >60 ml/min/1.73m2 Calcium 8.5 8.5 - 10.1 MG/DL Phosphorus 3.3 2.6 - 4.7 MG/DL Albumin 1.8 (L) 3.5 - 5.0 g/dL CBC WITH AUTOMATED DIFF Collection Time: 06/18/19  4:29 AM  
Result Value Ref Range WBC 8.6 4.1 - 11.1 K/uL  
 RBC 2.96 (L) 4.10 - 5.70 M/uL HGB 8.6 (L) 12.1 - 17.0 g/dL HCT 27.5 (L) 36.6 - 50.3 % MCV 92.9 80.0 - 99.0 FL  
 MCH 29.1 26.0 - 34.0 PG  
 MCHC 31.3 30.0 - 36.5 g/dL  
 RDW 17.2 (H) 11.5 - 14.5 % PLATELET 282 198 - 448 K/uL MPV 11.1 8.9 - 12.9 FL  
 NRBC 0.0 0  WBC ABSOLUTE NRBC 0.00 0.00 - 0.01 K/uL NEUTROPHILS 85 (H) 32 - 75 % LYMPHOCYTES 6 (L) 12 - 49 % MONOCYTES 6 5 - 13 % EOSINOPHILS 2 0 - 7 % BASOPHILS 0 0 - 1 % IMMATURE GRANULOCYTES 1 (H) 0.0 - 0.5 % ABS. NEUTROPHILS 7.3 1.8 - 8.0 K/UL  
 ABS. LYMPHOCYTES 0.6 (L) 0.8 - 3.5 K/UL  
 ABS. MONOCYTES 0.5 0.0 - 1.0 K/UL  
 ABS. EOSINOPHILS 0.1 0.0 - 0.4 K/UL  
 ABS. BASOPHILS 0.0 0.0 - 0.1 K/UL  
 ABS. IMM. GRANS. 0.1 (H) 0.00 - 0.04 K/UL  
 DF AUTOMATED MAGNESIUM Collection Time: 06/18/19  4:29 AM  
Result Value Ref Range Magnesium 2.3 1.6 - 2.4 mg/dL GLUCOSE, POC Collection Time: 06/18/19  6:37 AM  
Result Value Ref Range Glucose (POC) 163 (H) 65 - 100 mg/dL Performed by Leann Pearson Imaging: 
KUB: 
Direct comparison is made to prior plain radiographs dated Juanita 3, 2019. 
  
Nasogastric tube and side port overlie the stomach. There are several dilated 
small bowel loops in the abdomen. Degree of small bowel dilatation appears to 
have worsened as compared to prior plain radiographs dated Juanita 3, 2019. No free intraperitoneal gas is visualized on supine views. No pathologic calcification 
is seen. 
  
IMPRESSION: Multiple dilated loops of small bowel. Assessment and Plan: SBO: 
- Had adhesions lysed in OR with Dr Lizandro Bolden 6/13. 
- NG in place and TPN going. 
- await return of bowel function Fever/leukocytosis: - Febrile last week to 101.3 and wbc increased to 19k but has dropped to normal 
- blood cx no growth at 2 days per lab and cxray unremarkable - Monitor closely BOBBI: 
- BOBBI earlier in admission, renal fxn was improving prior to surgery, then radha postop and is down to 1.52 this AM.  
- Renal consulted and following. Had required HD earlier (hyperkalemia) Metastatic rectal cancer: - Follows with Dr. Dayne Steinberg in [address] [place] [address] office,  several chemotherapeutic options ahead for treatment - Will FU with Dr Dayne Steinberg after DC 
 
DM - on insulin Hypertension - clonidine, metoprolol, and nitro bid ordered. Simon Lopez

## 2019-06-18 NOTE — PROGRESS NOTES
1100 
Bedside shift [facility] by Jossy Singer RN (offgoing nurse). Report included the following information Kardex, Intake/Output, MAR, Recent Results, Med Rec Status, Cardiac Rhythm NSR and Alarm Parameters . 1200- 
3rd run of potassium infusing, see MAR. Pt is laying in bed with no complaints at this time. 1500- Pt is lying in bed, repositioned and comfortable at this time 1800- Wife supportive at bedside. Pt denies pain. 1920- Bedside shift change report given to [address] [place] [address] (oncoming nurse) by Teja Dowell RN (offgoing nurse). Report included the following information Kardex, Procedure Summary, Intake/Output, MAR, Recent Results, Med Rec Status, Cardiac Rhythm NSR and Alarm Parameters .

## 2019-06-18 NOTE — PROGRESS NOTES
Nutrition Assessment: 
 
INTERVENTIONS/RECOMMENDATIONS:  
Recommend increasing TPN to AA5% D15% @ 83 mL/hr and adding 250 mL 20% lipids 3x/week to better meet patient's nutritional needs and prevent essential fatty acid deficiency (1628 kcal, 100g protein) ASSESSMENT:  
Chart reviewed; patient remains NPO at this time. Okay for ice chips and popsicles per notes. TPN infusing at 63 mL/hr; this is only meeting 55% of estimated kcal needs. Recommendations provided above to increase rate and add lipids which will meet ~84% of estimated kcal needs/100% of protein needs. Patient s/p HD x 1 on 6/14 but Brooke Lopez removed yesterday. Episodes of hyperkalemia and now with hypokalemia. BG better controlled. Possible removal of NGT today per CRS; no diet advancement beyond clear liquids for now (although current diet is NPO). Multiple providers noting poor prognosis and recommending comfort but family not ready for this. Will continue to monitor TPN, electrolytes, weights, and diet advancement/PO. Diet Order: NPO(TPN AA5% D15% @ 63mL/hr, 1074 kcal, 76g protein) % Eaten:   
Patient Vitals for the past 72 hrs: 
 % Diet Eaten 06/16/19 0023 0 % 06/15/19 1500 0 % Pertinent Medications: [x] Reviewed []Other: Humalog, NPH, Lopressor, Protonix, KCl Pertinent Labs: [x]Reviewed  []Other: K+ 3.1, Na 146, [phone] Food Allergies: [x]None []Yes:    
Last BM: 6/17   [x]Active     []Hyperactive  []Hypoactive       [] Absent  BS Skin:    [] Intact   [x] Incision  [] Breakdown   []Edema   []Other: Anthropometrics: Height: 5' 10\" (177.8 cm) Weight: 111.4 kg (245 lb 9.5 oz) IBW (%IBW):   ( ) UBW (%UBW):   (  %) BMI: Body mass index is 35.24 kg/m². This BMI is indicative of: 
[]Underweight   []Normal   []Overweight   [x] Obesity   [] Extreme Obesity (BMI>40) Last Weight Metrics: 
Weight Loss Metrics 6/18/2019 4/18/2019/18/2019 4/9/2019 2/27/2019 12/17/2018 8/21/2018 4/17/2018 Today's Wt 245 lb 9.5 oz 245 lb 245 lb 245 lb 249 lb 247 lb 244 lb BMI 35.24 kg/m2 36.18 kg/m2 36.18 kg/m2 36.18 kg/m2 36.77 kg/m2 36.48 kg/m2 36.03 kg/m2 Estimated Nutrition Needs (Based on): 1946 Kcals/day(BMR (1881) x 1. 3AF -500kcal) , 89 g(-111g (0.8-1.0 g/kg bw)) Protein Carbohydrate: At Least 130 g/day  Fluids: 1950 mL/day Pt expected to meet estimated nutrient needs: [x]Yes []No 
 
NUTRITION DIAGNOSES:  
Problem:  Altered GI function Less than optimal parenteral nutrition Etiology: related to metastatic rectal cancer and SBO current TPN regimen Signs/Symptoms: as evidenced by need for TPN to meet nutrition needs Current TPN meets ~55% of estimated kcal needs NUTRITION INTERVENTIONS: 
  Enteral/Parenteral Nutrition: Other GOAL:  
TPN at goal with electrolytes WNL next 2-4 days NUTRITION MONITORING AND EVALUATION Food/Nutrient Intake Outcomes: Total energy intake, Enteral/parenteral nutrition intake Physical Signs/Symptoms Outcomes: Weight/weight change, Electrolyte and renal profile, GI profile, Glucose profile Previous Goal Met: 
 [] Met              [x] Progressing Towards Goal              [] Not Progressing Towards Goal  
Previous Recommendations: 
 [] Implemented          [x] Not Implemented          [] Not Applicable LEARNING NEEDS (Diet, Food/Nutrient-Drug Interaction):  
 [x] None Identified 
 [] Identified and Education Provided/Documented 
 [] Identified and Pt declined/was not appropriate Cultural, Buddhism, OR Ethnic Dietary Needs:  
 [x] None Identified 
 [] Identified and Addressed 
 
 [x] Interdisciplinary Care Plan Reviewed/Documented  
 [x] Discharge Planning: TBD [] Participated in Interdisciplinary Rounds NUTRITION RISK:  
 [x] Patient At Nutritional Risk             [] Patient Not At Nutritional Risk Luissteven Jay Kerr [id number] Pager [phone] Weekend Pager [phone]

## 2019-06-18 NOTE — PROGRESS NOTES
Hospitalist Progress Note NAME: Quinten Cooper :  1950 MRN:  [id number] Assessment / Plan: 
Severe hyperkalemia - resolved s/p HD BOBBI - required HD - now improved and off dialysis Metabolic acidosis - improved Sepsis 2/2 unknown source , intraabdominal infection? Pt had long  surgery on , he is c/o abdominal pain and spiked fever on  Wbc up to 19k , Started on cefepime and flagyl and vanco  
Cont NG to suction, NPO for now Potassium 6.4, bicarb 17 creat up 2.8-3.8 on  EKG : no peaked T waves , given insulin + ca gluconate  
nephro consulted , appreciate management , started on bicarb drip on  S/p HD on  and bicarb drip , K down to wnl and creat improving No further HD on 06/15 Wbc trending down . C/w vanco cefipime and flagyl BCx NTD  
k down to 3.2 today , repleted with IV K . Check mg : 
Hypokalemia TPN adjusted by Nephro to include K Remains afebrile, BCx NGTD 4 days. Remains on Cefepime, Flagyl and Vancomycin - source remains unclear. Will likely discontinue all if Cx remain negative. Hypernatremia Na adjusted in TPN, now 146. S/p ex-lap  ROBOTIC LYSIS OF ADHESIONS SMALL BOWEL RESECTION on  Small Bowel Obstruction POA-  
Colon Cancer s/p resection with recurrence/Rectal Adenocarcinoma on ChemotX s/p resection On TPN  
CT abd/pelvis on :Small bowel distention with decompressed loops distally is compatible with obstruction likely related to effusion formation in the mid lower abdomen. Mild free fluid Brief op note : Diagnostic laparoscopy converted to open exploratory laparotomy with small bowel bypass and repair of colotomy x6 
 
IP Oncology consult appreciated & noted- Chemo delayed for now Cont IV dilaudid prn pain and IV antiemetics prn 
TPN to be adjusted : avoid K inside , daily CMP mag phos C/w humalog Sc q6h  
 
: Most recent Surg note indicates pt may have NGT removed today if output remains minimal - will defer to them in regards to this and starting pt on a diet Hypertension 
-holding PO meds 
-place on clonidine patch and scheduled Nitrobid 
-prn IV hydralazine ordered, IV Labetalol ordered by Nephro 
  
Diabetes Mellitus, controlled 
-Continue with increased dose of NPH 30 units BID, SS, FS monitoring 30.0 - 39.9 Obese / Body mass index is 35.24 kg/m². Weight loss recommended- Exercise Code status: Full Surrogate Decision Maker: Wife Prophylaxis: Hep SQ and H2B/PPI Recommended Disposition: TBD Subjective: Chief Complaint / Reason for Physician Visit:  
Met with wife at bedside and updated on condition. Pt endorses feeling better. They are both hopeful that his NGT can be taken out soon and he can tried on PO intake. They are happy to hear his renal function is improving. Objective: VITALS:  
Last 24hrs VS reviewed since prior progress note. Most recent are: 
Patient Vitals for the past 24 hrs: 
 Temp Pulse Resp BP SpO2  
06/18/19 0919  92  190/88   
06/18/19 0714 97.9 °F (36.6 °C) 82 16 187/88 100 % 06/18/19 0646  93  175/84   
06/18/19 0427 98.1 °F (36.7 °C) 93 16 158/90 99 % 06/18/19 0028  94  161/79   
06/17/19 2226 98.8 °F (37.1 °C) 92 16 161/80 98 % 06/17/19 2011 99.4 °F (37.4 °C) 97 16 159/77 96 % 06/17/19 1839  86  161/68   
06/17/19 1804  (!) 102  173/82   
06/17/19 1501 98.2 °F (36.8 °C) 96 16 158/81 98 % 06/17/19 1206  96     
06/17/19 1104 97.9 °F (36.6 °C) 94 16 157/77 96 % Intake/Output Summary (Last 24 hours) at 6/18/2019 9550 Last data filed at 6/18/2019 9204 Gross per 24 hour Intake 480 ml Output 3580 ml Net -3100 ml PHYSICAL EXAM: 
General: Alert, cooperative, in distress, Obese +   
EENT:  EOMI. Anicteric sclerae. MMM, +NGT Resp:  CTA bilaterally, no wheezing or rales. No accessory muscle use CV:  Regular  rhythm,  No edema GI:  Abdomen soft, Non tender, Ex-lap surgical dressings intact  , ileostomy and colostomy bags Neurologic:  Alert and oriented X 3 Psych:   Good insight. Not anxious nor agitated Skin:  No rashes. No jaundice Reviewed most current lab test results and cultures  YES Reviewed most current radiology test results   YES Review and summation of old records today    NO Reviewed patient's current orders and MAR    YES 
PMH/SH reviewed - no change compared to H&P 
________________________________________________________________________ Care Plan discussed with: 
  Comments Patient x Family  x Wife at bedside RN x Care Manager Consultant Multidiciplinary team rounds were held today with , nursing, pharmacist and clinical coordinator. Patient's plan of care was discussed; medications were reviewed and discharge planning was addressed. ________________________________________________________________________ 
 
________________________________________________________________________ Rajwinder Holguin MD  
 
Procedures: see electronic medical records for all procedures/Xrays and details which were not copied into this note but were reviewed prior to creation of Plan. LABS: 
I reviewed today's most current labs and imaging studies. Pertinent labs include: 
Recent Labs  
  06/18/19 0429 06/17/19 
0601 06/16/19 
9074 WBC 8.6 12.9* 15.9* HGB 8.6* 8.7* 8.3* HCT 27.5* 26.5* 24.7*  
 157 140* Recent Labs  
  06/18/19 0429 06/17/19 
0601 06/16/19 
9980 * 142 142  
K 3.1* 3.2* 3.7 * 108 106 CO2 27 27 28 * 213* 226* BUN 50* 59* 57* CREA 1.52* 2.06* 2.84* CA 8.5 8.3* 8.0*  
MG 2.3 2.3  --   
PHOS 3.3 3.4 4.3 ALB 1.8* 1.6* 1.6* Signed: Rajwinder Holguin MD

## 2019-06-18 NOTE — PROGRESS NOTES
Bedside and Verbal shift change report given to Salvatore Fiore, RN (oncoming nurse) by Ced Salas, RN (offgoing nurse). Report included the following information SBAR, Kardex, [facility], Recent Results and Cardiac Rhythm NSR.  
 
0815:  Spoke with Dr. Melissa Burks about TPN and possibly adding in potassium. Will get pharmacy to help with this. 0825:  Dr. Steele Every ordered TPN for this evening and added potassium to the TPN.

## 2019-06-19 NOTE — PROGRESS NOTES
0000: Patient agreed to not have any water or ice chips till in the morning to allow accurate assessment of NGT output. Canister emptied out to began new measurements.

## 2019-06-19 NOTE — DIABETES MGMT
[facility] Progress Note Recommendations/ Comments:   Consider increasing Regular insulin add in to TPN to 25 units/L weaning as TPN weaned by surgery with advances in diet. As NPH has been increased to >pt's home dose at present, do not wish to precipitate hypoglycemia once TPN discontinued. Chart reviewed on Latoya Colunga. Current hospital DM medication: NPH 30 Units BID,  Lispro Correctional insulin with insulin resistant sensitivity. Regular insulin add-in to TPN 22 units/L for TDD 44 units Regular. Patient is a 70 y.o. male with known diabetes on Lantus 52 units daily, Lispro correctional insulin per scale 4 times daily, Metformin 1000 mg BID at home. A1c:  
Lab Results Component Value Date/Time Hemoglobin A1c 6.8 (H) 06/03/2019 03:26 AM  
 Hemoglobin A1c 6.5 (H) 10/17/2016 02:40 AM  
 
 
Recent Glucose Results:  
Lab Results Component Value Date/Time  (H) 06/19/2019 04:41 AM  
 GLUCPOC 214 (H) 06/19/2019 12:01 PM  
 GLUCPOC 177 (H) 06/19/2019 05:34 AM  
 GLUCPOC 193 (H) 06/18/2019 11:58 PM  
  
 
Lab Results Component Value Date/Time Creatinine 1.25 06/19/2019 04:41 AM  
 
Estimated Creatinine Clearance: 69.6 mL/min (based on SCr of 1.25 mg/dL). Active Orders Diet DIET NPO With [employer] PO intake: No data found. Will continue to follow as needed. Thank you. Leann Purvis RD, CDE [facility] Office:  [phone] Time spent: 10 minutes

## 2019-06-19 NOTE — PROGRESS NOTES
Orders received, chart reviewed and patient evaluated by physical therapy. Recommend patient to discharge to home with HHPT pending progress with skilled acute care physical therapy. Recommend with nursing patient to complete as able in order to maintain strength, endurance and independence: OOB to chair 3x/day with 1 and ambulating with RW. Thank you for your assistance. Full evaluation to follow.

## 2019-06-19 NOTE — PROGRESS NOTES
Problem: Self Care Deficits Care Plan (Adult) Goal: *Acute Goals and Plan of Care (Insert Text) Description Occupational Therapy Goals Initiated 6/19/2019 1. Patient will perform grooming standing at sink with independence within 7 day(s). 2.  Patient will perform upper body dressing with supervision/set-up within 7 day(s). 3.  Patient will perform lower body dressing with supervision/set-up within 7 day(s). 4.  Patient will perform sponge bathing with supervision/set-up within 7 day(s). Outcome: Progressing Towards Goal 
 
 
OCCUPATIONAL THERAPY EVALUATION Patient: Erika Parra (33 y.o. male) Date: 6/19/2019 Primary Diagnosis: Bowel obstruction ([facility] [X83.724] Metastatic cancer ([facility] [C79.9] Procedure(s) (LRB): 
ROBOTIC LYSIS OF ADHESIONS SMALL BOWEL RESECTION (N/A) 6 Days Post-Op Precautions:     
 
ASSESSMENT : 
Based on the objective data described below, the patient presents with GW, decreased activity tolerance and decreased balance which is impairing his functional independence. He is moving well considering he has not been up much since his admit on 6/2, but is functioning below his independent baseline. The patient is now independent to mod A for ADLs is performing functional mobility at a CGA level. At this point he will benefit from acute skilled intervention to address the above impairments and he may need HHOT and PT after discharge. Patients rehabilitation potential is considered to be Good Factors which may influence rehabilitation potential include: ? None noted ? Mental ability/status ? Medical condition ? Home/family situation and support systems ? Safety awareness ? Pain tolerance/management ? Other: PLAN : 
Recommendations and Planned Interventions: 
?               Self Care Training                  ? Therapeutic Activities ?               Functional Mobility Training    ? Cognitive Retraining 
? Therapeutic Exercises           ? Endurance Activities ? Balance Training                   ? Neuromuscular Re-Education ? Visual/Perceptual Training     ? Home Safety Training 
? Patient Education                 ? Family Training/Education ? Other (comment): Frequency/Duration: Patient will be followed by occupational therapy 4 times a week to address goals. Discharge Recommendations: Home Health OT and PT VS. None pending progress. Further Equipment Recommendations for Discharge: TBD OBJECTIVE DATA SUMMARY:  
 
Past Medical History:  
Diagnosis Date  Abnormal nuclear stress test 4/27/2017  Arthritis  Asthma   
 childhood  BPH (benign prostatic hypertrophy)  CAD (coronary artery disease) 1996 M.I., Stents x2  Cancer ([facility] Rectal CA  Chronic pain  Colon polyp  DM (diabetes mellitus) ([facility] 5/17/2011  Gout  Hemorrhoids  HTN (hypertension) 5/17/2011  Hyperlipidemia 5/17/2011  Ill-defined condition Colostomy, iliostomy  Rectal adenocarcinoma ([facility] 1/16/2018 S/p surg.  S/P cardiac cath 4/27/2017 Past Surgical History:  
Procedure Laterality Date  CARDIAC SURG PROCEDURE UNLIST  5140/1894  
 stent x2  
 COLONOSCOPY N/A 10/14/2016 COLONOSCOPY/EGD performed by Ramiro Prakash MD at [facility] ENDOSCOPY  COLONOSCOPY N/A 2/3/2017 COLONOSCOPY performed by Valri Gosselin, MD at [facility] ENDOSCOPY  COLONOSCOPY N/A 3/27/2018 COLONOSCOPY performed by Valri Gosselin, MD at [facility] ENDOSCOPY  ENDOSCOPY, COLON, DIAGNOSTIC  6/2011  HX GI    
 Prostate, Bladder, rectum. colon removed  HX HERNIA REPAIR    
 AS INFANT  HX ORTHOPAEDIC  02/2016  
 hip replacement , left  HX PROSTATECTOMY  X2  
 biopsy benign  HX TONSILLECTOMY  HX UROLOGICAL Bladder removed  IR INSERT NON TUNL CVC OVER 5 YRS  6/14/2019  
 SIGMOIDOSCOPY,BIOPSY  10/14/2016  UPPER GI ENDOSCOPY,DIAGNOSIS  10/14/2016 Prior Level of Function/Home Situation: Independent to mod I for ADLs, ambulates independently without an AD and drives. Patient has ostomy and iliostomy for toileting, which he is able manage independently. Expanded or extensive additional review of patient history:  
 
Home Situation Home Environment: Independent living One/Two Story Residence: One story Living Alone: No 
Support Systems: Family member(s), Child(karen) Patient Expects to be Discharged to[de-identified] Private residence Current DME Used/Available at Home: Shower chair, Grab bars ? Right hand dominant   ? Left hand dominant Cognitive/Behavioral Status: 
Neurologic State: Alert Orientation Level: Oriented X4 Cognition: Appropriate decision making; Appropriate for age attention/concentration; Follows commands Safety/Judgement: Awareness of environment; Insight into deficits Vision/Perceptual:   
    
    
    
  
    
Acuity: Within Defined Limits Range of Motion: 
AROM: Generally decreased, functional 
  
Strength: 
Strength: Generally decreased, functional 
Coordination: 
Coordination: Within functional limits Tone & Sensation: 
Tone: Normal 
Balance: 
Sitting: Intact Standing: Impaired Standing - Static: Fair;Good Standing - Dynamic : Fair Functional Mobility and Transfers for ADLs: 
Bed Mobility: 
  
  
Sit to Supine: Contact guard assistance Scooting: Supervision Transfers: 
Sit to Stand: Contact guard assistance Bed to Chair: Contact guard assistance(taking steps with RW) ADL Assessment: 
Feeding: Independent Oral Facial Hygiene/Grooming: Supervision Bathing: Moderate assistance Upper Body Dressing: Minimum assistance Lower Body Dressing: Moderate assistance Toileting: Minimum assistance(has ostomy and ileal conduit ) Functional Measure: 
Barthel Index: 
 
Bathin Bladder: 0 Bowels: 10 
Groomin Dressin Feeding: 10 Mobility: 0 Stairs: 0 Toilet Use: 5 Transfer (Bed to Chair and Back): 5 Total: 30 Barthel and G-code impairment scale: 
Percentage of impairment CH 
0% CI 
1-19% CJ 
20-39% CK 
40-59% CL 
60-79% CM 
80-99% CN 
100% Barthel Score 0-100 100 99-80 79-60 59-40 20-39 1-19 
 0 Barthel Score 0-20 20 17-19 13-16 9-12 5-8 1-4 0 The Barthel ADL Index: Guidelines 1. The index should be used as a record of what a patient does, not as a record of what a patient could do. 2. The main aim is to establish degree of independence from any help, physical or verbal, however minor and for whatever reason. 3. The need for supervision renders the patient not independent. 4. A patient's performance should be established using the best available evidence. Asking the patient, friends/relatives and nurses are the usual sources, but direct observation and common sense are also important. However direct testing is not needed. 5. Usually the patient's performance over the preceding 24-48 hours is important, but occasionally longer periods will be relevant. 6. Middle categories imply that the patient supplies over 50 per cent of the effort. 7. Use of aids to be independent is allowed. Bhumi Bansal, Barthel, D.W. (2434). Functional evaluation: the Barthel Index. [facility] (14)2. Smiley Irvin, LUANNEJLucianoMLucianoF, Anne Pascual., Terence Peter., [place], [address] [place] [address] (). Measuring the change indisability after inpatient rehabilitation; comparison of the responsiveness of the Barthel Index and Functional [place] Measure. Journal of Neurology, Neurosurgery, and Psychiatry, 66(4), 866-509.  
MORAIMA Aguilar.KRYSTYNA, Ralph Macedo,  W.J.M, & CHRISTINE MelgarA. (2004.) Assessment of post-stroke quality of life in cost-effectiveness studies: The usefulness of the Barthel Index and the EuroQoL-5D. [facility], 78, 561-30 Pain: 
Pain Scale 1: Numeric (0 - 10) Pain Intensity 1: 0 Pain Location 1: Abdomen Pain Orientation 1: Anterior Pain Description 1: Aching Pain Intervention(s) 1: Medication (see MAR) Activity Tolerance:  
VSS, Fair activity tolerance Please refer to the flowsheet for vital signs taken during this treatment. After treatment:  
? Patient left in no apparent distress sitting up in chair ? Patient left in no apparent distress in bed 
? Call bell left within reach ? Nursing notified ? Caregiver present ? Bed alarm activated COMMUNICATION/EDUCATION:  
The patients plan of care was discussed with: Physical Therapist and Registered Nurse. 
? Home safety education was provided and the patient/caregiver indicated understanding. ? Patient/family have participated as able in goal setting and plan of care. ? Patient/family agree to work toward stated goals and plan of care. ? Patient understands intent and goals of therapy, but is neutral about his/her participation. ? Patient is unable to participate in goal setting and plan of care. This patients plan of care is appropriate for delegation to [facility]. Thank you for this referral. 
Mira Guy, OTR/L Time Calculation: 18 mins

## 2019-06-19 NOTE — PROGRESS NOTES
Hospitalist Progress Note NAME: Jody Johnson :  1950 MRN:  [id number] Assessment / Plan: 
Severe hyperkalemia - resolved s/p HD BOBBI - required HD - now improved and off dialysis Metabolic acidosis - improved Sepsis 2/2 unknown source , intraabdominal infection? Pt had long  surgery on , he is c/o abdominal pain and spiked fever on  Wbc up to 19k , Started on cefepime and flagyl and vanco  
Cont NG to suction, NPO for now Potassium 6.4, bicarb 17 creat up 2.8-3.8 on  EKG : no peaked T waves , given insulin + ca gluconate  
nephro consulted , appreciate management , started on bicarb drip on  S/p HD on  and bicarb drip , K down to wnl and creat improving No further HD on 06/15 Wbc trending down . C/w vanco cefipime and flagyl BCx NTD  
k down to 3.2 today , repleted with IV K . Check mg : 
Hypokalemia TPN adjusted by Nephro to include K Remains afebrile, BCx NGTD 4 days. Remains on Cefepime, Flagyl and Vancomycin - source remains unclear. Will likely discontinue all if Cx remain negative. Hypernatremia Na adjusted in TPN, now 146. 
 
: 
1/4 NS added for increase in Na. TPN reordered. BCx NGTD 5 days - will likely discontinue abx if all cx result negative final. 
 
S/p ex-lap  ROBOTIC LYSIS OF ADHESIONS SMALL BOWEL RESECTION on  Small Bowel Obstruction POA-  
Colon Cancer s/p resection with recurrence/Rectal Adenocarcinoma on ChemotX s/p resection On TPN  
CT abd/pelvis on :Small bowel distention with decompressed loops distally is compatible with obstruction likely related to effusion formation in the mid lower abdomen. Mild free fluid Brief op note : Diagnostic laparoscopy converted to open exploratory laparotomy with small bowel bypass and repair of colotomy x6 
 
IP Oncology consult appreciated & noted- Chemo delayed for now Cont IV dilaudid prn pain and IV antiemetics prn 
 TPN to be adjusted : avoid K inside , daily CMP mag phos C/w humalog Sc q6h  
 
6/18: Most recent Surg note indicates pt may have NGT removed today if output remains minimal - will defer to them in regards to this and starting pt on a diet 6/19: 
NGT clamped today. Was not removed yesterday as pt had significant output. Will defer to Surgery in terms of removal and resumption of diet Hypertension 
-holding PO meds 
-place on clonidine patch and scheduled Nitrobid 
-prn IV hydralazine ordered, IV Labetalol ordered by Nephro 6/19: 
BP remains difficult to control due to inability to take PO. Continue PRN IV meds as ordered 
  
Diabetes Mellitus, controlled 
-Continue with increased dose of NPH 30 units BID, SS, FS monitoring 30.0 - 39.9 Obese / Body mass index is 35.08 kg/m². Weight loss recommended- Exercise Code status: Full Surrogate Decision Maker: Wife Prophylaxis: Hep SQ and H2B/PPI Recommended Disposition: TBD Subjective: Chief Complaint / Reason for Physician Visit:  
Updated wife at bedside. Pt eager to have NGT removed. States abdominal pain has improved. Objective: VITALS:  
Last 24hrs VS reviewed since prior progress note. Most recent are: 
Patient Vitals for the past 24 hrs: 
 Temp Pulse Resp BP SpO2  
06/19/19 1211    (!) 172/92   
06/19/19 1100 97.8 °F (36.6 °C) 98 20 174/90 100 % 06/19/19 0931  (!) 113  (!) 176/98   
06/19/19 0900    (!) 174/94   
06/19/19 0733 98.7 °F (37.1 °C) 100 20 (!) 184/91 97 % 06/19/19 0410 98.7 °F (37.1 °C) 97 18 185/79 97 % 06/18/19 2322 98.2 °F (36.8 °C) (!) 103 18 167/81 100 % 06/18/19 1940 99.2 °F (37.3 °C) 91 18 173/86 97 % 06/18/19 1720  91     
06/18/19 1526 97.7 °F (36.5 °C) 91 20 177/81 96 % Intake/Output Summary (Last 24 hours) at 6/19/2019 1322 Last data filed at 6/19/2019 1148 Gross per 24 hour Intake 940 ml Output 6138 ml Net -5198 ml PHYSICAL EXAM: 
 General: Alert, cooperative, in distress, Obese +   
EENT:  EOMI. Anicteric sclerae. MMM, +NGT Resp:  CTA bilaterally, no wheezing or rales. No accessory muscle use CV:  Regular  rhythm,  No edema GI:  Abdomen soft, Non tender, Ex-lap surgical dressings intact, ileostomy and colostomy bags Neurologic:  Alert and oriented X 3 Psych:   Good insight. Not anxious nor agitated Skin:  No rashes. No jaundice Reviewed most current lab test results and cultures  YES Reviewed most current radiology test results   YES Review and summation of old records today    NO Reviewed patient's current orders and MAR    YES 
PMH/SH reviewed - no change compared to H&P 
________________________________________________________________________ Care Plan discussed with: 
  Comments Patient x Family  x Wife at bedside RN x Care Manager Consultant Multidiciplinary team rounds were held today with , nursing, pharmacist and clinical coordinator. Patient's plan of care was discussed; medications were reviewed and discharge planning was addressed. ________________________________________________________________________ Total Time Spent: 25 mins 
________________________________________________________________________ Tere Angel MD  
 
Procedures: see electronic medical records for all procedures/Xrays and details which were not copied into this note but were reviewed prior to creation of Plan. LABS: 
I reviewed today's most current labs and imaging studies. Pertinent labs include: 
Recent Labs  
  06/19/19 
0441 06/18/19 
0429 06/17/19 
0601 WBC 9.1 8.6 12.9* HGB 8.5* 8.6* 8.7* HCT 26.9* 27.5* 26.5*  
 165 157 Recent Labs  
  06/19/19 
0441 06/18/19 
0429 06/17/19 
0601 * 146* 142  
K 3.2* 3.1* 3.2*  
* 113* 108 CO2 24 27 27 * 176* 213* BUN 43* 50* 59* CREA 1.25 1.52* 2.06* CA 8.2* 8.5 8.3*  
MG  --  2.3 2.3 PHOS 2.9 3.3 3.4 ALB 1.9* 1.8* 1.6* Signed: Tere Angel MD

## 2019-06-19 NOTE — PROGRESS NOTES
Hematology Oncology Progress Note Follow up for: metastatic rectal cancer CC:He feels a little better Chart notes reviewed since last visit. Case discussed with following:  
 
Patient complains of the following: Underwent surgery last Thursday with Dr. Gloria Craig. feeling a bit better with his abd pains. NG in place. Additional concerns noted by the staff:  
 
Patient Vitals for the past 24 hrs: 
 BP Temp Pulse Resp SpO2 Weight  
06/19/19 0931 (!) 176/98  (!) 113     
06/19/19 0900 (!) 174/94       
06/19/19 0733 (!) 184/91 98.7 °F (37.1 °C) 100 20 97 %   
06/19/19 0421      110.9 kg (244 lb 7.8 oz) 06/19/19 0410 185/79 98.7 °F (37.1 °C) 97 18 97 %   
06/18/19 2322 167/81 98.2 °F (36.8 °C) (!) 103 18 100 %   
06/18/19 1940 173/86 99.2 °F (37.3 °C) 91 18 97 %   
06/18/19 1720   91     
06/18/19 1526 177/81 97.7 °F (36.5 °C) 91 20 96 %   
06/18/19 1104 155/81 97.7 °F (36.5 °C) 92 20 95 %   
 
 
ROS negative for 11 organ systems except as ntoed above. Physical Examination: 
Gen NAD 
CV reg Lungs clear Abd benign Labs: 
Recent Results (from the past 24 hour(s)) GLUCOSE, POC Collection Time: 06/18/19 12:02 PM  
Result Value Ref Range Glucose (POC) 196 (H) 65 - 100 mg/dL Performed by Ravinder Ryan GLUCOSE, POC Collection Time: 06/18/19  5:13 PM  
Result Value Ref Range Glucose (POC) 186 (H) 65 - 100 mg/dL Performed by Ravinder Ryan GLUCOSE, POC Collection Time: 06/18/19  8:50 PM  
Result Value Ref Range Glucose (POC) 189 (H) 65 - 100 mg/dL Performed by Katelynn Lorenzo ([address]) GLUCOSE, POC Collection Time: 06/18/19 11:36 PM  
Result Value Ref Range Glucose (POC) 187 (H) 65 - 100 mg/dL Performed by Mavis Lawman ([address]) GLUCOSE, POC Collection Time: 06/18/19 11:58 PM  
Result Value Ref Range Glucose (POC) 193 (H) 65 - 100 mg/dL Performed by Kevyn Gordon (PCT) RENAL FUNCTION PANEL  
 Collection Time: 06/19/19  4:41 AM  
Result Value Ref Range Sodium 149 (H) 136 - 145 mmol/L Potassium 3.2 (L) 3.5 - 5.1 mmol/L Chloride 118 (H) 97 - 108 mmol/L  
 CO2 24 21 - 32 mmol/L Anion gap 7 5 - 15 mmol/L Glucose 198 (H) 65 - 100 mg/dL BUN 43 (H) 6 - 20 MG/DL Creatinine 1.25 0.70 - 1.30 MG/DL  
 BUN/Creatinine ratio 34 (H) 12 - 20 GFR est AA >60 >60 ml/min/1.73m2 GFR est non-AA 57 (L) >60 ml/min/1.73m2 Calcium 8.2 (L) 8.5 - 10.1 MG/DL Phosphorus 2.9 2.6 - 4.7 MG/DL Albumin 1.9 (L) 3.5 - 5.0 g/dL CBC WITH AUTOMATED DIFF Collection Time: 06/19/19  4:41 AM  
Result Value Ref Range WBC 9.1 4.1 - 11.1 K/uL  
 RBC 2.88 (L) 4.10 - 5.70 M/uL HGB 8.5 (L) 12.1 - 17.0 g/dL HCT 26.9 (L) 36.6 - 50.3 % MCV 93.4 80.0 - 99.0 FL  
 MCH 29.5 26.0 - 34.0 PG  
 MCHC 31.6 30.0 - 36.5 g/dL  
 RDW 17.1 (H) 11.5 - 14.5 % PLATELET 766 795 - 790 K/uL MPV 11.0 8.9 - 12.9 FL  
 NRBC 0.2 (H) 0  WBC ABSOLUTE NRBC 0.02 (H) 0.00 - 0.01 K/uL NEUTROPHILS 85 (H) 32 - 75 % LYMPHOCYTES 6 (L) 12 - 49 % MONOCYTES 6 5 - 13 % EOSINOPHILS 1 0 - 7 % BASOPHILS 0 0 - 1 % IMMATURE GRANULOCYTES 1 (H) 0.0 - 0.5 % ABS. NEUTROPHILS 7.8 1.8 - 8.0 K/UL  
 ABS. LYMPHOCYTES 0.6 (L) 0.8 - 3.5 K/UL  
 ABS. MONOCYTES 0.6 0.0 - 1.0 K/UL  
 ABS. EOSINOPHILS 0.1 0.0 - 0.4 K/UL  
 ABS. BASOPHILS 0.0 0.0 - 0.1 K/UL  
 ABS. IMM. GRANS. 0.1 (H) 0.00 - 0.04 K/UL  
 DF AUTOMATED    
GLUCOSE, POC Collection Time: 06/19/19  5:34 AM  
Result Value Ref Range Glucose (POC) 177 (H) 65 - 100 mg/dL Performed by Angy Lawrence ([address]) Imaging: 
KUB: 
Direct comparison is made to prior plain radiographs dated Juanita 3, 2019. 
  
Nasogastric tube and side port overlie the stomach. There are several dilated 
small bowel loops in the abdomen. Degree of small bowel dilatation appears to 
have worsened as compared to prior plain radiographs dated Juanita 3, 2019. No free intraperitoneal gas is visualized on supine views. No pathologic calcification 
is seen. 
  
IMPRESSION: Multiple dilated loops of small bowel. Assessment and Plan: SBO: 
- Had adhesions lysed in OR with Dr Frey Nurse 6/13. 
- NG in place and TPN going. 
- await return of bowel function Fever/leukocytosis: - Febrile last week to 101.3 and wbc increased to 19k but has dropped to normal 
- blood cx no growth at 2 days per lab and cxray unremarkable - Monitor closely BOBBI: 
- BOBBI earlier in admission, renal fxn was improving prior to surgery, then radha postop and is down to 1.52 this AM.  
- Renal consulted and following. Had required HD earlier (hyperkalemia) Metastatic rectal cancer: - Follows with Dr. Reggie Mejía in [address] [place] [address] office,  several chemotherapeutic options ahead for treatment - Will FU with Dr Reggie Mejía after DC 
 
DM - on insulin Hypertension - clonidine, metoprolol, and nitro bid ordered. Kathryn Ponce

## 2019-06-19 NOTE — PROGRESS NOTES
[facility] - Home health and f/u appts CM met with pt and pt's wife and discussed therapy's recommendation for [facility] and pt's wife was in agreement. Discussed [facility] companies and pt had [facility] in the past and pt's wife wanted them again. FOC form signed by pt's wife. Referral sent to [facility] via [facility]. CM will continue to follow for discharge planning needs. Margot Mcgovern, [address] [facility]

## 2019-06-19 NOTE — PROGRESS NOTES
Problem: Mobility Impaired (Adult and Pediatric) Goal: *Acute Goals and Plan of Care (Insert Text) Description Physical Therapy Goals Initiated 6/19/2019 1. Patient will move from supine to sit and sit to supine  in bed with independence within 7 day(s). 2.  Patient will transfer from bed to chair and chair to bed with modified independence using the least restrictive device within 7 day(s). 3.  Patient will perform sit to stand with modified independence within 7 day(s). 4.  Patient will ambulate with modified independence for 100 feet with the least restrictive device within 7 day(s). 5.  Patient will ascend/descend 4 stairs with 1 handrail(s) with supervision/set-up within 7 day(s). Outcome: Progressing Towards Goal 
 
PHYSICAL THERAPY EVALUATION Patient: Quinten Cooper (57 y.o. male) Date: 6/19/2019 Primary Diagnosis: Bowel obstruction ([facility] [N04.074] [facility] [C79.9] Procedure(s) (LRB): 
ROBOTIC LYSIS OF ADHESIONS SMALL BOWEL RESECTION (N/A) 6 Days Post-Op Precautions: Fall ASSESSMENT : 
Based on the objective data described below, the patient presents with generalized weakness, decreased activity tolerance, impaired balance and altered gait. Pt was received in supine and cleared by nursing to mobilize. VSS during session. He required overall CGA for mobility. Came to the EOB and used HHA x 2 to get to the chair. He was fatigued after short distances and left sitting up in the chair. He reported that he would think about HHPT which he would greatly benefit from and also should utilize a RW at this time. Patient will benefit from skilled intervention to address the above impairments. Patient?s rehabilitation potential is considered to be Good Factors which may influence rehabilitation potential include:  
? None noted ? Mental ability/status ? Medical condition ? Home/family situation and support systems ?         Safety awareness 
? Pain tolerance/management 
? Other: PLAN : 
Recommendations and Planned Interventions: 
?           Bed Mobility Training             ? Neuromuscular Re-Education ? Transfer Training                   ? Orthotic/Prosthetic Training 
? Gait Training                         ? Modalities ? Therapeutic Exercises           ? Edema Management/Control ? Therapeutic Activities            ? Patient and Family Training/Education ? Other (comment): Frequency/Duration: Patient will be followed by physical therapy  4 times a week to address goals. Discharge Recommendations: Home Health Further Equipment Recommendations for Discharge: rolling walker if does not own one SUBJECTIVE:  
Patient stated ?i will think about it. ? when discussing HHPT OBJECTIVE DATA SUMMARY:  
HISTORY:   
Past Medical History:  
Diagnosis Date Abnormal nuclear stress test 4/27/2017 Arthritis Asthma   
 childhood BPH (benign prostatic hypertrophy) CAD (coronary artery disease) 1996 M.I., Stents x2 Cancer ([facility] Rectal CA Chronic pain Colon polyp DM (diabetes mellitus) ([facility] 5/17/2011 Gout Hemorrhoids HTN (hypertension) 5/17/2011 Hyperlipidemia 5/17/2011 Ill-defined condition Colostomy, iliostomy Rectal adenocarcinoma ([facility] 1/16/2018 S/p surg. S/P cardiac cath 4/27/2017 Past Surgical History:  
Procedure Laterality Date CARDIAC SURG PROCEDURE UNLIST  8972/[id number]  
 stent x2 COLONOSCOPY N/A 10/14/2016 COLONOSCOPY/EGD performed by Krista Horowitz MD at [facility] ENDOSCOPY  
 COLONOSCOPY N/A 2/3/2017 COLONOSCOPY performed by Carliss Shone, MD at [facility] ENDOSCOPY  
 COLONOSCOPY N/A 3/27/2018 COLONOSCOPY performed by Carliss Shone, MD at [facility] ENDOSCOPY  
 ENDOSCOPY, COLON, DIAGNOSTIC  6/2011 HX GI    
 Prostate, Bladder, rectum. colon removed HX HERNIA REPAIR    
 AS INFANT  
 HX ORTHOPAEDIC  02/2016  
 hip replacement , left HX PROSTATECTOMY  X2  
 biopsy benign HX TONSILLECTOMY HX UROLOGICAL Bladder removed IR INSERT NON TUNL CVC OVER 5 YRS  6/14/2019 SIGMOIDOSCOPY,BIOPSY  10/14/2016 UPPER GI ENDOSCOPY,DIAGNOSIS  10/14/2016 Prior Level of Function/Home Situation: pt lives with wife and was independent with mobility prior to admission. Personal factors and/or comorbidities impacting plan of care:  
 
Home Situation Home Environment: Independent living # Steps to Enter: 4 Rails to Enter: Yes One/Two Story Residence: One story Living Alone: No 
Support Systems: Family member(s), Child(karen) Patient Expects to be Discharged to[de-identified] Private residence Current DME Used/Available at Home: Shower chair, Grab bars EXAMINATION/PRESENTATION/DECISION MAKING:  
Critical Behavior: 
Neurologic State: Alert Orientation Level: Oriented X4 Cognition: Appropriate decision making, Appropriate for age attention/concentration, Follows commands Safety/Judgement: Awareness of environment, Insight into deficits Hearing: Auditory Auditory Impairment: None Skin:  intact Edema: WDL Range Of Motion: 
AROM: Generally decreased, functional 
  
  
  
  
  
  
  
Strength:   
Strength: Generally decreased, functional 
  
  
  
  
  
  
Tone & Sensation:  
Tone: Normal 
  
  
  
  
Sensation: Intact Coordination: 
Coordination: Within functional limits Vision:  
Acuity: Within Defined Limits Functional Mobility: 
Bed Mobility: 
  
  
Sit to Supine: Contact guard assistance Scooting: Supervision Transfers: 
Sit to Stand: Contact guard assistance Stand to Sit: Contact guard assistance Bed to Chair: Contact guard assistance(taking steps with RW) Balance:  
Sitting: Intact Standing: Impaired Standing - Static: Fair;Good Standing - Dynamic : Fair Ambulation/Gait Training: Distance (ft): 2 Feet (ft) Assistive Device: (HHA x 2) Ambulation - Level of Assistance: Contact guard assistance Gait Abnormalities: Decreased step clearance Base of Support: Widened Speed/Bailey: Pace decreased (<100 feet/min); Shuffled Step Length: Left shortened;Right shortened Functional Measure: 
Barthel Index: 
Bathin Bladder: 0 Bowels: 10 
Groomin Dressin Feeding: 10 Mobility: 0 Stairs: 0 Toilet Use: 5 Transfer (Bed to Chair and Back): 5 Total: 30/100 Percentage of impairment  
0% 1-19% 20-39% 40-59% 60-79% 80-99% 100% Barthel Score 0-100 100 99-80 79-60 59-40 20-39 1-19 
 0 The Barthel ADL Index: Guidelines 1. The index should be used as a record of what a patient does, not as a record of what a patient could do. 2. The main aim is to establish degree of independence from any help, physical or verbal, however minor and for whatever reason. 3. The need for supervision renders the patient not independent. 4. A patient's performance should be established using the best available evidence. Asking the patient, friends/relatives and nurses are the usual sources, but direct observation and common sense are also important. However direct testing is not needed. 5. Usually the patient's performance over the preceding 24-48 hours is important, but occasionally longer periods will be relevant. 6. Middle categories imply that the patient supplies over 50 per cent of the effort. 7. Use of aids to be independent is allowed. Andree Fine., Barthel, D.W. (5674). Functional evaluation: the Barthel Index. [facility] (14)2. REMEDIOS Beavers, Maye Issa., Trang Kang., [place], [facility] (). Measuring the change indisability after inpatient rehabilitation; comparison of the responsiveness of the Barthel Index and Functional [place] Measure. Journal of Neurology, Neurosurgery, and Psychiatry, 66(4), 730-719. RYAN Thomas, ZAHIDA Hernandez, & Bhanu Mcintyre M.A. (2004.) Assessment of post-stroke quality of life in cost-effectiveness studies: The usefulness of the Barthel Index and the EuroQoL-5D. [facility], 13, 207-61 Physical Therapy Evaluation Charge Determination History Examination Presentation Decision-Making HIGH Complexity :3+ comorbidities / personal factors will impact the outcome/ POC  MEDIUM Complexity : 3 Standardized tests and measures addressing body structure, function, activity limitation and / or participation in recreation  MEDIUM Complexity : Evolving with changing characteristics  Other outcome measures barthel  HIGH Based on the above components, the patient evaluation is determined to be of the following complexity level: MEDIUM Pain: 
Pain Scale 1: Numeric (0 - 10) Pain Intensity 1: 0 Pain Location 1: Abdomen Pain Orientation 1: Anterior Pain Description 1: Aching Pain Intervention(s) 1: Medication (see MAR) Activity Tolerance: VSS Please refer to the flowsheet for vital signs taken during this treatment. After treatment:  
?         Patient left in no apparent distress sitting up in chair ? Patient left in no apparent distress in bed 
? Call bell left within reach ? Nursing notified ? Caregiver present ? Bed alarm activated COMMUNICATION/EDUCATION:  
The patient?s plan of care was discussed with: Occupational Therapist, Registered Nurse and . ?         Fall prevention education was provided and the patient/caregiver indicated understanding. ? Patient/family have participated as able in goal setting and plan of care. ?         Patient/family agree to work toward stated goals and plan of care. ?         Patient understands intent and goals of therapy, but is neutral about his/her participation. ?          Patient is unable to participate in goal setting and plan of care. 
 
Thank you for this referral. 
Ren Najjar, PT, DPT Time Calculation: 15 mins

## 2019-06-19 NOTE — PROGRESS NOTES
Nephrology Progress Note Conner Persaud  
 
[facility]. [facility]                  Phone - [phone] Patient: Quinten Cooper Date- 6/19/2019 Admit Date: 6/2/2019 YOB: 1950 CC: Follow up for  BOBBI,  hyperkalemia Subjective: Interval History:  
-CR. CONTINUE TO IMPROVE 
k remained low Na increased Urine out put > 9000 ml in last 48 hours No c/o vomiting No sob No fever ROS:- as above Assessment:  
· Hypernatremia · Hypokalemia · Polyuria - post ATN diuresis · Hyperkalemia due to renal failure and KCl infusion with total parenteral nutrition. Metabolic acidosis will also cause extracellular shift of potassium. - RESOLVED after dialysis on 6-14-19 ·  hypertension · Acute kidney injury, likely related to hemodynamic changes although postrenal obstruction cannot be ruled out especially, he had a surgery done yesterday on 06/13/2019. ·  Chronic kidney disease stage II, baseline creatinine 1.1 in 04/2019. · Metabolic acidosis, anion gap. ·  Diabetes. ·  History of rectal cancer. ·   History of prostatectomy, colostomy, and ileal conduit, and cystectomy. · . hypokalemia 
  
 
 
 Plan:  
· Start hypotonic ivf .225 nacl · Iv kcl 40 meq · Continue nitro paste and clonidine patch · Continue prn labetalol · Hopefully we will be able to resume his po bp meds · Check bmp in am 
 
Physical Exam:  
GEN: NAD NECK- Supple,no mass, NG TUBE 
RESP: clear  b/l, no wheezing, CVS: RRR,s1,s2 ABDO: soft,, ileal conduit,colostomy + EXT: + Edema NEURO: normal speech, non focal 
 
Care Plan discussed with: patient Objective:  
Visit Vitals /79 Pulse 97 Temp 98.7 °F (37.1 °C) Resp 18 Ht 5' 10\" (1.778 m) Wt 110.9 kg (244 lb 7.8 oz) SpO2 97% BMI 35.08 kg/m² Last 3 Recorded Weights in this Encounter 06/17/19 7020 06/18/19 1226 06/19/19 3487 Weight: 110.2 kg (242 lb 15.2 oz) 111.4 kg (245 lb 9.5 oz) 110.9 kg (244 lb 7.8 oz) 06/17 1901 - 06/19 0700 In: 7323 [P.O.:1530; I.V.:200] Out: 3055 [Urine:5200; Drains:9] Chart reviewed. Pertinent Notes reviewed. Medication list  reviewed Current Facility-Administered Medications Medication  sodium chloride 0.225 % in sterile water 1,000 mL infusion  potassium chloride 20 mEq in 50 ml IVPB  potassium chloride 20 mEq in 50 ml IVPB  cloNIDine (CATAPRES) 0.3 mg/24 hr patch 1 Patch  hydrALAZINE (APRESOLINE) 20 mg/mL injection 20 mg  
 labetalol (NORMODYNE;TRANDATE) injection 20 mg  
 fat emulsion 20% (LIPOSYN, INTRAlipid) infusion 250 mL  TPN ADULT - CENTRAL  cefepime (MAXIPIME) 2 g in 0.9% sodium chloride (MBP/ADV) 100 mL  insulin NPH (NOVOLIN N, HUMULIN N) injection 30 Units  dextrose 10% infusion 125-250 mL  metroNIDAZOLE (FLAGYL) IVPB premix 500 mg  
 heparin (porcine) injection 5,000 Units  zinc oxide-cod liver oil (DESITIN) 40 % paste  morphine injection 2 mg  metoprolol (LOPRESSOR) injection 5 mg  insulin lispro (HUMALOG) injection  glucose chewable tablet 16 g  
 glucagon (GLUCAGEN) injection 1 mg  sodium chloride (NS) flush 5-40 mL  sodium chloride (NS) flush 5-40 mL  nitroglycerin (NITROBID) 2 % ointment 1 Inch  
 HYDROmorphone (PF) (DILAUDID) injection 0.5 mg  
 naloxone (NARCAN) injection 0.4 mg  
 ondansetron (ZOFRAN) injection 4 mg Data Review : 
Recent Labs  
  06/19/19 
0441 06/18/19 
0429 06/17/19 
0601 WBC 9.1 8.6 12.9* HGB 8.5* 8.6* 8.7*  165 157 ANEU 7.8 7.3 11.6* * 146* 142  
K 3.2* 3.1* 3.2*  
* 176* 213* BUN 43* 50* 59* CREA 1.25 1.52* 2.06* CA 8.2* 8.5 8.3*  
MG  --  2.3 2.3 PHOS 2.9 3.3 3.4 Lab Results Component Value Date/Time  Culture result: NO GROWTH 4 DAYS 06/14/2019 03:21 AM  
 Culture result: NO FUNGUS ISOLATED 28 DAYS 03/05/2018 11:52 AM  
 Culture result: Culture performed on Fluid swab specimen 03/05/2018 10:52 AM  
 Culture result: NO GROWTH 4 DAYS 03/05/2018 10:52 AM  
 Culture result: NO GROWTH 4 DAYS 03/05/2018 10:52 AM  
 
No results found for: SDES No results for input(s): FE, TIBC, PSAT, FERR in the last 72 hours. Lab Results Component Value Date/Time Creatinine, urine 43.0 02/25/2016 04:01 PM  
 
Adri Wyatt MD 
[facility] 
 www. [facility] [employer] [facility], [address] [place], [address] Phone - [phone] Fax - [phone]

## 2019-06-19 NOTE — PROGRESS NOTES
0600: Pt did not consume ice chips or water from 0000- 0600. NGT output 50 ml. The documentation for this period is being entered following the guidelines as defined in the [facility] policy by [facility]. Downtime from 6940-7050.

## 2019-06-19 NOTE — PROGRESS NOTES
Bedside shift change report given to Maritza Turcios RN (oncoming nurse) by JUS JUAREZ (offgoing nurse). Report included the following information SBAR, Kardex, Intake/Output, MAR and Recent Results. 3271: NGT clamped for pt to work with physical therapy. Pt had no complaints of abdominal discomfort. NGT returned to suction once therapist left room. 1350: Spoke to MD robbins. Pt's BP medications. MD stated he will increase the dose to try to gain better control. Will continue to monitor. 1904: Bedside shift change report given to JUS JUAREZ (oncoming nurse) by Maritza Turcios RN (offgoing nurse). Report included the following information SBAR, Kardex, Intake/Output, MAR and Recent Results.

## 2019-06-19 NOTE — PROGRESS NOTES
Bedside shift change report given to Cassie Sauceda (oncoming nurse) by Jared Dugan (offgoing nurse). Report included the following information Kardex, Intake/Output, MAR, Recent Results and Cardiac Rhythm SR/ ST.

## 2019-06-20 NOTE — PROGRESS NOTES
Bedside shift change report given to David Murillo RN (oncoming nurse) by Yvrose Khan RN (offgoing nurse). Report included the following information SBAR, Kardex, Intake/Output and Recent Results 1527: Pt returned to bed after working with physical therapy. No complaints of pain. Pt resting in bed with eyes closed. 1710: NGT removed and clear liquid diet started per Dr Stephanie Yeung. Pt tolerated well. No redness or irritation on nares. 1905: Bedside shift change report given to Yvrose Khan RN (oncoming nurse) by David Murillo RN (offgoing nurse). Report included the following information SBAR, Kardex, Procedure Summary, Intake/Output, MAR and Recent Results.

## 2019-06-20 NOTE — PROGRESS NOTES
Nutrition Assessment: 
 
INTERVENTIONS/RECOMMENDATIONS:  
Continue current TF regimen Diet advancement per surgeon ASSESSMENT:  
Chart reviewed; TPN advanced to recommended goal. Currently meeting ~84% of estimated kcal needs, 100% of protein needs. Remains NPO at this time with NGT in place. RN and patient hopeful to remove NGT today; minimal output over the past few days. Will continue to follow progress and diet advancement/PO intake. Diet Order: NPO(TPN AA5% D15% @ 83 + lipids 3x/week; 1628 kcal/100g protein) % Eaten:  No data found. Pertinent Medications: [x] Reviewed []Other: Hydralazine, Humalog, NPH, Lopressor Pertinent Labs: [x]Reviewed  []Other: Na 150, -[phone] Food Allergies: [x]None []Yes:    
Last BM: 6/20   []Active     []Hyperactive  []Hypoactive       [] Absent  BS Skin:    [] Intact   [x] Incision  [] Breakdown   []Edema   []Other: Anthropometrics: Height: 5' 10\" (177.8 cm) Weight: 109.9 kg (242 lb 5 oz) IBW (%IBW):   ( ) UBW (%UBW):   (  %) BMI: Body mass index is 34.77 kg/m². This BMI is indicative of: 
[]Underweight   []Normal   []Overweight   [x] Obesity   [] Extreme Obesity (BMI>40) Last Weight Metrics: 
Weight Loss Metrics 6/20/2019 4/18/2019 4/9/2019 2/27/2019 12/17/2018 8/21/2018 4/17/2018 Today's Wt 242 lb 5 oz 245 lb 245 lb 245 lb 249 lb 247 lb 244 lb BMI 34.77 kg/m2 36.18 kg/m2 36.18 kg/m2 36.18 kg/m2 36.77 kg/m2 36.48 kg/m2 36.03 kg/m2 Estimated Nutrition Needs (Based on): 1933 Kcals/day(BMR (1871) x 1. 3AF -500kcal) , 88 g(-110g (0.8-1.0 g/kg bw)) Protein Carbohydrate: At Least 130 g/day  Fluids: 1950 mL/day Pt expected to meet estimated nutrient needs: [x]Yes []No 
 
NUTRITION DIAGNOSES:  
Problem:  Altered GI function Etiology: related to metastatic rectal cancer and SBO Signs/Symptoms: as evidenced by need for TPN to meet nutrition needs NUTRITION INTERVENTIONS: 
  Enteral/Parenteral Nutrition: Other GOAL:  
 Patient will tolerate TPN at goal with Na trend WNL and BG <200 next 2-4 days NUTRITION MONITORING AND EVALUATION Food/Nutrient Intake Outcomes: Enteral/parenteral nutrition intake Physical Signs/Symptoms Outcomes: Weight/weight change, GI profile, Electrolyte and renal profile, Glucose profile Previous Goal Met: 
 [x] Met              [] Progressing Towards Goal              [] Not Progressing Towards Goal  
Previous Recommendations: 
 [x] Implemented          [] Not Implemented          [] Not Applicable LEARNING NEEDS (Diet, Food/Nutrient-Drug Interaction):  
 [x] None Identified 
 [] Identified and Education Provided/Documented 
 [] Identified and Pt declined/was not appropriate Cultural, Protestant, OR Ethnic Dietary Needs:  
 [x] None Identified 
 [] Identified and Addressed 
 
 [x] Interdisciplinary Care Plan Reviewed/Documented  
 [x] Discharge Planning: TBD [x] Participated in Interdisciplinary Rounds NUTRITION RISK:  
 [x] Patient At Nutritional Risk             [] Patient Not At Nutritional Risk Chay Cacnino Ext [phone] Pager [phone] Weekend Pager [phone]

## 2019-06-20 NOTE — PROGRESS NOTES
0530: Patient refusing CHG bath at this time stating, \" Maybe later, I want to keep sleeping\". Wife at bedside stating that she would bath patient once he was ready to get up. Briefly talked with patient and wife on the importance of daily CHG baths, especially when patients have central lines/ drains which are a great source of infection. Provided patient and wife with new bedding, gown, CHG wipes , foam cleansing soap and cloths.

## 2019-06-20 NOTE — PROGRESS NOTES
Hematology Oncology Progress Note Follow up for: metastatic rectal cancer CC:He feels a little better Chart notes reviewed since last visit. Case discussed with following:  
 
Patient complains of the following: patient sleeping Additional concerns noted by the staff:  
 
Patient Vitals for the past 24 hrs: 
 BP Temp Pulse Resp SpO2 Weight  
06/20/19 1047 (!) 169/91 98.3 °F (36.8 °C) (!) 105 20 97 %   
06/20/19 0913 (!) 180/104       
06/20/19 0747 (!) 196/102 98.2 °F (36.8 °C) (!) 104 20 100 %   
06/20/19 0555      109.9 kg (242 lb 5 oz)  
06/20/19 0420 (!) 184/108 98.6 °F (37 °C) (!) 109 20 100 %   
06/20/19 0002 170/90  (!) 113     
06/19/19 2300 167/82 98.2 °F (36.8 °C) (!) 112 18 99 %   
06/19/19 2000 (!) 186/92 98 °F (36.7 °C) (!) 105 20 100 %   
06/19/19 1814 177/88  (!) 113     
06/19/19 1211 (!) 172/92       
 
 
ROS negative for 11 organ systems except as ntoed above. Physical Examination: 
Gen NAD Labs: 
Recent Results (from the past 24 hour(s)) GLUCOSE, POC Collection Time: 06/19/19 12:01 PM  
Result Value Ref Range Glucose (POC) 214 (H) 65 - 100 mg/dL Performed by Unkown  GLUCOSE, POC Collection Time: 06/19/19  5:54 PM  
Result Value Ref Range Glucose (POC) 226 (H) 65 - 100 mg/dL Performed by Unkown  GLUCOSE, POC Collection Time: 06/19/19 11:42 PM  
Result Value Ref Range Glucose (POC) 219 (H) 65 - 100 mg/dL Performed by Nancy Cordova ([address]) RENAL FUNCTION PANEL Collection Time: 06/20/19  5:30 AM  
Result Value Ref Range Sodium 150 (H) 136 - 145 mmol/L Potassium 3.5 3.5 - 5.1 mmol/L Chloride 119 (H) 97 - 108 mmol/L  
 CO2 22 21 - 32 mmol/L Anion gap 9 5 - 15 mmol/L Glucose 212 (H) 65 - 100 mg/dL BUN 40 (H) 6 - 20 MG/DL Creatinine 1.17 0.70 - 1.30 MG/DL  
 BUN/Creatinine ratio 34 (H) 12 - 20 GFR est AA >60 >60 ml/min/1.73m2 GFR est non-AA >60 >60 ml/min/1.73m2 Calcium 7.7 (L) 8.5 - 10.1 MG/DL Phosphorus 2.7 2.6 - 4.7 MG/DL Albumin 2.0 (L) 3.5 - 5.0 g/dL GLUCOSE, POC Collection Time: 06/20/19  5:33 AM  
Result Value Ref Range Glucose (POC) 211 (H) 65 - 100 mg/dL Performed by Giuseppe Rodríguez ([address]) Imaging: 
KUB: 
Direct comparison is made to prior plain radiographs dated Juanita 3, 2019. 
  
Nasogastric tube and side port overlie the stomach. There are several dilated 
small bowel loops in the abdomen. Degree of small bowel dilatation appears to 
have worsened as compared to prior plain radiographs dated Juanita 3, 2019. No free 
intraperitoneal gas is visualized on supine views. No pathologic calcification 
is seen. 
  
IMPRESSION: Multiple dilated loops of small bowel. Assessment and Plan: SBO: 
- Had adhesions lysed in OR with Dr Génesis Baxter 6/13. 
- NG in place and TPN going. 
- await return of bowel function Fever/leukocytosis: - Febrile last week to 101.3 and wbc increased to 19k but has dropped to normal, afebrile - blood cx no growth, and cxray unremarkable - Monitor closely BOBBI: 
- BOBBI earlier in admission, renal fxn was improving prior to surgery, then radha postop and is down to 1.52 this AM.  
- Renal consulted and following. Had required HD earlier (hyperkalemia) Metastatic rectal cancer: - Follows with Dr. Bere Morris in [address] [place] [address] office,  several chemotherapeutic options ahead for treatment - Will FU with Dr Bere Morris after DC 
 
DM - on insulin Hypertension - clonidine, metoprolol, and nitro bid ordered. Pearl Ricketts

## 2019-06-20 NOTE — PROGRESS NOTES
Scant from ngt 
+stoma function Visit Vitals /88 Pulse (!) 110 Temp 98.2 °F (36.8 °C) Resp 20 Ht 5' 10\" (1.778 m) Wt 109.9 kg (242 lb 5 oz) SpO2 99% BMI 34.77 kg/m² Date 06/19/19 0700 - 06/20/19 1083 06/20/19 0700 - 06/21/19 7440 Shift 0700-1859 1900-0659 24 Hour Total 7680-1757 8123-3495 24 Hour Total  
INTAKE  
P.O. 90  90     
  P. O. 90  90     
I. V.(mL/kg/hr)  100(0.1) 100(0) Volume (cefepime (MAXIPIME) 2 g in 0.9% sodium chloride (MBP/ADV) 100 mL)  100 100 Shift Total(mL/kg) 90(0.8) 100(0.9) 190(1.7) OUTPUT Urine(mL/kg/hr) 1050(0.8) 1300(1) 2350(0.9) 1400  1400 Urine Output (mL) (Urinary Ostomy Abdomen) 1050 1300 2350 1400  1400 Emesis/NG output 20 100 120 Output (ml) (Nasogastric Tube 06/06/19) 20 100 120 Drains 10 5 15 20  20 Output (ml) (Matthew-Morejon Drain 06/13/19 Abdomen) 10 5 15 20  20 Stool 100 50 150 Output (ml) (Colostomy 06/02/19) 100 50 150 Shift Total(mL/kg) 1180(10.6) [id number]) [id number]) 1420(12.9)  1420(12.9) [facility] [address] Weight (kg) 110.9 109.9 109.9 109.9 109.9 109.9  
 
abd soft A.P dc ngt Clear liquids for now

## 2019-06-20 NOTE — PROGRESS NOTES
Problem: Mobility Impaired (Adult and Pediatric) Goal: *Acute Goals and Plan of Care (Insert Text) Description Physical Therapy Goals Initiated 6/19/2019 1. Patient will move from supine to sit and sit to supine  in bed with independence within 7 day(s). 2.  Patient will transfer from bed to chair and chair to bed with modified independence using the least restrictive device within 7 day(s). 3.  Patient will perform sit to stand with modified independence within 7 day(s). 4.  Patient will ambulate with modified independence for 100 feet with the least restrictive device within 7 day(s). 5.  Patient will ascend/descend 4 stairs with 1 handrail(s) with supervision/set-up within 7 day(s). Outcome: Progressing Towards Goal 
PHYSICAL THERAPY TREATMENT Patient: Yumiko Reddy (71 y.o. male) Date: 6/20/2019 Diagnosis: Bowel obstruction ([facility] [Y89.798] [facility] [C79.9] <principal problem not specified> Procedure(s) (LRB): 
ROBOTIC LYSIS OF ADHESIONS SMALL BOWEL RESECTION (N/A) 7 Days Post-Op Precautions:   
Chart, physical therapy assessment, plan of care and goals were reviewed. ASSESSMENT: 
Pt was received in supine and cleared by nursing to mobilize. HR at rest 115bpm in supine. He required increased assistance with all mobility. Bed mobility was performed with HOB elevated, educated pt to work on tasks that would be similar to when he returns home. HR increased to 129bpm while sitting EOB. Transfers were performed with CGA and RW. HR to 137bpm with ambulation. He was returned to sitting after a short distance and BP elevated, nursing aware. Continue to recommend HHPT and OT at discharge. Progression toward goals: 
?    Improving appropriately and progressing toward goals ? Improving slowly and progressing toward goals ? Not making progress toward goals and plan of care will be adjusted PLAN: 
Patient continues to benefit from skilled intervention to address the above impairments. Continue treatment per established plan of care. Discharge Recommendations:  Home Health Further Equipment Recommendations for Discharge:  none SUBJECTIVE:  
Patient stated ?i was suppose to get this tube out yesterday. ? referring to NGT OBJECTIVE DATA SUMMARY:  
Critical Behavior: 
Neurologic State: Alert Orientation Level: Oriented X4 Cognition: Appropriate decision making, Appropriate for age attention/concentration, Follows commands Safety/Judgement: Awareness of environment, Insight into deficits Functional Mobility Training: 
Bed Mobility: 
Rolling: Contact guard assistance Supine to Sit: Minimum assistance Transfers: 
Sit to Stand: Contact guard assistance Stand to Sit: Setup Balance: 
Sitting: Intact Standing: Impaired Standing - Static: Fair;Constant support Standing - Dynamic : Fair Ambulation/Gait Training: 
Distance (ft): 30 Feet (ft) Assistive Device: Gait belt;Walker, rolling Ambulation - Level of Assistance: Contact guard assistance Gait Abnormalities: Decreased step clearance;Shuffling gait Base of Support: Widened Speed/Bailey: Pace decreased (<100 feet/min); Shuffled Step Length: Left shortened;Right shortened Activity Tolerance: Tachycardic, HTN Please refer to the flowsheet for vital signs taken during this treatment. After treatment:  
?    Patient left in no apparent distress sitting up in chair ? Patient left in no apparent distress in bed 
? Call bell left within reach ? Nursing notified ? Caregiver present ? Bed alarm activated COMMUNICATION/COLLABORATION:  
The patient?s plan of care was discussed with: Registered Nurse and  Carlene Christianson, PT, DPT Time Calculation: 23 mins

## 2019-06-20 NOTE — PROGRESS NOTES
Hospitalist Progress Note NAME: Erika Parra :  1950 MRN:  [id number] Assessment / Plan: 
Severe hyperkalemia - resolved s/p HD BOBBI - required HD - now resolved and off dialysis Metabolic acidosis - resolved Sepsis 2/2 unknown source , intraabdominal infection? - resolved Pt had long  surgery on , he is c/o abdominal pain and spiked fever on  Wbc up to 19k , Started on cefepime and flagyl and vanco  
Cont NG to suction, NPO for now Potassium 6.4, bicarb 17 creat up 2.8-3.8 on  EKG : no peaked T waves , given insulin + ca gluconate  
nephro consulted , appreciate management , started on bicarb drip on  S/p HD on  and bicarb drip , K down to wnl and creat improving No further HD on 06/15 Wbc trending down . C/w vanco cefipime and flagyl BCx NTD  
k down to 3.2 today , repleted with IV K . Check mg : 
Hypokalemia - resolved TPN adjusted by Nephro to include K Remains afebrile, BCx NGTD 4 days. Remains on Cefepime, Flagyl and Vancomycin - source remains unclear. Will likely discontinue all if Cx remain negative. Hypernatremia Na adjusted in TPN, now 146. 
 
: 
1/4 NS added for increase in Na. TPN reordered. BCx NGTD 5 days - will likely discontinue abx if all cx result negative final. 
 
: Final Cx negative - will discontinue abx. S/p ex-lap  ROBOTIC LYSIS OF ADHESIONS SMALL BOWEL RESECTION on  Small Bowel Obstruction POA-  
Colon Cancer s/p resection with recurrence/Rectal Adenocarcinoma on ChemotX s/p resection On TPN  
CT abd/pelvis on :Small bowel distention with decompressed loops distally is compatible with obstruction likely related to effusion formation in the mid lower abdomen. Mild free fluid Brief op note : Diagnostic laparoscopy converted to open exploratory laparotomy with small bowel bypass and repair of colotomy x6 
 
IP Oncology consult appreciated & noted- Chemo delayed for now Cont IV dilaudid prn pain and IV antiemetics prn 
TPN to be adjusted : avoid K inside , daily CMP mag phos C/w humalog Sc q6h  
 
6/18: Most recent Surg note indicates pt may have NGT removed today if output remains minimal - will defer to them in regards to this and starting pt on a diet 6/19: 
NGT clamped today. Was not removed yesterday as pt had significant output. Will defer to Surgery in terms of removal and resumption of diet 6/20: Will defer to Surgery in terms of removal of NGT and resumption of diet. Hypertension 
-holding PO meds 
-place on clonidine patch and scheduled Nitrobid 
-prn IV hydralazine ordered, IV Labetalol ordered by Nephro 6/20: 
BP remains difficult to control due to inability to take PO. Continue PRN IV meds as ordered 
  
Diabetes Mellitus, controlled 
-Continue with increased dose of NPH 30 units BID, SS, FS monitoring 30.0 - 39.9 Obese / Body mass index is 34.77 kg/m². Weight loss recommended- Exercise Code status: Full Surrogate Decision Maker: Wife Prophylaxis: Hep SQ and H2B/PPI Recommended Disposition: TBD Subjective: Chief Complaint / Reason for Physician Visit:  
Updated wife at bedside. Pt denies any complaints. Review of Systems: 
         
Symptom Y/N Comments   Symptom Y/N Comments Fever/Chills       Chest Pain n     
Poor Appetite       Edema       
Cough n     Abdominal Pain y     
Sputum       Joint Pain       
SOB/TALBOT n     Pruritis/Rash       
Nausea/vomit n     Tolerating PT/OT       
Diarrhea       Tolerating Diet       
Constipation       Other       
  
Could NOT obtain due to:    
 
Objective: VITALS:  
Last 24hrs VS reviewed since prior progress note. Most recent are: 
Patient Vitals for the past 24 hrs: 
 Temp Pulse Resp BP SpO2  
06/20/19 0913    (!) 180/104   
06/20/19 0747 98.2 °F (36.8 °C) (!) 104 20 (!) 196/102 100 % 06/20/19 0420 98.6 °F (37 °C) (!) 109 20 (!) 184/108 100 % 06/20/19 0002  (!) 113  170/90   
06/19/19 2300 98.2 °F (36.8 °C) (!) 112 18 167/82 99 % 06/19/19 2000 98 °F (36.7 °C) (!) 105 20 (!) 186/92 100 % 06/19/19 1814  (!) 113  177/88   
06/19/19 1211    (!) 172/92   
06/19/19 1100 97.8 °F (36.6 °C) 98 20 174/90 100 % Intake/Output Summary (Last 24 hours) at 6/20/2019 1042 Last data filed at 6/20/2019 3862 Gross per 24 hour Intake 100 ml Output 1885 ml Net -1785 ml PHYSICAL EXAM: 
General: Alert, cooperative, not in distress, Obese +   
EENT:  EOMI. Anicteric sclerae. MMM, +NGT Resp:  CTA bilaterally, no wheezing or rales. No accessory muscle use CV:  Regular  rhythm,  No edema GI:  Abdomen soft, Non tender, Ex-lap surgical dressings intact, ileostomy and colostomy bags Neurologic:  Alert and oriented X 3 Psych:   Good insight. Not anxious nor agitated Skin:  No rashes. No jaundice Reviewed most current lab test results and cultures  YES Reviewed most current radiology test results   YES Review and summation of old records today    NO Reviewed patient's current orders and MAR    YES 
PMH/SH reviewed - no change compared to H&P 
________________________________________________________________________ Care Plan discussed with: 
  Comments Patient x Family  x Wife at bedside RN x Care Manager Consultant Multidiciplinary team rounds were held today with , nursing, pharmacist and clinical coordinator. Patient's plan of care was discussed; medications were reviewed and discharge planning was addressed. ________________________________________________________________________ Total Time Spent: 25 mins 
________________________________________________________________________ Edgar Plummer MD  
 
Procedures: see electronic medical records for all procedures/Xrays and details which were not copied into this note but were reviewed prior to creation of Plan.    
 
LABS: 
 I reviewed today's most current labs and imaging studies. Pertinent labs include: 
Recent Labs  
  06/19/19 
0441 06/18/19 0429 WBC 9.1 8.6 HGB 8.5* 8.6* HCT 26.9* 27.5*  
 165 Recent Labs  
  06/20/19 
0530 06/19/19 0441 06/18/19 0429 * 149* 146*  
K 3.5 3.2* 3.1*  
* 118* 113* CO2 22 24 27 * 198* 176* BUN 40* 43* 50* CREA 1.17 1.25 1.52* CA 7.7* 8.2* 8.5 MG  --   --  2.3 PHOS 2.7 2.9 3.3 ALB 2.0* 1.9* 1.8* Signed: Mary Ellen Rios MD

## 2019-06-20 NOTE — PROGRESS NOTES
Nephrology Progress Note Conner Persaud  
 
[email or web address]                  Phone - [phone] Patient: Teagan Carl Date- 6/20/2019 Admit Date: 6/2/2019 YOB: 1950 CC: Follow up for  BOBBI,  hyperkalemia Subjective: Interval History:  
 
Creat improved Na increased Input 190 cc       OUTPUT 2635 cc 
BP's remain high. Pt says he feels very well. Denies any SOB, chest pain, abd pain, N/V or other complaints. No c/o vomiting No sob No fever ROS:- as above Assessment:  
· Hypernatremia. Needs more free water. · Hypokalemia · Polyuria - post ATN diuresis · Hyperkalemia due to renal failure and KCl infusion with total parenteral nutrition. Metabolic acidosis will also cause extracellular shift of potassium. - RESOLVED after dialysis on 6-14-19 ·  hypertension · Acute kidney injury, likely related to hemodynamic changes although postrenal obstruction cannot be ruled out especially, he had a surgery done yesterday on 06/13/2019. ·  Chronic kidney disease stage II, baseline creatinine 1.1 in 04/2019. · Metabolic acidosis, anion gap. ·  Diabetes. ·  History of rectal cancer. ·   History of prostatectomy, colostomy, and ileal conduit, and cystectomy. · . hypokalemia 
  
 
 
 Plan: ·  
· Will restart hypotonic ivf .225 nacl · TPN per hospitalist team. Leave Na out of the bag for now. · IV hydralazine increased. · Continue nitro paste and clonidine patch · Continue prn labetalol · Hopefully we will be able to resume his po bp meds · Check bmp in am 
 
Physical Exam:  
GEN: obese elderly man in no distress NECK- Supple,no mass, NG TUBE 
RESP: clear  b/l, no wheezing, CVS: RRR; no gallop or rub ABDO: soft,, ileal conduit,colostomy + EXT: no edema NEURO: normal speech, alert and appropriate Care Plan discussed with: patient and RN 
Objective:  
Visit Vitals /88 Pulse (!) 110  
 Temp 98.2 °F (36.8 °C) Resp 20 Ht 5' 10\" (1.778 m) Wt 109.9 kg (242 lb 5 oz) SpO2 99% BMI 34.77 kg/m² Last 3 Recorded Weights in this Encounter 06/18/19 7979 06/19/19 0421 06/20/19 6201 Weight: 111.4 kg (245 lb 9.5 oz) 110.9 kg (244 lb 7.8 oz) 109.9 kg (242 lb 5 oz) 06/18 1901 - 06/20 0700 In: 640 [P.O.:340; I.V.:300] Out: 7665 [Urine:4200; Drains:15] Chart reviewed. Pertinent Notes reviewed. Medication list  reviewed Current Facility-Administered Medications Medication  metoprolol (LOPRESSOR) injection 10 mg  
 hydrALAZINE (APRESOLINE) 20 mg/mL injection 10 mg  
 nitroglycerin (NITROBID) 2 % ointment 1 Inch  TPN ADULT - CENTRAL  sodium chloride 0.225 % in sterile water 1,000 mL infusion  enoxaparin (LOVENOX) injection 40 mg  
 cloNIDine (CATAPRES) 0.3 mg/24 hr patch 1 Patch  hydrALAZINE (APRESOLINE) 20 mg/mL injection 20 mg  
 labetalol (NORMODYNE;TRANDATE) injection 20 mg  
 fat emulsion 20% (LIPOSYN, INTRAlipid) infusion 250 mL  insulin NPH (NOVOLIN N, HUMULIN N) injection 30 Units  dextrose 10% infusion 125-250 mL  zinc oxide-cod liver oil (DESITIN) 40 % paste  morphine injection 2 mg  insulin lispro (HUMALOG) injection  glucose chewable tablet 16 g  
 glucagon (GLUCAGEN) injection 1 mg  sodium chloride (NS) flush 5-40 mL  sodium chloride (NS) flush 5-40 mL  
 HYDROmorphone (PF) (DILAUDID) injection 0.5 mg  
 naloxone (NARCAN) injection 0.4 mg  
 ondansetron (ZOFRAN) injection 4 mg Data Review : 
Recent Labs  
  06/20/19 
0530 06/19/19 
0441 06/18/19 
0429 WBC  --  9.1 8.6 HGB  --  8.5* 8.6* PLT  --  172 165 ANEU  --  7.8 7.3 * 149* 146*  
K 3.5 3.2* 3.1*  
* 198* 176* BUN 40* 43* 50* CREA 1.17 1.25 1.52* CA 7.7* 8.2* 8.5 MG  --   --  2.3 PHOS 2.7 2.9 3.3 Lab Results Component Value Date/Time  Culture result: NO GROWTH 6 DAYS 06/14/2019 03:21 AM  
 Culture result: NO FUNGUS ISOLATED 28 DAYS 03/05/2018 11:52 AM  
 Culture result: Culture performed on Fluid swab specimen 03/05/2018 10:52 AM  
 Culture result: NO GROWTH 4 DAYS 03/05/2018 10:52 AM  
 Culture result: NO GROWTH 4 DAYS 03/05/2018 10:52 AM  
 
No results found for: SDES No results for input(s): FE, TIBC, PSAT, FERR in the last 72 hours. Lab Results Component Value Date/Time Creatinine, urine 43.0 02/25/2016 04:01 PM  
 
Agnes Montes MD 
[address] [facility] 
 www. [email or web address] [employer] [address] [place], [address] Phone - [phone] Fax - [phone]

## 2019-06-20 NOTE — CONSULTS
Palliative Medicine Consult Markus: [id number] (1955) Patient Name: Eugene Wang YOB: 1950 Date of Initial Consult: 6/6/19 Reason for Consult: care decisions Requesting Provider: Magy Kate MD 
Primary Care Physician: Sanjay Gottlieb MD 
 
 SUMMARY:  
Eugene Wang is a 70 y.o. Male with a past history of colon  Cancer  Diagnosed 2016 , s/p resection , pelvic exenteration(partial colectomy , prostatectomy and cystectomy ) with colostomy and urostomy concurrent chemoradiation completed 2017 , ,  recurrent rectal adenocarcinoma on chemo with folfox, last chemo 5/23 , under the direction of Dr Austine Bumpers, who was admitted on 6/2/2019 from home with a diagnosis of Small bowel obstruction and mild BOBBI , Presented with abdominal pain and constipation . Current medical issues leading to Palliative Medicine involvement include: recurrent metastatic colon rectal cancer , now with malignant bowel obstruction  For care goals , No AMD . 
 other PMH : CAD , HTN, DM , Chronic pain Oncology following  Plans to continue with chemotherapy next week . Interim history : 
Feels slightly better today. Denies any abdominal pain. NG still with high output. Starting to have thick stool in colostomy bag , colostomy with thick stools. 6/13/19 : S/p ex-lap  ROBOTIC LYSIS OF ADHESIONS SMALL BOWEL RESECTION , on NGT suction Diagnostic laparoscopy converted to open exploratory laparotomy with small bowel bypass and repair of colotomy x6 He lives with his wife , retired from [employer] 20 year ago , quit smoking 3 years ago , has two children . 
  
 
 PALLIATIVE DIAGNOSES:  
1. Advance directives counseling 2. Full code status review 3. Goals of care . 4. Psychosocail support (wife is overwhelmed ) 5. Recurrent metastatic rectal cancer 6. Small bowel obstruction ( on NGT suction and TPN ). 7. Constipation PLAN:  
 
1. Met with patient and his wife [facility]. 2. Patient is resting , tired after walking in room today . 3.  Goal :They are hope ful , for discontinuation of NGT, awaiting colorectal surgeon recommendation . 4. Goal : they want full restorative care and continue with full code status. 5. Currently no active symptom management need 6. Team Socail worker Prasanth Gore is following as needed  with the couple for emotional support . 7. We will follow peripherally . See below previous conversation on 6/10/19 Celia Cates 8. Code status : he wants  to be resuscitated and be kept alive on machine as long as his ulices is \" functioning \" 9. Initial consult note routed to primary continuity provider and/or primary health care team members 10. Communicated plan of care with: Palliative IDTEsdras 192 Team 
 
 GOALS OF CARE / TREATMENT PREFERENCES:  
 
GOALS OF CARE: 
Patient/Health Care Proxy Stated Goals: Prolong life TREATMENT PREFERENCES:  
Code Status: Full Code Advance Care Planning: 
[] The [facility] Interdisciplinary Team has updated the ACP Navigator with Lala 8 and Patient Capacity Advance Care Planning 6/2/2019 Patient's Healthcare Decision Maker is: -  
Primary Decision Maker Name -  
Primary Decision Maker Phone Number -  
Primary Decision Maker Relationship to Patient -  
Confirm Advance Directive None Patient Would Like to Complete Advance Directive No  
 
 
Medical Interventions: Full interventions Other Instructions: Other: As far as possible, the palliative care team has discussed with patient / health care proxy about goals of care / treatment preferences for patient. HISTORY:  
 
History obtained from: patient and his wife . CHIEF COMPLAINT: \" feeling better \" HPI/SUBJECTIVE: The patient is:  
[] Verbal and participatory Patient notes he has constipation  x 2 weeks and some nausea, initially relieved with mirala , it recurred , despite taking miralax , he was instructed to take senna with miralax, without any response for two days , associated with abdominal pain . He is on NGT suction , his NGT came out accidentally , and was replaced today . Yesterday he had some nausea , he denies any abdominal pain or discomfort , has some lower extremity swelling , he denies any sob , his bowels are moving , he denies any anxiety or being depressed . 6/10/19 : overall feeling better , denies any pain , c/o constipation . 6/20/19 ; patient is resting , per wife he walked today upto the door , now wanting to rest , he denies any pain , cough or sob . They are waiting for colorectal surgeon visit for advise regarding removal for NGT tube . Clinical Pain Assessment (nonverbal scale for severity on nonverbal patients):  
Clinical Pain Assessment Severity: 0 Location: right flank radiating to abdomen Character: unable to describe Duration: weeks Effect: function Factors: dilaudid helps Frequency: comes and go Duration: for how long has pt been experiencing pain (e.g., 2 days, 1 month, years) Frequency: how often pain is an issue (e.g., several times per day, once every few days, constant) FUNCTIONAL ASSESSMENT:  
 
Palliative Performance Scale (PPS): PPS: 40 PSYCHOSOCIAL/SPIRITUAL SCREENING:  
 
Palliative IDT has assessed this patient for cultural preferences / practices and a referral made as appropriate to needs (Cultural Services, Patient Advocacy, Ethics, etc.) Any spiritual / Episcopalian concerns: 
[] Yes /  [x] No 
 
Caregiver Burnout: 
[] Yes /  [x] No /  [] No Caregiver Present Anticipatory grief assessment:  
[x] Normal  / [] Maladaptive ESAS Anxiety: Anxiety: 0 
 
ESAS Depression: Depression: 0 REVIEW OF SYSTEMS:  
 
Positive and pertinent negative findings in ROS are noted above in HPI. The following systems were [x] reviewed / [] unable to be reviewed as noted in HPI Other findings are noted below. Systems: constitutional, ears/nose/mouth/throat, respiratory, gastrointestinal, genitourinary, musculoskeletal, integumentary, neurologic, psychiatric, endocrine. Positive findings noted below. Modified ESAS Completed by: provider Fatigue: 6 Drowsiness: 2 Depression: 0 Pain: 0 Anxiety: 0 Nausea: 0 Anorexia: 7 Dyspnea: 0 Constipation: Yes(colostomy with no out put ) PHYSICAL EXAM:  
 
From RN flowsheet: 
Wt Readings from Last 3 Encounters:  
06/20/19 242 lb 5 oz (109.9 kg) 04/18/19 245 lb (111.1 kg) 04/09/19 245 lb (111.1 kg) Blood pressure (!) 169/91, pulse (!) 105, temperature 98.3 °F (36.8 °C), resp. rate 20, height 5' 10\" (1.778 m), weight 242 lb 5 oz (109.9 kg), SpO2 97 %. Pain Scale 1: Numeric (0 - 10) Pain Intensity 1: 0 Pain Onset 1: intermittent Pain Location 1: Abdomen Pain Orientation 1: Anterior Pain Description 1: Aching Pain Intervention(s) 1: Medication (see MAR) Last bowel movement, if known:  
 
Constitutional: alert , oriented x3, resting . Eyes: pupils equal, anicteric ENMT: no nasal discharge, moist mucous membranes Cardiovascular: regular rhythm, distal pulses intact Respiratory: breathing not labored, symmetric Gastrointestinal: soft, distended , non tender , colostomy and urostomy. Musculoskeletal: no deformity, no tenderness to palpation Skin: warm, dry Neurologic: following commands, moving all extremities Psychiatric: full affect, no hallucinations Other: 
 
 
 HISTORY:  
 
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  
Diagnosis Date  Abnormal nuclear stress test 4/27/2017  Arthritis  Asthma   
 childhood  BPH (benign prostatic hypertrophy)  CAD (coronary artery disease) 1996  M.I., Stents x2  
  Cancer ([facility] Rectal CA  Chronic pain  Colon polyp  DM (diabetes mellitus) ([facility] 2011  Gout  Hemorrhoids  HTN (hypertension) 2011  Hyperlipidemia 2011  Ill-defined condition Colostomy, iliostomy  Rectal adenocarcinoma ([facility] 2018 S/p surg.  S/P cardiac cath 2017 Past Surgical History:  
Procedure Laterality Date  CARDIAC SURG PROCEDURE UNLIST  1057/5184  
 stent x2  
 COLONOSCOPY N/A 10/14/2016 COLONOSCOPY/EGD performed by Krista Horowitz MD at [facility] ENDOSCOPY  COLONOSCOPY N/A 2/3/2017 COLONOSCOPY performed by Carliss Shone, MD at [facility] ENDOSCOPY  COLONOSCOPY N/A 3/27/2018 COLONOSCOPY performed by Carliss Shone, MD at [facility] ENDOSCOPY  ENDOSCOPY, COLON, DIAGNOSTIC  2011  HX GI    
 Prostate, Bladder, rectum. colon removed  HX HERNIA REPAIR    
 AS INFANT  HX ORTHOPAEDIC  2016  
 hip replacement , left  HX PROSTATECTOMY  X2  
 biopsy benign  HX TONSILLECTOMY  HX UROLOGICAL Bladder removed  IR INSERT NON TUNL CVC OVER 5 YRS  2019  
 SIGMOIDOSCOPY,BIOPSY  10/14/2016  UPPER GI ENDOSCOPY,DIAGNOSIS  10/14/2016 Family History Problem Relation Age of Onset  Heart Disease Mother  COPD Mother  COPD Father  Diabetes Father  Hypertension Father  Alcohol abuse Sister  Diabetes Brother  High Cholesterol Brother  Hypertension Brother  Anesth Problems Neg Hx History reviewed, no pertinent family history. Social History Tobacco Use  Smoking status: Former Smoker Packs/day: 0.50 Years: 40.00 Pack years: 20.00 Last attempt to quit: 2016 Years since quittin.7  Smokeless tobacco: Never Used Substance Use Topics  Alcohol use: No  
  Alcohol/week: 0.0 oz  
  Comment: OCCASIONAL Allergies Allergen Reactions  Atorvastatin Myalgia  Pcn [Penicillins] Hives Current Facility-Administered Medications Medication Dose Route Frequency  metoprolol (LOPRESSOR) injection 10 mg  10 mg IntraVENous Q6H  
 hydrALAZINE (APRESOLINE) 20 mg/mL injection 10 mg  10 mg IntraVENous Q6H  
 nitroglycerin (NITROBID) 2 % ointment 1 Inch  1 Inch Topical Q6H PRN  
 TPN ADULT - CENTRAL   IntraVENous CONTINUOUS  
 TPN ADULT - CENTRAL   IntraVENous CONTINUOUS  
 enoxaparin (LOVENOX) injection 40 mg  40 mg SubCUTAneous Q24H  cloNIDine (CATAPRES) 0.3 mg/24 hr patch 1 Patch  1 Patch TransDERmal Q7D  
 hydrALAZINE (APRESOLINE) 20 mg/mL injection 20 mg  20 mg IntraVENous Q6H PRN  
 labetalol (NORMODYNE;TRANDATE) injection 20 mg  20 mg IntraVENous Q4H PRN  
 fat emulsion 20% (LIPOSYN, INTRAlipid) infusion 250 mL  250 mL IntraVENous Q MON, WED & FRI  insulin NPH (NOVOLIN N, HUMULIN N) injection 30 Units  30 Units SubCUTAneous Q12H  
 dextrose 10% infusion 125-250 mL  125-250 mL IntraVENous PRN  zinc oxide-cod liver oil (DESITIN) 40 % paste   Topical BID  morphine injection 2 mg  2 mg IntraVENous Q3H PRN  
 insulin lispro (HUMALOG) injection   SubCUTAneous Q6H  
 glucose chewable tablet 16 g  4 Tab Oral PRN  
 glucagon (GLUCAGEN) injection 1 mg  1 mg IntraMUSCular PRN  
 sodium chloride (NS) flush 5-40 mL  5-40 mL IntraVENous Q8H  
 sodium chloride (NS) flush 5-40 mL  5-40 mL IntraVENous PRN  
 HYDROmorphone (PF) (DILAUDID) injection 0.5 mg  0.5 mg IntraVENous Q3H PRN  
 naloxone (NARCAN) injection 0.4 mg  0.4 mg IntraVENous PRN  
 ondansetron (ZOFRAN) injection 4 mg  4 mg IntraVENous Q4H PRN  
 
 
 
 LAB AND IMAGING FINDINGS:  
 
Lab Results Component Value Date/Time WBC 9.1 06/19/2019 04:41 AM  
 HGB 8.5 (L) 06/19/2019 04:41 AM  
 PLATELET 077 52/29/0538 04:41 AM  
 
Lab Results Component Value Date/Time  Sodium 150 (H) 06/20/2019 05:30 AM  
 Potassium 3.5 06/20/2019 05:30 AM  
 Chloride 119 (H) 06/20/2019 05:30 AM  
 CO2 22 06/20/2019 05:30 AM  
 BUN 40 (H) 06/20/2019 05:30 AM  
 Creatinine 1.17 06/20/2019 05:30 AM  
 Calcium 7.7 (L) 06/20/2019 05:30 AM  
 Magnesium 2.3 06/18/2019 04:29 AM  
 Phosphorus 2.7 06/20/2019 05:30 AM  
  
Lab Results Component Value Date/Time AST (SGOT) 240 (H) 06/14/2019 10:50 AM  
 Alk. phosphatase 158 (H) 06/14/2019 10:50 AM  
 Protein, total 6.3 (L) 06/14/2019 10:50 AM  
 Albumin 2.0 (L) 06/20/2019 05:30 AM  
 Globulin 4.3 (H) 06/14/2019 10:50 AM  
 
Lab Results Component Value Date/Time INR 1.0 04/26/2017 12:24 PM  
 INR (POC) 1.2 (H) 03/05/2018 10:47 AM  
 Prothrombin time 10.1 04/26/2017 12:24 PM  
 aPTT 28.6 10/25/2016 05:07 PM  
  
No results found for: IRON, FE, TIBC, IBCT, PSAT, FERR No results found for: PH, PCO2, PO2 No components found for: [place] No results found for: CPK, CKMB Total time:  
Counseling / coordination time, spent as noted above:  
> 50% counseling / coordination?:  
 
Prolonged service was provided for  []30 min   []75 min in face to face time in the presence of the patient, spent as noted above. Time Start:  
Time End:  
Note: this can only be billed with 72212 (initial) or 78331 (follow up). If multiple start / stop times, list each separately.

## 2019-06-20 NOTE — PROGRESS NOTES
Problem: Falls - Risk of 
Goal: *Absence of Falls Description Document Enrico Barton Fall Risk and appropriate interventions in the flowsheet. Outcome: Progressing Towards Goal 
  
Problem: Pressure Injury - Risk of 
Goal: *Prevention of pressure injury Description Document Vishnu Scale and appropriate interventions in the flowsheet. Outcome: Progressing Towards Goal 
  
Problem: Diabetes Self-Management Goal: *Using medications safely Description State effect of diabetes medications on diabetes; name diabetes medication taking, action and side effects. Outcome: Progressing Towards Goal 
Goal: *Monitoring blood glucose, interpreting and using results Description Identify recommended blood glucose targets  and personal targets.  
Outcome: Progressing Towards Goal

## 2019-06-20 NOTE — PROGRESS NOTES
Bedside shift change report given to destiny (oncoming nurse) by Kwame Rosales (offgoing nurse). Report included the following information Kardex, Intake/Output, MAR and Recent Results.

## 2019-06-21 NOTE — PROGRESS NOTES
Bedside and Verbal shift change report given to Hema Devine RN (oncoming nurse) by Stella Doyle RN (offgoing nurse). Report included the following information SBAR, Kardex, Intake/Output, MAR, Recent Results and Cardiac Rhythm NSR. Received pt resting quietly in bed with pt's wife present. Abdominal incisions clean, dry, & intact. Slight abdominal distention per previous nurse; pt denies any tenderness or Nausea upon palpation. Urostomy draining yellow urine with sedimentation & colostomy empty. Pt offers no complaints at this time. Call bell within reach. 1015 - Noted that today pt's port-a-cath needs to be reaccessed & dressing changed; called Dr. Luis Carlos Jacobo to request lidocaine spray for dressing change and make MD aware of tissue-like sedimentation in pt's urostomy; received orders for lidocaine spray to access port. 1617 - Medicated pt for abdominal pain; pt appears to not be in distress despite pt states he is 7/10 pain. Pt denies any nausea at this time. Pt's wife at bedside. Will continue to monitor. 56 - Paged Dr. Trang Rockwell regarding increased abdominal distention; waiting for return call.

## 2019-06-21 NOTE — PROGRESS NOTES
Nephrology Progress Note Conner Persaud  
 
www. [email or web address]                  Phone - [phone] Patient: Joseline Stephen Date- 6/21/2019 Admit Date: 6/2/2019 YOB: 1950 CC: Follow up for  nicole,  hyperkalemia Subjective: Interval History:  
-cr. Back to normal 
k stable Na improved with hypotonic ivf 
bp high but improving No c/o sob, No c/o chest pain, No c/o nausea or vomiting No c/o  fever. ROS:- as above Assessment:  
· HYPERNATREMIA 
· HYPERTENSION 
· Hypokalemia · Polyuria - post ATN diuresis · Hyperkalemia due to renal failure and KCl infusion with total parenteral nutrition. Metabolic acidosis will also cause extracellular shift of potassium. - RESOLVED after dialysis on 6-14-19 · Acute kidney injury, likely related to hemodynamic changes although postrenal obstruction cannot be ruled out especially, he had a surgery done yesterday on 06/13/2019. ·  Chronic kidney disease stage II, baseline creatinine 1.1 in 04/2019. · Metabolic acidosis, anion gap. ·  Diabetes. ·  History of rectal cancer. ·   History of prostatectomy, colostomy, and ileal conduit, and cystectomy. · . hypokalemia 
  
 
 
 Plan:  
· STOP . 225 NACL · RESUME NORVASC AND LABETALOL · ADJUST IV BP MEDS · Continue clonidine patch · Continue prn labetalol · FOLLOW BMP Physical Exam:  
GEN: NAD NECK-SUPPLE 
RESP: clear  b/l, no wheezing, CVS: RRR , S1,S2 ABDO: soft, ileal conduit,colostomy + EXT: + Edema NEURO: normal speech,non focal 
 
Care Plan discussed with: patient, wife Objective:  
Visit Vitals /90 Pulse 98 Temp 98.4 °F (36.9 °C) Resp 20 Ht 5' 10\" (1.778 m) Wt 110.9 kg (244 lb 7 oz) SpO2 100% BMI 35.07 kg/m² Last 3 Recorded Weights in this Encounter 06/19/19 0421 06/20/19 5251 06/21/19 0068 Weight: 110.9 kg (244 lb 7.8 oz) 109.9 kg (242 lb 5 oz) 110.9 kg (244 lb 7 oz) 06/19 1901 - 06/21 0700 In: 250 [P.O.:150; I.V.:100] Out: 4100 [SKRUT:6078; Drains:25] Chart reviewed. Pertinent Notes reviewed. Medication list  reviewed Current Facility-Administered Medications Medication  amLODIPine (NORVASC) tablet 5 mg  labetalol (NORMODYNE) tablet 100 mg  hydrALAZINE (APRESOLINE) 20 mg/mL injection 20 mg  
 metoprolol (LOPRESSOR) injection 10 mg  
 nitroglycerin (NITROBID) 2 % ointment 1 Inch  TPN ADULT - CENTRAL  sodium chloride 0.225 % in sterile water 1,000 mL infusion  enoxaparin (LOVENOX) injection 40 mg  
 cloNIDine (CATAPRES) 0.3 mg/24 hr patch 1 Patch  hydrALAZINE (APRESOLINE) 20 mg/mL injection 20 mg  
 labetalol (NORMODYNE;TRANDATE) injection 20 mg  
 fat emulsion 20% (LIPOSYN, INTRAlipid) infusion 250 mL  insulin NPH (NOVOLIN N, HUMULIN N) injection 30 Units  dextrose 10% infusion 125-250 mL  zinc oxide-cod liver oil (DESITIN) 40 % paste  morphine injection 2 mg  insulin lispro (HUMALOG) injection  glucose chewable tablet 16 g  
 glucagon (GLUCAGEN) injection 1 mg  sodium chloride (NS) flush 5-40 mL  sodium chloride (NS) flush 5-40 mL  
 HYDROmorphone (PF) (DILAUDID) injection 0.5 mg  
 naloxone (NARCAN) injection 0.4 mg  
 ondansetron (ZOFRAN) injection 4 mg Data Review : 
Recent Labs  
  06/21/19 
0550 06/20/19 
0530 06/19/19 
0441 WBC 9.9  --  9.1 HGB 8.3*  --  8.5*   --  172 ANEU 8.8*  --  7.8  150* 149*  
K 3.8 3.5 3.2*  
* 212* 198* BUN 40* 40* 43* CREA 1.12 1.17 1.25  
CA 8.0* 7.7* 8.2* PHOS 2.9 2.7 2.9 Lab Results Component Value Date/Time  Culture result: NO GROWTH 6 DAYS 06/14/2019 03:21 AM  
 Culture result: NO FUNGUS ISOLATED 28 DAYS 03/05/2018 11:52 AM  
 Culture result: Culture performed on Fluid swab specimen 03/05/2018 10:52 AM  
 Culture result: NO GROWTH 4 DAYS 03/05/2018 10:52 AM  
 Culture result: NO GROWTH 4 DAYS 03/05/2018 10:52 AM  
 
 No results found for: SDES No results for input(s): FE, TIBC, PSAT, FERR in the last 72 hours. Lab Results Component Value Date/Time Creatinine, urine 43.0 02/25/2016 04:01 PM  
 
Edith Tobias MD 
[facility] 
 www. [facility].[address] [facility] [address], [address] Phone - [phone] Fax - [phone]

## 2019-06-21 NOTE — PROGRESS NOTES
[facility] - [facility] and f/u appts Reviewed chart and will continue to follow for discharge planning needs. [facility] will provide [facility] services. Pt's wife can transport pt home by car. Renita Maria, [address]

## 2019-06-21 NOTE — PROGRESS NOTES
Problem: Mobility Impaired (Adult and Pediatric) Goal: *Acute Goals and Plan of Care (Insert Text) Description Physical Therapy Goals Initiated 6/19/2019 1. Patient will move from supine to sit and sit to supine  in bed with independence within 7 day(s). 2.  Patient will transfer from bed to chair and chair to bed with modified independence using the least restrictive device within 7 day(s). 3.  Patient will perform sit to stand with modified independence within 7 day(s). 4.  Patient will ambulate with modified independence for 100 feet with the least restrictive device within 7 day(s). 5.  Patient will ascend/descend 4 stairs with 1 handrail(s) with supervision/set-up within 7 day(s). Outcome: Not Progressing Towards Goal 
 PHYSICAL THERAPY TREATMENT Patient: Melissa Garcia (75 y.o. male) Date: 6/21/2019 Diagnosis: Bowel obstruction ([facility] [B48.919] [facility] [C79.9] <principal problem not specified> Procedure(s) (LRB): 
ROBOTIC LYSIS OF ADHESIONS SMALL BOWEL RESECTION (N/A) 8 Days Post-Op Precautions:   
Chart, physical therapy assessment, plan of care and goals were reviewed. ASSESSMENT: 
Pt cleared by nurse to mobilize. Pt received in bed supine . Pt requiring max encouragement  to participate with therapy. Pt finally agreeable. Pt educated on importance of getting out of bed after surgery. Pt with understanding. Pt performed sit to supine at mod A x2 with cueing for sequencing. Pt with increased pain when sitting EOB moaning in pain and shaking. Pt only able to tolerate <1 min of sitting EOB before wanting to get back to bed. Pt performed sit to supine at min A. Pt requiring max A x2 to scoot to [facility]. Pt left in bed in chair position to improve upright tolerance. Pt will benefit to from SNF to improve strength and endurance for safe mobility. Progression toward goals: 
?    Improving appropriately and progressing toward goals ?    Improving slowly and progressing toward goals ? Not making progress toward goals and plan of care will be adjusted PLAN: 
Patient continues to benefit from skilled intervention to address the above impairments. Continue treatment per established plan of care. Discharge Recommendations:  Luis Daniel Parsons Further Equipment Recommendations for Discharge:  TBD by rehab SUBJECTIVE:  
Patient stated ? It hurts bad I can't sit I have to lay back down.? OBJECTIVE DATA SUMMARY:  
Critical Behavior: 
Neurologic State: Alert Orientation Level: Oriented X4 Cognition: Follows commands Safety/Judgement: Awareness of environment, Insight into deficits Functional Mobility Training: 
Bed Mobility: 
Rolling: Moderate assistance;Minimum assistance;Assist x2; Additional time Supine to Sit: Moderate assistance;Assist x2; Additional time Sit to Supine: Minimum assistance Scooting: Moderate assistance Balance: 
Sitting: Impaired Sitting - Static: Occassional;Fair (occasional) Sitting - Dynamic: Fair (occasional) Pain: 
Pain Scale 1: Numeric (0 - 10) Pain Intensity 1: 0 Activity Tolerance:  
Pts tolerated session poor. Pt unable to sit upright due to increased pain. After treatment:  
?    Patient left in no apparent distress sitting up in chair ? Patient left in no apparent distress in bed 
? Call bell left within reach ? Nursing notified ? Caregiver present ? Bed alarm activated COMMUNICATION/COLLABORATION:  
The patient?s plan of care was discussed with: Registered Nurse Lucien Hammans, PTA Time Calculation: 23 mins

## 2019-06-21 NOTE — PROGRESS NOTES
Resting comfortably but complains of a little bit of abdominal pain when asked. No vomiting. Visit Vitals /79 (BP 1 Location: Left arm, BP Patient Position: At rest;Supine) Pulse (!) 105 Temp 98.6 °F (37 °C) Resp 20 Ht 5' 10\" (1.778 m) Wt 110.9 kg (244 lb 7 oz) SpO2 100% BMI 35.07 kg/m² Date 06/20/19 0700 - 06/21/19 2011 06/21/19 0700 - 06/22/19 1328 Shift [id number] 1900-0659 24 Hour Total 2535-1083 2461-3244 24 Hour Total  
INTAKE  
P.O.  150 150 240  240  
  P. O.  150 150 240  240  
I. V.(mL/kg/hr)  7635(0.1) 7918(9.8) 660  669 Volume (sodium chloride 0.225 % in sterile water 1,000 mL infusion)  761.3 761.3 286.3  286. 3 Volume (fat emulsion 20% (LIPOSYN, INTRAlipid) infusion 250 mL)  741.9 741.9 Volume (TPN ADULT - CENTRAL)  962.8 962.8 316.8  316.8 Shift Total(mL/kg)  [id number]) 7503(83.4) 843(7.6)  843(7.6) OUTPUT Urine(mL/kg/hr) 1400(1.1) 1225(0.9) 2625(1) 625  625 Urine Output (mL) (Urinary Ostomy Abdomen) 1400 1225 2625 625  625 Drains 20  20 0  0 Output (ml) (Matthew-Morejon Drain 06/13/19 Abdomen) 20  20 0  0 Stool    25  25 Output (ml) (Colostomy 06/02/19)    25  25 Shift Total(mL/kg) 1420(12.9) 1225(11.1) 7610(28.6) 650(5.9)  650(5.9) NET -1229 5785 -29.1 193  193 Weight (kg) 109.9 109.9 109.9 110.9 110.9 110.9  
 
abd soft Gabino serous A.P Keep on clear liquid diet Continue TPN

## 2019-06-21 NOTE — PROGRESS NOTES
Hospitalist Progress Note NAME: Juliette Souza :  1950 MRN:  [id number] Assessment / Plan: 
Severe hyperkalemia - resolved s/p HD BOBBI - required HD - now resolved and off dialysis Metabolic acidosis - resolved Sepsis 2/2 unknown source , intraabdominal infection? - resolved Pt had long  surgery on , he is c/o abdominal pain and spiked fever on  Wbc up to 19k , Started on cefepime and flagyl and vanco  
Cont NG to suction, NPO for now Potassium 6.4, bicarb 17 creat up 2.8-3.8 on  EKG : no peaked T waves , given insulin + ca gluconate  
nephro consulted , appreciate management , started on bicarb drip on  S/p HD on  and bicarb drip , K down to wnl and creat improving No further HD on 06/15 Wbc trending down . C/w vanco cefipime and flagyl BCx NTD  
k down to 3.2 today , repleted with IV K . Check mg : 
Hypokalemia - resolved TPN adjusted by Nephro to include K Remains afebrile, BCx NGTD 4 days. Remains on Cefepime, Flagyl and Vancomycin - source remains unclear. Will likely discontinue all if Cx remain negative. Hypernatremia - resolved Na adjusted in TPN, now 146. 
 
: 
1/4 NS added for increase in Na. TPN reordered. BCx NGTD 5 days - will likely discontinue abx if all cx result negative final. 
 
: Final Cx negative - will discontinue abx. S/p ex-lap  ROBOTIC LYSIS OF ADHESIONS SMALL BOWEL RESECTION on  Small Bowel Obstruction POA-  
Colon Cancer s/p resection with recurrence/Rectal Adenocarcinoma on ChemotX s/p resection On TPN  
CT abd/pelvis on :Small bowel distention with decompressed loops distally is compatible with obstruction likely related to effusion formation in the mid lower abdomen. Mild free fluid Brief op note : Diagnostic laparoscopy converted to open exploratory laparotomy with small bowel bypass and repair of colotomy x6 
 
IP Oncology consult appreciated & noted- Chemo delayed for now Cont IV dilaudid prn pain and IV antiemetics prn 
TPN to be adjusted : avoid K inside , daily CMP mag phos C/w humalog Sc q6h  
 
6/18: Most recent Surg note indicates pt may have NGT removed today if output remains minimal - will defer to them in regards to this and starting pt on a diet 6/19: 
NGT clamped today. Was not removed yesterday as pt had significant output. Will defer to Surgery in terms of removal and resumption of diet 6/20: Will defer to Surgery in terms of removal of NGT and resumption of diet. 6/21: NGT removed yesterday - pt on clears. Will see how he tolerates it. Has been working with PT/OT and doing well - recommendation is for [place]. Remains on TPN - can likely be dc if bowel function has returned Hypertension 
-holding PO meds 
-place on clonidine patch and scheduled Nitrobid 
-prn IV hydralazine ordered, IV Labetalol ordered by Nephro 6/20: 
BP remains difficult to control due to inability to take PO. Continue PRN IV meds as ordered 6/21: 
Resume oral BP meds slowly. For now continue with Norvasc, Labetalol, Clonidine patch and scheduled IV Hydralazine. Will DC scheduled Lopressor IV. 
  
Diabetes Mellitus, controlled 
-Continue with increased dose of NPH 30 units BID, SS, FS monitoring 30.0 - 39.9 Obese / Body mass index is 35.07 kg/m². Weight loss recommended- Exercise Code status: Full Surrogate Decision Maker: Wife Prophylaxis: Hep SQ and H2B/PPI Recommended Disposition: TBD Subjective: Chief Complaint / Reason for Physician Visit:  
Updated wife at bedside. Pt denies any complaints. Is pleased that NGT has been removed. Review of Systems: 
         
Symptom Y/N Comments   Symptom Y/N Comments Fever/Chills       Chest Pain n     
Poor Appetite       Edema       
Cough n     Abdominal Pain y     
Sputum       Joint Pain       
SOB/TALBOT n     Pruritis/Rash       
Nausea/vomit n     Tolerating PT/OT       
 Diarrhea       Tolerating Diet       
Constipation       Other       
  
Could NOT obtain due to:    
 
Objective: VITALS:  
Last 24hrs VS reviewed since prior progress note. Most recent are: 
Patient Vitals for the past 24 hrs: 
 Temp Pulse Resp BP SpO2  
06/21/19 0800 98.4 °F (36.9 °C) 98 20 166/90 100 % 06/21/19 0544  97  160/81   
06/21/19 0319 98.4 °F (36.9 °C) (!) 105 18 163/86 97 % 06/20/19 2333  (!) 108  (!) 147/91 100 % 06/20/19 2201 98.2 °F (36.8 °C) (!) 107 18 153/86 99 % 06/20/19 2000 99.5 °F (37.5 °C) (!) 109 18 153/84 99 % 06/20/19 1710  (!) 110  184/88   
06/20/19 1600 98.2 °F (36.8 °C) (!) 106 20 144/88 99 % 06/20/19 1047 98.3 °F (36.8 °C) (!) 105 20 (!) 169/91 97 % 06/20/19 0913    (!) 180/104  Intake/Output Summary (Last 24 hours) at 6/21/2019 7044 Last data filed at 6/21/2019 1751 Gross per 24 hour Intake 150 ml Output 2645 ml Net -2495 ml PHYSICAL EXAM: 
General: Alert, cooperative, not in distress, Obese +   
EENT:  EOMI. Anicteric sclerae. MMM Resp:  CTA bilaterally, no wheezing or rales. No accessory muscle use CV:  Regular  rhythm,  No edema GI:  Abdomen soft, Non tender, Ex-lap surgical dressings intact, ileostomy and colostomy bags Neurologic:  Alert and oriented X 3 Psych:   Good insight. Not anxious nor agitated Skin:  No rashes. No jaundice Reviewed most current lab test results and cultures  YES Reviewed most current radiology test results   YES Review and summation of old records today    NO Reviewed patient's current orders and MAR    YES 
PMH/SH reviewed - no change compared to H&P 
________________________________________________________________________ Care Plan discussed with: 
  Comments Patient x Family  x Wife at bedside RN x Care Manager Consultant Multidiciplinary team rounds were held today with , nursing, pharmacist and clinical coordinator.   Patient's plan of care was discussed; medications were reviewed and discharge planning was addressed. ________________________________________________________________________ Total Time Spent: 25 mins 
________________________________________________________________________ Mendoza Bullock MD  
 
Procedures: see electronic medical records for all procedures/Xrays and details which were not copied into this note but were reviewed prior to creation of Plan. LABS: 
I reviewed today's most current labs and imaging studies. Pertinent labs include: 
Recent Labs  
  06/21/19 
0550 06/19/19 
0441 WBC 9.9 9.1 HGB 8.3* 8.5* HCT 26.5* 26.9*  
 172 Recent Labs  
  06/21/19 
0550 06/20/19 
0530 06/19/19 
0441  150* 149*  
K 3.8 3.5 3.2*  
* 119* 118* CO2 20* 22 24 * 212* 198* BUN 40* 40* 43* CREA 1.12 1.17 1.25  
CA 8.0* 7.7* 8.2* PHOS 2.9 2.7 2.9 ALB 1.9* 2.0* 1.9* Signed: Mendoza Bullock MD

## 2019-06-21 NOTE — DIABETES MGMT
[facility] Progress Note Recommendations/ Comments: 
1) Consider increasing Regular insulin add in to TPN to 25 units/L weaning as TPN weaned by surgery with advances in diet. As NPH has been increased to >pt's home dose at present, do not wish to precipitate hypoglycemia once TPN discontinued. Or 
2) add Lantus to medication regimen until fasting BG <140 mg/dl and then transition off NPH and increase Lantus until pt on home dose. Chart reviewed on [place]. Current hospital DM medication: NPH 30 Units BID,  Lispro Correctional insulin with insulin resistant sensitivity. Regular insulin add-in to TPN 22 units/L for TDD 44 units Regular. Patient is a 70 y.o. male with known diabetes on Lantus 52 units daily, Lispro correctional insulin per scale 4 times daily, Metformin 1000 mg BID at home. A1c:  
Lab Results Component Value Date/Time Hemoglobin A1c 6.8 (H) 06/03/2019 03:26 AM  
 Hemoglobin A1c 6.5 (H) 10/17/2016 02:40 AM  
 
Recent Glucose Results:  
Lab Results Component Value Date/Time  (H) 06/21/2019 05:50 AM  
 GLUCPOC 288 (H) 06/21/2019 12:17 PM  
 GLUCPOC 242 (H) 06/21/2019 05:43 AM  
 GLUCPOC 195 (H) 06/20/2019 11:50 PM  
  
 
Lab Results Component Value Date/Time Creatinine 1.12 06/21/2019 05:50 AM  
 
Estimated Creatinine Clearance: 77.7 mL/min (based on SCr of 1.12 mg/dL). Active Orders Diet DIET CLEAR LIQUID  
  
 
PO intake: No data found. Will continue to follow as needed. Thank you. Luke Jackson RD, CDE [facility] Office:  [phone] Time spent: 10 minutes

## 2019-06-21 NOTE — PROGRESS NOTES
Pt refused to participate with PT today even to get up OOB. Wife sitting in room attempting to educate and encourage alongside author. Still declined. Dicussed with nursing and encouraged OOB at least 3x/daily to chair.

## 2019-06-21 NOTE — PROGRESS NOTES
Patient sleeping very soundly. Did not awaken when attempted by shaking at shoulder. Will retry another time.

## 2019-06-22 NOTE — PROGRESS NOTES
Bedside and Verbal shift change report given to JUS Lazo (oncoming nurse) by Eloise Beal RN (offgoing nurse). Report included the following information SBAR, Kardex, Intake/Output, MAR, Recent Results and Cardiac Rhythm SR/ST. Received pt sleeping, responsive to voice.; pt's wife at bedside. Call bell within reach. 8935 - Dr. Titi Lorenz at bedside, discussing plan of care with pt and pt's wife. Received order to clamp NG tube and place to continuous suction if pt experiences nausea/vomiting and/or abdominal distention & pain. Pt able to have sips of clears today. NG tube clamped per MD order. Will continue to monitor. 1114 - Pt resting quietly; NG tube remains clamped. Pt denies any pain or nausea at this time and accepts sips of clears per MD. Will continue to monitor. [phone] - Pt laying in bed, very alert, appropriate and watching TV. Pt denies any abdominal pain or discomfort. Abdomen remains semi-soft; pt denies any pain upon palpation of abdomen. NG tube remains clamped per order. Will continue to monitor. Call bell within reach.

## 2019-06-22 NOTE — PROGRESS NOTES
Hospitalist Progress Note NAME: Edward Stuart :  1950 MRN:  [id number] Assessment / Plan: 
Severe hyperkalemia - resolved s/p HD BOBBI - required HD - now resolved and off dialysis Metabolic acidosis - resolved Sepsis 2/2 unknown source , intraabdominal infection? - resolved Pt had long  surgery on , he is c/o abdominal pain and spiked fever on  Wbc up to 19k , Started on cefepime and flagyl and vanco  
Cont NG to suction, NPO for now Potassium 6.4, bicarb 17 creat up 2.8-3.8 on  EKG : no peaked T waves , given insulin + ca gluconate  
nephro consulted , appreciate management , started on bicarb drip on  S/p HD on  and bicarb drip , K down to wnl and creat improving No further HD on 06/15 Wbc trending down . C/w vanco cefipime and flagyl BCx NTD  
k down to 3.2 today , repleted with IV K . Check mg : 
Hypokalemia - resolved TPN adjusted by Nephro to include K Remains afebrile, BCx NGTD 4 days. Remains on Cefepime, Flagyl and Vancomycin - source remains unclear. Will likely discontinue all if Cx remain negative. Hypernatremia - resolved Na adjusted in TPN, now 146. 
 
: 
1/4 NS added for increase in Na. TPN reordered. BCx NGTD 5 days - will likely discontinue abx if all cx result negative final. 
 
: Final Cx negative - will discontinue abx. S/p ex-lap  ROBOTIC LYSIS OF ADHESIONS SMALL BOWEL RESECTION on  Small Bowel Obstruction POA-  
Colon Cancer s/p resection with recurrence/Rectal Adenocarcinoma on ChemotX s/p resection On TPN  
CT abd/pelvis on :Small bowel distention with decompressed loops distally is compatible with obstruction likely related to effusion formation in the mid lower abdomen. Mild free fluid Brief op note : Diagnostic laparoscopy converted to open exploratory laparotomy with small bowel bypass and repair of colotomy x6 
 
IP Oncology consult appreciated & noted- Chemo delayed for now Cont IV dilaudid prn pain and IV antiemetics prn 
TPN to be adjusted : avoid K inside , daily CMP mag phos C/w humalog Sc q6h  
 
6/18: Most recent Surg note indicates pt may have NGT removed today if output remains minimal - will defer to them in regards to this and starting pt on a diet 6/19: 
NGT clamped today. Was not removed yesterday as pt had significant output. Will defer to Surgery in terms of removal and resumption of diet 6/20: Will defer to Surgery in terms of removal of NGT and resumption of diet. 6/21: NGT removed yesterday - pt on clears. Will see how he tolerates it. Has been working with PT/OT and doing well - recommendation is for [facility]. Remains on TPN - can likely be dc if bowel function has returned 6/22: NGT replaced yesterday due to worsening abdominal distention. Will continue TPN. Surgery plan is to clamp NGT and see if pt tolerates sips of clears. Hypertension - control improved 
-holding PO meds 
-place on clonidine patch and scheduled Nitrobid 
-prn IV hydralazine ordered, IV Labetalol ordered by Nephro 6/20: 
BP remains difficult to control due to inability to take PO. Continue PRN IV meds as ordered 6/21: 
Resume oral BP meds slowly. For now continue with Norvasc, Labetalol, Clonidine patch and scheduled IV Hydralazine. Will DC scheduled Lopressor IV. 
 
6/22: 
BP control improving as pt is being given his oral meds. 
  
Diabetes Mellitus, controlled 
-Continue with increased dose of NPH 30 units BID, SS, FS monitoring 30.0 - 39.9 Obese / Body mass index is 36.04 kg/m². Weight loss recommended- Exercise Code status: Full Surrogate Decision Maker: Wife Prophylaxis: Hep SQ and H2B/PPI Recommended Disposition: TBD Subjective: Chief Complaint / Reason for Physician Visit:  
Updated wife at bedside. Pt is slightly dejected that NGT had to be replaced. Review of Systems: 
         
Symptom Y/N Comments   Symptom Y/N Comments Fever/Chills       Chest Pain n     
Poor Appetite       Edema       
Cough n     Abdominal Pain y     
Sputum       Joint Pain       
SOB/TALBOT n     Pruritis/Rash       
Nausea/vomit n     Tolerating PT/OT       
Diarrhea       Tolerating Diet       
Constipation       Other       
  
Could NOT obtain due to:    
 
Objective: VITALS:  
Last 24hrs VS reviewed since prior progress note. Most recent are: 
Patient Vitals for the past 24 hrs: 
 Temp Pulse Resp BP SpO2  
06/22/19 0843  97  164/81   
06/22/19 0841  97  164/81   
06/22/19 0707 97.1 °F (36.2 °C) 97 18 168/74 100 % 06/22/19 0323 98.2 °F (36.8 °C) 94 17 160/86 100 % 06/21/19 2259 99.4 °F (37.4 °C) (!) 106 18 154/70 100 % 06/21/19 1930 97.8 °F (36.6 °C) (!) 106 20 (!) 156/91 100 % 06/21/19 1812    137/79   
06/21/19 1613 98.7 °F (37.1 °C) (!) 111 20 153/84 100 % 06/21/19 1041 98.6 °F (37 °C) (!) 105 20 154/79 100 % 06/21/19 0959  (!) 109  158/85  Intake/Output Summary (Last 24 hours) at 6/22/2019 9388 Last data filed at 6/22/2019 2483 Gross per 24 hour Intake 1054 ml Output 2955 ml Net -1901 ml PHYSICAL EXAM: 
General: Alert, cooperative, not in distress, Obese +   
EENT:  EOMI. Anicteric sclerae. MMM Resp:  CTA bilaterally, no wheezing or rales. No accessory muscle use CV:  Regular  rhythm,  No edema GI:  Abdomen soft, Non tender, Ex-lap surgical dressings intact, ileostomy and colostomy bags Neurologic:  Alert and oriented X 3 Psych:   Good insight. Not anxious nor agitated Skin:  No rashes. No jaundice Reviewed most current lab test results and cultures  YES Reviewed most current radiology test results   YES Review and summation of old records today    NO Reviewed patient's current orders and MAR    YES 
PMH/SH reviewed - no change compared to H&P 
________________________________________________________________________ Care Plan discussed with: 
  Comments Patient x Family  x Wife at bedside RN x Care Manager Consultant Multidiciplinary team rounds were held today with , nursing, pharmacist and clinical coordinator. Patient's plan of care was discussed; medications were reviewed and discharge planning was addressed. ________________________________________________________________________ Total Time Spent: 25 mins 
________________________________________________________________________ Ginny Hernández MD  
 
Procedures: see electronic medical records for all procedures/Xrays and details which were not copied into this note but were reviewed prior to creation of Plan. LABS: 
I reviewed today's most current labs and imaging studies. Pertinent labs include: 
Recent Labs  
  06/21/19 
0550 WBC 9.9 HGB 8.3* HCT 26.5*  
 Recent Labs  
  06/21/19 
0550 06/20/19 
0530  150*  
K 3.8 3.5 * 119* CO2 20* 22 * 212* BUN 40* 40* CREA 1.12 1.17 CA 8.0* 7.7* PHOS 2.9 2.7 ALB 1.9* 2.0* Signed: Ginny Hernández MD

## 2019-06-22 NOTE — PROGRESS NOTES
Problem: Falls - Risk of 
Goal: *Absence of Falls Description Document [facility] Fall Risk and appropriate interventions in the flowsheet. Outcome: Progressing Towards Goal 
  
Problem: Patient Education: Go to Patient Education Activity Goal: Patient/Family Education Outcome: Progressing Towards Goal 
  
Problem: Pressure Injury - Risk of 
Goal: *Prevention of pressure injury Description Document Vishnu Scale and appropriate interventions in the flowsheet. Outcome: Progressing Towards Goal 
  
Problem: Patient Education: Go to Patient Education Activity Goal: Patient/Family Education Outcome: Progressing Towards Goal 
  
Problem: Diabetes Self-Management Goal: *Disease process and treatment process Description Define diabetes and identify own type of diabetes; list 3 options for treating diabetes. Outcome: Progressing Towards Goal 
Goal: *Incorporating nutritional management into lifestyle Description Describe effect of type, amount and timing of food on blood glucose; list 3 methods for planning meals. Outcome: Progressing Towards Goal 
Goal: *Incorporating physical activity into lifestyle Description State effect of exercise on blood glucose levels. Outcome: Progressing Towards Goal 
Goal: *Developing strategies to promote health/change behavior Description Define the ABC's of diabetes; identify appropriate screenings, schedule and personal plan for screenings. Outcome: Progressing Towards Goal 
Goal: *Using medications safely Description State effect of diabetes medications on diabetes; name diabetes medication taking, action and side effects. Outcome: Progressing Towards Goal 
Goal: *Monitoring blood glucose, interpreting and using results Description Identify recommended blood glucose targets  and personal targets. Outcome: Progressing Towards Goal 
Goal: *Prevention, detection, treatment of acute complications Description List symptoms of hyper- and hypoglycemia; describe how to treat low blood sugar and actions for lowering  high blood glucose level. Outcome: Progressing Towards Goal 
Goal: *Prevention, detection and treatment of chronic complications Description Define the natural course of diabetes and describe the relationship of blood glucose levels to long term complications of diabetes. Outcome: Progressing Towards Goal 
Goal: *Developing strategies to address psychosocial issues Description Describe feelings about living with diabetes; identify support needed and support network Outcome: Progressing Towards Goal 
Goal: *Insulin pump training Outcome: Progressing Towards Goal 
Goal: *Sick day guidelines Outcome: Progressing Towards Goal 
Goal: *Patient Specific Goal (EDIT GOAL, INSERT TEXT) Outcome: Progressing Towards Goal 
  
Problem: Patient Education: Go to Patient Education Activity Goal: Patient/Family Education Outcome: Progressing Towards Goal 
  
Problem: Patient Education: Go to Patient Education Activity Goal: Patient/Family Education Outcome: Progressing Towards Goal 
  
Problem: Patient Education: Go to Patient Education Activity Goal: Patient/Family Education Outcome: Progressing Towards Goal 
  
Problem: Infection - Risk of, Surgical Site Infection Goal: *Absence of surgical site infection signs and symptoms Outcome: Progressing Towards Goal 
  
Problem: Patient Education: Go to Patient Education Activity Goal: Patient/Family Education Outcome: Progressing Towards Goal

## 2019-06-22 NOTE — PROGRESS NOTES
1900: Verbal bedside report received from Rodrigo Riley RN given to [employer] and [employer], RNs (oncoming nurses). Bedside report included MAR, Kardex, cardiac rhythm sinus tach. Pt resting comfortably in bed, pt family at bedside. No distress noted. 0600: Pt's wife at the bedside throughout the evening. Pt expressed no needs throughout the shift. Routine labs drawn 04:50.  
 
0710: Verbal bedside report given to Rodrigo Riley RN (oncoming nurse). Bedside report included Kardex, MAR, input and output and cardiac rhythm NSR.

## 2019-06-22 NOTE — PROGRESS NOTES
Nephrology Progress Note Conner Persaud  
 
[email or web address]                  Phone - [phone] Patient: Edward Stuart Date- 6/22/2019 Admit Date: 6/2/2019 YOB: 1950 CC: Follow up for  BOBBI, hyperkalemia Subjective: Interval History:  
-CR. STABLE 
bp good NG tube placed yesterday NG tube clamped today No sob No fever C/o abdo. Discomfort ROS:- as above Assessment:  
· HYPERNATREMIA · hypertension · Hypokalemia · Polyuria - post ATN diuresis · Hyperkalemia due to renal failure and KCl infusion with total parenteral nutrition. Metabolic acidosis will also cause extracellular shift of potassium. - RESOLVED after dialysis on 6-14-19 · Acute kidney injury, likely related to hemodynamic changes although postrenal obstruction cannot be ruled out especially, he had a surgery done yesterday on 06/13/2019. DIALYSIS CATH REMOVED ·  Chronic kidney disease stage II, baseline creatinine 1.1 in 04/2019. · Metabolic acidosis, anion gap. ·  Diabetes. ·  History of rectal cancer. ·   History of prostatectomy, colostomy, and ileal conduit, and cystectomy. · . hypokalemia 
  
 
 
 Plan:  
· Continue TPN  
· No other ivf needed · No changes in bp meds · Continue clonidine patch · Continue prn labetalol · WE WILL SIGN OFF. CALL US IF NEEDED. Physical Exam:  
GEN:NAD 
NECK-SUPPLE, NG tube + RESP: clear  b/l, no wheezing, CVS: RRR , s1,s2 ABDO:soft, ileal conduit,colostomy + EXT: + Edema NEURO: normal speech, non focal 
 
Care Plan discussed with: patient, nurse Objective:  
Visit Vitals /69 Pulse 90 Temp 97.7 °F (36.5 °C) Resp 20 Ht 5' 10\" (1.778 m) Wt 113.9 kg (251 lb 3.2 oz) SpO2 98% BMI 36.04 kg/m² Last 3 Recorded Weights in this Encounter 06/20/19 1000 06/21/19 0703 06/22/19 0538 Weight: 109.9 kg (242 lb 5 oz) 110.9 kg (244 lb 7 oz) 113.9 kg (251 lb 3.2 oz) 06/20 1901 - 06/22 0700 In: 5469.5 [P.O.:390; I.V.:5079.5] Out: 5463 [Urine:4000; Drains:30] Chart reviewed. Pertinent Notes reviewed. Medication list  reviewed Current Facility-Administered Medications Medication  TPN ADULT - CENTRAL  amLODIPine (NORVASC) tablet 5 mg  labetalol (NORMODYNE) tablet 100 mg  TPN ADULT - CENTRAL  nitroglycerin (NITROBID) 2 % ointment 1 Inch  enoxaparin (LOVENOX) injection 40 mg  
 cloNIDine (CATAPRES) 0.3 mg/24 hr patch 1 Patch  hydrALAZINE (APRESOLINE) 20 mg/mL injection 20 mg  
 labetalol (NORMODYNE;TRANDATE) injection 20 mg  
 fat emulsion 20% (LIPOSYN, INTRAlipid) infusion 250 mL  insulin NPH (NOVOLIN N, HUMULIN N) injection 30 Units  dextrose 10% infusion 125-250 mL  zinc oxide-cod liver oil (DESITIN) 40 % paste  morphine injection 2 mg  insulin lispro (HUMALOG) injection  glucose chewable tablet 16 g  
 glucagon (GLUCAGEN) injection 1 mg  sodium chloride (NS) flush 5-40 mL  sodium chloride (NS) flush 5-40 mL  
 HYDROmorphone (PF) (DILAUDID) injection 0.5 mg  
 naloxone (NARCAN) injection 0.4 mg  
 ondansetron (ZOFRAN) injection 4 mg Data Review : 
Recent Labs  
  06/21/19 
0550 06/20/19 
0530 WBC 9.9  --   
HGB 8.3*  --   
  --   
ANEU 8.8*  --   
 150*  
K 3.8 3.5 * 212* BUN 40* 40* CREA 1.12 1.17 CA 8.0* 7.7* PHOS 2.9 2.7 Lab Results Component Value Date/Time Culture result: NO GROWTH 6 DAYS 06/14/2019 03:21 AM  
 Culture result: NO FUNGUS ISOLATED 28 DAYS 03/05/2018 11:52 AM  
 Culture result: Culture performed on Fluid swab specimen 03/05/2018 10:52 AM  
 Culture result: NO GROWTH 4 DAYS 03/05/2018 10:52 AM  
 Culture result: NO GROWTH 4 DAYS 03/05/2018 10:52 AM  
 
No results found for: SDES No results for input(s): FE, TIBC, PSAT, FERR in the last 72 hours. Lab Results Component Value Date/Time  Creatinine, urine 43.0 02/25/2016 04:01 PM  
 
 Chrissy Monae MD 
[facility] 
 www. [email or web address] [employer] [address] [place], [address] Phone - [phone] Fax - [phone]

## 2019-06-22 NOTE — PROGRESS NOTES
1900: Verbal bedside report received from Svetlana Palomares RN (offgoing nurse) given to Cheryle Peters RN (oncoming nurse). Bedside report included Kardex, MAR, input and output and cardiac rhythm NSR. Pt resting in bed, family at bedside. MEWS score 1.  
 
1936: Tele hospitalist paged for order PRN tylenol for low grade temp. 2000: Pt ambulating in room with mobility team and RN Kate. Pt appeared to tolerated ambulation well. 2130: Pt temp decreased to 99.3; RN will continue to monitor. NGT clamped, pt tolerating well. No complaints of nausea, distention. Pt reported passing flatus and RN noted bowel sounds active all quadrants. 2315: Pt had single burst of SVT at rate of 160; pt converted back to NSR. RN will continue to monitor. Pt asymptomatic, VSS. 0700: Verbal bedside report given to Svetlana Palomares RN. Bedside report included MAR, Kardex, input and output and cardiac rhythm NSR.

## 2019-06-22 NOTE — PROGRESS NOTES
Feels better after ngt placement Visit Vitals /81 Pulse 97 Temp 97.1 °F (36.2 °C) Resp 18 Ht 5' 10\" (1.778 m) Wt 113.9 kg (251 lb 3.2 oz) SpO2 100% BMI 36.04 kg/m² Date 06/21/19 0700 - 06/22/19 1220 06/22/19 0700 - 06/23/19 0348 Shift [id number] 1900-0659 24 Hour Total 8882-4250 2163-1256 24 Hour Total  
INTAKE  
P.O. 240 0 240     
  P. O. 240 0 240     
I. V.(mL/kg/hr) 1054(0.8)  1054(0.4) Volume (sodium chloride 0.225 % in sterile water 1,000 mL infusion) 286.3  286. 3 Volume (TPN ADULT - CENTRAL) 767.8  767.8 Shift Total(mL/kg) 0832(74.7) 0(0) 7940(41.1) OUTPUT Urine(mL/kg/hr) 1525(1.1) 1250(0.9) 2775(1) Urine Output (mL) (Urinary Ostomy Abdomen) 1525 1250 2775 Emesis/NG output 125  125 Output (ml) (Nasogastric Tube 06/21/19) 125  125 Drains 0 30 30 Output (ml) (Matthew-[place] Drain 06/13/19 Abdomen) 0 30 30 Stool 25  25 Output (ml) (Colostomy 06/02/19) 25  25 Shift Total(mL/kg) [id number]) 2250(32.1) 0243(81.2) NET -381 -8571 -1797 Weight (kg) 110.9 113.9 113.9 113.9 113.9 113.9  
 
abd soft Stoma pink with stool in bag A/p Clamp ngt and trial of ice chips and sips of clear liquids. Unclamp ngt if he gets distended again. Continue tpn until on regular diet

## 2019-06-23 NOTE — PROGRESS NOTES
Hospitalist Progress Note NAME: Yumiko Redyd :  1950 MRN:  [id number] Assessment / Plan: 
Severe hyperkalemia - resolved s/p HD BOBBI - required HD - now resolved and off dialysis Metabolic acidosis - resolved Sepsis 2/2 unknown source , intraabdominal infection? - resolved Pt had long  surgery on , he is c/o abdominal pain and spiked fever on  Wbc up to 19k , Started on cefepime and flagyl and vanco  
Cont NG to suction, NPO for now Potassium 6.4, bicarb 17 creat up 2.8-3.8 on  EKG : no peaked T waves , given insulin + ca gluconate  
nephro consulted , appreciate management , started on bicarb drip on  S/p HD on  and bicarb drip , K down to wnl and creat improving No further HD on 06/15 Wbc trending down . C/w vanco cefipime and flagyl BCx NTD  
k down to 3.2 today , repleted with IV K . Check mg : 
Hypokalemia - resolved TPN adjusted by Nephro to include K Remains afebrile, BCx NGTD 4 days. Remains on Cefepime, Flagyl and Vancomycin - source remains unclear. Will likely discontinue all if Cx remain negative. Hypernatremia - resolved Na adjusted in TPN, now 146. 
 
: 
1/4 NS added for increase in Na. TPN reordered. BCx NGTD 5 days - will likely discontinue abx if all cx result negative final. 
 
: Final Cx negative - will discontinue abx. S/p ex-lap  ROBOTIC LYSIS OF ADHESIONS SMALL BOWEL RESECTION on  Small Bowel Obstruction POA-  
Colon Cancer s/p resection with recurrence/Rectal Adenocarcinoma on ChemotX s/p resection On TPN  
CT abd/pelvis on :Small bowel distention with decompressed loops distally is compatible with obstruction likely related to effusion formation in the mid lower abdomen. Mild free fluid Brief op note : Diagnostic laparoscopy converted to open exploratory laparotomy with small bowel bypass and repair of colotomy x6 
 
IP Oncology consult appreciated & noted- Chemo delayed for now Cont IV dilaudid prn pain and IV antiemetics prn 
TPN to be adjusted : avoid K inside , daily CMP mag phos C/w humalog Sc q6h  
 
6/18: Most recent Surg note indicates pt may have NGT removed today if output remains minimal - will defer to them in regards to this and starting pt on a diet 6/19: 
NGT clamped today. Was not removed yesterday as pt had significant output. Will defer to Surgery in terms of removal and resumption of diet 6/20: Will defer to Surgery in terms of removal of NGT and resumption of diet. 6/21: NGT removed yesterday - pt on clears. Will see how he tolerates it. Has been working with PT/OT and doing well - recommendation is for [facility]. Remains on TPN - can likely be dc if bowel function has returned 6/22: NGT replaced yesterday due to worsening abdominal distention. Will continue TPN. Surgery plan is to clamp NGT and see if pt tolerates sips of clears. 6/23: 
Remains with NGT - will defer to Surgery about discontinuing. Will continue TPN. Hypertension - control improved 
-holding PO meds 
-place on clonidine patch and scheduled Nitrobid 
-prn IV hydralazine ordered, IV Labetalol ordered by Nephro 6/20: 
BP remains difficult to control due to inability to take PO. Continue PRN IV meds as ordered 6/21: 
Resume oral BP meds slowly. For now continue with Norvasc, Labetalol, Clonidine patch and scheduled IV Hydralazine. Will DC scheduled Lopressor IV. 
 
6/23: 
BP control improving as pt is being given his oral meds. 
  
Diabetes Mellitus, controlled 
-Continue with increased dose of NPH 30 units BID, SS, FS monitoring 30.0 - 39.9 Obese / Body mass index is 36.04 kg/m². Weight loss recommended- Exercise Code status: Full Surrogate Decision Maker: Wife Prophylaxis: Hep SQ and H2B/PPI Recommended Disposition: TBD Subjective: Chief Complaint / Reason for Physician Visit:  
Updated wife at bedside. Pt denies any complaints today. Review of Systems: 
         
Symptom Y/N Comments   Symptom Y/N Comments Fever/Chills       Chest Pain n     
Poor Appetite       Edema       
Cough n     Abdominal Pain y   improved Sputum       Joint Pain       
SOB/TALBOT n     Pruritis/Rash       
Nausea/vomit n     Tolerating PT/OT       
Diarrhea       Tolerating Diet       
Constipation       Other       
  
Could NOT obtain due to:    
 
Objective: VITALS:  
Last 24hrs VS reviewed since prior progress note. Most recent are: 
Patient Vitals for the past 24 hrs: 
 Temp Pulse Resp BP SpO2  
06/23/19 0714 97.5 °F (36.4 °C) 94 20 161/86 100 % 06/23/19 0312 99.1 °F (37.3 °C) 94 19 144/74 100 % 06/22/19 2300 99.1 °F (37.3 °C) 95 18 145/70 100 % 06/22/19 2130 99.3 °F (37.4 °C)      
06/22/19 1907 100.3 °F (37.9 °C) 95 20 167/76 100 % 06/22/19 1531 97.6 °F (36.4 °C) 95 20 159/73 100 % 06/22/19 1102 97.7 °F (36.5 °C) 90 20 157/69 98 % Intake/Output Summary (Last 24 hours) at 6/23/2019 3594 Last data filed at 6/23/2019 1125 Gross per 24 hour Intake 705.5 ml Output 2535 ml Net -1829.5 ml PHYSICAL EXAM: 
General: Alert, cooperative, not in distress, Obese +   
EENT:  EOMI. Anicteric sclerae. MMM Resp:  CTA bilaterally, no wheezing or rales. No accessory muscle use CV:  Regular  rhythm,  No edema GI:  Abdomen soft, Non tender, Ex-lap surgical dressings intact, ileostomy and colostomy bags Neurologic:  Alert and oriented X 3 Psych:   Good insight. Not anxious nor agitated Skin:  No rashes. No jaundice Reviewed most current lab test results and cultures  YES Reviewed most current radiology test results   YES Review and summation of old records today    NO Reviewed patient's current orders and MAR    YES 
PMH/ reviewed - no change compared to H&P 
________________________________________________________________________ Care Plan discussed with: 
  Comments Patient x Family  x Wife at bedside RN x   
 Care Manager Consultant Multidiciplinary team rounds were held today with , nursing, pharmacist and clinical coordinator. Patient's plan of care was discussed; medications were reviewed and discharge planning was addressed. ________________________________________________________________________ Total Time Spent: 25 mins 
________________________________________________________________________ Giovani Peña MD  
 
Procedures: see electronic medical records for all procedures/Xrays and details which were not copied into this note but were reviewed prior to creation of Plan. LABS: 
I reviewed today's most current labs and imaging studies. Pertinent labs include: 
Recent Labs  
  06/23/19 
0500 06/21/19 
0550 WBC 12.0* 9.9 HGB 8.4* 8.3* HCT 25.0* 26.5*  
 207 Recent Labs  
  06/23/19 
0500 06/21/19 
0550  144  
K 4.0 3.8 * 116* CO2 20* 20* * 222* BUN 34* 40* CREA 1.08 1.12  
CA 8.2* 8.0*  
PHOS 3.3 2.9 ALB 2.0* 1.9* TBILI 0.5  --   
SGOT 59*  --   
ALT 63  --   
 
 
Signed: Giovani Peña MD

## 2019-06-23 NOTE — PROGRESS NOTES
Bedside and Verbal shift change report given to Joi Staples RN (oncoming nurse) by Chloe Ospina RN (offgoing nurse). Report included the following information SBAR, Kardex, Intake/Output, MAR, Recent Results and Cardiac Rhythm NSR. Received pt laying in the bed, watching TV; pt's wife at the bedside. Call bell within reach. Will continue to monitor. 1 - Assisted nursing assistant provide AM bath; pt required IV pain medication in order to turn over to change linen. Provide pain medication; pt verbalizes relief of pain. Will continue to monitor. 1117 - Pt resting quietly; offers no complaints at this time. Pt remains on RA. Call bell within reach. Pt's wife remains at the bedside. Will continue to monitor. 1454 - Pt resting in bed, watching TV; denies any pain at this time. Pt refusing to ambulate or get out of bed at this time. Pt's wife at bedside. Call bell within reach.

## 2019-06-23 NOTE — PROGRESS NOTES
Says he feels better although ngt was placed back to suction because he felt a little bloated Visit Vitals /82 (BP 1 Location: Left arm, BP Patient Position: At rest) Pulse 92 Temp 97.8 °F (36.6 °C) Resp 20 Ht 5' 10\" (1.778 m) Wt 113.9 kg (251 lb 3.2 oz) SpO2 100% BMI 36.04 kg/m² Date 06/22/19 0700 - 06/23/19 2221 06/23/19 0700 - 06/24/19 3123 Shift 9160-3878 7448-3926 24 Hour Total 3186-3133 8505-0971 24 Hour Total  
INTAKE  
I.V.(mL/kg/hr) 705.5(0.5) 1160.6(0.8) 1866.1(0.7) 358. 3  358. 3 Volume (TPN ADULT - CENTRAL) 705.5  705.5 Volume (TPN ADULT - CENTRAL)  1160.6 1160.6 358. 3  358. 3 Shift Total(mL/kg) 705. 5(6.2) 1160.6(10.2) 1866.1(16.4) 358. 3(3.1)  358. 3(3.1) OUTPUT Urine(mL/kg/hr) 1250(0.9) 1225(0.9) 2475(0.9) Urine Output (mL) (Urinary Ostomy Abdomen) 1250 1225 2475 Emesis/NG output 0  0 Output (ml) (Nasogastric Tube 06/21/19) 0  0 Drains 10 30 40 0  0 Output (ml) (Matthew-Morejon Drain 06/13/19 Abdomen) 10 30 40 0  0 Stool 10 30 40 Output (ml) (Colostomy 06/02/19) 10 30 40 Shift Total(mL/kg) 5342(72.7) 7679(53.6) 1585(38.2) 0(0)  0(0) NET -564.5 -124.4 -688. 9 358. 3  358. 3 Weight (kg) 113.9 113.9 113.9 113.9 113.9 113.9  
 
abd soft Stoma with some stool in bag A/p continue clamping trials Dc ngt once stoma output is reliable Continue tpn until ngt out

## 2019-06-24 NOTE — PROGRESS NOTES
[facility] - Home with HH and f/u appts CM reviewed chart and will continue to follow for discharge planning needs. CM spoke with pt's wife at the bedside. [facility] has accepted pt. Pt's wife can transport pt home by car at discharge. Shanti Linares, [phone]

## 2019-06-24 NOTE — PROGRESS NOTES
Hospitalist Progress Note NAME: Heather Salas :  1950 MRN:  [id number] Assessment / Plan: 
Severe hyperkalemia - resolved s/p HD BOBBI - required HD - now resolved and off dialysis Metabolic acidosis - resolved Sepsis 2/2 unknown source , intraabdominal infection? - resolved Pt had long  surgery on , he is c/o abdominal pain and spiked fever on  Wbc up to 19k , Started on cefepime and flagyl and vanco  
Cont NG to suction, NPO for now Potassium 6.4, bicarb 17 creat up 2.8-3.8 on  EKG : no peaked T waves , given insulin + ca gluconate  
nephro consulted , appreciate management , started on bicarb drip on  S/p HD on  and bicarb drip , K down to wnl and creat improving No further HD on 06/15 Wbc trending down . C/w vanco cefipime and flagyl BCx NTD  
k down to 3.2 today , repleted with IV K . Check mg : 
Hypokalemia - resolved TPN adjusted by Nephro to include K Remains afebrile, BCx NGTD 4 days. Remains on Cefepime, Flagyl and Vancomycin - source remains unclear. Will likely discontinue all if Cx remain negative. Hypernatremia - resolved Na adjusted in TPN, now 146. 
 
: 
1/4 NS added for increase in Na. TPN reordered. BCx NGTD 5 days - will likely discontinue abx if all cx result negative final. 
 
: Final Cx negative - will discontinue abx. S/p ex-lap  ROBOTIC LYSIS OF ADHESIONS SMALL BOWEL RESECTION on  Small Bowel Obstruction POA-  
Colon Cancer s/p resection with recurrence/Rectal Adenocarcinoma on ChemotX s/p resection On TPN  
CT abd/pelvis on :Small bowel distention with decompressed loops distally is compatible with obstruction likely related to effusion formation in the mid lower abdomen. Mild free fluid Brief op note : Diagnostic laparoscopy converted to open exploratory laparotomy with small bowel bypass and repair of colotomy x6 
 
IP Oncology consult appreciated & noted- Chemo delayed for now Cont IV dilaudid prn pain and IV antiemetics prn 
TPN to be adjusted : avoid K inside , daily CMP mag phos C/w humalog Sc q6h  
 
6/18: Most recent Surg note indicates pt may have NGT removed today if output remains minimal - will defer to them in regards to this and starting pt on a diet 6/19: 
NGT clamped today. Was not removed yesterday as pt had significant output. Will defer to Surgery in terms of removal and resumption of diet 6/20: Will defer to Surgery in terms of removal of NGT and resumption of diet. 6/21: NGT removed yesterday - pt on clears. Will see how he tolerates it. Has been working with PT/OT and doing well - recommendation is for [facility]. Remains on TPN - can likely be dc if bowel function has returned 6/22: NGT replaced yesterday due to worsening abdominal distention. Will continue TPN. Surgery plan is to clamp NGT and see if pt tolerates sips of clears. 6/23: 
Remains with NGT - will defer to Surgery about discontinuing. Will continue TPN. 
 
6/24: NGT remains - defer to Surgery. Continue TPN in the interim. Hypertension - control improved 
-holding PO meds 
-place on clonidine patch and scheduled Nitrobid 
-prn IV hydralazine ordered, IV Labetalol ordered by Nephro 6/20: 
BP remains difficult to control due to inability to take PO. Continue PRN IV meds as ordered 6/21: 
Resume oral BP meds slowly. For now continue with Norvasc, Labetalol, Clonidine patch and scheduled IV Hydralazine. Will DC scheduled Lopressor IV. 
 
6/23: 
BP control improving as pt is being given his oral meds. 6/24: Will increase Norvasc to 10mg 
  
Diabetes Mellitus, controlled 
-Continue with increased dose of NPH 30 units BID, SS, FS monitoring 30.0 - 39.9 Obese / Body mass index is 35.71 kg/m². Weight loss recommended- Exercise Code status: Full Surrogate Decision Maker: Wife Prophylaxis: Hep SQ and H2B/PPI Recommended Disposition: TBD Subjective: Chief Complaint / Reason for Physician Visit:  
Updated wife at bedside. Pt denies any complaints today. Review of Systems: 
         
Symptom Y/N Comments   Symptom Y/N Comments Fever/Chills       Chest Pain n     
Poor Appetite       Edema       
Cough n     Abdominal Pain y   improved Sputum       Joint Pain       
SOB/TALBOT n     Pruritis/Rash       
Nausea/vomit n     Tolerating PT/OT       
Diarrhea       Tolerating Diet       
Constipation       Other       
  
Could NOT obtain due to:    
 
Objective: VITALS:  
Last 24hrs VS reviewed since prior progress note. Most recent are: 
Patient Vitals for the past 24 hrs: 
 Temp Pulse Resp BP SpO2  
06/24/19 0754 98.9 °F (37.2 °C) (!) 102 18 (!) 164/91 100 % 06/24/19 0512 99.2 °F (37.3 °C) (!) 101 16 158/79 98 %  
06/23/19 2359 99.9 °F (37.7 °C) 95 18 163/75 99 % 06/23/19 1921 99.5 °F (37.5 °C) 94 18 169/72 100 % 06/23/19 1441 97.8 °F (36.6 °C) 89 20 160/86 100 % 06/23/19 1121 97.8 °F (36.6 °C) 92 20 161/82 100 % Intake/Output Summary (Last 24 hours) at 6/24/2019 1011 Last data filed at 6/24/2019 3251 Gross per 24 hour Intake 1878.61 ml Output 3255 ml Net -1376.39 ml PHYSICAL EXAM: 
General: Alert, cooperative, not in distress, Obese +   
EENT:  EOMI. Anicteric sclerae. MMM Resp:  CTA bilaterally, no wheezing or rales. No accessory muscle use CV:  Regular  rhythm,  No edema GI:  Abdomen soft, Non tender, Ex-lap surgical dressings intact, ileostomy and colostomy bags Neurologic:  Alert and oriented X 3 Psych:   Good insight. Not anxious nor agitated Skin:  No rashes. No jaundice Reviewed most current lab test results and cultures  YES Reviewed most current radiology test results   YES Review and summation of old records today    NO Reviewed patient's current orders and MAR    YES 
PMH/SH reviewed - no change compared to H&P 
________________________________________________________________________ Care Plan discussed with: 
  Comments Patient x Family  x Wife at bedside RN x Care Manager Consultant Multidiciplinary team rounds were held today with , nursing, pharmacist and clinical coordinator. Patient's plan of care was discussed; medications were reviewed and discharge planning was addressed. ________________________________________________________________________ Total Time Spent: 25 mins 
________________________________________________________________________ David Hyde MD  
 
Procedures: see electronic medical records for all procedures/Xrays and details which were not copied into this note but were reviewed prior to creation of Plan. LABS: 
I reviewed today's most current labs and imaging studies. Pertinent labs include: 
Recent Labs  
  06/23/19 
0500 WBC 12.0* HGB 8.4* HCT 25.0*  
 Recent Labs  
  06/24/19 
0628 06/23/19 
0500  142  
K 4.3 4.0  
* 116* CO2 18* 20* * 132* BUN 37* 34* CREA 1.14 1.08  
CA 8.5 8.2* PHOS 3.9 3.3 ALB 2.1* 2.0*  
TBILI  --  0.5 SGOT  --  59* ALT  --  63 Signed: David Hyde MD

## 2019-06-24 NOTE — PROGRESS NOTES
[facility] [phone] ([phone]) [facility]: [phone] ([phone]) Patient in bed with eyes closed, did open them briefly to verbal greeting, wife at bedside, noted \"he's resting\". Patient walked earlier today, outside of room, per wife and she noted that it \"wiped him out, he's tired\". Wife did not wish to disturb patient. Patient has NG tube and TPN. Wife shared that Supriya Rosenbaum has the NG tube clamped at times so he can eat some ice chips and liquids\". At this time, both patient and wife appear to be clear in their goals, continued restorative measures for treatment. They are hoping for improvement in his functioning, which is slow going, but they are hopeful. No further need for LCSW support at this time. They seem to have enough support from family.

## 2019-06-24 NOTE — PROGRESS NOTES
Feels a little better today. No nausea or vomiting Visit Vitals /79 (BP 1 Location: Left arm, BP Patient Position: At rest) Pulse 100 Temp 97.8 °F (36.6 °C) Resp 18 Ht 5' 10\" (1.778 m) Wt 112.9 kg (248 lb 14.4 oz) SpO2 97% BMI 35.71 kg/m² Date 06/23/19 0700 - 06/24/19 6286 06/24/19 0700 - 06/25/19 8624 Shift 0632-0425 1061-5677 24 Hour Total 2554-3670 6959-9204 24 Hour Total  
INTAKE  
P.O.  120 120     
  P. O.  120 120     
I. V.(mL/kg/hr) 652.9(0.5) 1005.7(0.7) 1658.6(0.6) Volume (TPN ADULT - CENTRAL) 652.9  652.9 Volume (TPN ADULT - CENTRAL)  1005. 7 1005.7 NG/GT  100 100 Water Flush Volume (mL) (Nasogastric Tube 06/21/19)  60 60 Medication Volume (Nasogastric Tube 06/21/19)  10 10 Intake (ml) (Nasogastric Tube 06/21/19)  30 30 Shift Total(mL/kg) 652. 9(5.7) 1225. 7(10.9) 1878.6(16.6) OUTPUT Urine(mL/kg/hr) 1200(0.9) 1825(1.3) 3025(1.1) Urine Output (mL) (Urinary Ostomy Abdomen) 1200 1825 3025 Emesis/NG output  225 225 Output (ml) (Nasogastric Tube 06/21/19)  225 225 Drains 5  5 Output (ml) (Matthew-Morejon Drain 06/13/19 Abdomen) 5  5 Stool 0  0 Output (ml) (Colostomy 06/02/19) 0  0 Shift Total(mL/kg) 1115(78.5) 6089(46.3) 7976(62.3) NET -552.1 -824.3 -1376.4 Weight (kg) 113.9 112.9 112.9 112.9 112.9 112.9  
 
abd soft Stoma with some stool in bag A/p Dc ngt once stoma output is reliable. Still only with scant output I stoma Continue tpn until ngt out

## 2019-06-24 NOTE — PROGRESS NOTES
Problem: Mobility Impaired (Adult and Pediatric) Goal: *Acute Goals and Plan of Care (Insert Text) Description Physical Therapy Goals Initiated 6/19/2019 1. Patient will move from supine to sit and sit to supine  in bed with independence within 7 day(s). 2.  Patient will transfer from bed to chair and chair to bed with modified independence using the least restrictive device within 7 day(s). 3.  Patient will perform sit to stand with modified independence within 7 day(s). 4.  Patient will ambulate with modified independence for 100 feet with the least restrictive device within 7 day(s). 5.  Patient will ascend/descend 4 stairs with 1 handrail(s) with supervision/set-up within 7 day(s). Outcome: Progressing Towards Goal 
 PHYSICAL THERAPY TREATMENT Patient: Natalie Beasley (78 y.o. male) Date: 6/24/2019 Diagnosis: Bowel obstruction ([facility] [A93.462] [facility] [C79.9] <principal problem not specified> Procedure(s) (LRB): 
ROBOTIC LYSIS OF ADHESIONS SMALL BOWEL RESECTION (N/A) 11 Days Post-Op Precautions:   
Chart, physical therapy assessment, plan of care and goals were reviewed. ASSESSMENT: 
Pt cleared by nurse to mobilize. Pt received in bed supine. Pt agreeable to therapy. Pt performed supine to sit at min A with cueing for sequencing. Pt preformed sit to stand transfer at CGA/min A with cueing for hand placement. Pt ambulated 8-ft x2 with RW at [facility]. Pt requiring cueing to stand upright and walk closer to walker. Pt able to correct for short periods. Pt with good strength but easily fatigued requiring a seated rest break. Pt reported feeling good throughout ambulation . Pt With SOB after ambulation each bout although o2 stats at 96%. Pt declined sitting up in chair but agreeable to getting bed in chair position. Pt left with all needs met in bed with wife in room.  Pt educated on getting out of bed several times a day to improve strength to with understanding but still wanting to stay in bed. Pt will benefit from HHPT to improve strength and endurance. Progression toward goals: 
?    Improving appropriately and progressing toward goals ? Improving slowly and progressing toward goals ? Not making progress toward goals and plan of care will be adjusted PLAN: 
Patient continues to benefit from skilled intervention to address the above impairments. Continue treatment per established plan of care. Discharge Recommendations:  Home Health Further Equipment Recommendations for Discharge:  rolling walker if pt does not have one SUBJECTIVE:  
Patient stated  I want to walk in the weeks.  OBJECTIVE DATA SUMMARY:  
Critical Behavior: 
Neurologic State: Alert Orientation Level: Oriented X4 Cognition: Follows commands Safety/Judgement: Awareness of environment, Insight into deficits Functional Mobility Training: 
Bed Mobility: 
Rolling: Minimum assistance Supine to Sit: Minimum assistance Sit to Supine: Minimum assistance Scooting: Minimum assistance Transfers: 
Sit to Stand: Contact guard assistance;Minimum assistance Stand to Sit: Contact guard assistance Balance: 
Sitting: Intact Sitting - Static: Good (unsupported) Sitting - Dynamic: Fair (occasional) Standing: Impaired Standing - Static: Good;Constant support Standing - Dynamic : Fair;Constant support Ambulation/Gait Training: 
Distance (ft): 80 Feet (ft)(x2) 
Assistive Device: Gait belt;Walker, rolling Ambulation - Level of Assistance: Contact guard assistance Gait Abnormalities: Decreased step clearance(trunk flexion) Base of Support: Widened Speed/Bailey: Pace decreased (<100 feet/min) Step Length: Left shortened;Right shortened Pain: 
Pain Scale 1: Numeric (0 - 10) Pain Intensity 1: 2 Activity Tolerance:  
Pt moving well although easily fatigued. After treatment: ?    Patient left in no apparent distress sitting up in chair ? Patient left in no apparent distress in bed 
? Call bell left within reach ? Nursing notified ? Caregiver present ? Bed alarm activated COMMUNICATION/COLLABORATION:  
The patients plan of care was discussed with: Registered Nurse Dev Avalos PTA Time Calculation: 24 mins

## 2019-06-24 NOTE — PROGRESS NOTES
Problem: Self Care Deficits Care Plan (Adult) Goal: *Acute Goals and Plan of Care (Insert Text) Description Occupational Therapy Goals Initiated 6/19/2019 1. Patient will perform grooming standing at sink with independence within 7 day(s). 2.  Patient will perform upper body dressing with supervision/set-up within 7 day(s). 3.  Patient will perform lower body dressing with supervision/set-up within 7 day(s). 4.  Patient will perform sponge bathing with supervision/set-up within 7 day(s). Outcome: Progressing Towards Goal 
 OCCUPATIONAL THERAPY TREATMENT Patient: Kym Parker (71 y.o. male) Date: 6/24/2019 [facility] [Z20.016] Metastatic cancer ([facility] [C79.9] <principal problem not specified> Procedure(s) (LRB): 
ROBOTIC LYSIS OF ADHESIONS SMALL BOWEL RESECTION (N/A) 11 Days Post-Op Precautions:   
Chart, occupational therapy assessment, plan of care, and goals were reviewed. ASSESSMENT: 
Agreeable to up OOB this afternoon, mod A for bed mobility attempting to reach/pull on therapist, CGA for sit to stand, mild TALBOT with ambulating in room with RW, SPO2 stable and  at rest. Fair endurance, required moderate encouragement to remain upright. Good output of colostomy and ileostomy this PM, RN emptied. Remains total A for LB ADls and toileting needs, Min A for UB ADLs, and intermittent support for balance, with constant support with functional ambulation. At this time Recommend IP rehab verse home with [facility] and wife assist, patient far from baseline of [employer] and driving, poor endurance now and assist with all ADLs. Progression toward goals: 
?       Improving appropriately and progressing toward goals ? Improving slowly and progressing toward goals ? Not making progress toward goals and plan of care will be adjusted PLAN: 
Patient continues to benefit from skilled intervention to address the above impairments. Continue treatment per established plan of care. Discharge Recommendations:  Inpatient Rehab Further Equipment Recommendations for Discharge:  TBD at rehab SUBJECTIVE:  
Patient stated I'll sit up for 10 minutes.  OBJECTIVE DATA SUMMARY:  
Cognitive/Behavioral Status: 
  
 alert Functional Mobility and Transfers for ADLs: 
Bed Mobility: 
Rolling: Moderate assistance; Additional time Supine to Sit: Moderate assistance; Additional time;Assist x1 Sit to Supine: Minimum assistance Scooting: Minimum assistance Transfers: 
Sit to Stand: Contact guard assistance;Minimum assistance Bed to Chair: Contact guard assistance(RW from R side of bed around to chair) Balance: 
Sitting: Intact Sitting - Static: Good (unsupported) Sitting - Dynamic: Fair (occasional) Standing: Impaired Standing - Static: Good;Constant support Standing - Dynamic : Fair;Constant support ADL Intervention: 
  
 
 Agreeable to up OOB, mod A for bed mobiltiy sat EOB for a few minutes without support, CGA for sit to stand to RW with ambulating across room with constant support,min A and RW for balance required, declined standing task once at chair, elevated B LE and  at rest, fatigued and TALBOT. Wife supportive, combing hair, patient declined self grooming task Lower Body Dressing Assistance Socks: Total assistance (dependent) Toileting Toileting Assistance: Total assistance(dependent) Bladder Hygiene: Total assistance (dependent) Bowel Hygiene: Total assistance (dependent) Clothing Management: Total assistance (dependent) Neuro Re-Education: 
  
  
  
Therapeutic Exercises:  
encouraged OOB and full participation with ADLs to improve strength and endurance. Pain: 
Pain Scale 1: Numeric (0 - 10) Pain Intensity 1: 2 Activity Tolerance:  
Fair, TALBOT , SPO2 stable on room air and  post activity Please refer to the flowsheet for vital signs taken during this treatment. After treatment:  
? Patient left in no apparent distress sitting up in chair ? Patient left in no apparent distress in bed 
? Call bell left within reach ? Nursing notified ? Caregiver present ? Bed alarm activated COMMUNICATION/COLLABORATION:  
The patients plan of care was discussed with: Physical Therapy Assistant and Registered Nurse Chen Wooten OT Time Calculation: 24 mins

## 2019-06-24 NOTE — PROGRESS NOTES
Nutrition Assessment: 
 
INTERVENTIONS/RECOMMENDATIONS:  
Continue current TPN regimen Advance diet as medically feasible ASSESSMENT:  
Chart reviewed; patient remains on TPN at goal rate. Previously advanced to clear liquids on 6/20 but was placed back on NPO status 6/21. NGT replaced. TPN meeting ~84% of estimated kcal needs, 91% of protein needs Diet advancement per CRS. Would continue TPN until patient is tolerating at least full liquids. Will continue to follow progress and plan of care. Diet Order: NPO(TPN AA5% D15% @ 83 + lipids 3x/week; 1628 kcal/100g protein) % Eaten:   
Patient Vitals for the past 72 hrs: 
 % Diet Eaten  
06/22/19 0323 0 %  
06/21/19 1930 0 % Pertinent Medications: [x] Reviewed []Other: Norvasc, Humalog, NPH, Labetalol Pertinent Labs: [x]Reviewed  []Other: 
Food Allergies: [x]None []Yes:    
Last BM: 6/23   [x]Active     []Hyperactive  []Hypoactive       [] Absent  BS Skin:    [] Intact   [x] Incision  [] Breakdown   [x]Edema   []Other: Anthropometrics: Height: 5' 10\" (177.8 cm) Weight: 112.9 kg (248 lb 14.4 oz) IBW (%IBW):   ( ) UBW (%UBW):   (  %) BMI: Body mass index is 35.71 kg/m². This BMI is indicative of: 
[]Underweight   []Normal   []Overweight   [x] Obesity   [] Extreme Obesity (BMI>40) Last Weight Metrics: 
Weight Loss Metrics 6/24/2019 4/18/2019 4/9/2019 2/27/2019 12/17/2018 8/21/2018 4/17/2018 Today's Wt 248 lb 14.4 oz 245 lb 245 lb 245 lb 249 lb 247 lb 244 lb BMI 35.71 kg/m2 36.18 kg/m2 36.18 kg/m2 36.18 kg/m2 36.77 kg/m2 36.48 kg/m2 36.03 kg/m2 Estimated Nutrition Needs (Based on): 1933 Kcals/day(BMR (1871) x 1. 3AF -500kcal) , 110g (1.0 g/kg bw)) Protein Carbohydrate: At Least 130 g/day  Fluids:1900 mL/day Pt expected to meet estimated nutrient needs: [x]Yes []No 
 
NUTRITION DIAGNOSES:  
Problem:  Altered GI function Etiology: related to metastatic rectal cancer and SBO Signs/Symptoms: as evidenced by need for TPN to meet nutrition needs NUTRITION INTERVENTIONS: 
  Enteral/Parenteral Nutrition: Other GOAL:  
Continue to tolerate TPN at goal and diet advanced next 1-3 days NUTRITION MONITORING AND EVALUATION Food/Nutrient Intake Outcomes: Enteral/parenteral nutrition intake Physical Signs/Symptoms Outcomes: Weight/weight change, GI profile, Electrolyte and renal profile, Glucose profile Previous Goal Met: 
 [x] Met              [] Progressing Towards Goal              [] Not Progressing Towards Goal  
Previous Recommendations: 
 [x] Implemented          [] Not Implemented          [] Not Applicable LEARNING NEEDS (Diet, Food/Nutrient-Drug Interaction):  
 [x] None Identified 
 [] Identified and Education Provided/Documented 
 [] Identified and Pt declined/was not appropriate Cultural, Hindu, OR Ethnic Dietary Needs:  
 [x] None Identified 
 [] Identified and Addressed 
 
 [x] Interdisciplinary Care Plan Reviewed/Documented  
 [x] Discharge Planning: TBD [x] Participated in Interdisciplinary Rounds NUTRITION RISK:  
 [x] Patient At Nutritional Risk             [] Patient Not At Nutritional Risk Reena Kerr [id number] Pager [phone] Weekend Pager [phone]

## 2019-06-24 NOTE — PROGRESS NOTES
Hematology Oncology Progress Note Follow up for: metastatic rectal cancer CC:He feels a little better Chart notes reviewed since last visit. CC: \" I am ok. \" 
 
Case discussed with following: Patient and wife Patient complains of the following: Sleeping but arouses, no complaints today. Additional concerns noted by the staff:  
 
Patient Vitals for the past 24 hrs: 
 BP Temp Pulse Resp SpO2 Weight  
06/24/19 1110 146/79 97.8 °F (36.6 °C) 100 18 97 %   
06/24/19 0754 (!) 164/91 98.9 °F (37.2 °C) (!) 102 18 100 %   
06/24/19 0642      112.9 kg (248 lb 14.4 oz) 06/24/19 0512 158/79 99.2 °F (37.3 °C) (!) 101 16 98 %   
06/23/19 2359 163/75 99.9 °F (37.7 °C) 95 18 99 %   
06/23/19 1921 169/72 99.5 °F (37.5 °C) 94 18 100 %   
06/23/19 1441 160/86 97.8 °F (36.6 °C) 89 20 100 %   
 
 
ROS negative for 11 organ systems except as ntoed above. Physical Examination: 
Gen NAD 
CV reg Lungs clear 
abd benign Labs: 
Recent Results (from the past 24 hour(s)) GLUCOSE, POC Collection Time: 06/23/19  5:12 PM  
Result Value Ref Range Glucose (POC) 134 (H) 65 - 100 mg/dL Performed by Keagan Silveira GLUCOSE, POC Collection Time: 06/23/19 11:55 PM  
Result Value Ref Range Glucose (POC) 164 (H) 65 - 100 mg/dL Performed by [facility] RENAL FUNCTION PANEL Collection Time: 06/24/19  6:28 AM  
Result Value Ref Range Sodium 143 136 - 145 mmol/L Potassium 4.3 3.5 - 5.1 mmol/L Chloride 115 (H) 97 - 108 mmol/L  
 CO2 18 (L) 21 - 32 mmol/L Anion gap 10 5 - 15 mmol/L Glucose 137 (H) 65 - 100 mg/dL BUN 37 (H) 6 - 20 MG/DL Creatinine 1.14 0.70 - 1.30 MG/DL  
 BUN/Creatinine ratio 32 (H) 12 - 20 GFR est AA >60 >60 ml/min/1.73m2 GFR est non-AA >60 >60 ml/min/1.73m2 Calcium 8.5 8.5 - 10.1 MG/DL Phosphorus 3.9 2.6 - 4.7 MG/DL Albumin 2.1 (L) 3.5 - 5.0 g/dL GLUCOSE, POC Collection Time: 06/24/19  6:45 AM  
Result Value Ref Range Glucose (POC) 125 (H) 65 - 100 mg/dL Performed by Jeff Gerardo (PCT) GLUCOSE, POC Collection Time: 06/24/19 11:40 AM  
Result Value Ref Range Glucose (POC) 148 (H) 65 - 100 mg/dL Performed by Unkown  Imaging: 
KUB: 
Direct comparison is made to prior plain radiographs dated Juanita 3, 2019. 
  
Nasogastric tube and side port overlie the stomach. There are several dilated 
small bowel loops in the abdomen. Degree of small bowel dilatation appears to 
have worsened as compared to prior plain radiographs dated Juanita 3, 2019. No free 
intraperitoneal gas is visualized on supine views. No pathologic calcification 
is seen. 
  
IMPRESSION: Multiple dilated loops of small bowel. Assessment and Plan: SBO: 
- Had adhesions lysed in OR with Dr Elli Sorto 6/13. 
- NG back in place, clamping trials underway - Remains on TPN BOBBI: 
- resolved Metastatic rectal cancer: - Follows with Dr. Erica Gallagher in [address] [place] [address] office,  several chemotherapeutic options ahead for treatment - Will FU with Dr Erica Gallagher after DC 
 
DM - on insulin Hypertension - clonidine, metoprolol, and nitro bid ordered.

## 2019-06-25 NOTE — PROGRESS NOTES
Bedside shift change report given to Linn Rogers (oncoming nurse) by Mallika Del Rosario (offgoing nurse). Report included the following information SBAR, Kardex, Intake/Output, MAR, Recent Results and Cardiac Rhythm ST.

## 2019-06-25 NOTE — PROGRESS NOTES
Hospitalist Progress Note NAME: Pramod Miranda :  1950 MRN:  [id number] Assessment / Plan: 
 
Severe hyperkalemia - resolved s/p HD BOBBI - required HD - now resolved and off dialysis Metabolic acidosis - resolved Sepsis 2/2 unknown source , intraabdominal infection? - resolved Pt had long  surgery on , he is c/o abdominal pain and spiked fever on  Wbc up to 19k , Started on cefepime and flagyl and vanco  
Cont NG to suction, NPO for now Potassium 6.4, bicarb 17 creat up 2.8-3.8 on  EKG : no peaked T waves , given insulin + ca gluconate  
nephro consulted , appreciate management , started on bicarb drip on  S/p HD on  and bicarb drip , K down to wnl and creat improving No further HD on 06/15 Wbc trending down . completed vanco cefipime and flagyl, now off abx since  BCx NTD Hypokalemia - resolved TPN adjusted by Nephro to include K 
recheck tomorrow Hypernatremia - resolved Na adjusted in TPN, now 146. S/p ex-lap  ROBOTIC LYSIS OF ADHESIONS SMALL BOWEL RESECTION on  Small Bowel Obstruction POA-  
Colon Cancer s/p resection with recurrence/Rectal Adenocarcinoma on ChemotX s/p resection On TPN  
CT abd/pelvis on :Small bowel distention with decompressed loops distally is compatible with obstruction likely related to effusion formation in the mid lower abdomen. Mild free fluid Brief op note : Diagnostic laparoscopy converted to open exploratory laparotomy with small bowel bypass and repair of colotomy x6 
IP Oncology consult appreciated & noted- Chemo delayed for now Cont IV dilaudid prn pain and IV antiemetics prn 
TPN to be adjusted : avoid K inside , daily CMP mag phos C/w humalog Sc q6h Tolerating small clears, NGT still in, cont tpn, advance diet per surgery Low grade temp overnight 100.3, follow closely, recheck cbc in am, pan cx and consider repeat imaging if worsesns Hypertension - control improved -titrate PO meds 
-place on clonidine patch and scheduled Nitrobid 
-prn IV hydralazine ordered, IV Labetalol ordered by Nephro 
  
Diabetes Mellitus, controlled 
-Continue with increased dose of NPH 30 units BID, SS, FS monitoring Code status: Full Surrogate Decision Maker: Wife Prophylaxis: lovenox Recommended Disposition: ? inpt rehab, PT following, increase activity 30.0 - 39.9 Obese / Body mass index is 34.48 kg/m². Subjective: Chief Complaint / Reason for Physician Visit 
abd pain/n/v Patient is sleepy but easily arousable with NG tube in place. He denies any nausea or vomiting is tolerating some clears. His wife reports more output from his ostomy. He denies any shortness of breath or chest pain or abdominal pain. He is still very weak. Patient was evaluated at 8:20 AM 
 
 
Discussed with RN events overnight. Review of Systems: 
Symptom Y/N Comments  Symptom Y/N Comments Fever/Chills    Chest Pain n   
Poor Appetite y   Edema Cough n   Abdominal Pain n   
Sputum    Joint Pain SOB/TALBOT n   Pruritis/Rash Nausea/vomit n   Tolerating PT/OT Diarrhea    Tolerating Diet n clears Constipation    Other Could NOT obtain due to:   
 
Objective: VITALS:  
Last 24hrs VS reviewed since prior progress note. Most recent are: 
Patient Vitals for the past 24 hrs: 
 Temp Pulse Resp BP SpO2  
06/25/19 0741 98.7 °F (37.1 °C) 91 20 138/63 100 % 06/25/19 0243 99.3 °F (37.4 °C) 95 18 142/69 100 % 06/24/19 2255 100.2 °F (37.9 °C) (!) 101 22 145/82 100 % 06/24/19 1953 100.3 °F (37.9 °C) (!) 105 20 138/71 100 % 06/24/19 1526 99.3 °F (37.4 °C) (!) 102 20 137/67 100 % 06/24/19 1110 97.8 °F (36.6 °C) 100 18 146/79 97 % Intake/Output Summary (Last 24 hours) at 6/25/2019 1751 Last data filed at 6/25/2019 7756 Gross per 24 hour Intake 851.47 ml Output 2975 ml Net -2123.53 ml PHYSICAL EXAM: 
 Patient is sleepy but arousable oriented x3 NG tube in place. Conjunctiva pink mucous membranes are very dry with some possible thrush and some thick secretions. Cardiovascular regular rate no obvious murmurs rubs or gallops. Lungs are clear but poor effort no wheezes rhonchi or crackles. Abdomen bowel sounds are present soft overall nontender. Ostomy stool and a urostomy with yellow urine in tubing. Extremities no clubbing cyanosis or edema neuro exam is nonfocal. 
 
 
Reviewed most current lab test results and cultures  YES Reviewed most current radiology test results   YES Review and summation of old records today    NO Reviewed patient's current orders and MAR    YES 
PMH/SH reviewed - no change compared to H&P 
________________________________________________________________________ Care Plan discussed with: 
  Comments Patient y Family  y wife RN y   
Care Manager Consultant Multidiciplinary team rounds were held today with , nursing, pharmacist and clinical coordinator. Patient's plan of care was discussed; medications were reviewed and discharge planning was addressed. ________________________________________________________________________ Total NON critical care TIME:    Minutes Total CRITICAL CARE TIME Spent:   Minutes non procedure based Comments >50% of visit spent in counseling and coordination of care    
________________________________________________________________________ Steve Ibrahim MD  
 
Procedures: see electronic medical records for all procedures/Xrays and details which were not copied into this note but were reviewed prior to creation of Plan. LABS: 
I reviewed today's most current labs and imaging studies. Pertinent labs include: 
Recent Labs  
  06/23/19 
0500 WBC 12.0* HGB 8.4* HCT 25.0*  
 Recent Labs  
  06/25/19 
0246 06/24/19 
0628 06/23/19 
0500  143 142  
K 4.2 4.3 4.0  
 * 115* 116* CO2 18* 18* 20* * 137* 132* BUN 37* 37* 34* CREA 1.17 1.14 1.08  
CA 8.2* 8.5 8.2* PHOS 4.1 3.9 3.3 ALB 2.1* 2.1* 2.0*  
TBILI  --   --  0.5 SGOT  --   --  59* ALT  --   --  63 Signed: Verito Hurley MD

## 2019-06-25 NOTE — PROGRESS NOTES
Doing well. Stoma functional with liquid stool and gas Visit Vitals /58 (BP 1 Location: Left arm, BP Patient Position: At rest) Pulse 95 Temp 98.2 °F (36.8 °C) Resp 18 Ht 5' 10\" (1.778 m) Wt 109 kg (240 lb 4.8 oz) SpO2 100% BMI 34.48 kg/m² Date 06/24/19 0700 - 06/25/19 6908 06/25/19 0700 - 06/26/19 1898 Shift 0700-1859 1900-0659 24 Hour Total 7478-6014 6057-9085 24 Hour Total  
INTAKE  
P.O.  120 120     
  P. O.  120 120     
I. V.(mL/kg/hr)  731.5(0.6) 731.5(0.3) Volume (fat emulsion 20% (LIPOSYN, INTRAlipid) infusion 250 mL)  100.7 100.7 Volume (TPN ADULT - CENTRAL)  630.8 630.8 Shift Total(mL/kg)  851.5(7.8) 851.5(7.8) OUTPUT Urine(mL/kg/hr) 1200(0.9) 1400(1.1) 2600(1) 600  600 Urine Voided    600  600 Urine Output (mL) (Urinary Ostomy Abdomen) 1200 1400 2600 Emesis/NG output  0 0 Output (ml) (Nasogastric Tube 06/21/19)  0 0 Drains  25 25 Output (ml) (Matthew-Morejon Drain 06/13/19 Abdomen)  25 25 Stool 250 100 350 Output (ml) (Colostomy 06/02/19) 250 100 350 Shift Total(mL/kg) [id number]) [id number]) [id number]) 600(5.5)  600(5.5) NET -1450 -673.5 -2123.5 -600  -600 Weight (kg) 112.9 109 109 109 109 109  
 
abd soft A/p dc ngt Sips and ice chips Clear liquids tomorrow

## 2019-06-25 NOTE — PROGRESS NOTES
Problem: Mobility Impaired (Adult and Pediatric) Goal: *Acute Goals and Plan of Care (Insert Text) Description Physical Therapy Goals Initiated 6/19/2019 1. Patient will move from supine to sit and sit to supine  in bed with independence within 7 day(s). 2.  Patient will transfer from bed to chair and chair to bed with modified independence using the least restrictive device within 7 day(s). 3.  Patient will perform sit to stand with modified independence within 7 day(s). 4.  Patient will ambulate with modified independence for 100 feet with the least restrictive device within 7 day(s). 5.  Patient will ascend/descend 4 stairs with 1 handrail(s) with supervision/set-up within 7 day(s). Outcome: Progressing Towards Goal 
 PHYSICAL THERAPY TREATMENT Patient: Melissa Garcia (75 y.o. male) Date: 6/25/2019 Diagnosis: [facility] [F96.116] Metastatic cancer ([facility] [C79.9] <principal problem not specified> Procedure(s) (LRB): 
ROBOTIC LYSIS OF ADHESIONS SMALL BOWEL RESECTION (N/A) 12 Days Post-Op Precautions:   
Chart, physical therapy assessment, plan of care and goals were reviewed. ASSESSMENT: 
Pt cleared by nurse to mobilize. Pt received in bed supine. Pt agreeable to therapy. Pt performed supine to sit at min A with cueing for sequencing. Pt performed ist to stand transfer at [facility] with min A. Pt ambulated 80ft x2 with RW at [facility]. Pt requiring cueing to relax shoulders and stand upright. Pt with improved posture after cueing . Pt with improved endurance with decreased dyspnea today. Pt agreeable to sitting up in chair. Pt left up in chair with all needs met. Pt with overall improved mobility tolerance. Pt will benefit from HHPT with family support to improve strength and endurance. Progression toward goals: 
?    Improving appropriately and progressing toward goals ? Improving slowly and progressing toward goals ?    Not making progress toward goals and plan of care will be adjusted PLAN: 
Patient continues to benefit from skilled intervention to address the above impairments. Continue treatment per established plan of care. Discharge Recommendations:  Home Health with family support Further Equipment Recommendations for Discharge:  pt may need RW if he does not have one SUBJECTIVE:  
Patient stated ? Its not as bad today. ? OBJECTIVE DATA SUMMARY:  
Critical Behavior: 
Neurologic State: Alert Orientation Level: Oriented X4 Cognition: Follows commands Safety/Judgement: Awareness of environment, Insight into deficits Functional Mobility Training: 
Bed Mobility: 
Rolling: Minimum assistance Supine to Sit: Minimum assistance Scooting: Contact guard assistance Transfers: 
Sit to Stand: Contact guard assistance Stand to Sit: Contact guard assistance Balance: 
Sitting: Intact Sitting - Static: Good (unsupported) Sitting - Dynamic: Fair (occasional) Standing: Impaired Standing - Static: Good;Constant support Standing - Dynamic : Fair;Constant support Ambulation/Gait Training: 
Distance (ft): 80 Feet (ft)(x2) 
Assistive Device: Gait belt;Walker, rolling Ambulation - Level of Assistance: Contact guard assistance Gait Abnormalities: Decreased step clearance(shoulder elevation) Base of Support: Widened Speed/Bailey: Pace decreased (<100 feet/min) Step Length: Left shortened;Right shortened Pain: Pt with no complaints of pain Activity Tolerance: Pt with improved mobility tolerance and safety with posture. After treatment:  
?    Patient left in no apparent distress sitting up in chair ? Patient left in no apparent distress in bed 
? Call bell left within reach ? Nursing notified ? Caregiver present ? Bed alarm activated COMMUNICATION/COLLABORATION:  
 The patient?s plan of care was discussed with: Registered Nurse Dallas Shultz PTA Time Calculation: 16 mins

## 2019-06-25 NOTE — PROGRESS NOTES
Hematology Oncology Progress Note Follow up for: metastatic rectal cancer CC:He feels a little better Chart notes reviewed since last visit. CC: \" I have more in my bag today\" Case discussed with following: Patient and wife Patient complains of the following: Feeling well, having more output from his ostomy since last night. NG clamped and denies abd pain/bloating. Additional concerns noted by the staff:  
 
Patient Vitals for the past 24 hrs: 
 BP Temp Pulse Resp SpO2 Weight  
06/25/19 0741 138/63 98.7 °F (37.1 °C) 91 20 100 %   
06/25/19 0532      109 kg (240 lb 4.8 oz) 06/25/19 0243 142/69 99.3 °F (37.4 °C) 95 18 100 %   
06/24/19 2255 145/82 100.2 °F (37.9 °C) (!) 101 22 100 %   
06/24/19 1953 138/71 100.3 °F (37.9 °C) (!) 105 20 100 %   
06/24/19 1526 137/67 99.3 °F (37.4 °C) (!) 102 20 100 %   
06/24/19 1110 146/79 97.8 °F (36.6 °C) 100 18 97 %   
 
 
ROS negative for 11 organ systems except as ntoed above. Physical Examination: 
Gen NAD, NG clamped in place CV reg Lungs: CTAB 
abd benign, dual ostomies in place, colostomy with brown soft stool filling half the bag and improved Neuro: CN II-XII grossly intact Psych: Normal mood and affect Labs: 
Recent Results (from the past 24 hour(s)) GLUCOSE, POC Collection Time: 06/24/19 11:40 AM  
Result Value Ref Range Glucose (POC) 148 (H) 65 - 100 mg/dL Performed by Unkown  GLUCOSE, POC Collection Time: 06/24/19  5:52 PM  
Result Value Ref Range Glucose (POC) 110 (H) 65 - 100 mg/dL Performed by Unkown  GLUCOSE, POC Collection Time: 06/25/19 12:13 AM  
Result Value Ref Range Glucose (POC) 127 (H) 65 - 100 mg/dL Performed by Neto Buchanan (PCT) RENAL FUNCTION PANEL Collection Time: 06/25/19  2:46 AM  
Result Value Ref Range Sodium 140 136 - 145 mmol/L Potassium 4.2 3.5 - 5.1 mmol/L  Chloride 114 (H) 97 - 108 mmol/L  
 CO2 18 (L) 21 - 32 mmol/L  
 Anion gap 8 5 - 15 mmol/L Glucose 130 (H) 65 - 100 mg/dL BUN 37 (H) 6 - 20 MG/DL Creatinine 1.17 0.70 - 1.30 MG/DL  
 BUN/Creatinine ratio 32 (H) 12 - 20 GFR est AA >60 >60 ml/min/1.73m2 GFR est non-AA >60 >60 ml/min/1.73m2 Calcium 8.2 (L) 8.5 - 10.1 MG/DL Phosphorus 4.1 2.6 - 4.7 MG/DL Albumin 2.1 (L) 3.5 - 5.0 g/dL GLUCOSE, POC Collection Time: 06/25/19  5:50 AM  
Result Value Ref Range Glucose (POC) 174 (H) 65 - 100 mg/dL Performed by Deyanira Doll (RN) Imaging: 
KUB: 
Direct comparison is made to prior plain radiographs dated Juanita 3, 2019. 
  
Nasogastric tube and side port overlie the stomach. There are several dilated 
small bowel loops in the abdomen. Degree of small bowel dilatation appears to 
have worsened as compared to prior plain radiographs dated Juanita 3, 2019. No free 
intraperitoneal gas is visualized on supine views. No pathologic calcification 
is seen. 
  
IMPRESSION: Multiple dilated loops of small bowel. Assessment and Plan: SBO: 
- Had adhesions lysed in OR with Dr Kerwin Ross 6/13. 
- NG in place, clamping trials underway, tolerating it being clamped this am. 
- Remains on TPN 
- better output from ostomy BOBBI: 
- resolved Metastatic rectal cancer: - Follows with Dr. Shine Altamirano in [address] [place] [address],  several chemotherapeutic options ahead for treatment - Will FU with Dr Shine Altamirano after DC 
 
DM - on insulin Hypertension - clonidine, metoprolol, and nitro bid ordered.

## 2019-06-25 NOTE — PROGRESS NOTES
Bedside shift change report given to Lolita Guadalupe (oncoming nurse) by Guille Newby (offgoing nurse). Report included the following information SBAR.

## 2019-06-26 NOTE — PROGRESS NOTES
Problem: Mobility Impaired (Adult and Pediatric) Goal: *Acute Goals and Plan of Care (Insert Text) Description Physical Therapy Goals Initiated 6/19/2019 1. Patient will move from supine to sit and sit to supine  in bed with independence within 7 day(s).  (MET) 2. Patient will transfer from bed to chair and chair to bed with modified independence using the least restrictive device within 7 day(s). (MET) 3. Patient will perform sit to stand with modified independence within 7 day(s). (MET) 4. Patient will ambulate with modified independence for 100 feet with the least restrictive device within 7 day(s). 5.  Patient will ascend/descend 4 stairs with 1 handrail(s) with supervision/set-up within 7 day(s). Revised 6/26/19 
4. Patient will ambulate with modified independence for 300 feet with the least restrictive device within 7 day(s). 5.  Patient will ascend/descend 4 stairs with 1 handrail(s) with supervision/set-up within 7 day(s). Outcome: Progressing Towards Goal 
 
PHYSICAL THERAPY TREATMENT: WEEKLY REASSESSMENT Patient: Joseline Stephen (1 y.o. male) Date: 6/26/2019 Diagnosis: [facility] [S28.638] Metastatic cancer ([facility] [C79.9] <principal problem not specified> Procedure(s) (LRB): 
ROBOTIC LYSIS OF ADHESIONS SMALL BOWEL RESECTION (N/A) 13 Days Post-Op Precautions:   
Chart, physical therapy assessment, plan of care and goals were reviewed. ASSESSMENT: 
Pt was received in supine, VSS and cleared by nursing to mobilize. Pt needed a significant amount of motivation to participate with therapy. Pt performed all mobility at an overall mod I level. He got out of bed and ambulated down the weeks, he needed to take a seated rest break and then ambulated back to his room. Pt very flat and usually annoyed with therapy attempting session. He refused to sit up in the chair despite encouragement from therapists and wife.  Pt mobilizes well and does not need to be on turn team. Discussed with pt and wife that they can perform short walks in the weeks with RW. Recommending HHPT, unsure if pt is willing to have this service. Patient's progression toward goals since last assessment: making good progress when willing to participate PLAN: 
Goals have been updated based on progression since last assessment. Patient continues to benefit from skilled intervention to address the above impairments. Continue to follow the patient 3 times a week to address goals. Planned Interventions: 
?              Bed Mobility Training             ? Neuromuscular Re-Education ? Transfer Training                   ? Orthotic/Prosthetic Training 
? Gait Training                         ? Modalities ? Therapeutic Exercises           ? Edema Management/Control ? Therapeutic Activities            ? Patient and Family Training/Education ? Other (comment): 
Discharge Recommendations: Home Health Further Equipment Recommendations for Discharge: none SUBJECTIVE:  
Patient stated ?i don't want to.? OBJECTIVE DATA SUMMARY:  
Critical Behavior: 
Neurologic State: Alert Orientation Level: Oriented X4 Cognition: Follows commands Safety/Judgement: Awareness of environment, Insight into deficits Strength:  
  
  
 GW, but much improved Functional Mobility Training: 
Bed Mobility: 
Rolling: Independent Supine to Sit: Independent Scooting: Independent Transfers: 
Sit to Stand: Modified independent Stand to Sit: Modified independent Balance: 
Sitting: Intact Standing: Intact; With support Ambulation/Gait Training: 
Distance (ft): 145 Feet (ft)(2x) Assistive Device: Gait belt;Walker, rolling Ambulation - Level of Assistance: Stand-by assistance Gait Abnormalities: Decreased step clearance Base of Support: Widened Speed/Bailey: Slow Step Length: Left shortened;Right shortened Pain: 
Pain Scale 1: Numeric (0 - 10) Pain Intensity 1: 0 Activity Tolerance: VSS Please refer to the flowsheet for vital signs taken during this treatment. After treatment:  
?  Patient left in no apparent distress sitting up in chair ? Patient left in no apparent distress in bed 
? Call bell left within reach ? Nursing notified ? Caregiver present ? Bed alarm activated COMMUNICATION/COLLABORATION:  
The patient?s plan of care was discussed with: Occupational Therapist, Registered Nurse and  Federico Wagner, PT, DPT Time Calculation: 24 mins

## 2019-06-26 NOTE — PROGRESS NOTES
Hematology Oncology Progress Note Follow up for: metastatic rectal cancer Chart notes reviewed since last visit. CC: \" I am tired today\" Case discussed with following: Patient and wife Patient complains of the following: Laying in bed asleep, went for walk with PT and now tired. NG taken out yesterday, tolerating clear liquids, denies abd pains. Additional concerns noted by the staff:  
 
Patient Vitals for the past 24 hrs: 
 BP Temp Pulse Resp SpO2 Weight  
06/26/19 0735 132/66 97.9 °F (36.6 °C) 82 18 100 %   
06/26/19 0510      107.5 kg (237 lb)  
06/26/19 0302 137/62 98.8 °F (37.1 °C) 81 18 100 %   
06/25/19 2247 127/59 97.8 °F (36.6 °C) 87 18 100 %   
06/25/19 1946 142/59 98 °F (36.7 °C) 88 20 100 %   
06/25/19 1533 141/71 98.2 °F (36.8 °C) 93 18 100 %   
06/25/19 1105 126/58 98.2 °F (36.8 °C) 95 18 100 %   
 
 
ROS negative for 11 organ systems except as ntoed above. Physical Examination: 
Gen NAD, NG clamped in place CV reg Lungs: CTAB 
abd benign, dual ostomies in place, colostomy with brown soft stool filling half the bag and improved Neuro: CN II-XII grossly intact Psych: Normal mood and affect Labs: 
Recent Results (from the past 24 hour(s)) GLUCOSE, POC Collection Time: 06/25/19 12:04 PM  
Result Value Ref Range Glucose (POC) 185 (H) 65 - 100 mg/dL Performed by Dilip Hector(CON) GLUCOSE, POC Collection Time: 06/25/19  6:09 PM  
Result Value Ref Range Glucose (POC) 141 (H) 65 - 100 mg/dL Performed by Dilip Hector(CON) GLUCOSE, POC Collection Time: 06/26/19 12:15 AM  
Result Value Ref Range Glucose (POC) 139 (H) 65 - 100 mg/dL Performed by Sergio Cross (PCT) RENAL FUNCTION PANEL Collection Time: 06/26/19  2:55 AM  
Result Value Ref Range Sodium 141 136 - 145 mmol/L Potassium 4.3 3.5 - 5.1 mmol/L Chloride 114 (H) 97 - 108 mmol/L  
 CO2 18 (L) 21 - 32 mmol/L  Anion gap 9 5 - 15 mmol/L  
 Glucose 146 (H) 65 - 100 mg/dL BUN 42 (H) 6 - 20 MG/DL Creatinine 1.15 0.70 - 1.30 MG/DL  
 BUN/Creatinine ratio 37 (H) 12 - 20 GFR est AA >60 >60 ml/min/1.73m2 GFR est non-AA >60 >60 ml/min/1.73m2 Calcium 8.0 (L) 8.5 - 10.1 MG/DL Phosphorus 4.2 2.6 - 4.7 MG/DL Albumin 2.0 (L) 3.5 - 5.0 g/dL CBC WITH AUTOMATED DIFF Collection Time: 06/26/19  2:55 AM  
Result Value Ref Range WBC 11.7 (H) 4.1 - 11.1 K/uL  
 RBC 2.90 (L) 4.10 - 5.70 M/uL HGB 8.4 (L) 12.1 - 17.0 g/dL HCT 26.6 (L) 36.6 - 50.3 % MCV 91.7 80.0 - 99.0 FL  
 MCH 29.0 26.0 - 34.0 PG  
 MCHC 31.6 30.0 - 36.5 g/dL  
 RDW 17.2 (H) 11.5 - 14.5 % PLATELET 410 865 - 012 K/uL MPV 11.3 8.9 - 12.9 FL  
 NRBC 0.0 0  WBC ABSOLUTE NRBC 0.00 0.00 - 0.01 K/uL NEUTROPHILS 82 (H) 32 - 75 % LYMPHOCYTES 7 (L) 12 - 49 % MONOCYTES 8 5 - 13 % EOSINOPHILS 2 0 - 7 % BASOPHILS 0 0 - 1 % IMMATURE GRANULOCYTES 1 (H) 0.0 - 0.5 % ABS. NEUTROPHILS 9.6 (H) 1.8 - 8.0 K/UL  
 ABS. LYMPHOCYTES 0.9 0.8 - 3.5 K/UL  
 ABS. MONOCYTES 0.9 0.0 - 1.0 K/UL  
 ABS. EOSINOPHILS 0.2 0.0 - 0.4 K/UL  
 ABS. BASOPHILS 0.0 0.0 - 0.1 K/UL  
 ABS. IMM. GRANS. 0.1 (H) 0.00 - 0.04 K/UL  
 DF AUTOMATED MAGNESIUM Collection Time: 06/26/19  2:55 AM  
Result Value Ref Range Magnesium 1.8 1.6 - 2.4 mg/dL GLUCOSE, POC Collection Time: 06/26/19  5:34 AM  
Result Value Ref Range Glucose (POC) 157 (H) 65 - 100 mg/dL Performed by Lewayne Phalen (RN) Imaging: 
KUB: 
Direct comparison is made to prior plain radiographs dated Juanita 3, 2019. 
  
Nasogastric tube and side port overlie the stomach. There are several dilated 
small bowel loops in the abdomen. Degree of small bowel dilatation appears to 
have worsened as compared to prior plain radiographs dated Juanita 3, 2019. No free 
intraperitoneal gas is visualized on supine views. No pathologic calcification 
is seen. 
  
IMPRESSION: Multiple dilated loops of small bowel. Assessment and Plan: SBO: 
- Had adhesions lysed in OR with Dr Arianna Valdes 6/13. 
- NG removed 6/25/. 
- Remains on TPN, tolerating clear liquid diet, improving it seems. - better output from ostomy BOBBI: 
- resolved Metastatic rectal cancer: - Follows with Dr. Leatha Story in [employer] end office,  several chemotherapeutic options ahead for treatment - Will FU with Dr Leatha Story after DC 
 
DM - on insulin Hypertension - clonidine, metoprolol, and nitro bid ordered.

## 2019-06-26 NOTE — PROGRESS NOTES
Bedside shift change report given to Do Sanchez (oncoming nurse) by Yossi Nuñez (offgoing nurse). Report included the following information SBAR, Kardex, Intake/Output, MAR, Recent Results and Cardiac Rhythm NSR. 
 
2115  300 ml of liquid stool emptied from colostomy. Stool output has increased from yesterday. 0015  Patient refused to be turned by lift team.  Patient has walked during the day with 1 person assist and w/ PT. Patient encouraged to turn. Patient makes minimal effort to reposition himself. 
 
0600  Emptied 500 mL of urine from urostomy. Patient continues to have good urine output. 0730  Bedside report given to Yossi Nuñez.

## 2019-06-26 NOTE — PROGRESS NOTES
Problem: Falls - Risk of 
Goal: *Absence of Falls Description Document Jocelyne Ashby Fall Risk and appropriate interventions in the flowsheet. Outcome: Progressing Towards Goal 
  
Problem: Patient Education: Go to Patient Education Activity Goal: Patient/Family Education Outcome: Progressing Towards Goal 
  
Problem: Pressure Injury - Risk of 
Goal: *Prevention of pressure injury Description Document Vishnu Scale and appropriate interventions in the flowsheet. Outcome: Progressing Towards Goal 
  
Problem: Patient Education: Go to Patient Education Activity Goal: Patient/Family Education Outcome: Progressing Towards Goal 
  
Problem: Diabetes Self-Management Goal: *Disease process and treatment process Description Define diabetes and identify own type of diabetes; list 3 options for treating diabetes. Outcome: Progressing Towards Goal 
Goal: *Incorporating nutritional management into lifestyle Description Describe effect of type, amount and timing of food on blood glucose; list 3 methods for planning meals. Outcome: Progressing Towards Goal 
Goal: *Incorporating physical activity into lifestyle Description State effect of exercise on blood glucose levels. Outcome: Progressing Towards Goal 
Goal: *Developing strategies to promote health/change behavior Description Define the ABC's of diabetes; identify appropriate screenings, schedule and personal plan for screenings. Outcome: Progressing Towards Goal 
Goal: *Using medications safely Description State effect of diabetes medications on diabetes; name diabetes medication taking, action and side effects. Outcome: Progressing Towards Goal 
Goal: *Monitoring blood glucose, interpreting and using results Description Identify recommended blood glucose targets  and personal targets. Outcome: Progressing Towards Goal 
Goal: *Prevention, detection, treatment of acute complications Description List symptoms of hyper- and hypoglycemia; describe how to treat low blood sugar and actions for lowering  high blood glucose level. Outcome: Progressing Towards Goal 
Goal: *Prevention, detection and treatment of chronic complications Description Define the natural course of diabetes and describe the relationship of blood glucose levels to long term complications of diabetes. Outcome: Progressing Towards Goal 
Goal: *Developing strategies to address psychosocial issues Description Describe feelings about living with diabetes; identify support needed and support network Outcome: Progressing Towards Goal 
Goal: *Insulin pump training Outcome: Progressing Towards Goal 
Goal: *Sick day guidelines Outcome: Progressing Towards Goal 
Goal: *Patient Specific Goal (EDIT GOAL, INSERT TEXT) Outcome: Progressing Towards Goal 
  
Problem: Patient Education: Go to Patient Education Activity Goal: Patient/Family Education Outcome: Progressing Towards Goal 
  
Problem: Patient Education: Go to Patient Education Activity Goal: Patient/Family Education Outcome: Progressing Towards Goal 
  
Problem: Patient Education: Go to Patient Education Activity Goal: Patient/Family Education Outcome: Progressing Towards Goal 
  
Problem: Infection - Risk of, Surgical Site Infection Goal: *Absence of surgical site infection signs and symptoms Outcome: Progressing Towards Goal 
  
Problem: Patient Education: Go to Patient Education Activity Goal: Patient/Family Education Outcome: Progressing Towards Goal

## 2019-06-26 NOTE — PROGRESS NOTES
Hospitalist Progress Note NAME: Navarro Lehman :  1950 MRN:  [id number] Assessment / Plan: 
 
Severe hyperkalemia - resolved s/p HD BOBBI - required HD - now resolved and off dialysis Metabolic acidosis - resolved Sepsis 2/2 unknown source , intraabdominal infection? - resolved Pt had long  surgery on , he is c/o abdominal pain and spiked fever on  Wbc up to 19k , Started on cefepime and flagyl and vanco  
Cont NG to suction, NPO for now Potassium 6.4, bicarb 17 creat up 2.8-3.8 on  EKG : no peaked T waves , given insulin + ca gluconate  
nephro consulted , appreciate management , started on bicarb drip on  S/p HD on  and bicarb drip , K down to wnl and creat improving No further HD on 06/15 Wbc trending down . completed vanco cefipime and flagyl, now off abx since  BCx NTD Wbc 11K today, no fever Hypokalemia - resolved TPN adjusted by Nephro to include K Follow lytes, ok today Hypernatremia - resolved Na adjusted in TPN, now 141. S/p ex-lap  ROBOTIC LYSIS OF ADHESIONS SMALL BOWEL RESECTION on  Small Bowel Obstruction POA-  
Colon Cancer s/p resection with recurrence/Rectal Adenocarcinoma on ChemotX s/p resection On TPN  
CT abd/pelvis on :Small bowel distention with decompressed loops distally is compatible with obstruction likely related to effusion formation in the mid lower abdomen. Mild free fluid Brief op note : Diagnostic laparoscopy converted to open exploratory laparotomy with small bowel bypass and repair of colotomy x6 
IP Oncology consult appreciated & noted- Chemo delayed for now Cont IV dilaudid prn pain and IV antiemetics prn 
TPN cont for now, daily CMP mag phos C/w humalog Sc q6h Tolerating small clears, NGT out today, cont tpn, advance diet per surgery Hypertension - control improved 
-titrate PO meds 
-place on clonidine patch and scheduled Nitrobid -prn IV hydralazine ordered, IV Labetalol ordered by Nephro 
  
Diabetes Mellitus, controlled 
-Continue with increased dose of NPH 30 units BID, SS, FS monitoring Code status: Full Surrogate Decision Maker: Wife Prophylaxis: lovenox Recommended Disposition:  PT following, increase activity, home health likely. Transfer out of PCU 
 
30.0 - 39.9 Obese / Body mass index is 34.01 kg/m². Subjective: Chief Complaint / Reason for Physician Visit 
abd pain/n/v  
 
6/25 Patient is sleepy but easily arousable with NG tube in place. He denies any nausea or vomiting is tolerating some clears. His wife reports more output from his ostomy. He denies any shortness of breath or chest pain or abdominal pain. He is still very weak. 6/26 pt reports minimal abd pain 1-2/10. No n/v. No appetite. ngt removed today, clears per surgery. Better with PT today Patient was evaluated at 4pm 
 
 
Discussed with RN events overnight. Review of Systems: 
Symptom Y/N Comments  Symptom Y/N Comments Fever/Chills    Chest Pain n   
Poor Appetite y   Edema Cough n   Abdominal Pain n   
Sputum    Joint Pain SOB/TALBOT n   Pruritis/Rash Nausea/vomit n   Tolerating PT/OT Diarrhea    Tolerating Diet n clears Constipation    Other Could NOT obtain due to:   
 
Objective: VITALS:  
Last 24hrs VS reviewed since prior progress note. Most recent are: 
Patient Vitals for the past 24 hrs: 
 Temp Pulse Resp BP SpO2  
06/26/19 1535 98.3 °F (36.8 °C) 84 18 112/54 100 % 06/26/19 1115 98.6 °F (37 °C) 79 18 115/57 100 % 06/26/19 0735 97.9 °F (36.6 °C) 82 18 132/66 100 % 06/26/19 0302 98.8 °F (37.1 °C) 81 18 137/62 100 % 06/25/19 2247 97.8 °F (36.6 °C) 87 18 127/59 100 % 06/25/19 1946 98 °F (36.7 °C) 88 20 142/59 100 % Intake/Output Summary (Last 24 hours) at 6/26/2019 1621 Last data filed at 6/26/2019 1607 Gross per 24 hour Intake 680.6 ml Output 2760 ml Net -2079.4 ml  
  
 
 PHYSICAL EXAM: 
Patient is a little sleepy oriented x3 NG tube in place. Conjunctiva pink mucous membranes are dry   Cardiovascular regular rate no obvious murmurs rubs or gallops. Lungs are clear but poor effort no wheezes rhonchi or crackles. Abdomen bowel sounds are present soft overall nontender. Ostomy stool and a urostomy with yellow urine in tubing. Extremities no clubbing cyanosis or edema neuro exam is nonfocal. 
 
 
Reviewed most current lab test results and cultures  YES Reviewed most current radiology test results   YES Review and summation of old records today    NO Reviewed patient's current orders and MAR    YES 
PMH/SH reviewed - no change compared to H&P 
________________________________________________________________________ Care Plan discussed with: 
  Comments Patient y Family  y wife RN y   
Care Manager y Consultant     
                 y Multidiciplinary team rounds were held today with , nursing, pharmacist and clinical coordinator. Patient's plan of care was discussed; medications were reviewed and discharge planning was addressed. ________________________________________________________________________ Total NON critical care TIME:    Minutes Total CRITICAL CARE TIME Spent:   Minutes non procedure based Comments >50% of visit spent in counseling and coordination of care    
________________________________________________________________________ Hudson Caller, MD  
 
Procedures: see electronic medical records for all procedures/Xrays and details which were not copied into this note but were reviewed prior to creation of Plan. LABS: 
I reviewed today's most current labs and imaging studies. Pertinent labs include: 
Recent Labs  
  06/26/19 
0255 WBC 11.7* HGB 8.4* HCT 26.6*  
 Recent Labs  
  06/26/19 
0255 06/25/19 
0246 06/24/19 
6667  140 143 K 4.3 4.2 4.3 * 114* 115* CO2 18* 18* 18*  
 * 130* 137* BUN 42* 37* 37* CREA 1.15 1.17 1.14  
CA 8.0* 8.2* 8.5 MG 1.8  --   --   
PHOS 4.2 4.1 3.9 ALB 2.0* 2.1* 2.1* Signed: Hudson Caller, MD

## 2019-06-26 NOTE — PROGRESS NOTES
Doing well. Stoma functional with liquid stool and gas Visit Vitals /57 Pulse 79 Temp 98.6 °F (37 °C) Resp 18 Ht 5' 10\" (1.778 m) Wt 107.5 kg (237 lb) SpO2 100% BMI 34.01 kg/m² Date 06/25/19 0700 - 06/26/19 5750 06/26/19 0700 - 06/27/19 7072 Shift 1752-6993 6191-7803 24 Hour Total 4319-5611 6577-0309 24 Hour Total  
INTAKE  
I.V.(mL/kg/hr)  680.6(0.5) 680.6(0.3) Volume (TPN ADULT - CENTRAL)  680.6 680.6 Shift Total(mL/kg)  680. 6(6.3) 680. 6(6.3) OUTPUT Urine(mL/kg/hr) 1100(0.8) 1450(1.1) 2550(1) Urine Voided 600  600 Urine Output (mL) (Urinary Ostomy Abdomen) 500 1450 1950 Drains  10 10 Output (ml) (Matthew-Morejon Drain 06/13/19 Abdomen)  10 10 Stool 500 300 800 Output (ml) (Colostomy 06/02/19) 500 300 800 Shift Total(mL/kg) 9984(82.9) [id number]) [id number]) NET -1600 -1079.4 -2679.4 Weight (kg) 109 107.5 107.5 107.5 107.5 107.5  
 
abd soft A/p Continues to have liquid brown output with a lot of gas this afternoon Adv to clear liquids Continue TPN

## 2019-06-26 NOTE — PROGRESS NOTES
Problem: Self Care Deficits Care Plan (Adult) Goal: *Acute Goals and Plan of Care (Insert Text) Description Occupational Therapy Goals Weekly Re-assessment 6/26/2019 All goals remain appropriate Initiated 6/19/2019 1. Patient will perform grooming standing at sink with independence within 7 day(s). 2.  Patient will perform upper body dressing with supervision/set-up within 7 day(s). 3.  Patient will perform lower body dressing with supervision/set-up within 7 day(s). 4.  Patient will perform sponge bathing with supervision/set-up within 7 day(s). Outcome: Progressing Towards Goal 
 
Occupational Therapy TREATMENT: WEEKLY REASSESSMENT Patient: Edith Wynne (37 y.o. male) Date: 6/26/2019 Diagnosis: Bowel obstruction ([facility] [address] [R40.940] Metastatic cancer ([facility] [address] [C79.9] <principal problem not specified> Procedure(s) (LRB): 
ROBOTIC LYSIS OF ADHESIONS SMALL BOWEL RESECTION (N/A) 13 Days Post-Op Precautions:   
Chart, occupational therapy assessment, plan of care, and goals were reviewed. ASSESSMENT: 
Patient requires A LOT of encouragement to participate in therapy. He is fine with his wife assisting him with ADL, although he can probably do a lot more for himself if he tried. Provided caregiver education/training for his wife to ensure she utilizes the most safe and efficient techniques to assist him. Encouraged him to at least ATTEMPT self-care (like getting himself dressed) before accepting assistance from his wife. Progression toward goals: 
[]            Improving appropriately and progressing toward goals [x]            Improving slowly and progressing toward goals []            Not making progress toward goals and plan of care will be adjusted PLAN: 
Goals have been updated based on progression since last assessment. Patient continues to benefit from skilled intervention to address the above impairments. Continue to follow patient 3 times a week to address goals. Planned Interventions: 
[x]                    Self Care Training                  [x]             Therapeutic Activities [x]                    Functional Mobility Training    []             Cognitive Retraining 
[x]                    Therapeutic Exercises           [x]             Endurance Activities [x]                    Balance Training                   []             Neuromuscular Re-Education []                    Visual/Perceptual Training     [x]        Home Safety Training 
[x]                    Patient Education                 [x]             Family Training/Education []                    Other (comment): 
Discharge Recommendations: Home Health PT Further Equipment Recommendations for Discharge: None anticipated SUBJECTIVE:  
Patient stated I'm too tired.  (but he finally agreed to participate) OBJECTIVE DATA SUMMARY:  
Cognitive/Behavioral Status: 
Neurologic State: Alert; Appropriate for age Orientation Level: Oriented X4 Cognition: Follows commands Functional Mobility and Transfers for ADLs:Bed Mobility: 
Rolling: Independent Supine to Sit: Independent Scooting: Independent Transfers: 
Sit to Stand: Modified independent Functional Transfers Toilet Transfer : Supervision Cues: Verbal cues provided Adaptive Equipment: Zakia Persaud (comment) Balance: 
Sitting: Intact Standing: Intact; With support ADL Intervention: 
  
 
Grooming Washing Face: Independent(seated) Upper Body Dressing Assistance Hospital Gown: (refused) Lower Body Dressing Assistance Socks: Total assistance (dependent) Position Performed: Seated edge of bed Cognitive Retraining Safety/Judgement: Awareness of environment; Insight into deficits Pain: 
Pain Scale 1: Numeric (0 - 10) Pain Intensity 1: 0 Activity Tolerance:  
Fair After treatment:  
[] Patient left in no apparent distress sitting up in chair 
[x] Patient left in no apparent distress in bed [x] Call bell left within reach [x] Nursing notified 
[] Caregiver present 
[] Bed alarm activated COMMUNICATION/COLLABORATION:  
The patients plan of care was discussed with: Physical Therapist and Registered Nurse Sandhya Tobar OTR/L Time Calculation: 24 mins

## 2019-06-26 NOTE — PROGRESS NOTES
[facility] - [facility] and f/u appts CM spoke with therapy and both OT and PT are recommending HH. Pt has been  set up with [facility]. CM met with pt and pt's wife at the bedside. CM will continue to follow for discharge planning needs. Sergio Marino, [address] [place], [place] [address]

## 2019-06-26 NOTE — PROGRESS NOTES
TRANSFER - OUT REPORT: 
 
Verbal report given to [facility] RN(name) on Arminda Ramírez  being transferred to [facility] for routine progression of care Report consisted of patients Situation, Background, Assessment and  
Recommendations(SBAR). Information from the following report(s) SBAR was reviewed with the receiving nurse. Lines:  
Venous Access Device 06/04/19 Upper chest (subclavicular area, right (Active) Central Line Being Utilized Yes 6/26/2019  4:00 PM  
Criteria for Appropriate Use Total parenteral nutrition 6/26/2019  4:00 PM  
Site Assessment Clean, dry, & intact 6/26/2019  4:00 PM  
Date of Last Dressing Change 06/22/19 6/26/2019  8:00 AM  
Dressing Status Clean, dry, & intact 6/26/2019  8:00 AM  
Dressing Type Tape;Transparent 6/26/2019  8:00 AM  
Action Taken Open ports on tubing capped 6/26/2019  3:02 AM  
Date Accessed (Medial Site) 06/21/19 6/26/2019  8:00 AM  
Access Time (Medial Site) 1915 6/10/2019  2:32 AM  
Access Needle Length (Medial Site) 1.5 inches 6/26/2019  8:00 AM  
Positive Blood Return (Medial Site) Yes 6/26/2019  8:00 AM  
Action Taken (Medial Site) Infusing 6/26/2019  8:00 AM  
Positive Blood Return (Lateral Site) Yes 6/25/2019  2:43 AM  
Action Taken (Lateral Site) Flushed; Infusing 6/25/2019  2:43 AM  
Alcohol Cap Used Yes 6/19/2019  8:00 PM  
  
 
Opportunity for questions and clarification was provided. Patient transported with: 
 [employer]

## 2019-06-27 NOTE — PROGRESS NOTES
Hospitalist Progress Note NAME: Edward Stuart :  1950 MRN:  [id number] Assessment / Plan: 
 
Severe hyperkalemia - resolved s/p HD BOBBI - required HD - now resolved and off dialysis Metabolic acidosis - resolved Sepsis 2/2 unknown source , intraabdominal infection? - resolved Pt had long  surgery on , he is c/o abdominal pain and spiked fever on  Wbc up to 19k , Started on cefepime and flagyl and vanco  
Cont NG to suction, NPO for now Potassium 6.4, bicarb 17 creat up 2.8-3.8 on  EKG : no peaked T waves , given insulin + ca gluconate  
nephro consulted , appreciate management , started on bicarb drip on  S/p HD on  and bicarb drip , K down to wnl and creat improving No further HD on 06/15 Wbc trending down . completed vanco cefipime and flagyl, now off abx since  BCx NTD Wbc 11K again today, no fever Hypokalemia - resolved TPN adjusted by Nephro to include K Follow lytes, ok today Hypernatremia - resolved Na adjusted in TPN, now 136 S/p ex-lap  ROBOTIC LYSIS OF ADHESIONS SMALL BOWEL RESECTION on  Small Bowel Obstruction POA-  
Colon Cancer s/p resection with recurrence/Rectal Adenocarcinoma on ChemotX s/p resection On TPN  
CT abd/pelvis on :Small bowel distention with decompressed loops distally is compatible with obstruction likely related to effusion formation in the mid lower abdomen. Mild free fluid Brief op note : Diagnostic laparoscopy converted to open exploratory laparotomy with small bowel bypass and repair of colotomy x6 
IP Oncology consult appreciated & noted- Chemo delayed for now Cont IV dilaudid prn pain and IV antiemetics prn 
TPN cont for now, daily CMP mag phos C/w humalog Sc q6h Tolerating clears, NGT out , cont tpn, advance diet per surgery Hypertension - control improved 
-titrate PO meds 
-place on clonidine patch and scheduled Nitrobid -prn IV hydralazine ordered, IV Labetalol ordered by Nephro 
  
Diabetes Mellitus, controlled 
-Continue with increased dose of NPH 30 units BID, SS, FS monitoring Code status: Full Surrogate Decision Maker: Wife Prophylaxis: lovenox Recommended Disposition:  PT following, increase activity, home health likely. 30.0 - 39.9 Obese / Body mass index is 34.01 kg/m². Subjective: Chief Complaint / Reason for Physician Visit 
abd pain/n/v  
 
6/25 Patient is sleepy but easily arousable with NG tube in place. He denies any nausea or vomiting is tolerating some clears. His wife reports more output from his ostomy. He denies any shortness of breath or chest pain or abdominal pain. He is still very weak. 6/26 pt reports minimal abd pain 1-2/10. No n/v. No appetite. ngt removed today, clears per surgery. Better with PT today 6/27 pt reports awoke around 4 in the morning and felt \"lack of energy\" can specify more,  No pain, no sob, still passing stool. Tolerated clear diet for dinner. Patient was evaluated at 7:30am 
 
 
Discussed with RN events overnight. Review of Systems: 
Symptom Y/N Comments  Symptom Y/N Comments Fever/Chills    Chest Pain n   
Poor Appetite y   Edema Cough n   Abdominal Pain n   
Sputum    Joint Pain SOB/TALBOT n   Pruritis/Rash Nausea/vomit n   Tolerating PT/OT Diarrhea    Tolerating Diet n clears Constipation    Other Could NOT obtain due to:   
 
Objective: VITALS:  
Last 24hrs VS reviewed since prior progress note. Most recent are: 
Patient Vitals for the past 24 hrs: 
 Temp Pulse Resp BP SpO2  
06/27/19 0301 98.1 °F (36.7 °C) 89 18 143/59 100 % 06/26/19 2309 98.4 °F (36.9 °C) 89 18 141/58 100 % 06/26/19 1944 98.1 °F (36.7 °C) 87 18 136/58 99 % 06/26/19 1535 98.3 °F (36.8 °C) 84 18 112/54 100 % 06/26/19 1115 98.6 °F (37 °C) 79 18 115/57 100 % Intake/Output Summary (Last 24 hours) at 6/27/2019 9445 Last data filed at 6/27/2019 4791 Gross per 24 hour Intake 360 ml Output 2680 ml Net -2320 ml PHYSICAL EXAM: 
Patient is a awake and alert this morning oriented x3 NG tube in place. Conjunctiva pink mucous membranes are dry   Cardiovascular regular rate no obvious murmurs rubs or gallops. Lungs are clear but poor effort no wheezes rhonchi or crackles. Abdomen bowel sounds are present soft overall nontender. Ostomy stool and a urostomy with yellow urine in tubing. Extremities no clubbing cyanosis or edema neuro exam is nonfocal. 
 
 
Reviewed most current lab test results and cultures  YES Reviewed most current radiology test results   YES Review and summation of old records today    NO Reviewed patient's current orders and MAR    YES 
PMH/SH reviewed - no change compared to H&P 
________________________________________________________________________ Care Plan discussed with: 
  Comments Patient y Family  y wife RN y   
Care Manager Consultant Multidiciplinary team rounds were held today with , nursing, pharmacist and clinical coordinator. Patient's plan of care was discussed; medications were reviewed and discharge planning was addressed. ________________________________________________________________________ Total NON critical care TIME:    Minutes Total CRITICAL CARE TIME Spent:   Minutes non procedure based Comments >50% of visit spent in counseling and coordination of care    
________________________________________________________________________ James Claudio MD  
 
Procedures: see electronic medical records for all procedures/Xrays and details which were not copied into this note but were reviewed prior to creation of Plan. LABS: 
I reviewed today's most current labs and imaging studies. Pertinent labs include: 
Recent Labs  
  06/27/19 
0242 06/26/19 
0255 WBC 11.4* 11.7* HGB 8.5* 8.4* HCT 26.1* 26.6*  
  278 Recent Labs  
  06/27/19 
0242 06/26/19 
0255 06/25/19 0246  141 140  
K 4.3 4.3 4.2 * 114* 114* CO2 16* 18* 18* * 146* 130* BUN 40* 42* 37* CREA 1.11 1.15 1.17 CA 8.2* 8.0* 8.2* MG 1.8 1.8  --   
PHOS 3.9 4.2 4.1 ALB 2.1* 2.0* 2.1* TBILI 0.4  --   --   
SGOT 50*  --   --   
ALT 61  --   --   
 
 
Signed: Westley Nelson MD

## 2019-06-27 NOTE — PROGRESS NOTES
Hematology Oncology Progress Note Follow up for: metastatic rectal cancer Chart notes reviewed since last visit. CC: \" I am can 't find my walker, feeling sleepy\" Case discussed with following: Patient Patient complains of the following: Laying in bed, moved rooms and can't find  His walker now. Tolerating clear liquids, states his abd is a little full today. Good outpt from ostomy Additional concerns noted by the staff:  
 
Patient Vitals for the past 24 hrs: 
 BP Temp Pulse Resp SpO2 Weight  
06/27/19 0922      107 kg (236 lb)  
06/27/19 0754 143/61 98.5 °F (36.9 °C) 87 18 100 %   
06/27/19 0301 143/59 98.1 °F (36.7 °C) 89 18 100 %   
06/26/19 2309 141/58 98.4 °F (36.9 °C) 89 18 100 %   
06/26/19 1944 136/58 98.1 °F (36.7 °C) 87 18 99 %   
06/26/19 1535 112/54 98.3 °F (36.8 °C) 84 18 100 %   
06/26/19 1115 115/57 98.6 °F (37 °C) 79 18 100 %   
 
 
ROS negative for 11 organ systems except as ntoed above. Physical Examination: 
Gen NAD, NG clamped in place CV reg Lungs: CTAB 
abd benign, dual ostomies in place, colostomy with brown soft stool filling half the bag and improved Neuro: CN II-XII grossly intact Psych: Normal mood and affect Labs: 
Recent Results (from the past 24 hour(s)) GLUCOSE, POC Collection Time: 06/26/19 11:38 AM  
Result Value Ref Range Glucose (POC) 150 (H) 65 - 100 mg/dL Performed by [employer] (PCT) GLUCOSE, POC Collection Time: 06/26/19  5:46 PM  
Result Value Ref Range Glucose (POC) 136 (H) 65 - 100 mg/dL Performed by [employer] (PCT) GLUCOSE, POC Collection Time: 06/26/19 11:06 PM  
Result Value Ref Range Glucose (POC) 182 (H) 65 - 100 mg/dL Performed by Shad Collins METABOLIC PANEL, BASIC Collection Time: 06/27/19  2:42 AM  
Result Value Ref Range Sodium 136 136 - 145 mmol/L Potassium 4.3 3.5 - 5.1 mmol/L  Chloride 112 (H) 97 - 108 mmol/L  
 CO2 16 (L) 21 - 32 mmol/L  
 Anion gap 8 5 - 15 mmol/L Glucose 163 (H) 65 - 100 mg/dL BUN 40 (H) 6 - 20 MG/DL Creatinine 1.11 0.70 - 1.30 MG/DL  
 BUN/Creatinine ratio 36 (H) 12 - 20 GFR est AA >60 >60 ml/min/1.73m2 GFR est non-AA >60 >60 ml/min/1.73m2 Calcium 8.2 (L) 8.5 - 10.1 MG/DL  
PHOSPHORUS Collection Time: 06/27/19  2:42 AM  
Result Value Ref Range Phosphorus 3.9 2.6 - 4.7 MG/DL  
HEPATIC FUNCTION PANEL Collection Time: 06/27/19  2:42 AM  
Result Value Ref Range Protein, total 7.6 6.4 - 8.2 g/dL Albumin 2.1 (L) 3.5 - 5.0 g/dL Globulin 5.5 (H) 2.0 - 4.0 g/dL A-G Ratio 0.4 (L) 1.1 - 2.2 Bilirubin, total 0.4 0.2 - 1.0 MG/DL Bilirubin, direct 0.2 0.0 - 0.2 MG/DL Alk. phosphatase 211 (H) 45 - 117 U/L  
 AST (SGOT) 50 (H) 15 - 37 U/L  
 ALT (SGPT) 61 12 - 78 U/L  
CBC WITH AUTOMATED DIFF Collection Time: 06/27/19  2:42 AM  
Result Value Ref Range WBC 11.4 (H) 4.1 - 11.1 K/uL  
 RBC 2.92 (L) 4.10 - 5.70 M/uL HGB 8.5 (L) 12.1 - 17.0 g/dL HCT 26.1 (L) 36.6 - 50.3 % MCV 89.4 80.0 - 99.0 FL  
 MCH 29.1 26.0 - 34.0 PG  
 MCHC 32.6 30.0 - 36.5 g/dL  
 RDW 16.7 (H) 11.5 - 14.5 % PLATELET 095 682 - 714 K/uL MPV 10.9 8.9 - 12.9 FL  
 NRBC 0.0 0  WBC ABSOLUTE NRBC 0.00 0.00 - 0.01 K/uL NEUTROPHILS 83 (H) 32 - 75 % LYMPHOCYTES 7 (L) 12 - 49 % MONOCYTES 8 5 - 13 % EOSINOPHILS 3 0 - 7 % BASOPHILS 0 0 - 1 % IMMATURE GRANULOCYTES 1 (H) 0.0 - 0.5 % ABS. NEUTROPHILS 9.4 (H) 1.8 - 8.0 K/UL  
 ABS. LYMPHOCYTES 0.7 (L) 0.8 - 3.5 K/UL  
 ABS. MONOCYTES 0.9 0.0 - 1.0 K/UL  
 ABS. EOSINOPHILS 0.3 0.0 - 0.4 K/UL  
 ABS. BASOPHILS 0.0 0.0 - 0.1 K/UL  
 ABS. IMM. GRANS. 0.1 (H) 0.00 - 0.04 K/UL  
 DF AUTOMATED MAGNESIUM Collection Time: 06/27/19  2:42 AM  
Result Value Ref Range Magnesium 1.8 1.6 - 2.4 mg/dL GLUCOSE, POC Collection Time: 06/27/19  5:26 AM  
Result Value Ref Range Glucose (POC) 185 (H) 65 - 100 mg/dL Performed by Tyrell Rosales Imaging: 
KUB: 
Direct comparison is made to prior plain radiographs dated Juanita 3, 2019. 
  
Nasogastric tube and side port overlie the stomach. There are several dilated 
small bowel loops in the abdomen. Degree of small bowel dilatation appears to 
have worsened as compared to prior plain radiographs dated Juanita 3, 2019. No free 
intraperitoneal gas is visualized on supine views. No pathologic calcification 
is seen. 
  
IMPRESSION: Multiple dilated loops of small bowel. Assessment and Plan: SBO: 
- Had adhesions lysed in OR with Dr Elda Pérez 6/13. 
- NG removed 6/25 
- Remains on TPN, tolerating clear liquid diet, improving it seems. - better output from ostomy BOBBI: 
- resolved Metastatic rectal cancer: - Follows with Dr. Katey Alexander in [address] [place] [address] office,  several chemotherapeutic options ahead for treatment - Will FU with Dr Katey Alexander after DC 
 
DM - on insulin Hypertension - clonidine, metoprolol, and nitro bid ordered.

## 2019-06-27 NOTE — PROGRESS NOTES
General Surgery End of Shift Nursing Note Bedside shift change report given to Corrina (oncoming nurse) by Zheng Hutchinson (offgoing nurse). Report included the following information SBAR, Intake/Output and MAR. Shift worked:   [id number] Summary of shift:    Patient was given IV Dilaudid at beginning of shift for reports of pain- slept throughout the night. Easily awakened overnight for drainage of lines/ostomys. Reported this morning that he did not feel right- glucose levels were OK- denied pain and nausea. Juice was administered and patient had no additional complaints. Wife was at the bedside overnight. TPN infusing per order. Issues for physician to address:   - Shaun Murphy

## 2019-06-27 NOTE — PROGRESS NOTES
Nutrition Assessment: 
 
RECOMMENDATIONS:  
Continue diet advancement per surgeon Continue TPN for now, once pt consuming >50% on full liquid/solid diet, would wean TPN 
 
ASSESSMENT:  
Chart reviewed. Pt started on clear liquids yesterday and is tolerating them thus far. He consumed 50% of his breakfast tray. TPN at goal rate which meet the majority of his kcal and protein needs. Colostomy with 200mL OP yesterday, OP is picking up. -205, should improve once TPN is weaned. Electrolytes WNL. Will defer further diet advancement per surgeon. Dietitians Intervention(s)/Plan(s): Advance diet as able, wean TPN, monitor PO intake SUBJECTIVE/OBJECTIVE:  
 
Diet Order: Clear liquids, Other (comment)(TPN: D15, 5% AA @ 83mL/h + 250mL 20% lipids 3 x week (provides 1628kcals/100gPro) ) 
% Eaten:   
Patient Vitals for the past 72 hrs: 
 % Diet Eaten  
06/27/19 0745 50 % Pertinent Medications:humalog, insulin NPH. Chemistries: 
Lab Results Component Value Date/Time Sodium 136 06/27/2019 02:42 AM  
 Potassium 4.3 06/27/2019 02:42 AM  
 Chloride 112 (H) 06/27/2019 02:42 AM  
 CO2 16 (L) 06/27/2019 02:42 AM  
 Anion gap 8 06/27/2019 02:42 AM  
 Glucose 163 (H) 06/27/2019 02:42 AM  
 BUN 40 (H) 06/27/2019 02:42 AM  
 Creatinine 1.11 06/27/2019 02:42 AM  
 BUN/Creatinine ratio 36 (H) 06/27/2019 02:42 AM  
 GFR est AA >60 06/27/2019 02:42 AM  
 GFR est non-AA >60 06/27/2019 02:42 AM  
 Calcium 8.2 (L) 06/27/2019 02:42 AM  
 Albumin 2.1 (L) 06/27/2019 02:42 AM  
  
Anthropometrics: Height: 5' 10\" (177.8 cm) Weight: 107 kg (236 lb)   [x]bed scale (6/27)   []stated   []unknown IBW (%IBW):   ( ) UBW (%UBW):   (  %) BMI: Body mass index is 33.86 kg/m². This BMI is indicative of: 
[]Underweight   []Normal   []Overweight   [x] Obesity   [] Extreme Obesity (BMI>40) Estimated Nutrition Needs (Based on): 1933 Kcals/day(BMR (1871) x 1. 3AF -500kcal) , 88 g(-110g (0.8-1.0 g/kg bw)) Protein Carbohydrate: At Least 130 g/day  Fluids: 2000 mL/day Last BM: colostomy-200mL OP yesterday   [x]Active     []Hyperactive  []Hypoactive       [] Absent   BS Skin:    [] Intact   [x] Incision  [] Breakdown   [] DTI   [] Tears/Excoriation/Abrasion  [x]Edema(trace-BLE) [] Other: Wt Readings from Last 30 Encounters:  
06/27/19 107 kg (236 lb) 04/18/19 111.1 kg (245 lb) 04/09/19 111.1 kg (245 lb)  
02/27/19 111.1 kg (245 lb) 12/17/18 112.9 kg (249 lb)  
08/21/18 112 kg (247 lb) 04/17/18 110.7 kg (244 lb) 04/04/18 113.4 kg (250 lb)  
03/27/18 111.4 kg (245 lb 9 oz) 03/05/18 113.4 kg (250 lb) 02/02/18 114.5 kg (252 lb 6.4 oz) 01/30/18 113.4 kg (250 lb)  
01/22/18 112.7 kg (248 lb 7.3 oz) 01/16/18 114.3 kg (252 lb) 10/16/17 114.8 kg (253 lb)  
09/26/17 114.8 kg (253 lb) 08/08/17 111.6 kg (246 lb)  
06/27/17 106 kg (233 lb 11.2 oz) 05/26/17 108.4 kg (239 lb) 04/27/17 117.9 kg (260 lb) 04/26/17 119.3 kg (263 lb 1.6 oz) 02/08/17 118.1 kg (260 lb 4.8 oz) 02/03/17 111.1 kg (245 lb) 10/25/16 105.7 kg (233 lb) 10/22/16 107 kg (236 lb) 10/15/16 107.1 kg (236 lb 1.8 oz) 10/14/16 109 kg (240 lb 6 oz) 10/04/16 110.7 kg (244 lb) 08/18/16 111.9 kg (246 lb 11.1 oz) 07/13/16 113.7 kg (250 lb 11.2 oz) NUTRITION DIAGNOSES:  
Problem:  Altered GI function ' Etiology: related to metastatic rectal cancer and SBO Signs/Symptoms: as evidenced by need for TPN to meet nutrition needs Previous dx re: altered GI function resolving. NUTRITION INTERVENTIONS: 
Meals/Snacks: Other(Continue diet advancement per surgeon) Enteral/Parenteral Nutrition: Other GOAL:  
Pt will advance to solid diet and wean off of TPN in 2-4 days. NUTRITION MONITORING AND EVALUATION Previous Goal: Continue to tolerate TPN at goal and diet advanced next 1-3 day Previous Goal Met: Yes Previous Recommendations Implemented: Yes Cultural, Moravian, or Ethnic Dietary Needs: None LEARNING NEEDS (Diet, Food/Nutrient-Drug Interaction):  
 [x] None Identified 
 [] Identified and Education Provided/Documented 
 [] Identified and Pt declined/was not appropriate [x] Interdisciplinary Care Plan Reviewed/Documented  
 [x] Participated in Discharge Planning: To be determined  
 [] Interdisciplinary Rounds NUTRITION RISK:  
 [x] High              [] Moderate           []  Low  []  Minimal/Uncompromised Quinn Us RD, [facility] Pager [phone] Weekend Pager [phone]

## 2019-06-27 NOTE — PROGRESS NOTES
Goals are clear for full code status and full restorative care , follow up with oncology upon discharge . We will sign off . Thank you for including Palliative care in the care of this patient . Napolean Moritz MD  
 
[phone]

## 2019-06-27 NOTE — PROGRESS NOTES
Doing well. Tolerating clear liquids Visit Vitals /64 Pulse 84 Temp 98.7 °F (37.1 °C) Resp 18 Ht 5' 10\" (1.778 m) Wt 107 kg (236 lb) SpO2 99% BMI 33.86 kg/m² Date 06/26/19 0700 - 06/27/19 3371 06/27/19 0700 - 06/28/19 6764 Shift 2048-1393 4493-3027 24 Hour Total 8481-1753 0086-3689 24 Hour Total  
INTAKE  
P.O.  360 360 180  180  
  P. O.  360 360 180  180  
I. V.(mL/kg/hr)    2223.8  2223.8 Volume (fat emulsion 20% (LIPOSYN, INTRAlipid) infusion 250 mL)    1104.7  1104.7 Volume (TPN ADULT - CENTRAL)    1119.1  1119.1 Shift Total(mL/kg)  360(3.3) 360(3.3) [id number])  7854.7(90.5) OUTPUT Urine(mL/kg/hr) 850(0.7) 1625(1.3) 2475(1) 600  600 Urine Output (mL) (Urinary Ostomy Abdomen) 850 1625 2475 600  600 Drains  5 5 Output (ml) (Matthew-Morejon Drain 06/13/19 Abdomen)  5 5 Stool 150 50 200 100  100 Output (ml) (Colostomy 06/02/19) 150 50 200 100  100 Shift Total(mL/kg) 1000(9.3) 3341(16.7) 8434(48.8) 700(6.5)  700(6.5) NET -1000 -1320 -2320 1703.8  1703.8 Weight (kg) 107.5 107.5 107.5 107 107 107  
 
abd soft A/p advance to full liquids today Regular diet tomorrow Home Monday likely

## 2019-06-27 NOTE — PROGRESS NOTES
Progress Note Patient: Kym Parker MRN: [id number]  SSN: [id number] YOB: 1950  Age: 70 y.o. Sex: male Admit Date: 6/2/2019 LOS: 25 days Subjective: Pt was sleeping in bed at report, wife was at bedside No complaints of pain Objective:  
 
Vitals:  
 06/27/19 0754 06/27/19 0922 06/27/19 1102 06/27/19 1535 BP: 143/61  142/67 123/64 Pulse: 87  86 84 Resp: 18  17 18 Temp: 98.5 °F (36.9 °C)  98 °F (36.7 °C) 98.7 °F (37.1 °C) TempSrc:      
SpO2: 100%  100% 99% Weight:  107 kg (236 lb) Height:      
  
 
Intake and Output: 
Current Shift: 06/27 0701 - 06/27 1900 In: 2503.8 [P.O.:280; I.V.:2223.8] Out: 700 [Urine:600] Last three shifts: 06/25 1901 - 06/27 0700 In: 1040.6 [P.O.:360; I.V.:680.6] Out: 0858 [WFGFW:0316; Drains:15] Physical Exam:  
GENERAL: alert, cooperative, no distress, appears stated age Lab/Data Review: All lab results for the last 24 hours reviewed. Assessment:  
 
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  
 
 
 
Signed By: Berlin Ann RN   
 June 27, 2019

## 2019-06-27 NOTE — PROGRESS NOTES
Pt transferred from PCU unit last evening to the surgical unit, room 2180. CM gave handoff report to Polo JanuaryDASHA. Vic Diez, [address]

## 2019-06-27 NOTE — DIABETES MGMT
[facility] Progress Note Recommendations/ Comments: 
1) Consider increasing Regular insulin add in to TPN to 25 units/L 
- message sent to Dr. Karan Martines Chart reviewed on Danial Chacon. Current hospital DM medication: NPH 30 Units BID,  Lispro Correctional insulin with insulin resistant sensitivity. Regular insulin add-in to TPN 22 units/L for TDD 44 units Regular. Patient is a 70 y.o. male with known diabetes on Lantus 52 units daily, Lispro correctional insulin per scale 4 times daily, Metformin 1000 mg BID at home. A1c:  
Lab Results Component Value Date/Time Hemoglobin A1c 6.8 (H) 06/03/2019 03:26 AM  
 Hemoglobin A1c 6.5 (H) 10/17/2016 02:40 AM  
 
Recent Glucose Results:  
Lab Results Component Value Date/Time  (H) 06/27/2019 02:42 AM  
 GLUCPOC 185 (H) 06/27/2019 05:26 AM  
 GLUCPOC 182 (H) 06/26/2019 11:06 PM  
 GLUCPOC 136 (H) 06/26/2019 05:46 PM  
  
 
Lab Results Component Value Date/Time Creatinine 1.11 06/27/2019 02:42 AM  
 
Estimated Creatinine Clearance: 76.9 mL/min (based on SCr of 1.11 mg/dL). Active Orders Diet DIET CLEAR LIQUID  
  
 
PO intake: No data found. Will continue to follow as needed. Thank you. Brett Gordon RD [facility] Office:  [phone] Time spent: 4 minutes

## 2019-06-28 NOTE — PROGRESS NOTES
General Surgery End of Shift Nursing Note Bedside shift change report given to Yane Aguirre (oncoming nurse) by Rajeev Guardado (offgoing nurse). Report included the following information SBAR, Intake/Output and MAR. Shift worked:   [id number] Summary of shift:    Uneventful. Denied pain overnight. Eager to get OOB this morning and eat. All output from drain and ostomy in I&O flowsheet. Glucose levels controlled. Issues for physician to address:   None. Alfred De La Cruz

## 2019-06-28 NOTE — PROGRESS NOTES
Hospitalist Progress Note NAME: Teagan Carl :  1950 MRN:  [id number] Assessment / Plan: 
 
Severe hyperkalemia - resolved s/p HD BOBBI - required HD - now resolved and off dialysis Metabolic acidosis - resolved Sepsis 2/2 unknown source , intraabdominal infection? - resolved Pt had long  surgery on , he is c/o abdominal pain and spiked fever on  Wbc up to 19k , Started on cefepime and flagyl and vanco  
Cont NG to suction, NPO for now Potassium 6.4, bicarb 17 creat up 2.8-3.8 on  EKG : no peaked T waves , given insulin + ca gluconate  
nephro consulted , appreciate management , started on bicarb drip on  S/p HD on  and bicarb drip , K down to wnl and creat improving No further HD on 06/15 Wbc trending down . completed vanco cefipime and flagyl, now off abx since  BCx NTD Wbc 10K today, no fever Hypokalemia - resolved TPN adjusted by Nephro to include K Follow lytes, ok today, K trending up- reduce K in TPN Hypernatremia - resolved Na adjusted in TPN, now 137 S/p ex-lap  ROBOTIC LYSIS OF ADHESIONS SMALL BOWEL RESECTION on  Small Bowel Obstruction POA-  
Colon Cancer s/p resection with recurrence/Rectal Adenocarcinoma on ChemotX s/p resection On TPN  
CT abd/pelvis on :Small bowel distention with decompressed loops distally is compatible with obstruction likely related to effusion formation in the mid lower abdomen. Mild free fluid Brief op note : Diagnostic laparoscopy converted to open exploratory laparotomy with small bowel bypass and repair of colotomy x6 
IP Oncology consult appreciated & noted- Chemo delayed for now Cont IV dilaudid prn pain and IV antiemetics prn 
TPN cont for now, daily CMP mag phos C/w humalog Sc q6h Tolerating fulls, NGT out , cont tpn, advance diet per surgery Hypertension - control improved 
-titrate PO meds 
-place on clonidine patch and scheduled Nitrobid -prn IV hydralazine ordered, IV Labetalol ordered by Nephro 
  
Diabetes Mellitus, controlled 
-Continue with increased dose of NPH 30 units BID, SS, FS monitoring 
-change ss to qac and hs 
-adjust insulin in tpn Code status: Full Surrogate Decision Maker: Wife Prophylaxis: lovenox Recommended Disposition:  PT following, increase activity, home health likely. 30.0 - 39.9 Obese / Body mass index is 33.86 kg/m². Subjective: Chief Complaint / Reason for Physician Visit 
abd pain/n/v  
 
6/25 Patient is sleepy but easily arousable with NG tube in place. He denies any nausea or vomiting is tolerating some clears. His wife reports more output from his ostomy. He denies any shortness of breath or chest pain or abdominal pain. He is still very weak. 6/26 pt reports minimal abd pain 1-2/10. No n/v. No appetite. ngt removed today, clears per surgery. Better with PT today 6/27 pt reports awoke around 4 in the morning and felt \"lack of energy\" can specify more,  No pain, no sob, still passing stool. Tolerated clear diet for dinner. 6/28 pt feels well. Eager to eat.  tolerating fulls. No abd pain, stooling. Getting stronger. Patient was evaluated at 7:40am 
 
 
Discussed with RN events overnight. Review of Systems: 
Symptom Y/N Comments  Symptom Y/N Comments Fever/Chills    Chest Pain n   
Poor Appetite y better  Edema Cough n   Abdominal Pain n   
Sputum    Joint Pain SOB/TALBOT n   Pruritis/Rash Nausea/vomit n   Tolerating PT/OT Diarrhea    Tolerating Diet y full Constipation    Other Could NOT obtain due to:   
 
Objective: VITALS:  
Last 24hrs VS reviewed since prior progress note. Most recent are: 
Patient Vitals for the past 24 hrs: 
 Temp Pulse Resp BP SpO2  
06/28/19 0716 98.6 °F (37 °C) 67 18 136/65 100 % 06/28/19 0232 98.6 °F (37 °C) 84 16 144/62 100 % 06/27/19 2324 98.9 °F (37.2 °C) 83 18 121/55 99 % 06/27/19 1931 98.4 °F (36.9 °C) 87 18 124/67 100 % 06/27/19 1535 98.7 °F (37.1 °C) 84 18 123/64 99 % 06/27/19 1102 98 °F (36.7 °C) 86 17 142/67 100 % Intake/Output Summary (Last 24 hours) at 6/28/2019 5021 Last data filed at 6/28/2019 4284 Gross per 24 hour Intake 460 ml Output 2835 ml Net -2375 ml PHYSICAL EXAM: 
Patient is a awake and alert this morning oriented x3 NG. Conjunctiva pink mucous membranes are lessdry   Cardiovascular regular rate no obvious murmurs rubs or gallops. Lungs are clear but poor effort no wheezes rhonchi or crackles. Abdomen bowel sounds are present soft overall nontender. Ostomy stool and a urostomy with yellow urine in tubing. Extremities no clubbing cyanosis or edema neuro exam is nonfocal. No change Reviewed most current lab test results and cultures  YES Reviewed most current radiology test results   YES Review and summation of old records today    NO Reviewed patient's current orders and MAR    YES 
PMH/ reviewed - no change compared to H&P 
________________________________________________________________________ Care Plan discussed with: 
  Comments Patient y Family  y wife RN y   
Care Manager Consultant Multidiciplinary team rounds were held today with , nursing, pharmacist and clinical coordinator. Patient's plan of care was discussed; medications were reviewed and discharge planning was addressed. ________________________________________________________________________ Total NON critical care TIME:    Minutes Total CRITICAL CARE TIME Spent:   Minutes non procedure based Comments >50% of visit spent in counseling and coordination of care    
________________________________________________________________________ Negro Wilson MD  
 
Procedures: see electronic medical records for all procedures/Xrays and details which were not copied into this note but were reviewed prior to creation of Plan. LABS: 
I reviewed today's most current labs and imaging studies. Pertinent labs include: 
Recent Labs  
  06/28/19 0217 06/27/19 0242 06/26/19 0255 WBC 10.1 11.4* 11.7* HGB 8.5* 8.5* 8.4* HCT 26.1* 26.1* 26.6*  
 306 278 Recent Labs  
  06/28/19 0217 06/27/19 0242 06/26/19 0255  136 141  
K 4.6 4.3 4.3 * 112* 114* CO2 16* 16* 18* * 163* 146* BUN 38* 40* 42* CREA 1.16 1.11 1.15  
CA 8.3* 8.2* 8.0*  
MG 1.7 1.8 1.8 PHOS 3.6 3.9 4.2 ALB 2.1* 2.1* 2.0*  
TBILI  --  0.4  --   
SGOT  --  50*  --   
ALT  --  61  --   
 
 
Signed: Alex Arboleda MD

## 2019-06-28 NOTE — PROGRESS NOTES
General Surgery End of Shift Nursing Note Bedside shift change report given to Norberto Vo (oncoming nurse) by Speedy Garcia (offgoing nurse). Report included the following information SBAR. Shift worked:   7a-7p Summary of shift:    Patient is progressing towards goal. Tolerating liquid diet. No N/V. Issues for physician to address:   See Wendy Agustin Number times ambulated in hallway past shift: 0 Number of times OOB to chair past shift: 0 Pain Management: 
Current medication: see mar Patient states pain is manageable on current pain medication: YES 
 
GI: 
 
Current diet:  DIET FULL LIQUID 
TPN ADULT - CENTRAL Tolerating current diet: YES Passing flatus: YES Last Bowel Movement: today Appearance: brown liquid Respiratory: 
 
Incentive Spirometer at bedside: YES Patient instructed on use: YES Patient Safety: 
 
Falls Score: 2 Bed Alarm On? No 
Sitter? No 
 
Blake Cordova

## 2019-06-28 NOTE — PROGRESS NOTES
Hematology Oncology Progress Note Follow up for: metastatic rectal cancer Chart notes reviewed since last visit. CC: \" I am ok, got my filter\" Case discussed with following: Patient Patient complains of the following: Tolerating regular diet this am, feeling tired but denies abd pains Additional concerns noted by the staff:  
 
Patient Vitals for the past 24 hrs: 
 BP Temp Pulse Resp SpO2  
06/28/19 0716 136/65 98.6 °F (37 °C) 67 18 100 % 06/28/19 0232 144/62 98.6 °F (37 °C) 84 16 100 % 06/27/19 2324 121/55 98.9 °F (37.2 °C) 83 18 99 % 06/27/19 1931 124/67 98.4 °F (36.9 °C) 87 18 100 % 06/27/19 1535 123/64 98.7 °F (37.1 °C) 84 18 99 % 06/27/19 1102 142/67 98 °F (36.7 °C) 86 17 100 % ROS negative for 11 organ systems except as ntoed above. Physical Examination: 
Gen NAD, NG out 
CV reg Lungs: CTAB 
abd benign, dual ostomies in place, colostomy with brown soft stool, improved. CATHERINE drain in place Neuro: CN II-XII grossly intact Psych: Normal mood and affect Labs: 
Recent Results (from the past 24 hour(s)) GLUCOSE, POC Collection Time: 06/27/19 11:05 AM  
Result Value Ref Range Glucose (POC) 205 (H) 65 - 100 mg/dL Performed by Justin Wilder (PCT) RENAL FUNCTION PANEL Collection Time: 06/28/19  2:17 AM  
Result Value Ref Range Sodium 137 136 - 145 mmol/L Potassium 4.6 3.5 - 5.1 mmol/L Chloride 112 (H) 97 - 108 mmol/L  
 CO2 16 (L) 21 - 32 mmol/L Anion gap 9 5 - 15 mmol/L Glucose 115 (H) 65 - 100 mg/dL BUN 38 (H) 6 - 20 MG/DL Creatinine 1.16 0.70 - 1.30 MG/DL  
 BUN/Creatinine ratio 33 (H) 12 - 20 GFR est AA >60 >60 ml/min/1.73m2 GFR est non-AA >60 >60 ml/min/1.73m2 Calcium 8.3 (L) 8.5 - 10.1 MG/DL Phosphorus 3.6 2.6 - 4.7 MG/DL Albumin 2.1 (L) 3.5 - 5.0 g/dL CBC WITH AUTOMATED DIFF Collection Time: 06/28/19  2:17 AM  
Result Value Ref Range WBC 10.1 4.1 - 11.1 K/uL  
 RBC 2.93 (L) 4.10 - 5.70 M/uL HGB 8.5 (L) 12.1 - 17.0 g/dL HCT 26.1 (L) 36.6 - 50.3 % MCV 89.1 80.0 - 99.0 FL  
 MCH 29.0 26.0 - 34.0 PG  
 MCHC 32.6 30.0 - 36.5 g/dL  
 RDW 16.6 (H) 11.5 - 14.5 % PLATELET 450 273 - 348 K/uL MPV 10.8 8.9 - 12.9 FL  
 NRBC 0.0 0  WBC ABSOLUTE NRBC 0.00 0.00 - 0.01 K/uL NEUTROPHILS 78 (H) 32 - 75 % LYMPHOCYTES 9 (L) 12 - 49 % MONOCYTES 10 5 - 13 % EOSINOPHILS 2 0 - 7 % BASOPHILS 0 0 - 1 % IMMATURE GRANULOCYTES 1 (H) 0.0 - 0.5 % ABS. NEUTROPHILS 8.0 1.8 - 8.0 K/UL  
 ABS. LYMPHOCYTES 0.9 0.8 - 3.5 K/UL  
 ABS. MONOCYTES 1.0 0.0 - 1.0 K/UL  
 ABS. EOSINOPHILS 0.2 0.0 - 0.4 K/UL  
 ABS. BASOPHILS 0.0 0.0 - 0.1 K/UL  
 ABS. IMM. GRANS. 0.1 (H) 0.00 - 0.04 K/UL  
 DF AUTOMATED MAGNESIUM Collection Time: 06/28/19  2:17 AM  
Result Value Ref Range Magnesium 1.7 1.6 - 2.4 mg/dL Imaging: 
KUB: 
Direct comparison is made to prior plain radiographs dated Juanita 3, 2019. 
  
Nasogastric tube and side port overlie the stomach. There are several dilated 
small bowel loops in the abdomen. Degree of small bowel dilatation appears to 
have worsened as compared to prior plain radiographs dated Juanita 3, 2019. No free 
intraperitoneal gas is visualized on supine views. No pathologic calcification 
is seen. 
  
IMPRESSION: Multiple dilated loops of small bowel. Assessment and Plan: SBO: 
- Had adhesions lysed in OR with Dr Gray Adkins 6/13. 
- NG removed 6/25 
- Improved, diet advanced this am and seems to be tolerating it. - better output from ostomy BOBBI: 
- resolved Metastatic rectal cancer: - Follows with Dr. Herson Lozano in [address] [place] [address] office,  several chemotherapeutic options ahead for treatment - Will FU with Dr [place] Glow after DC 
 
DM - on insulin Hypertension - clonidine, metoprolol, and nitro bid ordered.

## 2019-06-29 NOTE — PROGRESS NOTES
General Surgery End of Shift Nursing Note Bedside shift change report given to [address] [place] [address] (oncoming nurse) by Gagandeep Hameed RN (offgoing nurse). Report included the following information SBAR, Kardex, OR Summary, Intake/Output, MAR, Recent Results and Cardiac Rhythm SR. Shift worked:   7p-7a Summary of shift:    Pt observed resting with eyes closed on most rounds, stated to this nurse and turn team that he is turning himself in bed now. VSS. His wife was @ bedside sleeping on most rounds. Ostomy draining stool. Urostomy draining clear yellow urine. Issues for physician to address:   none Number times ambulated in hallway past shift: 0 Number of times OOB to chair past shift: 0 Pain Management: 
Current medication: none requested Patient states pain is manageable on current pain medication: NO 
 
GI: 
 
Current diet:  DIET FULL LIQUID 
TPN ADULT - CENTRAL Tolerating current diet: YES Passing flatus: YES Last Bowel Movement: today Appearance: liquid Respiratory: 
 
Incentive Spirometer at bedside: YES Patient instructed on use: YES Patient Safety: 
 
Falls Score: 2 Bed Alarm On? No 
Sitter?  No 
 
Erin Hoff RN

## 2019-06-29 NOTE — PROGRESS NOTES
.General Surgery End of Shift Nursing Note Bedside shift change report given to Taran Montalvo RN (oncoming nurse) by Juan Alberto Green RN (offgoing nurse). Report included the following information SBAR, OR Summary, Procedure Summary, Intake/Output, MAR and Recent Results. Shift worked:   [id number] Summary of shift:   Day 16 s/p surgery. Slow to recover. Low PO intake. Meds whole. A&Ox4. TPN continues. Refused to get OOB stating \"not feeling good today\". Family is loving and helpful. No output with colostomy. Urostomy draining to ortega bag with duyen clear urine. Patient on turn team for repositioning. Q2 turns increase his belching. Issues for physician to address:  plan of care Number times ambulated in hallway past shift: 0 Number of times OOB to chair past shift: 0 Pain Management: 
Current medication: Morphine IV with bentyl for gas Patient states pain is manageable on current pain medication: yes GI: 
 
Current diet:  DIET FULL LIQUID 
TPN ADULT - CENTRAL Tolerating current diet: no - poor intake Passing flatus: no 
Last Bowel Movement: 0= colostomy - no ouput Respiratory: 
 
Incentive Spirometer at bedside: yes Patient instructed on use: no 
 
Patient Safety: 
 
Falls Score: 3 Bed Alarm On?no Sitter?  no 
 
Amy Rosario RN

## 2019-06-29 NOTE — PROGRESS NOTES
Pharmacy Clarification of the Prior to Admission Medication Regimen Retrospective to the Admission Medication Reconciliation The patient was interviewed regarding clarification of the prior to admission medication regimen. Patient's wife was present in room and obtained permission from patient to discuss drug regimen with visitor(s) present. Patient's wife was questioned regarding use of any other inhalers, topical products, over the counter medications, herbal medications, vitamin products or ophthalmic/nasal/otic medication use. Information Obtained From: Patient's wife, Medication list, Rx query Recommendations/Findings: The following amendments were made to the patient's active medication list on file at [facility]:  
 
1) Additions: FLUOROURACIL IV 
OXALIPLATIN IV 
PREPARATION H, WITCH HAZEL, EX 
lidocaine (RECTICARE EX) 
multivitamin (ONE A DAY) tablet 
ondansetron hcl (ZOFRAN) 8 mg tablet 
polyethylene glycol (MIRALAX) 17 gram/dose powder 2) Removals:  
Capecitabine (Xeloda) 500 mg tab 3) Changes: 
ferrous sulfate (SLOW FE) 142 mg (45 mg iron) ER tablet (Old regimen: no sig /New regimen: take 142 mg by mouth daily) 
insulin glargine (LANTUS SOLOSTAR U-100 INSULIN) 100 unit/mL (3 mL) inpn (Old regimen: inject 52 units SQ every day /New regimen: inject 34 units SQ QHS) 
multivitamins-minerals-lutein (MULTIVITAMIN 50 PLUS) tab tablet (Old regimen: no sig /New regimen: take 1 tab by mouth daily) 4) Pertinent Pharmacy Findings: 
Identified High Alert Medication Information Current IV Chemotherapy Regimen: FLUOROURACIL IV Oncologist: Dr. Joanne Martin Location receiving chemotherapy: [place] Patient's wife stated the patient was due for an infusion at the beginning of June but did not receive it. Current IV Chemotherapy Regimen: OXALIPLATIN IV Oncologist: Dr. Joanne Martin Location receiving chemotherapy: [place] Patient's wife stated the patient was due for an infusion at the beginning of  but did not receive it. Current IV Chemotherapy Regimen: bevacizumab (AVASTIN IV) Oncologist: Dr. Fouzia Varela Location receiving chemotherapy: [facility] Patient's wife stated the patient was due for an infusion at the beginning of  but did not receive it. ondansetron hcl (ZOFRAN) 8 mg tablet: Patient's wife states the patient has this agent at home but has not needed to take it as of 19. PTA medication list was corrected to the following:  
 
Prior to Admission Medications Prescriptions Last Dose Informant Patient Reported? Taking? FLUOROURACIL IV 2019 Significant Other Yes Yes Sig: by IntraVENous route Once every 2 weeks. HUMALOG KWIKPEN INSULIN 100 unit/mL kwikpen 2019 Significant Other No Yes Sig: USE FOUR TIMES DAILY PER SLIDING SCALE OXALIPLATIN IV 2019 Significant Other Yes Yes Sig: by IntraVENous route Once every 2 weeks. PREPARATION H, WITCH HAZEL, EX 2019 at Unknown time Significant Other Yes Yes Sig: Insert  into rectum daily as needed for Pain. amLODIPine (NORVASC) 10 mg tablet 2019 Significant Other No Yes Sig: TAKE 1 TABLET BY MOUTH EVERY DAY  
aspirin delayed-release 81 mg tablet 2019 Significant Other Yes Yes Sig: Take 81 mg by mouth daily. bevacizumab (AVASTIN IV) 2019 Significant Other Yes Yes Sig: by IntraVENous route Once every 2 weeks. cloNIDine HCl (CATAPRES) 0.2 mg tablet 2019 Significant Other No Yes Sig: Take 1 Tab by mouth two (2) times a day. ferrous sulfate (SLOW FE) 142 mg (45 mg iron) ER tablet 2019 Significant Other Yes Yes Sig: Take 142 mg by mouth Daily (before breakfast). insulin glargine (LANTUS SOLOSTAR U-100 INSULIN) 100 unit/mL (3 mL) inpn 2019 Significant Other Yes Yes Si Units by SubCUTAneous route nightly. isosorbide mononitrate ER (IMDUR) 60 mg CR tablet 6/1/2019 Significant Other No Yes Sig: TAKE 1 TABLET BY MOUTH DAILY. labetalol (NORMODYNE) 100 mg tablet 6/1/2019 Significant Other No Yes Sig: TAKE 2 TABLETS BY MOUTH TWICE A DAY  
lidocaine (RECTICARE EX) 6/1/2019 Significant Other Yes Yes Sig: Insert  into rectum daily as needed for Pain. lisinopril (PRINIVIL, ZESTRIL) 10 mg tablet 6/1/2019 Significant Other Yes Yes Sig: Take 10 mg by mouth daily. magnesium oxide (MAGOX) 400 mg tablet 6/1/2019 Significant Other Yes Yes Sig: Take 400 mg by mouth two (2) times a day. metFORMIN (GLUCOPHAGE) 500 mg tablet 6/1/2019 Significant Other No Yes Sig: TAKE 2 TABLETS BY MOUTH TWICE DAILY WITH MEALS  
multivitamin (ONE A DAY) tablet 6/1/2019 Significant Other Yes Yes Sig: Take 1 Tab by mouth daily. Patient takes [place] vitamin because of ingredients  
multivitamins-minerals-lutein (MULTIVITAMIN 50 PLUS) tab tablet 6/1/2019 Significant Other Yes Yes Sig: Take 1 tablet by mouth daily  
ondansetron hcl (ZOFRAN) 8 mg tablet Not Taking at Unknown time Significant Other Yes No  
Sig: Take 8 mg by mouth every eight (8) hours as needed for Nausea. polyethylene glycol (MIRALAX) 17 gram/dose powder 6/1/2019 Significant Other Yes Yes Sig: Take 17 g by mouth daily as needed (constipation). Facility-Administered Medications: None Trae Ramirez Medication History Pharmacy Technician

## 2019-06-29 NOTE — PROGRESS NOTES
Hospitalist Progress Note NAME: Yumiko Reddy :  1950 MRN:  [id number] Assessment / Plan: 
 
Severe hyperkalemia - resolved s/p HD BOBBI - required HD - now resolved and off dialysis Metabolic acidosis - resolved Sepsis 2/2 unknown source , intraabdominal infection? - resolved Pt had long  surgery on , he is c/o abdominal pain and spiked fever on  Wbc up to 19k , Started on cefepime and flagyl and vanco  
Cont NG to suction, NPO for now Potassium 6.4, bicarb 17 creat up 2.8-3.8 on  EKG : no peaked T waves , given insulin + ca gluconate  
nephro consulted , appreciate management , started on bicarb drip on  S/p HD on  and bicarb drip , K down to wnl and creat improving No further HD on 06/15 Wbc trending down . completed vanco cefipime and flagyl, now off abx since  BCx NTD Wbc 10K now normal, no fever Hypokalemia - resolved TPN adjusted by Nephro to include K Follow lytes, ok today, K trending up- reduced K in TPN , better today follow Hypernatremia - resolved Na adjusted in TPN, follow S/p ex-lap  ROBOTIC LYSIS OF ADHESIONS SMALL BOWEL RESECTION on  Small Bowel Obstruction POA-  
Colon Cancer s/p resection with recurrence/Rectal Adenocarcinoma on ChemotX s/p resection On TPN  
CT abd/pelvis on :Small bowel distention with decompressed loops distally is compatible with obstruction likely related to effusion formation in the mid lower abdomen. Mild free fluid Brief op note : Diagnostic laparoscopy converted to open exploratory laparotomy with small bowel bypass and repair of colotomy x6 
IP Oncology consult appreciated & noted- Chemo delayed for now Cont IV dilaudid prn pain and IV antiemetics prn 
TPN cont for now, daily CMP mag phos C/w humalog Sc q6h Tolerating fulls, NGT out , cont tpn, advance diet per surgery Hypertension - control improved 
-titrate PO meds -place on clonidine patch and scheduled Nitrobid 
-prn IV hydralazine ordered, IV Labetalol ordered by Nephro 
  
Diabetes Mellitus, controlled 
-Increased dose of NPH  To 32 units BID, SS, FS monitoring 
-change ss to qac and hs 
-adjust insulin in tpn 
-once off TPN, at home was on lantus 32 daily and prn humalog Code status: Full Surrogate Decision Maker: Wife Prophylaxis: lovenox Recommended Disposition:  PT following, increase activity, home health likely. ? Monday if ok with surgery. Celia Cates 30.0 - 39.9 Obese / Body mass index is 33.86 kg/m². Subjective: Chief Complaint / Reason for Physician Visit 
abd pain/n/v  
 
6/25 Patient is sleepy but easily arousable with NG tube in place. He denies any nausea or vomiting is tolerating some clears. His wife reports more output from his ostomy. He denies any shortness of breath or chest pain or abdominal pain. He is still very weak. 6/26 pt reports minimal abd pain 1-2/10. No n/v. No appetite. ngt removed today, clears per surgery. Better with PT today 6/27 pt reports awoke around 4 in the morning and felt \"lack of energy\" can specify more,  No pain, no sob, still passing stool. Tolerated clear diet for dinner. 6/28 pt feels well. Eager to eat.  tolerating fulls. No abd pain, stooling. Getting stronger. 6/29 pt feels \"not bad\". Improved mobility. tolerating full liq diet. some intermittent \"gas' pains. Patient was evaluated at 7:45am 
 
 
Discussed with RN events overnight. Review of Systems: 
Symptom Y/N Comments  Symptom Y/N Comments Fever/Chills    Chest Pain n   
Poor Appetite y better  Edema Cough n   Abdominal Pain n   
Sputum    Joint Pain SOB/TALBOT n   Pruritis/Rash Nausea/vomit n   Tolerating PT/OT Diarrhea    Tolerating Diet y full Constipation    Other Could NOT obtain due to:   
 
Objective: VITALS:  
Last 24hrs VS reviewed since prior progress note. Most recent are: Patient Vitals for the past 24 hrs: 
 Temp Pulse Resp BP SpO2  
06/29/19 0714 98 °F (36.7 °C) 96 18 149/76 100 % 06/29/19 0421 98 °F (36.7 °C) 92 18 151/71 100 % 06/28/19 2249 98.5 °F (36.9 °C) 89 16 145/57 100 % 06/28/19 2004 99.1 °F (37.3 °C) 88 16 135/60 100 % 06/28/19 1508 98.4 °F (36.9 °C) 67 18  99 % 06/28/19 1125 98 °F (36.7 °C) 67 18 134/68 100 % Intake/Output Summary (Last 24 hours) at 6/29/2019 5153 Last data filed at 6/29/2019 8245 Gross per 24 hour Intake 240 ml Output 2900 ml Net -2660 ml PHYSICAL EXAM: 
Patient is a awake and alert oriented x3 NG. Conjunctiva pink mucous membranes are moist   Cardiovascular regular rate no obvious murmurs rubs or gallops. Lungs are clear  no wheezes rhonchi or crackles. Abdomen bowel sounds are present soft overall nontender. Ostomy stool and a urostomy with yellow urine in tubing. Extremities no clubbing cyanosis or edema neuro exam is nonfocal. No change Reviewed most current lab test results and cultures  YES Reviewed most current radiology test results   YES Review and summation of old records today    NO Reviewed patient's current orders and MAR    YES 
PMH/ reviewed - no change compared to H&P 
________________________________________________________________________ Care Plan discussed with: 
  Comments Patient y Family  y wife RN y   
Care Manager Consultant Multidiciplinary team rounds were held today with , nursing, pharmacist and clinical coordinator. Patient's plan of care was discussed; medications were reviewed and discharge planning was addressed. ________________________________________________________________________ Total NON critical care TIME:    Minutes Total CRITICAL CARE TIME Spent:   Minutes non procedure based Comments >50% of visit spent in counseling and coordination of care ________________________________________________________________________ Priya Mendez MD  
 
Procedures: see electronic medical records for all procedures/Xrays and details which were not copied into this note but were reviewed prior to creation of Plan. LABS: 
I reviewed today's most current labs and imaging studies. Pertinent labs include: 
Recent Labs  
  06/28/19 0217 06/27/19 0242 WBC 10.1 11.4* HGB 8.5* 8.5* HCT 26.1* 26.1*  
 306 Recent Labs  
  06/29/19 
0420 06/28/19 0217 06/27/19 0242  137 136  
K 4.6 4.6 4.3 * 112* 112* CO2 17* 16* 16* * 115* 163* BUN 36* 38* 40* CREA 1.12 1.16 1.11  
CA 8.4* 8.3* 8.2* MG 1.7 1.7 1.8 PHOS 3.7 3.6 3.9 ALB 2.1* 2.1* 2.1* TBILI  --   --  0.4 SGOT  --   --  50* ALT  --   --  61 Signed: Priya Mendez MD

## 2019-06-30 NOTE — PROGRESS NOTES
5: 62 AM Patient with critical CO2 of 15; Tele-Hospitalist paged.  
 
Kishore Michelle RN 
06/30/19 
5:57 AM

## 2019-06-30 NOTE — PROGRESS NOTES
POD #17 Pt with slow progress and diet is slow. Feels better this am than he did yesterday 
alert VSS Abd: soft + BS ostomy pink with stool in bag Labs: WBC 13, K:4.9 cr:1.35 A/P Will advance to gi lite Ambulate as tolerated Slow progress TPN needs adjusting as K up a little

## 2019-06-30 NOTE — PROGRESS NOTES
.General Surgery End of Shift Nursing Note Bedside shift change report given to Beltran Benitez RN (oncoming nurse) by Genoveva Isaac RN (offgoing nurse). Report included the following information SBAR, OR Summary, Procedure Summary, Intake/Output, MAR and Recent Results. Shift worked:   [id number] Summary of shift:   Patient states, \"I just don't feel good\". Medicated for pain 1x. Changed urostomy pouch. Changed christian-cath dressing. Pt. Refused to get OOB or a bath. Chlorhexidine to abdomen/surgical staples and covered with gauze, abd, and tape. Issues for physician to address:   Plan of care and poor intake. Pt does respond better to options of GI lite. Number times ambulated in hallway past shift: 0 Number of times OOB to chair past shift: 0=refused Pain Management: 
Current medication: IV morphine and bentyl Patient states pain is manageable on current pain medication: yes GI: 
 
Current diet:  TPN ADULT - CENTRAL 
DIET GI LITE (POST SURGICAL) Tolerating current diet: no - very poor intake Passing flatus:no Last Bowel Movement:06/30/2019 - colostomy Respiratory: 
 
Incentive Spirometer at bedside: yes Patient instructed on use: yes - refused Patient Safety: 
 
Falls Score: 3 Bed Alarm On? no 
Sitter?  no 
 
Maureen Reyes RN

## 2019-06-30 NOTE — PROGRESS NOTES
General Surgery End of Shift Nursing Note Bedside shift change report given to [address] [place] [address] (oncoming nurse) by Shelia Green RN (offgoing nurse). Report included the following information SBAR, Kardex, OR Summary, Intake/Output, MAR, Recent Results and Cardiac Rhythm SR. Shift worked:   7p-7a Summary of shift:    Abdomen distended, firm. Pt denies pain or nausea. No output from ostomy,  Pt c/o not feeling well enough to get OOB to chair or ambulate Issues for physician to address:   none Number times ambulated in hallway past shift: 0 Number of times OOB to chair past shift: 0 Pain Management: 
Current medication: none requested 
:  
GI: 
 
Current diet:  DIET FULL LIQUID 
TPN ADULT - CENTRAL Tolerating current diet: NO  Pt had no appetite, no intake Passing flatus: YES  Hard to tell with vented ostomy bag Last Bowel Movement: yesterday Appearance: liquid Respiratory: 
 
Incentive Spirometer at bedside: YES Patient instructed on use: YES Patient Safety: 
 
Falls Score: 2 Bed Alarm On? No 
Sitter?  No 
 
Cierra Gottlieb RN

## 2019-06-30 NOTE — PROGRESS NOTES
Hospitalist Progress Note NAME: Archana Tellez :  1950 MRN:  [id number] Assessment / Plan: 
 
Severe hyperkalemia - resolved s/p HD BOBBI - required HD - now resolved and off dialysis Metabolic acidosis Sepsis 2/2 unknown source , intraabdominal infection? - resolved Pt had long  surgery on , he is c/o abdominal pain and spiked fever on  Wbc up to 19k , Started on cefepime and flagyl and vanco  
Cont NG to suction, NPO for now Potassium 6.4, bicarb 17 creat up 2.8-3.8 on  EKG : no peaked T waves , given insulin + ca gluconate  
nephro consulted , appreciate management , started on bicarb drip on  S/p HD on  and bicarb drip , K down to wnl and creat improving No further HD on 06/15 Wbc trending down . completed vanco cefipime and flagyl, now off abx since  BCx NTD Wbc 10K to 13k today, no fever, follow closely, no on abx 
nongap acidosis worsening, will add po bicarb, discussed adjustments of TPN with pharmacy to decrease K and add mag and increase insulin form 50 to 55untis in bag Hypokalemia - resolved, now trending high again TPN adjusted by Nephro to include K Follow lytes, ok today, K trending up- reduced K in TPN , K up again today, reduce in TPN, follow 
 
hypomag 
-supp IV and add to tpn Hypernatremia - resolved Na adjusted in TPN, follow S/p ex-lap  ROBOTIC LYSIS OF ADHESIONS SMALL BOWEL RESECTION on  Small Bowel Obstruction POA-  
Colon Cancer s/p resection with recurrence/Rectal Adenocarcinoma on ChemotX s/p resection On TPN  
CT abd/pelvis on :Small bowel distention with decompressed loops distally is compatible with obstruction likely related to effusion formation in the mid lower abdomen. Mild free fluid Brief op note : Diagnostic laparoscopy converted to open exploratory laparotomy with small bowel bypass and repair of colotomy x6 
IP Oncology consult appreciated & noted- Chemo delayed for now Cont IV dilaudid prn pain and IV antiemetics prn 
TPN cont for now, daily CMP mag phos - adjust k, mag 
C/w humalog Sc qachs, eating Tolerating fulls, NGT out 6/26, cont tpn, advance diet per surgery to gi lyte today per note Watch leukocytosis Hypertension - control improved 
-titrate PO meds  
-place on clonidine patch and scheduled Nitrobid 
-prn IV hydralazine ordered, IV Labetalol ordered by Nephro 
  
Diabetes Mellitus, controlled 
-Increased dose of NPH  To 32 units BID 6/29, SS, FS monitoring 
-change ss to qac and hs 
-adjust insulin in tpn to 55units 
-once off TPN, at home was on lantus 32 daily and prn humalog Code status: Full Surrogate Decision Maker: Wife Prophylaxis: lovenox Recommended Disposition:  PT following, increase activity, home health likely. ? Monday if ok with surgery. Velora Sis 30.0 - 39.9 Obese / Body mass index is 33.86 kg/m². Subjective: Chief Complaint / Reason for Physician Visit 
abd pain/n/v  
 
6/25 Patient is sleepy but easily arousable with NG tube in place. He denies any nausea or vomiting is tolerating some clears. His wife reports more output from his ostomy. He denies any shortness of breath or chest pain or abdominal pain. He is still very weak. 6/26 pt reports minimal abd pain 1-2/10. No n/v. No appetite. ngt removed today, clears per surgery. Better with PT today 6/27 pt reports awoke around 4 in the morning and felt \"lack of energy\" can specify more,  No pain, no sob, still passing stool. Tolerated clear diet for dinner. 6/28 pt feels well. Eager to eat.  tolerating fulls. No abd pain, stooling. Getting stronger. 6/29 pt feels \"not bad\". Improved mobility. tolerating full liq diet. some intermittent \"gas' pains. 6/30 pt had a bad day yesterday, no appetite only small sips all day and minimal activity per wife, occ cramps in abd \"gas\" no n/v. No sob/cp.    
 
Patient was evaluated at 7:30am 
 
 
 Discussed with RN events overnight. Review of Systems: 
Symptom Y/N Comments  Symptom Y/N Comments Fever/Chills    Chest Pain n   
Poor Appetite y   Edema Cough n   Abdominal Pain n   
Sputum    Joint Pain SOB/TALBOT n   Pruritis/Rash Nausea/vomit n   Tolerating PT/OT Diarrhea    Tolerating Diet y full Constipation    Other Could NOT obtain due to:   
 
Objective: VITALS:  
Last 24hrs VS reviewed since prior progress note. Most recent are: 
Patient Vitals for the past 24 hrs: 
 Temp Pulse Resp BP SpO2  
06/30/19 0647 98.3 °F (36.8 °C) 91 20 148/64 100 % 06/30/19 0406   28    
06/30/19 0404 98.3 °F (36.8 °C) 93 18 148/69 100 % 06/29/19 2335 97.3 °F (36.3 °C) 90 20 141/66 100 % 06/29/19 2018 98.2 °F (36.8 °C) 100 19 153/70 100 % 06/29/19 1514 97.4 °F (36.3 °C) 99 20 148/79 100 % 06/29/19 1057 98.4 °F (36.9 °C) (!) 102 18 153/88 100 % Intake/Output Summary (Last 24 hours) at 6/30/2019 0801 Last data filed at 6/29/2019 1759 Gross per 24 hour Intake 2000.82 ml Output 1350 ml Net 650.82 ml PHYSICAL EXAM: 
Patient is a awake and alert oriented x3 NG. More sleepy this morning. Conjunctiva pink mucous membranes are very dry   Cardiovascular regular rate no obvious murmurs rubs or gallops. Lungs are clear  no wheezes rhonchi or crackles. Abdomen bowel sounds are present soft overall nontender. Ostomy stool and a urostomy with yellow urine in tubing. Extremities no clubbing cyanosis or edema neuro exam is nonfocal.  
 
 
Reviewed most current lab test results and cultures  YES Reviewed most current radiology test results   YES Review and summation of old records today    NO Reviewed patient's current orders and MAR    YES 
PMH/SH reviewed - no change compared to H&P 
________________________________________________________________________ Care Plan discussed with: 
  Comments Patient y Family  y wife RN y   
Care Manager Consultant  alonso Murry Multidiciplinary team rounds were held today with , nursing, pharmacist and clinical coordinator. Patient's plan of care was discussed; medications were reviewed and discharge planning was addressed. ________________________________________________________________________ Total NON critical care TIME:    Minutes Total CRITICAL CARE TIME Spent:   Minutes non procedure based Comments >50% of visit spent in counseling and coordination of care    
________________________________________________________________________ Dave Valdez MD  
 
Procedures: see electronic medical records for all procedures/Xrays and details which were not copied into this note but were reviewed prior to creation of Plan. LABS: 
I reviewed today's most current labs and imaging studies. Pertinent labs include: 
Recent Labs  
  06/30/19 
0412 06/28/19 0217 WBC 13.4* 10.1 HGB 8.7* 8.5* HCT 26.9* 26.1*  
 289 Recent Labs  
  06/30/19 
0412 06/29/19 
0420 06/28/19 0217 * 136 137  
K 4.9 4.6 4.6 * 110* 112* CO2 15* 17* 16* * 163* 115* BUN 41* 36* 38* CREA 1.35* 1.12 1.16  
CA 8.3* 8.4* 8.3*  
MG 1.6 1.7 1.7 PHOS 4.0 3.7 3.6 ALB 2.1* 2.1* 2.1* TBILI 0.8  --   --   
SGOT 32  --   --   
ALT 52  --   --   
 
 
Signed: Dave Valdez MD

## 2019-07-01 NOTE — PROGRESS NOTES
Pt's temp @ 2051 was 100.9. Pt taught to use IS. Was only able to use IS to 250 level , practiced deep breathing,  Coughing with pillow to splint abdomen,   Temp rechecked after breathing exercises was 101, Tylenol given by Jose Luis Irizarry RN @ 7974. Temp recheck was 98.4, and pt now using IS to 1250 level  X 5  @ 7815

## 2019-07-01 NOTE — PROGRESS NOTES
9:35 PM Patient with oral temp 101; 500mg PO Tylenol administered; Will recheck in 30-60 minutes; Primary RN, Magnolia Bowman, tanna.  
 
Fabiana Ding RN

## 2019-07-01 NOTE — PROGRESS NOTES
I returned to the afternoon to discuss findings of the CT scan and what the next step may be. The wife was not there. The patient was there with his children. I told him that the scan did not appear to show any sign of infection in the belly that was obvious, however there were dilated loops that may be related to ileus versus partial small bowel obstruction. And there was progression of an irregular soft tissue mass in the rectum. There was also progression of a lung nodule with surrounding pneumonitis concerning for metastatic neoplasm which had increased in size. He is on oxygen because he feels somewhat short of breath but has not been hypoxic. He denies any significant cough or sputum production or chest discomfort. He has not been eating at all today with minimal sips of fluid only. Still producing stool in the bag. Patient says he would rather not have an NG tube in, but he would do it if it was necessary to get him better. I did not pursue any further palliative care discussions since his wife was not there with him. I did discuss the case with palliative care and hematology earlier in the day. Palliative care will [place] back to see him tomorrow. He has had another fever this afternoon. Blood cultures so far are pending. We will place him on empiric antibiotics with cefepime, Flagyl and vancomycin to cover for possible hospital associated pneumonia or intra-abdominal infection not seen on CAT scan. Will try to obtain sputum. The fever may also be tumor related but will need to discuss this further with hematology.

## 2019-07-01 NOTE — PROGRESS NOTES
Sent tiger text to MD Cody Eckert to inform that pt was given contrast at 11:23 am, but after  taking one sip of it he said he couldn't drink it. The pt is short of breath and he stated his nerves are up. His wife wanted to know could the contrast be given IV? Awaiting response 11:44 Called the office as well to inform MD Cody Eckert about the info typed above.   
 
11:55 Rep stated that order will be sent over to do the IV contrast instead of the oral.

## 2019-07-01 NOTE — PROGRESS NOTES
General Surgery End of Shift Nursing Note Bedside shift change report [facility] ([facility]) by Nighat Hollis (offgoing nurse). Report included the following information SBAR, Kardex, OR Summary, Intake/Output, MAR, Recent Results and Cardiac Rhythm NSR. Shift worked:   7 am to 7 pm  
Summary of shift:    Pt drowsy for the duration of shift. Pt dyspneic with exertion; MD notified and pt placed on 2 L of oxygen. Chest xray, CT of abdomen/pelvis, and CT of chest were ordered. Urostomy draining appropriately. Emptied 45 cc from ostomy. Pt hasn't had much of an appetite all day. At the beginning of shift, pt complained that he had difficulty swallowing in that \"it takes time for things to go down. \" Issues for physician to address:   none Number times ambulated in hallway past shift: 0 Number of times OOB to chair past shift: 0 Pain Management: 
Current medication: tylenol, morphine, bentyl Patient states pain is manageable on current pain medication: YES 
 
GI: 
 
Current diet:  TPN ADULT - CENTRAL 
DIET GI LITE (POST SURGICAL) TPN ADULT - CENTRAL Tolerating current diet: pt not eating much due to lack of appetite Passing flatus: NO  
Last Bowel Movement: today Respiratory: 
 
Incentive Spirometer at bedside: YES Patient instructed on use: YES Patient Safety: 
 
Falls Score: 3 Bed Alarm On? No 
Sitter? No 
 
Cas Santillan

## 2019-07-01 NOTE — PROGRESS NOTES
General Surgery End of Shift Nursing Note Bedside shift change report given to Nilda Canales (oncoming nurse) by Maria De Jesus Mccollum RN (offgoing nurse). Report included the following information SBAR, Kardex, OR Summary, Intake/Output, MAR, Recent Results and Cardiac Rhythm SR. Shift worked:   7p-7a Summary of shift:    lowgrade temp, used IS to treat, Tylenol given. critcal CO2 called, orders given for bicarb tabs TID,  Single set blood cx done peripherally from R arm Issues for physician to address:   none Number times ambulated in hallway past shift: 0  (pt refusing) Number of times OOB to chair past shift: 0 Pain Management: 
Current medication: Tylenol overnight Patient states pain is manageable on current pain medication: YES 
 
GI: 
 
Current diet:  TPN ADULT - CENTRAL 
DIET GI LITE (POST SURGICAL) Tolerating current diet: NO  No appetite Passing flatus: YES Last Bowel Movement: yesterday Appearance:small amt liquid brown stool Respiratory: 
 
Incentive Spirometer at bedside: YES Patient instructed on use: YES Patient Safety: 
 
Falls Score: 2 Bed Alarm On? No 
Sitter?  No 
 
Yamila Carr RN

## 2019-07-01 NOTE — PROGRESS NOTES
Nutrition Assessment: 
 
RECOMMENDATIONS:  
Continue diet per MD 
Continue TPN for now ASSESSMENT:  
Chart reviewed, pt occupied with visitor (?) at time of attempted visit. He has not been eating or drinking well the past few days per progress notes. Palliative is following and hospice/comfort care is not off the table per notes. Plan for imaging. -203. Electrolytes creeping up, adjusted in TPN. Minimal ostomy OP. Will monitor plan of care. For now would continue TPN until plan going forward is more clear. Dietitians Intervention(s)/Plan(s): Continue TPN, monitor plan of care SUBJECTIVE/OBJECTIVE:  
 
Diet Order: Other (comment)(GI Lite + TPN: D15, 5% AA @ 83mL/h + 250mL 20% lipids 3 x week (provides 1628kcals/100gPro) ) 
% Eaten:   
Patient Vitals for the past 72 hrs: 
 % Diet Eaten 06/30/19 1837 0 % 06/30/19 1247 10 % 06/30/19 0920 15 %  
06/29/19 1759 0 % Pertinent Medications:humalog, insulin NPH. Chemistries: 
Lab Results Component Value Date/Time Sodium 134 (L) 07/01/2019 03:37 AM  
 Potassium 4.6 07/01/2019 03:37 AM  
 Chloride 109 (H) 07/01/2019 03:37 AM  
 CO2 14 (LL) 07/01/2019 03:37 AM  
 Anion gap 11 07/01/2019 03:37 AM  
 Glucose 155 (H) 07/01/2019 03:37 AM  
 BUN 44 (H) 07/01/2019 03:37 AM  
 Creatinine 1.34 (H) 07/01/2019 03:37 AM  
 BUN/Creatinine ratio 33 (H) 07/01/2019 03:37 AM  
 GFR est AA >60 07/01/2019 03:37 AM  
 GFR est non-AA 53 (L) 07/01/2019 03:37 AM  
 Calcium 8.8 07/01/2019 03:37 AM  
 Albumin 2.0 (L) 07/01/2019 03:37 AM  
  
Anthropometrics: Height: 5' 10\" (177.8 cm) Weight: 105.7 kg (233 lb)   [x]bed scale (6/30)   []stated   []unknown IBW (%IBW):   ( ) UBW (%UBW):   (  %) BMI: Body mass index is 33.43 kg/m². This BMI is indicative of: 
[]Underweight   []Normal   []Overweight   [x] Obesity   [] Extreme Obesity (BMI>40) Estimated Nutrition Needs (Based on): 1933 Kcals/day(BMR (1871) x 1.3AF -500kcal) , 88 g(-110g (0.8-1.0 g/kg bw)) Protein Carbohydrate: At Least 130 g/day  Fluids: 2000 mL/day Last BM: colostomy-minimal OP   []Active     []Hyperactive  [x]Hypoactive       [] Absent   BS Skin:    [] Intact   [x] Incision  [] Breakdown   [] DTI   [] Tears/Excoriation/Abrasion  [x]Edema(trace-BLE) [] Other: Wt Readings from Last 30 Encounters:  
06/30/19 105.7 kg (233 lb) 04/18/19 111.1 kg (245 lb) 04/09/19 111.1 kg (245 lb)  
02/27/19 111.1 kg (245 lb) 12/17/18 112.9 kg (249 lb)  
08/21/18 112 kg (247 lb) 04/17/18 110.7 kg (244 lb) 04/04/18 113.4 kg (250 lb)  
03/27/18 111.4 kg (245 lb 9 oz) 03/05/18 113.4 kg (250 lb) 02/02/18 114.5 kg (252 lb 6.4 oz) 01/30/18 113.4 kg (250 lb)  
01/22/18 112.7 kg (248 lb 7.3 oz) 01/16/18 114.3 kg (252 lb) 10/16/17 114.8 kg (253 lb)  
09/26/17 114.8 kg (253 lb) 08/08/17 111.6 kg (246 lb)  
06/27/17 106 kg (233 lb 11.2 oz) 05/26/17 108.4 kg (239 lb) 04/27/17 117.9 kg (260 lb) 04/26/17 119.3 kg (263 lb 1.6 oz) 02/08/17 118.1 kg (260 lb 4.8 oz) 02/03/17 111.1 kg (245 lb) 10/25/16 105.7 kg (233 lb) 10/22/16 107 kg (236 lb) 10/15/16 107.1 kg (236 lb 1.8 oz) 10/14/16 109 kg (240 lb 6 oz) 10/04/16 110.7 kg (244 lb) 08/18/16 111.9 kg (246 lb 11.1 oz) 07/13/16 113.7 kg (250 lb 11.2 oz) NUTRITION DIAGNOSES:  
Problem:  Altered GI function Etiology: related to metastatic rectal cancer and SBO Signs/Symptoms: as evidenced by need for TPN to meet nutrition needs Previous dx re: altered GI function continues. NUTRITION INTERVENTIONS: 
Meals/Snacks: Other(dier per MD ) Enteral/Parenteral Nutrition: Other(Continue TPN for now ) GOAL:  
Pt will tolerate TPN with BG <200mg/dL and electrolytes WNL in 2-4 days. NUTRITION MONITORING AND EVALUATION Previous Goal: Pt will advance to solid diet and wean off of TPN in 2-4 days Previous Goal Met: No  
Previous Recommendations Implemented:  Yes  
 Cultural, Quaker, or Ethnic Dietary Needs: None LEARNING NEEDS (Diet, Food/Nutrient-Drug Interaction):  
 [x] None Identified 
 [] Identified and Education Provided/Documented 
 [] Identified and Pt declined/was not appropriate [x] Interdisciplinary Care Plan Reviewed/Documented  
 [x] Participated in Discharge Planning: Unable to determine  
 [] Interdisciplinary Rounds NUTRITION RISK:  
 [x] High              [] Moderate           []  Low  []  Minimal/Uncompromised Ermelinda Quinn RD, [facility] Pager [phone] Weekend Pager [phone]

## 2019-07-01 NOTE — PROGRESS NOTES
Hospitalist Progress Note NAME: Bhavna Calero :  1950 MRN:  [id number] Assessment / Plan: 
 
Severe hyperkalemia - resolved s/p HD BOBBI - required HD - now resolved and off dialysis Metabolic acidosis Sepsis 2/2 unknown source , intraabdominal infection? - resolved Pt had long  surgery on , he is c/o abdominal pain and spiked fever on  Wbc up to 19k , Started on cefepime and flagyl and vanco  
Cont NG to suction, NPO for now Potassium 6.4, bicarb 17 creat up 2.8-3.8 on  EKG : no peaked T waves , given insulin + ca gluconate  
nephro consulted , appreciate management , started on bicarb drip on  S/p HD on  and bicarb drip , K down to wnl and creat improving No further HD on 06/15 
completed vanco cefipime and flagyl, now off abx since  BCx NTD Wbc 10K to 13k to 12K today, 101 fever overnight, follow closely, not on abx, blood cx sent-p, CT abd today, check CXR, IS 
nongap acidosis worsening, increased po bicarb Pharm can't adjust tpn to accommodate acidosis further Hypokalemia - resolved, now trending high again TPN adjusted by Nephro to include K Follow lytes, ok today, K trending up- reduced K in TPN , K better today, reduced in TPN, follow 
 
hypomag 
-supp IV yesterday and added to tpn 
-mag better today Hypernatremia - resolved Na adjusted in TPN, follow S/p ex-lap  ROBOTIC LYSIS OF ADHESIONS SMALL BOWEL RESECTION on  Small Bowel Obstruction POA-  
Colon Cancer s/p resection with recurrence/Rectal Adenocarcinoma on ChemotX s/p resection On TPN  
CT abd/pelvis on :Small bowel distention with decompressed loops distally is compatible with obstruction likely related to effusion formation in the mid lower abdomen. Mild free fluid Brief op note : Diagnostic laparoscopy converted to open exploratory laparotomy with small bowel bypass and repair of colotomy x6 
IP Oncology consult appreciated & noted- Chemo delayed for now Cont IV dilaudid prn pain and IV antiemetics prn 
TPN cont for now, daily CMP mag phos - adjust k, mag 
C/w humalog Sc qachs, eating Tolerating fulls, NGT out 6/26, cont tpn, advance diet per surgery to gi lyte 6/30 per note Fever and not tolerating po today CT scan to reeval 
May need NGT again Family to reconsider more palliative measures depending on CT results. Hypertension - control improved 
-titrate PO meds  
-place on clonidine patch and scheduled Nitrobid 
-prn IV hydralazine ordered, IV Labetalol ordered by Nephro 
  
Diabetes Mellitus, controlled 
-Increased dose of NPH  To 32 units BID 6/29, SS, FS monitoring 
-changed ss to qac and hs 
-adjusted insulin in tpn to 55units 
-once off TPN, at home was on lantus 32 daily and prn humalog 
- watch closely, may need to go down on NPH if not eating as well Code status: Full , reconsult palliative Surrogate Decision Maker: Wife Prophylaxis: lovenox Recommended Disposition:  PT following,  home health ? Hospice? Poor overall prognosis 30.0 - 39.9 Obese / Body mass index is 33.43 kg/m². Subjective: Chief Complaint / Reason for Physician Visit 
abd pain/n/v  
 
6/25 Patient is sleepy but easily arousable with NG tube in place. He denies any nausea or vomiting is tolerating some clears. His wife reports more output from his ostomy. He denies any shortness of breath or chest pain or abdominal pain. He is still very weak. 6/26 pt reports minimal abd pain 1-2/10. No n/v. No appetite. ngt removed today, clears per surgery. Better with PT today 6/27 pt reports awoke around 4 in the morning and felt \"lack of energy\" can specify more,  No pain, no sob, still passing stool. Tolerated clear diet for dinner. 6/28 pt feels well. Eager to eat.  tolerating fulls. No abd pain, stooling. Getting stronger. 6/29 pt feels \"not bad\". Improved mobility. tolerating full liq diet. some intermittent \"gas' pains. 6/30 pt had a bad day yesterday, no appetite only small sips all day and minimal activity per wife, occ cramps in abd \"gas\" no n/v. No sob/cp. 7/1 pt not eating or drinking, seems to deny abd pain, only occ cramp, some slight sob, no desat, no cough, denies n/v. Feels food is moving but \"slow\". We discussed general decline over the weekend. Addressed reeval with palliative care, but will proceed with CT scan first given fever overnight. When asked if he would consider another NGT, he said it would depend. Wife understands longterm prognosis poor. Patient was evaluated at 8:30am 
 
 
Discussed with RN events overnight. Review of Systems: 
Symptom Y/N Comments  Symptom Y/N Comments Fever/Chills    Chest Pain n   
Poor Appetite y   Edema Cough n   Abdominal Pain n   
Sputum    Joint Pain SOB/TALBOT y   Pruritis/Rash Nausea/vomit n   Tolerating PT/OT Diarrhea    Tolerating Diet y full Constipation    Other Could NOT obtain due to:   
 
Objective: VITALS:  
Last 24hrs VS reviewed since prior progress note. Most recent are: 
Patient Vitals for the past 24 hrs: 
 Temp Pulse Resp BP SpO2  
07/01/19 0717 99.3 °F (37.4 °C) 88 20 134/60 100 % 07/01/19 0330 98.8 °F (37.1 °C) 86 20 124/58 100 % 06/30/19 2335 98.4 °F (36.9 °C) 87 20 125/57 100 % 06/30/19 2120 (!) 101 °F (38.3 °C)      
06/30/19 2051 (!) 100.9 °F (38.3 °C) 92 20 127/55 100 % 06/30/19 1519 98.3 °F (36.8 °C) 90 20 137/60 100 % 06/30/19 1101 98.1 °F (36.7 °C) 82 28 131/56 100 % Intake/Output Summary (Last 24 hours) at 7/1/2019 0901 Last data filed at 7/1/2019 0719 Gross per 24 hour Intake 3371 ml Output 3110 ml Net 261 ml PHYSICAL EXAM: 
Patient is a awake and oriented x3 NG mild ill appearing. A little sleepy. Conjunctiva pink mucous membranes are very dry   Cardiovascular regular rate no obvious murmurs rubs or gallops.   Lungs are clear  no wheezes rhonchi or crackles poor effort. Abdomen bowel sounds are present soft overall nontender. Ostomy stool and a urostomy with yellow urine in tubing. Extremities no clubbing cyanosis or edema neuro exam is nonfocal.  
 
 
Reviewed most current lab test results and cultures  YES Reviewed most current radiology test results   YES Review and summation of old records today    NO Reviewed patient's current orders and MAR    YES 
PMH/SH reviewed - no change compared to H&P 
________________________________________________________________________ Care Plan discussed with: 
  Comments Patient y Family  y wife RN y   
Care Manager Consultant  y surgery Multidiciplinary team rounds were held today with , nursing, pharmacist and clinical coordinator. Patient's plan of care was discussed; medications were reviewed and discharge planning was addressed. ________________________________________________________________________ Total NON critical care TIME:    Minutes Total CRITICAL CARE TIME Spent:   Minutes non procedure based Comments >50% of visit spent in counseling and coordination of care    
________________________________________________________________________ Yunier Coburn MD  
 
Procedures: see electronic medical records for all procedures/Xrays and details which were not copied into this note but were reviewed prior to creation of Plan. LABS: 
I reviewed today's most current labs and imaging studies. Pertinent labs include: 
Recent Labs  
  07/01/19 
0337 06/30/19 
6980 WBC 12.7* 13.4* HGB 7.7* 8.7* HCT 22.7* 26.9*  
 287 Recent Labs  
  07/01/19 
7733 06/30/19 
2854 06/29/19 
0420 * 134* 136  
K 4.6 4.9 4.6 * 110* 110* CO2 14* 15* 17* * 194* 163* BUN 44* 41* 36* CREA 1.34* 1.35* 1.12  
CA 8.8 8.3* 8.4* MG 2.6* 1.6 1.7 PHOS 4.8* 4.0 3.7 ALB 2.0* 2.1* 2.1* TBILI  --  0.8  --   
 SGOT  --  32  --   
ALT  --  52  --   
 
 
Signed: Liudmila Porter MD

## 2019-07-01 NOTE — PROGRESS NOTES
CRS Progress Note Unable to keep food down over the weekend. Now spiking fevers up to 101. Acidotic and started on bicarb gtt /73   Pulse 88   Temp (!) 100.8 °F (38.2 °C)   Resp 20   Ht 5' 10\" (1.778 m)   Wt 105.7 kg (233 lb)   SpO2 100%   BMI 33.43 kg/m² NAD, sleepy but AAOx3 Abd soft, mildly tender, no rebound/guarding, mild distension Ostomy with some stool in bag Plan CT chest/abd/pelvis today. On wet read, nothing too concerning. Agree with getting palliative medicine back on board if we cannot get him recovered. Hopefully, we can get him to a point where further chemotherapy would be an option but I am doubtful. He is not an operative candidate at this point. The points of obstruction were relieved and any further operation would set him back without obtaining an R0 resection

## 2019-07-01 NOTE — PROGRESS NOTES
Hematology Oncology Progress Note Follow up for: metastatic rectal cancer Chart notes reviewed since last visit. CC: very sleepy this AM and not verbalizing any complaints. Per wife, his last good day was Friday - he was up walking the halls, sat up in chair most of the day and his mentation was normal. Has spent most of weekend and today, very sleepy. She shared that his name is a combination of 2 baseball greats that his father admired but unfortunately ended up misspelled on birth certificate. Case discussed with following: Patient Patient complains of the following: sleeping, roused briefly but rapidly fell back to sleep. Febrile last PM to 101 Additional concerns noted by the staff:  
 
Patient Vitals for the past 24 hrs: 
 BP Temp Pulse Resp SpO2 Weight 07/01/19 1057 129/86 97.6 °F (36.4 °C) 86 20 100 %   
07/01/19 0717 134/60 99.3 °F (37.4 °C) 88 20 100 %   
07/01/19 0330 124/58 98.8 °F (37.1 °C) 86 20 100 %   
06/30/19 2335 125/57 98.4 °F (36.9 °C) 87 20 100 %   
06/30/19 2132      105.7 kg (233 lb)  
06/30/19 2120  (!) 101 °F (38.3 °C)      
06/30/19 2051 127/55 (!) 100.9 °F (38.3 °C) 92 20 100 %   
06/30/19 1519 137/60 98.3 °F (36.8 °C) 90 20 100 %   
 
 
ROS negative for 11 organ systems except as ntoed above. Physical Examination: 
Gen NAD, sleeping on arrival, roused briefly HEENT: unremarkable Neck: no visible JVD or adenopathy Chest: clear anterolaterally CV reg rate and rhythm 
abd: dual ostomies in place, colostomy with brown soft stool, hypoactive BS Extr: without edema Neuro: nonfocal but not specifically tested. Psych: sleeping, conversation about the origin of his name, but then fell back to sleep Skin: no suspicious lesions. Labs: 
Recent Results (from the past 24 hour(s)) GLUCOSE, POC Collection Time: 06/30/19  5:58 PM  
Result Value Ref Range Glucose (POC) 217 (H) 65 - 100 mg/dL Performed by Margot Orellana (PCT) GLUCOSE, POC Collection Time: 06/30/19 11:21 PM  
Result Value Ref Range Glucose (POC) 156 (H) 65 - 100 mg/dL Performed by [place]ota (PCT) RENAL FUNCTION PANEL Collection Time: 07/01/19  3:37 AM  
Result Value Ref Range Sodium 134 (L) 136 - 145 mmol/L Potassium 4.6 3.5 - 5.1 mmol/L Chloride 109 (H) 97 - 108 mmol/L  
 CO2 14 (LL) 21 - 32 mmol/L Anion gap 11 5 - 15 mmol/L Glucose 155 (H) 65 - 100 mg/dL BUN 44 (H) 6 - 20 MG/DL Creatinine 1.34 (H) 0.70 - 1.30 MG/DL  
 BUN/Creatinine ratio 33 (H) 12 - 20 GFR est AA >60 >60 ml/min/1.73m2 GFR est non-AA 53 (L) >60 ml/min/1.73m2 Calcium 8.8 8.5 - 10.1 MG/DL Phosphorus 4.8 (H) 2.6 - 4.7 MG/DL Albumin 2.0 (L) 3.5 - 5.0 g/dL CBC WITH AUTOMATED DIFF Collection Time: 07/01/19  3:37 AM  
Result Value Ref Range WBC 12.7 (H) 4.1 - 11.1 K/uL  
 RBC 2.56 (L) 4.10 - 5.70 M/uL HGB 7.7 (L) 12.1 - 17.0 g/dL HCT 22.7 (L) 36.6 - 50.3 % MCV 88.7 80.0 - 99.0 FL  
 MCH 30.1 26.0 - 34.0 PG  
 MCHC 33.9 30.0 - 36.5 g/dL  
 RDW 16.8 (H) 11.5 - 14.5 % PLATELET 674 653 - 911 K/uL MPV 11.5 8.9 - 12.9 FL  
 NRBC 0.0 0  WBC ABSOLUTE NRBC 0.00 0.00 - 0.01 K/uL NEUTROPHILS 82 (H) 32 - 75 % LYMPHOCYTES 6 (L) 12 - 49 % MONOCYTES 10 5 - 13 % EOSINOPHILS 2 0 - 7 % BASOPHILS 0 0 - 1 % IMMATURE GRANULOCYTES 1 (H) 0.0 - 0.5 % ABS. NEUTROPHILS 10.4 (H) 1.8 - 8.0 K/UL  
 ABS. LYMPHOCYTES 0.7 (L) 0.8 - 3.5 K/UL  
 ABS. MONOCYTES 1.3 (H) 0.0 - 1.0 K/UL  
 ABS. EOSINOPHILS 0.2 0.0 - 0.4 K/UL  
 ABS. BASOPHILS 0.0 0.0 - 0.1 K/UL  
 ABS. IMM. GRANS. 0.1 (H) 0.00 - 0.04 K/UL  
 DF AUTOMATED MAGNESIUM Collection Time: 07/01/19  3:37 AM  
Result Value Ref Range Magnesium 2.6 (H) 1.6 - 2.4 mg/dL GLUCOSE, POC Collection Time: 07/01/19  5:54 AM  
Result Value Ref Range Glucose (POC) 160 (H) 65 - 100 mg/dL Performed by Rina Perales (PCT) Imaging: CXray 6/1/19 - no acute process Assessment and Plan: SBO: 
- Had adhesions lysed in OR with Dr Stephen Nguyen 6/13. 
- NG removed 6/25 
- Improved, diet advanced but over weekend appetite worsened. Acidosis  
- sodium bicarb ordered, adjust TPN Mental status changes  
- very sleepy. ? If infection brewing given fever.  
- CT pending.  
- this is distinct change for him. BOBBI: 
- resolved. Cr 1.34 this AM 
 
Metastatic rectal cancer: - Follows with Dr. Mary Vieira in [address] [place] [address] office,  several chemotherapeutic options available for treatment and would generally not be considered terminal from perspective of lack of available treatment options - the question is whether he is able to recover from acute illness. - Will followup with Dr Mary Vieira after discharge DM - on insulin Hypertension - clonidine, norvasc, labetalol  ordered.

## 2019-07-01 NOTE — ROUTINE PROCESS
I am notified of low bicarb. Pt has known severe metabolic acidosis that is out of proportion to renal failure. Increased po bicarb to TID from BID. Noted fever spike @ 2051 and 2110 that is not reported. Blood cx ordered

## 2019-07-01 NOTE — PROGRESS NOTES
met with patient and patient's wife. Patient's wife informed  that they were having an okay day. Patient was peacefully resting. Plan of care is to follow up as needed. [address]  Paging Service [phone])

## 2019-07-01 NOTE — PROGRESS NOTES
Physical Therapy PT intervention deferred this date per discussion with RN who notes pt drowsy, fatigued, has had fever and is pending CT scan. Will continue to follow.  
 
Elisa Montoya, PT, MPT

## 2019-07-02 NOTE — DIABETES MGMT
[facility] Progress Note Recommendations/ Comments: 
1) Consider increasing Regular insulin in TPN to 30 units/L 
- message sent to Dr. Dash Porter. Chart reviewed on Deyvi Aquino. Current hospital DM medication: NPH 32 Units BID,  Lispro Correctional insulin with insulin resistant sensitivity. Regular insulin add-in to TPN 27.5 units/L for TDD 55 units Patient is a 70 y.o. male with known diabetes on Lantus 52 units daily, Lispro correctional insulin per scale 4 times daily, Metformin 1000 mg BID at home. A1c:  
Lab Results Component Value Date/Time Hemoglobin A1c 6.8 (H) 06/03/2019 03:26 AM  
 Hemoglobin A1c 6.5 (H) 10/17/2016 02:40 AM  
 
Recent Glucose Results:  
Lab Results Component Value Date/Time  (H) 07/02/2019 02:32 AM  
 GLUCPOC 214 (H) 07/02/2019 11:23 AM  
 GLUCPOC 202 (H) 07/02/2019 05:27 AM  
 GLUCPOC 195 (H) 07/02/2019 01:38 AM  
  
 
Lab Results Component Value Date/Time Creatinine 1.36 (H) 07/02/2019 02:32 AM  
 
Estimated Creatinine Clearance: 62.4 mL/min (A) (based on SCr of 1.36 mg/dL (H)). Active Orders Diet DIET FULL LIQUID  
  
 
PO intake:  
Patient Vitals for the past 72 hrs: 
 % Diet Eaten 06/30/19 1837 0 % 06/30/19 1247 10 % 06/30/19 0920 15 %  
06/29/19 1759 0 % Will continue to follow as needed. Thank you. Carmina Mclaughlin, RIOSN, RN, CDE [facility] Time spent: 4 minutes

## 2019-07-02 NOTE — PROGRESS NOTES
[place] [phone]) [facility]: [phone] ([phone]) Patient in bed with eyes closed as is his norm. He has rarely been seen by LCSW awake, interacting. Wife Onesimo Joseph usually at bedside and patient has never been seen talking to or interacting with wife who is generally at bedside. Patient and wife seen initially for emotional support with Dr Nereyda Borrero. Onesimo Joseph noted that patient was tired as he had walked in the hallway with the therapists this morning. Patient did not engage at all, he did nod \"yes\" or \"no\" to some questions. Onesimo Joseph noted that Dr Alanis Ahumada spoke to them today and that it was shared that patient's cancer lesions are growing in size and that she and patient have talked and patient has noted, \"It's up to God\". Slowly the family appears to be processing and accepting that patient's health outcome is not what they had hoped for. Onesimo Joseph also shared with palliative team that patient would never want a PEG tube, that the underlying issue is still the cancer. Dr Miguel A Washington joined [de-identified] and did a wonderful job of explaining patient's medical issues to both patient and wife. Patient seemed to be absorbing the information despite his eyes being closed. Dr Miguel A Washington explained that in the present situation, it is very likely that patient will not be a candidate for chemotherapy. It was also explained that patient cannot be on TPN forever, that it is a short term solution. Onesimo Joseph and patient able to hear out these explanations from Dr Miguel A Washington and may finally be able to begin having some difficult conversations. LCSW stayed behind to let both patient and wife know that there are continued difficult conversations to have and asked patient to think about where he would want to be if there is no medical treatment, home vs hospital. Encouraged Onesimo Joseph to continue to have these conversations with patient.  Patient unfortunately appears depressed and emotionally withdrawn. He seems to know that the outcome is not good.

## 2019-07-02 NOTE — CONSULTS
Palliative Medicine Consult Markus: [phone] ([id number]) Patient Name: Heather Salas YOB: 1950 Date of Initial Consult: 6/6/19 Reason for Consult: care decisions Requesting Provider: Sherita Kyle MD 
Primary Care Physician: Miroslava Cho MD 
 
 SUMMARY:  
Heather Salas is a 70 y.o. Male with a past history of colon  Cancer  Diagnosed 2016 , s/p resection , pelvic exenteration(partial colectomy , prostatectomy and cystectomy ) with colostomy and urostomy concurrent chemoradiation completed 2017 , ,  recurrent rectal adenocarcinoma on chemo with folfox, last chemo 5/23 , under the direction of Dr Ed Garza, who was admitted on 6/2/2019 from home with a diagnosis of Small bowel obstruction and mild BOBBI , Presented with abdominal pain and constipation . Current medical issues leading to Palliative Medicine involvement include: recurrent metastatic colon rectal cancer , now with malignant bowel obstruction  For care goals , No AMD . 
 other PMH : CAD , HTN, DM , Chronic pain Oncology following , recent ct scan shows progression of disease , per oncology not a candidate for chemo. Interim history : 
6/13/19 : S/p ex-lap  ROBOTIC LYSIS OF ADHESIONS SMALL BOWEL RESECTION , on NGT suction Diagnostic laparoscopy converted to open exploratory laparotomy with small bowel bypass and repair of colotomy x6 Psychosocial :He lives with his wife , retired from [employer] 20 year ago , quit smoking 3 years ago , has two children . 
  
 
 
 
 PALLIATIVE DIAGNOSES:  
1. Advance directives counseling 2. Full code status review 3. Goals of care . 4. Psychosocail support (wife is overwhelmed ,patient is depressed ). 5. Recurrent metastatic rectal cancer with progressive disease 6. Small bowel obstruction ( on NGT suction and TPN ). 7. Constipation PLAN:  
 
1. Met with patient and his wife Raine Chatman, along with LUANNE Finley 2. Patient is resting , tired after walking with PT. He is depressed ,  not amenable to engage in  conversation and defers to his wife for discussion 3. Dr Ernestine Meeks came in during our visit and expalined beautifully ,  progression of cancer , not appropraite for chemotherapy given , his poor nutritional status and decline in function , explained  TPN does not seem to be  bridge to oral nutriion because intestine are very slow, side effects of long term TPN . Goals of care : 
- patient and wife heard information provided by dr Tom Pruitt ,we encourged to talk through what is important to patient ,now we are reaching a point of end stage condition and limited life expectancy . - we will follow for support and continue with goals of care discussion . 4. -Code status : currently full code , we did not revisit it today , to allow them to absorb of  Information provided during todays's meeting , about his prognosis , we will continue to support and plan to revisit code status , next visit 5. Psychsosocial  : patient is withdrawn and depressed knowing he is at end of life . Team  is on board . 6. Initial consult note routed to primary continuity provider and/or primary health care team members 7. Communicated plan of care with: Palliative IDT, [facility] Team 
 
 GOALS OF CARE / TREATMENT PREFERENCES:  
 
GOALS OF CARE: 
Patient/Health Care Proxy Stated Goals: Prolong life TREATMENT PREFERENCES:  
Code Status: Full Code Advance Care Planning: 
[] [facility] Interdisciplinary Team has updated the ACP Navigator with Lai and Patient Capacity Advance Care Planning 6/2/2019 Patient's Healthcare Decision Maker is: -  
Primary Decision Maker Name -  
Primary Decision Maker Phone Number -  
Primary Decision Maker Relationship to Patient -  
Confirm Advance Directive None Patient Would Like to Complete Advance Directive No  
 
 
 Medical Interventions: Full interventions Other Instructions: Other: As far as possible, the palliative care team has discussed with patient / health care proxy about goals of care / treatment preferences for patient. HISTORY:  
 
History obtained from: patient and his wife . CHIEF COMPLAINT: \" feeling better \" HPI/SUBJECTIVE: The patient is:  
[] Verbal and participatory, resting , tired Patient notes he has constipation  x 2 weeks and some nausea, initially relieved with mirala , it recurred , despite taking miralax , he was instructed to take senna with miralax, without any response for two days , associated with abdominal pain . He is on NGT suction , his NGT came out accidentally , and was replaced today . Yesterday he had some nausea , he denies any abdominal pain or discomfort , has some lower extremity swelling , he denies any sob , his bowels are moving , he denies any anxiety or being depressed . 6/10/19 : overall feeling better , denies any pain , c/o constipation . 6/20/19 ; patient is resting , per wife he walked today upto the door , now wanting to rest , he denies any pain , cough or sob . They are waiting for colorectal surgeon visit for advise regarding removal for NGT tube . 7/2/19 : 
 
Patient is resting tired after PT, not amenable to long conversation, answer yes and no questions. Per his wife he is able to keep down full liquids , he was able to walk in the hallway with PT . 
 he denies any pain , nausea , sob at the time of visit . Clinical Pain Assessment (nonverbal scale for severity on nonverbal patients):  
Clinical Pain Assessment Severity: 0 Location: right flank radiating to abdomen Character: unable to describe Duration: weeks Effect: function Factors: dilaudid helps Frequency: comes and go Duration: for how long has pt been experiencing pain (e.g., 2 days, 1 month, years) Frequency: how often pain is an issue (e.g., several times per day, once every few days, constant) FUNCTIONAL ASSESSMENT:  
 
Palliative Performance Scale (PPS): PPS: 40 PSYCHOSOCIAL/SPIRITUAL SCREENING:  
 
Palliative IDT has assessed this patient for cultural preferences / practices and a referral made as appropriate to needs (Cultural Services, Patient Advocacy, Ethics, etc.) Any spiritual / Nondenominational concerns: 
[] Yes /  [x] No 
 
Caregiver Burnout: 
[] Yes /  [x] No /  [] No Caregiver Present Anticipatory grief assessment:  
[x] Normal  / [] Maladaptive ESAS Anxiety: Anxiety: 0 
 
ESAS Depression: Depression: 0 REVIEW OF SYSTEMS:  
 
Positive and pertinent negative findings in ROS are noted above in HPI. The following systems were [x] reviewed / [] unable to be reviewed as noted in HPI Other findings are noted below. Systems: constitutional, ears/nose/mouth/throat, respiratory, gastrointestinal, genitourinary, musculoskeletal, integumentary, neurologic, psychiatric, endocrine. Positive findings noted below. Modified ESAS Completed by: provider Fatigue: 6 Drowsiness: 2 Depression: 0 Pain: 0 Anxiety: 0 Nausea: 0 Anorexia: 7 Dyspnea: 0 Constipation: Yes(colostomy with no out put ) PHYSICAL EXAM:  
 
From RN flowsheet: 
Wt Readings from Last 3 Encounters:  
06/30/19 233 lb (105.7 kg) 04/18/19 245 lb (111.1 kg) 04/09/19 245 lb (111.1 kg) Blood pressure 129/62, pulse 85, temperature 99 °F (37.2 °C), resp. rate 18, height 5' 10\" (1.778 m), weight 233 lb (105.7 kg), SpO2 98 %. Pain Scale 1: Visual 
Pain Intensity 1: 0 Pain Onset 1: acute Pain Location 1: Abdomen Pain Orientation 1: Anterior, Lateral 
Pain Description 1: Constant, Cramping Pain Intervention(s) 1: Medication (see MAR) Last bowel movement, if known:  
 
Constitutional: resting , does not make eye contact , does not engages in conversation . Eyes: pupils equal, anicteric ENMT: no nasal discharge, moist mucous membranes Cardiovascular: regular rhythm, distal pulses intact Respiratory: breathing not labored, symmetric Gastrointestinal: soft, distended , non tender , colostomy and urostomy. Musculoskeletal: no deformity, no tenderness to palpation Skin: warm, dry Neurologic: following commands, moving all extremities Psychiatric: depressed . Other: 
 
 
 HISTORY:  
 
Principal Problem: 
  Metastatic cancer ([address] (6/2/2019) Active Problems: Hypertension complicating diabetes ([address] (8/8/2017) Type 2 diabetes mellitus with proliferative retinopathy ([address] (4/9/2019) Bowel obstruction ([address] (6/2/2019) Past Medical History:  
Diagnosis Date  Abnormal nuclear stress test 4/27/2017  Arthritis  Asthma   
 childhood  BPH (benign prostatic hypertrophy)  CAD (coronary artery disease) 1996 M.I., Stents x2  Cancer ([address] Rectal CA  Chronic pain  Colon polyp  DM (diabetes mellitus) ([address] 5/17/2011  Gout  Hemorrhoids  HTN (hypertension) 5/17/2011  Hyperlipidemia 5/17/2011  Ill-defined condition Colostomy, iliostomy  Rectal adenocarcinoma ([address] 1/16/2018 S/p surg.  S/P cardiac cath 4/27/2017 Past Surgical History:  
Procedure Laterality Date  CARDIAC SURG PROCEDURE UNLIST  8493/2822  
 stent x2  
 COLONOSCOPY N/A 10/14/2016 COLONOSCOPY/EGD performed by [facility]iza [facility] at [facility]COPY  COLONOSCOPY N/A 2/3/2017 COLONOSCOPY performed by [facility]ind [facility]erman MD at [facility] ENDOSCOPY  COLONOSCOPY N/A 3/27/2018 COLONOSCOPY performed by Aravind Zimmerman MD at [facility] ENDOSCOPY  ENDOSCOPY, COLON, DIAGNOSTIC  6/2011  HX GI    
 Prostate, Bladder, rectum. colon removed  HX HERNIA REPAIR    
 AS INFANT  HX ORTHOPAEDIC  02/2016  
 hip replacement , left  HX PROSTATECTOMY  X2  
 biopsy benign  HX TONSILLECTOMY  HX UROLOGICAL Bladder removed  IR INSERT NON TUNL CVC OVER 5 YRS  2019  
 SIGMOIDOSCOPY,BIOPSY  10/14/2016  UPPER GI ENDOSCOPY,DIAGNOSIS  10/14/2016 Family History Problem Relation Age of Onset  Heart Disease Mother  COPD Mother  COPD Father  Diabetes Father  Hypertension Father  Alcohol abuse Sister  Diabetes Brother  High Cholesterol Brother  Hypertension Brother  Anesth Problems Neg Hx History reviewed, no pertinent family history. Social History Tobacco Use  Smoking status: Former Smoker Packs/day: 0.50 Years: 40.00 Pack years: 20.00 Last attempt to quit: 2016 Years since quittin.8  Smokeless tobacco: Never Used Substance Use Topics  Alcohol use: No  
  Alcohol/week: 0.0 oz  
  Comment: OCCASIONAL Allergies Allergen Reactions  Atorvastatin Myalgia  Pcn [Penicillins] Hives Current Facility-Administered Medications Medication Dose Route Frequency  0.9% sodium chloride infusion 250 mL  250 mL IntraVENous PRN  
 TPN ADULT - CENTRAL   IntraVENous CONTINUOUS  
 sodium bicarbonate (8.4%) 150 mEq in sterile water 1,000 mL infusion   IntraVENous CONTINUOUS  
 sodium bicarbonate tablet 650 mg  650 mg Oral TID  TPN ADULT - CENTRAL   IntraVENous CONTINUOUS  
 cefepime (MAXIPIME) 2 g in 0.9% sodium chloride (MBP/ADV) 100 mL  2 g IntraVENous Q12H  
 metroNIDAZOLE (FLAGYL) IVPB premix 500 mg  500 mg IntraVENous Q12H  
 vancomycin (VANCOCIN) 1250 mg in  ml infusion  1,250 mg IntraVENous Q12H  
 insulin lispro (HUMALOG) injection   SubCUTAneous Q6H  
 dicyclomine (BENTYL) capsule 20 mg  20 mg Oral QID PRN  
 insulin NPH (NOVOLIN N, HUMULIN N) injection 32 Units  32 Units SubCUTAneous Q12H  
 acetaminophen (TYLENOL) solution 500 mg  500 mg Oral Q6H PRN  
 amLODIPine (NORVASC) tablet 10 mg  10 mg Oral DAILY  acetaminophen (TYLENOL) suppository 650 mg  650 mg Rectal Q4H PRN  
  labetalol (NORMODYNE) tablet 100 mg  100 mg Oral Q12H  
 nitroglycerin (NITROBID) 2 % ointment 1 Inch  1 Inch Topical Q6H PRN  
 enoxaparin (LOVENOX) injection 40 mg  40 mg SubCUTAneous Q24H  cloNIDine (CATAPRES) 0.3 mg/24 hr patch 1 Patch  1 Patch TransDERmal Q7D  
 hydrALAZINE (APRESOLINE) 20 mg/mL injection 20 mg  20 mg IntraVENous Q6H PRN  
 labetalol (NORMODYNE;TRANDATE) injection 20 mg  20 mg IntraVENous Q4H PRN  
 fat emulsion 20% (LIPOSYN, INTRAlipid) infusion 250 mL  250 mL IntraVENous Q MON, WED & FRI  dextrose 10% infusion 125-250 mL  125-250 mL IntraVENous PRN  zinc oxide-cod liver oil (DESITIN) 40 % paste   Topical BID  morphine injection 2 mg  2 mg IntraVENous Q3H PRN  
 glucose chewable tablet 16 g  4 Tab Oral PRN  
 glucagon (GLUCAGEN) injection 1 mg  1 mg IntraMUSCular PRN  
 sodium chloride (NS) flush 5-40 mL  5-40 mL IntraVENous Q8H  
 sodium chloride (NS) flush 5-40 mL  5-40 mL IntraVENous PRN  
 HYDROmorphone (PF) (DILAUDID) injection 0.5 mg  0.5 mg IntraVENous Q3H PRN  
 naloxone (NARCAN) injection 0.4 mg  0.4 mg IntraVENous PRN  
 ondansetron (ZOFRAN) injection 4 mg  4 mg IntraVENous Q4H PRN  
 
 
 
 LAB AND IMAGING FINDINGS:  
 
Lab Results Component Value Date/Time WBC 13.5 (H) 07/02/2019 02:32 AM  
 HGB 6.3 (L) 07/02/2019 02:32 AM  
 PLATELET 205 95/19/5550 02:32 AM  
 
Lab Results Component Value Date/Time Sodium 137 07/02/2019 02:32 AM  
 Potassium 4.4 07/02/2019 02:32 AM  
 Chloride 111 (H) 07/02/2019 02:32 AM  
 CO2 14 (LL) 07/02/2019 02:32 AM  
 BUN 44 (H) 07/02/2019 02:32 AM  
 Creatinine 1.36 (H) 07/02/2019 02:32 AM  
 Calcium 8.3 (L) 07/02/2019 02:32 AM  
 Magnesium 2.1 07/02/2019 02:32 AM  
 Phosphorus 4.5 07/02/2019 02:32 AM  
  
Lab Results Component Value Date/Time AST (SGOT) 45 (H) 07/02/2019 02:32 AM  
 Alk.  phosphatase 207 (H) 07/02/2019 02:32 AM  
 Protein, total 7.8 07/02/2019 02:32 AM  
 Albumin 1.9 (L) 07/02/2019 02:32 AM  
 Globulin 5.9 (H) 07/02/2019 02:32 AM  
 
Lab Results Component Value Date/Time INR 1.0 04/26/2017 12:24 PM  
 INR (POC) 1.2 (H) 03/05/2018 10:47 AM  
 Prothrombin time 10.1 04/26/2017 12:24 PM  
 aPTT 28.6 10/25/2016 05:07 PM  
  
No results found for: IRON, FE, TIBC, IBCT, PSAT, FERR No results found for: PH, PCO2, PO2 No components found for: [place] No results found for: CPK, CKMB Total time:  
Counseling / coordination time, spent as noted above:  
> 50% counseling / coordination?:  
 
Prolonged service was provided for  []30 min   []75 min in face to face time in the presence of the patient, spent as noted above. Time Start:  
Time End:  
Note: this can only be billed with 27015 (initial) or 62924 (follow up). If multiple start / stop times, list each separately.

## 2019-07-02 NOTE — PROGRESS NOTES
**Consult Information** 
Member Facility: [facility] Facility MRN: [id number] Consult ID: [id number] Facility Time Zone: ET 
Date and Time of Consult: 07/02/2019 04:32:10 AM 
Requesting Clinician: Christel Dang Patient Name: Leigh Sylvester Date of Birth: [id number] Gender: Male **Clinical Note** Clinical Note: Patient's labs this am show a Bicarb of 14. Patient had a Bicarb of 14 yesterday and is already on Sodium Bicarbonate tablets. C/w current care. D/w nursing. **Attestation** Interaction Mode: Phone Only Phone Duration (mins): 1 Time of Call : 07/02/2019 04:37 AM 
Interaction Attestation: Interprofessional internet consultation was delivered through telephonic and/or electronic communication upon the request of the patients treating physician, while the requesting and the rendering provider were not in the same physical location. Written report was provided to the requesting provider. Evaluation Duration (mins): 2

## 2019-07-02 NOTE — PROGRESS NOTES
Pharmacy Automatic Renal Dosing Protocol - Antimicrobials Indication for Antimicrobials: Sepsis of unknown etiology Current Regimen of Each Antimicrobial: 
Vancomycin IV - started  day 2 Cefepime 1gm IV q12h - started  day 2 Metronidazole 500 mg IVq12h  - started  day 2 Previous Antimicrobial Therapy: 
Cefepime IV  -  Metronidazole IV  -  Vancomycin HD  -  Vancomycin trough goal level:  10-15 Date Dose & Interval Measured (mcg/mL) Extrapolated (mcg/mL) Significant Cultures:  
 BCx - ng - final 
 BCx -NG x 1 day- pending Radiology / Imaging results: (X-ray, CT scan or MRI):  
 CXR - no acute disease  CT chest + Abd pelv - 1. Mildly dilated loops of central small bowel may reflect ileus or partial small bowel obstruction. No clear focal transition is identified. 2. Progression of neoplasm with enlarging irregular soft tissue mass in the 
rectal resection bed, worsened peritoneal carcinomatosis with small ascites, and 
increased size of left apical nodule with surrounding pneumonitis concerning for 
metastatic neoplasm deposit. Paralysis, amputations, malnutrition: none Labs: 
Recent Labs  
  19 
0232 19 
1071 19 
8137 CREA 1.36* 1.34* 1.35* BUN 44* 44* 41* WBC 13.5* 12.7* 13.4* Temp (24hrs), Av °F (37.2 °C), Min:97.7 °F (36.5 °C), Max:100.7 °F (38.2 °C) Creatinine Clearance (mL/min) or Dialysis:  
Estimated Creatinine Clearance: 62.4 mL/min (A) (based on SCr of 1.36 mg/dL (H)). Estimated Creatinine Clearance (using IBW):52.9 mL/min Impression/Plan:  
Previous BOBBI requiring HD, now resolved and off dialysis Modifying vancomycin dose to 1250 mg IV q18h for expected trough of ~14 for patient current renal function Daily renal function panel ordered Pharmacy to follow Antimicrobial stop date - pending Pharmacy will follow daily and adjust medications as appropriate for renal function and/or serum levels. Thank you, Michelina Kanner, PHARMD

## 2019-07-02 NOTE — PROGRESS NOTES
TRANSFER - IN REPORT: 
 
Verbal report received from [address] RN(name) on Philippe Valverde  being received from [facility] (unit) for urgent transfer Report consisted of patients Situation, Background, Assessment and  
Recommendations(SBAR). Information from the following report(s) SBAR, Kardex, Intake/Output, MAR and Recent Results was reviewed with the receiving nurse. Opportunity for questions and clarification was provided. Assessment completed upon patients arrival to unit and care assumed. 1925: Bedside shift change report given to Yelitza Ruby RN (oncoming nurse) by Florencia Bullock RN (offgoing nurse). Report included the following information SBAR, Kardex, Procedure Summary, Intake/Output, MAR and Recent Results.

## 2019-07-02 NOTE — PROGRESS NOTES
Surgery NP Progress Note 
 
s/p ROBOTIC LYSIS OF ADHESIONS SMALL BOWEL RESECTION on 2019 Pt seen with Dr. Rakesh Peres Assessment:  
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 - Continue diet 
- Pain management- Continue current pain control methods.  
- VTE Prophylaxis: Lovenox - Discussion of treatment goals and prognosis. Dr. Rakesh Peres reviewed CT scan with patient and family. - Patient with poor PO intake. Dr. Rakesh Peres reviewed CT and feels full liquid diet would be good to promote gut motility with a focus on Ensure. - Dr Rakesh Peres encouraged family to have palliative discussions to \"get the conversation started\". Wife wants to get patient to a stable baseline where he doesn't need invasive treatments but could be comfortable. Discharge Planning Plan for patient will be determined by family decisions regarding care. The hospitalist team feels that patient would benefit from hospice. Colorectal surgery in agreement with palliative discussions and no further surgery options. This was shared with the patient and family. Anticipated discharge date TBD Subjective:  
 
Patient has no new complaints. Feels better today than yesterday. Reportedly walked this morning. Pain control adequate. Patient reports PO intake inadequate. Voiding status: ortega in place. Objective:  
 
Blood pressure 129/62, pulse 85, temperature 99 °F (37.2 °C), resp. rate 18, height 5' 10\" (1.778 m), weight 105.7 kg (233 lb), SpO2 98 %. Temp (24hrs), Av °F (37.2 °C), Min:97.7 °F (36.5 °C), Max:100.8 °F (38.2 °C) Recent Results (from the past 48 hour(s)) GLUCOSE, POC  Collection Time: 19  5:58 PM  
 Result Value Ref Range Glucose (POC) 217 (H) 65 - 100 mg/dL Performed by Marylou Link (PCT) GLUCOSE, POC Collection Time: 06/30/19 11:21 PM  
Result Value Ref Range Glucose (POC) 156 (H) 65 - 100 mg/dL Performed by Marysol Thomas (PCT) RENAL FUNCTION PANEL Collection Time: 07/01/19  3:37 AM  
Result Value Ref Range Sodium 134 (L) 136 - 145 mmol/L Potassium 4.6 3.5 - 5.1 mmol/L Chloride 109 (H) 97 - 108 mmol/L  
 CO2 14 (LL) 21 - 32 mmol/L Anion gap 11 5 - 15 mmol/L Glucose 155 (H) 65 - 100 mg/dL BUN 44 (H) 6 - 20 MG/DL Creatinine 1.34 (H) 0.70 - 1.30 MG/DL  
 BUN/Creatinine ratio 33 (H) 12 - 20 GFR est AA >60 >60 ml/min/1.73m2 GFR est non-AA 53 (L) >60 ml/min/1.73m2 Calcium 8.8 8.5 - 10.1 MG/DL Phosphorus 4.8 (H) 2.6 - 4.7 MG/DL Albumin 2.0 (L) 3.5 - 5.0 g/dL CBC WITH AUTOMATED DIFF Collection Time: 07/01/19  3:37 AM  
Result Value Ref Range WBC 12.7 (H) 4.1 - 11.1 K/uL  
 RBC 2.56 (L) 4.10 - 5.70 M/uL HGB 7.7 (L) 12.1 - 17.0 g/dL HCT 22.7 (L) 36.6 - 50.3 % MCV 88.7 80.0 - 99.0 FL  
 MCH 30.1 26.0 - 34.0 PG  
 MCHC 33.9 30.0 - 36.5 g/dL  
 RDW 16.8 (H) 11.5 - 14.5 % PLATELET 531 501 - 222 K/uL MPV 11.5 8.9 - 12.9 FL  
 NRBC 0.0 0  WBC ABSOLUTE NRBC 0.00 0.00 - 0.01 K/uL NEUTROPHILS 82 (H) 32 - 75 % LYMPHOCYTES 6 (L) 12 - 49 % MONOCYTES 10 5 - 13 % EOSINOPHILS 2 0 - 7 % BASOPHILS 0 0 - 1 % IMMATURE GRANULOCYTES 1 (H) 0.0 - 0.5 % ABS. NEUTROPHILS 10.4 (H) 1.8 - 8.0 K/UL  
 ABS. LYMPHOCYTES 0.7 (L) 0.8 - 3.5 K/UL  
 ABS. MONOCYTES 1.3 (H) 0.0 - 1.0 K/UL  
 ABS. EOSINOPHILS 0.2 0.0 - 0.4 K/UL  
 ABS. BASOPHILS 0.0 0.0 - 0.1 K/UL  
 ABS. IMM. GRANS. 0.1 (H) 0.00 - 0.04 K/UL  
 DF AUTOMATED MAGNESIUM Collection Time: 07/01/19  3:37 AM  
Result Value Ref Range Magnesium 2.6 (H) 1.6 - 2.4 mg/dL GLUCOSE, POC Collection Time: 07/01/19  5:54 AM  
Result Value Ref Range Glucose (POC) 160 (H) 65 - 100 mg/dL Performed by Forestine Polite (PCT) CULTURE, BLOOD Collection Time: 07/01/19  6:52 AM  
Result Value Ref Range Special Requests: NO SPECIAL REQUESTS Culture result: NO GROWTH 1 DAY    
GLUCOSE, POC Collection Time: 07/01/19 11:50 AM  
Result Value Ref Range Glucose (POC) 203 (H) 65 - 100 mg/dL Performed by Sea Moreno GLUCOSE, POC Collection Time: 07/01/19  5:30 PM  
Result Value Ref Range Glucose (POC) 138 (H) 65 - 100 mg/dL Performed by [facility]   
GLUCOSE, POC Collection Time: 07/02/19  1:38 AM  
Result Value Ref Range Glucose (POC) 195 (H) 65 - 100 mg/dL Performed by Araseli Estrella METABOLIC PANEL, BASIC Collection Time: 07/02/19  2:32 AM  
Result Value Ref Range Sodium 137 136 - 145 mmol/L Potassium 4.4 3.5 - 5.1 mmol/L Chloride 111 (H) 97 - 108 mmol/L  
 CO2 14 (LL) 21 - 32 mmol/L Anion gap 12 5 - 15 mmol/L Glucose 163 (H) 65 - 100 mg/dL BUN 44 (H) 6 - 20 MG/DL Creatinine 1.36 (H) 0.70 - 1.30 MG/DL  
 BUN/Creatinine ratio 32 (H) 12 - 20 GFR est AA >60 >60 ml/min/1.73m2 GFR est non-AA 52 (L) >60 ml/min/1.73m2 Calcium 8.3 (L) 8.5 - 10.1 MG/DL  
PHOSPHORUS Collection Time: 07/02/19  2:32 AM  
Result Value Ref Range Phosphorus 4.5 2.6 - 4.7 MG/DL  
HEPATIC FUNCTION PANEL Collection Time: 07/02/19  2:32 AM  
Result Value Ref Range Protein, total 7.8 6.4 - 8.2 g/dL Albumin 1.9 (L) 3.5 - 5.0 g/dL Globulin 5.9 (H) 2.0 - 4.0 g/dL A-G Ratio 0.3 (L) 1.1 - 2.2 Bilirubin, total 0.7 0.2 - 1.0 MG/DL Bilirubin, direct 0.4 (H) 0.0 - 0.2 MG/DL Alk. phosphatase 207 (H) 45 - 117 U/L  
 AST (SGOT) 45 (H) 15 - 37 U/L  
 ALT (SGPT) 45 12 - 78 U/L  
CBC WITH AUTOMATED DIFF Collection Time: 07/02/19  2:32 AM  
Result Value Ref Range WBC 13.5 (H) 4.1 - 11.1 K/uL  
 RBC 2.19 (L) 4.10 - 5.70 M/uL HGB 6.3 (L) 12.1 - 17.0 g/dL HCT 19.4 (L) 36.6 - 50.3 % MCV 88.6 80.0 - 99.0 FL  
 MCH 28.8 26.0 - 34.0 PG  
 MCHC 32.5 30.0 - 36.5 g/dL  
 RDW 16.9 (H) 11.5 - 14.5 % PLATELET 821 039 - 711 K/uL MPV 11.2 8.9 - 12.9 FL  
 NRBC 0.0 0  WBC ABSOLUTE NRBC 0.00 0.00 - 0.01 K/uL NEUTROPHILS 83 (H) 32 - 75 % LYMPHOCYTES 6 (L) 12 - 49 % MONOCYTES 8 5 - 13 % EOSINOPHILS 2 0 - 7 % BASOPHILS 0 0 - 1 % IMMATURE GRANULOCYTES 1 (H) 0.0 - 0.5 % ABS. NEUTROPHILS 11.2 (H) 1.8 - 8.0 K/UL  
 ABS. LYMPHOCYTES 0.8 0.8 - 3.5 K/UL  
 ABS. MONOCYTES 1.1 (H) 0.0 - 1.0 K/UL  
 ABS. EOSINOPHILS 0.3 0.0 - 0.4 K/UL  
 ABS. BASOPHILS 0.0 0.0 - 0.1 K/UL  
 ABS. IMM. GRANS. 0.1 (H) 0.00 - 0.04 K/UL  
 DF SMEAR SCANNED    
 RBC COMMENTS ROULEAUX PRESENT 
    
MAGNESIUM Collection Time: 07/02/19  2:32 AM  
Result Value Ref Range Magnesium 2.1 1.6 - 2.4 mg/dL GLUCOSE, POC Collection Time: 07/02/19  5:27 AM  
Result Value Ref Range Glucose (POC) 202 (H) 65 - 100 mg/dL Performed by Shabbir Mckinney TYPE + CROSSMATCH Collection Time: 07/02/19  8:08 AM  
Result Value Ref Range Crossmatch Expiration 07/05/2019 ABO/Rh(D) A POSITIVE Antibody screen NEG Unit number [id number] Blood component type RC LR Unit division 00 Status of unit ALLOCATED Crossmatch result Compatible GLUCOSE, POC Collection Time: 07/02/19 11:23 AM  
Result Value Ref Range Glucose (POC) 214 (H) 65 - 100 mg/dL Performed by Donato Kate (PCT) Pt resting in bed. NAD, very sleepy and drifts off during conversation. Arouses easily. Dressings CDI and ostomy appliance intace. Drain  in place. SCDs for mechanical DVT proph while in bed Body mass index is 33.43 kg/m². Reference: BMI greater than 30 is classified as obesity and greater than 40 is classified as morbid obesity. Last 3 Recorded Weights in this Encounter 06/26/19 0510 06/27/19 7402 06/30/19 1350 Weight: 107.5 kg (237 lb) 107 kg (236 lb) 105.7 kg (233 lb) Howard Mitchell NP  
MSN, APRN, FNP-C, Bettina Mclaughlin 
 
07/02/19

## 2019-07-02 NOTE — PROGRESS NOTES
Pharmacy Automatic Renal Dosing Protocol - Antimicrobials Indication for Antimicrobials: HAP, Sepsis Current Regimen of Each Antimicrobial: 
Vancomycin consult - started  day 1 Cefepime 1gm IV q8h - started  day 1 Metronidazole 250mg IVq8h  - started 7/1 day 1 Previous Antimicrobial Therapy: 
Cefepime IV  -  Metronidazole IV  -  Vancomycin HD  -  Vancomycin trough goal level:  15-20 Date Dose & Interval Measured (mcg/mL) Extrapolated (mcg/mL) 7/3 09:30am 1250mg IV q12h Significant Cultures:  
 BCx - ng - final 
 BCx - pending Radiology / Imaging results: (X-ray, CT scan or MRI):  
 CXR - no acute disease  CT chest + Abd pelv - 1. Mildly dilated loops of central small bowel may reflect ileus or partial small bowel obstruction. No clear focal transition is identified. 2. Progression of neoplasm with enlarging irregular soft tissue mass in the 
rectal resection bed, worsened peritoneal carcinomatosis with small ascites, and 
increased size of left apical nodule with surrounding pneumonitis concerning for 
metastatic neoplasm deposit. Paralysis, amputations, malnutrition: none Labs: 
Recent Labs  
  19 
0110 19 
6465 19 
3551 CREA 1.34* 1.35* 1.12  
BUN 44* 41* 36* WBC 12.7* 13.4*  -- Temp (24hrs), Av.3 °F (37.4 °C), Min:97.6 °F (36.4 °C), Max:101 °F (38.3 °C) Creatinine Clearance (mL/min) or Dialysis:  
Estimated Creatinine Clearance: 63.4 mL/min (A) (based on SCr of 1.34 mg/dL (H)). Estimated Creatinine Clearance (using IBW):53.7 mL/min Impression/Plan:  
Fever  afternoon with tylenol intervention On TPN that may increase Vd (unable to keep food down over the weekend Per hospitalist  progress note - scan did not appear to show any sign of infection in the belly that was obvious, however there were dilated loops that may be related to ileus versus partial small bowel obstruction. And there was progression of an irregular soft tissue mass in the rectum. There was also progression of a lung nodule with surrounding pneumonitis concerning for metastatic neoplasm which had increased in size. Previous BOBBI requiring HD, now resolved and off dialysis Vancomycin 2000mg IV loading dose then 1250mg IV q16h for a projected trough at Css of 15.7. Conservative dosing considering recent BOBBI. Adjusted Cefepime to 2gm IV q8h Adjusted Metronidazole to 500mg IV q12h per dosing protocol BMP + CBC already ordered for 7/2 am 
 
Antimicrobial stop date - pending Pharmacy will follow daily and adjust medications as appropriate for renal function and/or serum levels.  
 
Thank you, 
Jose Decker, [facility]

## 2019-07-02 NOTE — PROGRESS NOTES
CRS Progress Note CT scan showed mildly dilated SB loops. There is also progression of his peritoneal carcinomatosis as well as enlarging lung lesion. He was able to get out of bed and walk today. He describes the pain as 1/10 and no nausea/vomiting /57   Pulse 84   Temp 98.8 °F (37.1 °C)   Resp 22   Ht 5' 10\" (1.778 m)   Wt 105.7 kg (233 lb)   SpO2 98%   BMI 33.43 kg/m² NAD, sleepy but AAOx3 Abd soft, mildly tender, no rebound/guarding, mild distension Ostomy with some stool in bag Plan Dilated SB loops are largely unimpressive on CT scan today. Will start full liquid diet with ensure Discussed with wife his poor prognosis and she seems to understand. Still full code for now.

## 2019-07-02 NOTE — PROGRESS NOTES
Problem: Mobility Impaired (Adult and Pediatric) Goal: *Acute Goals and Plan of Care (Insert Text) Description Physical Therapy Goals Initiated 6/19/2019 1. Patient will move from supine to sit and sit to supine  in bed with independence within 7 day(s).  (MET) 2. Patient will transfer from bed to chair and chair to bed with modified independence using the least restrictive device within 7 day(s). (MET) 3. Patient will perform sit to stand with modified independence within 7 day(s). (MET) 4. Patient will ambulate with modified independence for 100 feet with the least restrictive device within 7 day(s). 5.  Patient will ascend/descend 4 stairs with 1 handrail(s) with supervision/set-up within 7 day(s). Revised 6/26/19 
4. Patient will ambulate with modified independence for 300 feet with the least restrictive device within 7 day(s). 5.  Patient will ascend/descend 4 stairs with 1 handrail(s) with supervision/set-up within 7 day(s). Outcome: Progressing Towards Goal 
 PHYSICAL THERAPY TREATMENT Patient: Edward Stuart (40 y.o. male) Date: 7/2/2019 Diagnosis: Bowel obstruction ([facility] [B09.854] Metastatic cancer ([facility] [C79.9] Metastatic cancer ([facility] Procedure(s) (LRB): 
ROBOTIC LYSIS OF ADHESIONS SMALL BOWEL RESECTION (N/A) 19 Days Post-Op Precautions:   
Chart, physical therapy assessment, plan of care and goals were reviewed. ASSESSMENT: 
Pt cleared by nurse to mobilize. Pt received in bed supine. Pt agreeable to therapy. Pt preformed supine to sit min A. Pt performed sit to stand transfer at [facility]. Pt ambulated 60ft x3 with RW at [facility]/SBA. Pt with dyspnea during ambulation  but o2 stats at 95%. Pt still moving well although very deconditioned. Pt once back in too pt able to stand at RW at [facility] for skin assessment by nursing . Pt performed sit to supine at supervision. Pt left in bed supine with doctor and wife in room. Pt will benefit from HHPT to improve strength and endurance. Progression toward goals: 
?    Improving appropriately and progressing toward goals ? Improving slowly and progressing toward goals ? Not making progress toward goals and plan of care will be adjusted PLAN: 
Patient continues to benefit from skilled intervention to address the above impairments. Continue treatment per established plan of care. Discharge Recommendations:  Home Health Further Equipment Recommendations for Discharge:  none SUBJECTIVE:  
Patient stated ? I just get out of breath. ? OBJECTIVE DATA SUMMARY:  
Critical Behavior: 
Neurologic State: Drowsy Orientation Level: Oriented X4 Cognition: Follows commands Safety/Judgement: Awareness of environment, Insight into deficits Functional Mobility Training: 
Bed Mobility: 
Rolling: Independent Supine to Sit: Minimum assistance Scooting: Independent Transfers: 
Sit to Stand: Stand-by assistance Stand to Sit: Stand-by assistance Balance: 
Sitting: Intact Sitting - Static: Good (unsupported) Sitting - Dynamic: Good (unsupported) Standing: Intact; With support Standing - Static: Good;Constant support Standing - Dynamic : Fair;Good;Constant support Ambulation/Gait Training: 
Distance (ft): 180 Feet (ft)(60ft x3) Assistive Device: Gait belt;Walker, rolling Ambulation - Level of Assistance: Contact guard assistance;Stand-by assistance Gait Abnormalities: Decreased step clearance(trunk flexion ) Base of Support: Widened Speed/Bailey: Pace decreased (<100 feet/min) Step Length: Right shortened;Left shortened Pain: 
Pain Scale 1: Visual 
Pain Intensity 1: 0 Pain Location 1: Abdomen Pain Intervention(s) 1: Medication (see MAR) Activity Tolerance:  
Pt moving well but very deconditioned. After treatment:  
?    Patient left in no apparent distress sitting up in chair ? Patient left in no apparent distress in bed 
? Call bell left within reach ?    Nursing notified ? Caregiver present ? Bed alarm activated COMMUNICATION/COLLABORATION:  
The patient?s plan of care was discussed with: Registered Nurse Thomas Whatley PTA Time Calculation: 28 mins

## 2019-07-02 NOTE — PROGRESS NOTES
Hospitalist Progress Note NAME: Jose Alejandro Hameed :  1950 MRN:  [id number] Assessment / Plan: 
Severe hyperkalemia - resolved s/p HD BOBBI - required HD - now resolved and off dialysis Metabolic acidosis will start bicarbonate drip Sepsis 2/2 unknown source , intraabdominal infection? - resolved Pt had long  surgery on , he is c/o abdominal pain and spiked fever on  Wbc up to 19k , Started on cefepime and flagyl and vanco  
Cont NG to suction, NPO for now Potassium 6.4, bicarb 17 creat up 2.8-3.8 on  EKG : no peaked T waves , given insulin + ca gluconate  
nephro consulted , appreciate management , started on bicarb drip on  S/p HD on  and bicarb drip , K down to wnl and creat improving No further HD on 06/15 
completed vanco cefipime and flagyl, now off abx since  BCx NTD  
WBC up and spiking fevers, CT shows pneumonitis, c/w Cefepime/Flagyl/Vancomycin Anemia: no signs of bleeding at this time, will transfuse 1 unit PRBC now. Hypokalemia - resolved, now trending high again TPN adjusted by Nephro to include K Follow lytes, ok today, K trending up- reduced K in TPN , K better today, reduced in TPN, follow 
hypomag 
-supp IV yesterday and added to tpn 
-mag better today Hypernatremia - resolved Na adjusted in TPN, follow S/p ex-lap  ROBOTIC LYSIS OF ADHESIONS SMALL BOWEL RESECTION on  Small Bowel Obstruction POA-  CT shows new partial SBO, keep NPO, c/w TPN. Colon Cancer s/p resection with recurrence/Rectal Adenocarcinoma on ChemotX s/p resection On TPN  
CT abd/pelvis on :Small bowel distention with decompressed loops distally is compatible with obstruction likely related to effusion formation in the mid lower abdomen. Mild free fluid New CT : progression of disease with increased carcinomatosis, increased size of rectal mass, lung nodule with pneumonitis, partial SBO. Brief op note : Diagnostic laparoscopy converted to open exploratory laparotomy with small bowel bypass and repair of colotomy x6 
IP Oncology consult appreciated & noted- Chemo delayed for now Cont IV dilaudid prn pain and IV antiemetics prn 
TPN cont for now, daily CMP mag phos - adjust k, mag 
C/w humalog Sc qachs, eating Tolerating fulls, NGT out 6/26, cont tpn, advance diet per surgery to gi lyte 6/30 per note Fever and not tolerating po today CT scan to reeval 
May need NGT again Family to reconsider more palliative measures depending on CT results. Hypertension - control improved 
-titrate PO meds  
-place on clonidine patch and scheduled Nitrobid 
-prn IV hydralazine ordered, IV Labetalol ordered by Nephro 
  
Diabetes Mellitus, controlled 
-Increased dose of NPH  To 32 units BID 6/29, SS, FS monitoring 
-changed ss to qac and hs 
-adjusted insulin in tpn to 55units 
-once off TPN, at home was on lantus 32 daily and prn humalog 
- watch closely, may need to go down on NPH if not eating as well Code status: Full , reconsult palliative Surrogate Decision Maker: Wife Prophylaxis: lovenox Recommended Disposition:  PT following,  home health ? Hospice? Poor overall prognosis 30.0 - 39.9 Obese / Body mass index is 33.43 kg/m². Overall prognosis is poor, patients disease has progressed and there are no curative options, patient is appropriate for Hospice although seems that he is not prepared for this and still wants to be full code, if this is the case may need to transfer to [facility] unit. Subjective: Chief Complaint / Reason for Physician Visit 
abd pain/n/v  
 
6/25 Patient is sleepy but easily arousable with NG tube in place. He denies any nausea or vomiting is tolerating some clears. His wife reports more output from his ostomy. He denies any shortness of breath or chest pain or abdominal pain. He is still very weak. 6/26 pt reports minimal abd pain 1-2/10. No n/v. No appetite. ngt removed today, clears per surgery. Better with PT today 6/27 pt reports awoke around 4 in the morning and felt \"lack of energy\" can specify more,  No pain, no sob, still passing stool. Tolerated clear diet for dinner. 6/28 pt feels well. Eager to eat.  tolerating fulls. No abd pain, stooling. Getting stronger. 6/29 pt feels \"not bad\". Improved mobility. tolerating full liq diet. some intermittent \"gas' pains. 6/30 pt had a bad day yesterday, no appetite only small sips all day and minimal activity per wife, occ cramps in abd \"gas\" no n/v. No sob/cp. 7/1 pt not eating or drinking, seems to deny abd pain, only occ cramp, some slight sob, no desat, no cough, denies n/v. Feels food is moving but \"slow\". We discussed general decline over the weekend. Addressed reeval with palliative care, but will proceed with CT scan first given fever overnight. When asked if he would consider another NGT, he said it would depend. Wife understands longterm prognosis poor. 07/02: \"I feel a little better, just very tired\" Discussed with RN events overnight. Review of Systems: 
Symptom Y/N Comments  Symptom Y/N Comments Fever/Chills y   Chest Pain n   
Poor Appetite y   Edema Cough n   Abdominal Pain n   
Sputum    Joint Pain SOB/TALBOT y   Pruritis/Rash Nausea/vomit n   Tolerating PT/OT Diarrhea    Tolerating Diet y full Constipation    Other Could NOT obtain due to:   
 
Objective: VITALS:  
Last 24hrs VS reviewed since prior progress note. Most recent are: 
Patient Vitals for the past 24 hrs: 
 Temp Pulse Resp BP SpO2  
07/02/19 0818 98.8 °F (37.1 °C) 92 17 130/50 100 % 07/02/19 0335 98.4 °F (36.9 °C) 89 18 113/66 100 % 07/02/19 0032 99.1 °F (37.3 °C) 92 18 152/54 98 % 07/01/19 1906 97.7 °F (36.5 °C) 80 18 107/69 99 % 07/01/19 1455 (!) 100.8 °F (38.2 °C) 88 20 129/73 100 % 07/01/19 1057 97.6 °F (36.4 °C) 86 20 129/86 100 % Intake/Output Summary (Last 24 hours) at 7/2/2019 1056 Last data filed at 7/2/2019 1019 Gross per 24 hour Intake 1070.7 ml Output 3516.5 ml Net -2445.8 ml PHYSICAL EXAM: 
General:          WD, WN. Alert, sleepy, in no distress EENT:              EOMI. Anicteric sclerae. MMM Resp:               Coarse BS, mild crackle sin both sides CV:                  Regular  rhythm,  No edema GI:                   Soft, Non distended,epi-meso gastric pain  +Bowel sounds Ostomy stool and a urostomy with yellow urine in tubing Neurologic:      Alert and oriented X 2, normal speech, Psych:             Fair  insight. mildly anxious Skin:                No rashes. No jaundice Reviewed most current lab test results and cultures  YES Reviewed most current radiology test results   YES Review and summation of old records today    NO Reviewed patient's current orders and MAR    YES 
PMH/SH reviewed - no change compared to H&P 
________________________________________________________________________ Care Plan discussed with: 
  Comments Patient y Family  y wife RN y   
Care Manager Consultant  y surgery Multidiciplinary team rounds were held today with , nursing, pharmacist and clinical coordinator. Patient's plan of care was discussed; medications were reviewed and discharge planning was addressed. ________________________________________________________________________ Total NON critical care TIME:  35  Minutes Total CRITICAL CARE TIME Spent:   Minutes non procedure based Comments >50% of visit spent in counseling and coordination of care y   
________________________________________________________________________ Loretta Alaniz MD  
 
Procedures: see electronic medical records for all procedures/Xrays and details which were not copied into this note but were reviewed prior to creation of Plan. LABS: 
I reviewed today's most current labs and imaging studies. Pertinent labs include: 
Recent Labs  
  07/02/19 
0232 07/01/19 
8204 06/30/19 
5232 WBC 13.5* 12.7* 13.4* HGB 6.3* 7.7* 8.7* HCT 19.4* 22.7* 26.9*  
 248 287 Recent Labs  
  07/02/19 
0232 07/01/19 
9506 06/30/19 
0538  134* 134* K 4.4 4.6 4.9  
* 109* 110* CO2 14* 14* 15* * 155* 194* BUN 44* 44* 41* CREA 1.36* 1.34* 1.35* CA 8.3* 8.8 8.3*  
MG 2.1 2.6* 1.6 PHOS 4.5 4.8* 4.0 ALB 1.9* 2.0* 2.1* TBILI 0.7  --  0.8 SGOT 45*  --  32 ALT 45  --  52 Signed: Fernando Becker MD

## 2019-07-02 NOTE — PROGRESS NOTES
TRANSFER - OUT REPORT: 
 
Verbal report given to Sebastian (name) on Latoya Colunga  being transferred to [facility] (unit) for change in patient condition(high risk for deterioration) Report consisted of patients Situation, Background, Assessment and  
Recommendations(SBAR). Information from the following report(s) SBAR, Kardex, OR Summary, Intake/Output, MAR, Recent Results and Cardiac Rhythm NSR was reviewed with the receiving nurse. Lines:  
Venous Access Device 06/04/19 Upper chest (subclavicular area, right (Active) Central Line Being Utilized Yes 7/2/2019  7:51 AM  
Criteria for Appropriate Use Total parenteral nutrition 7/2/2019  7:51 AM  
Site Assessment Clean, dry, & intact 7/2/2019  7:51 AM  
Date of Last Dressing Change 06/30/19 7/2/2019  7:51 AM  
Dressing Status Clean, dry, & intact 7/2/2019  7:51 AM  
Dressing Type Disk with Chlorhexadine gluconate (CHG); Transparent 7/2/2019  7:51 AM  
Action Taken Dressing changed; Tubing changed 6/30/2019  6:31 PM  
Date Accessed (Medial Site) 06/21/19 6/26/2019  8:00 AM  
Access Time (Medial Site) 1915 6/10/2019  2:32 AM  
Access Needle Length (Medial Site) 1.5 inches 6/26/2019  8:00 AM  
Positive Blood Return (Medial Site) Yes 7/2/2019  2:53 AM  
Action Taken (Medial Site) Infusing;Flushed;Blood drawn 7/2/2019  2:53 AM  
Positive Blood Return (Lateral Site) Yes 7/2/2019  1:13 PM  
Action Taken (Lateral Site) Flushed 7/2/2019  1:13 PM  
Alcohol Cap Used Yes 7/2/2019  7:51 AM  
   
Peripheral IV 07/01/19 Left Antecubital (Active) Site Assessment Clean, dry, & intact 7/2/2019  7:51 AM  
Phlebitis Assessment 0 7/2/2019  7:51 AM  
Infiltration Assessment 0 7/2/2019  7:51 AM  
Dressing Status Clean, dry, & intact 7/2/2019  7:51 AM  
Dressing Type Tape;Transparent 7/2/2019  7:51 AM  
Hub Color/Line Status Pink; Infusing 7/2/2019  7:51 AM  
Alcohol Cap Used No 7/2/2019  7:51 AM  
   
Peripheral IV 07/02/19 Right Arm (Active) Opportunity for questions and clarification was provided. Patient transported with: 
 Monitor Registered Nurse

## 2019-07-02 NOTE — PROGRESS NOTES
1000: Saw MD Stephens on the unit and asked if he was planning to see the pt any time soon since the blood consent has to be completed. Informed MD that the consent was already prepped and placed on the pt's chart. MD stated that he was going to see the discharges first, make rounds on other pts, and then he will come by to discuss the consent with Mr. Bellayury Franchesca later. Blood is ready and will be transfused once consent is obtain later today.

## 2019-07-02 NOTE — PROGRESS NOTES
Hematology Oncology Progress Note Follow up for: metastatic rectal cancer Chart notes reviewed since last visit. CC: remains intermittently somnolent but answers questions when directly asked. not verbalizing any complaints but was noted to flinch and when asked, said he had a brief sharp pain in his abdomen. Case discussed with following: Patient, wife, palliative care MD Dr. Dwight Soto and SW 
 
Patient complains of the following: no specific complaints other than brief abdominal pain Additional concerns noted by the staff:  
 
Patient Vitals for the past 24 hrs: 
 BP Temp Pulse Resp SpO2  
07/02/19 1121 129/62 99 °F (37.2 °C) 85 18 98 % 07/02/19 0818 130/50 98.8 °F (37.1 °C) 92 17 100 % 07/02/19 0335 113/66 98.4 °F (36.9 °C) 89 18 100 % 07/02/19 0032 152/54 99.1 °F (37.3 °C) 92 18 98 % 07/01/19 1906 107/69 97.7 °F (36.5 °C) 80 18 99 % 07/01/19 1455 129/73 (!) 100.8 °F (38.2 °C) 88 20 100 % ROS negative for 11 organ systems except as ntoed above. Physical Examination: 
Gen NAD, generally kept eyes closed during encounter. HEENT: unremarkable Neck: no visible JVD or adenopathy Chest: clear anterolaterally CV reg rate and rhythm 
abd: dual ostomies in place, colostomy with brown soft stool, hypoactive BS Extr: without edema Neuro: nonfocal but not specifically tested. Psych: weak, somnolent, but was able to converse appropriately in soft voice with direct questions. Skin: no suspicious lesions. Labs: 
Recent Results (from the past 24 hour(s)) GLUCOSE, POC Collection Time: 07/01/19  5:30 PM  
Result Value Ref Range Glucose (POC) 138 (H) 65 - 100 mg/dL Performed by [facility]   
GLUCOSE, POC Collection Time: 07/02/19  1:38 AM  
Result Value Ref Range Glucose (POC) 195 (H) 65 - 100 mg/dL Performed by [facility] METABOLIC PANEL, BASIC Collection Time: 07/02/19  2:32 AM  
Result Value Ref Range  Sodium 137 136 - 145 mmol/L  
 Potassium 4.4 3.5 - 5.1 mmol/L Chloride 111 (H) 97 - 108 mmol/L  
 CO2 14 (LL) 21 - 32 mmol/L Anion gap 12 5 - 15 mmol/L Glucose 163 (H) 65 - 100 mg/dL BUN 44 (H) 6 - 20 MG/DL Creatinine 1.36 (H) 0.70 - 1.30 MG/DL  
 BUN/Creatinine ratio 32 (H) 12 - 20 GFR est AA >60 >60 ml/min/1.73m2 GFR est non-AA 52 (L) >60 ml/min/1.73m2 Calcium 8.3 (L) 8.5 - 10.1 MG/DL  
PHOSPHORUS Collection Time: 07/02/19  2:32 AM  
Result Value Ref Range Phosphorus 4.5 2.6 - 4.7 MG/DL  
HEPATIC FUNCTION PANEL Collection Time: 07/02/19  2:32 AM  
Result Value Ref Range Protein, total 7.8 6.4 - 8.2 g/dL Albumin 1.9 (L) 3.5 - 5.0 g/dL Globulin 5.9 (H) 2.0 - 4.0 g/dL A-G Ratio 0.3 (L) 1.1 - 2.2 Bilirubin, total 0.7 0.2 - 1.0 MG/DL Bilirubin, direct 0.4 (H) 0.0 - 0.2 MG/DL Alk. phosphatase 207 (H) 45 - 117 U/L  
 AST (SGOT) 45 (H) 15 - 37 U/L  
 ALT (SGPT) 45 12 - 78 U/L  
CBC WITH AUTOMATED DIFF Collection Time: 07/02/19  2:32 AM  
Result Value Ref Range WBC 13.5 (H) 4.1 - 11.1 K/uL  
 RBC 2.19 (L) 4.10 - 5.70 M/uL HGB 6.3 (L) 12.1 - 17.0 g/dL HCT 19.4 (L) 36.6 - 50.3 % MCV 88.6 80.0 - 99.0 FL  
 MCH 28.8 26.0 - 34.0 PG  
 MCHC 32.5 30.0 - 36.5 g/dL  
 RDW 16.9 (H) 11.5 - 14.5 % PLATELET 229 987 - 529 K/uL MPV 11.2 8.9 - 12.9 FL  
 NRBC 0.0 0  WBC ABSOLUTE NRBC 0.00 0.00 - 0.01 K/uL NEUTROPHILS 83 (H) 32 - 75 % LYMPHOCYTES 6 (L) 12 - 49 % MONOCYTES 8 5 - 13 % EOSINOPHILS 2 0 - 7 % BASOPHILS 0 0 - 1 % IMMATURE GRANULOCYTES 1 (H) 0.0 - 0.5 % ABS. NEUTROPHILS 11.2 (H) 1.8 - 8.0 K/UL  
 ABS. LYMPHOCYTES 0.8 0.8 - 3.5 K/UL  
 ABS. MONOCYTES 1.1 (H) 0.0 - 1.0 K/UL  
 ABS. EOSINOPHILS 0.3 0.0 - 0.4 K/UL  
 ABS. BASOPHILS 0.0 0.0 - 0.1 K/UL  
 ABS. IMM. GRANS. 0.1 (H) 0.00 - 0.04 K/UL  
 DF SMEAR SCANNED    
 RBC COMMENTS ROULEAUX PRESENT 
    
MAGNESIUM Collection Time: 07/02/19  2:32 AM  
Result Value Ref Range Magnesium 2.1 1.6 - 2.4 mg/dL GLUCOSE, POC Collection Time: 07/02/19  5:27 AM  
Result Value Ref Range Glucose (POC) 202 (H) 65 - 100 mg/dL Performed by Greg MURO + CROSSMATCH Collection Time: 07/02/19  8:08 AM  
Result Value Ref Range Crossmatch Expiration 07/05/2019 ABO/Rh(D) A POSITIVE Antibody screen NEG Unit number [id number] Blood component type RC LR Unit division 00 Status of unit ALLOCATED Crossmatch result Compatible GLUCOSE, POC Collection Time: 07/02/19 11:23 AM  
Result Value Ref Range Glucose (POC) 214 (H) 65 - 100 mg/dL Performed by Millie Garcia (PCT) Imaging: 
CXray 6/1/19 - no acute process Assessment and Plan: SBO: 
- Had adhesions lysed in OR with Dr Nadia Vidal 6/13. 
- NG removed 6/25 
- Improved, diet advanced but over weekend appetite worsened. Acidosis  
- sodium bicarb ordered, adjust TPN Mental status changes  
- fluctuating. Apparently got up and took walk this AM but at present, rather somnolent. BOBBI: 
- resolved. Cr 1.36 this AM 
 
Metastatic rectal cancer: 
- progressive on CT as treatment held while recovering from surgery. I went over findings with him and wife today. - Follows with Dr. Yamilka Collins in [address] [place] [address] office,  several chemotherapeutic options available for treatment and would generally not be considered terminal from perspective of lack of available treatment options - he is getting weaker , however, and likelihood of improving physically to point where he can be treated is becoming increasingly less likely. I reviewed this with him and wife. DM - on insulin Hypertension - clonidine, norvasc, labetalol  ordered.

## 2019-07-02 NOTE — PROGRESS NOTES
Progress Note Patient: Teagan Carl MRN: [id number]  SSN: [id number] YOB: 1950  Age: 70 y.o. Sex: male Admit Date: 6/2/2019 LOS: 30 days Subjective:  
 
Pt very tired today, is able to answer all questions appropriately Critical lap @ 0425~ CO2 was 14 paged  (no new orders) Objective:  
 
Vitals:  
 07/01/19 1455 07/01/19 1906 07/02/19 0032 07/02/19 0335 BP: 129/73 107/69 152/54 113/66 Pulse: 88 80 92 89 Resp: 20 18 18 18 Temp: (!) 100.8 °F (38.2 °C) 97.7 °F (36.5 °C) 99.1 °F (37.3 °C) 98.4 °F (36.9 °C) TempSrc:      
SpO2: 100% 99% 98% 100% Weight:      
Height:      
  
 
Intake and Output: 
Current Shift: No intake/output data recorded. Last three shifts: 06/30 0701 - 07/01 1900 In: 4441.7 [P.O.:300; I.V.:4141.7] Out: 4166.5 [Urine:4025; Drains:16.5] Physical Exam:  
GENERAL: alert, cooperative, no distress, appears stated age Lab/Data Review: 
BMP:  
Lab Results Component Value Date/Time  07/02/2019 02:32 AM  
 K 4.4 07/02/2019 02:32 AM  
  (H) 07/02/2019 02:32 AM  
 CO2 14 (LL) 07/02/2019 02:32 AM  
 AGAP 12 07/02/2019 02:32 AM  
  (H) 07/02/2019 02:32 AM  
 BUN 44 (H) 07/02/2019 02:32 AM  
 CREA 1.36 (H) 07/02/2019 02:32 AM  
 GFRAA >60 07/02/2019 02:32 AM  
 GFRNA 52 (L) 07/02/2019 02:32 AM  
  
 
 
 
Assessment:  
 
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  
 
 
 
Signed By: Abiodun Qiu RN   
 July 2, 2019

## 2019-07-03 NOTE — HOSPICE
190 Edwar Nguyen RN note:  Consult noted. Reviewing chart. Will contact family shortly. Thank you for the opportunity to care for this pt and family. Please contact hospice at [phone] with any questions or concerns.

## 2019-07-03 NOTE — PROGRESS NOTES
Responded to staff request to offer support to patient and his wife on PCU. Patient and wife, Dinh, made decision for patient to become DDNR after being given new information about patient's medical condition. Patient is very much at peace with decision and expressed he had been expecting the prognosis received today. Both he and his wife have a deep leanne and trust in God. Dinh also indicated she has good support as she accompanies her  through this time. Provided ministry of presence, empathic listening and assurance of prayer. Assured them of ongoing availability of pastoral support, as desired. BE Pearl, [address] [facility],  [facility] Paging Service  [phone])

## 2019-07-03 NOTE — CONSULTS
Palliative Medicine Consult [place]: [phone] Patient Name: Joseline Stephen YOB: 1950 Date of Initial Consult: 6/6/19 Reason for Consult: care decisions Requesting Provider: Kathy Vargas MD 
Primary Care Physician: Lisette Dorsey MD 
 
 SUMMARY:  
Joseline Stephen is a 70 y.o. Male with a past history of colon  Cancer  Diagnosed 2016 , s/p resection , pelvic exenteration(partial colectomy , prostatectomy and cystectomy ) with colostomy and urostomy concurrent chemoradiation completed 2017 , ,  recurrent rectal adenocarcinoma on chemo with folfox, last chemo 5/23 , under the direction of Dr Ravin Miller, who was admitted on 6/2/2019 from home with a diagnosis of Small bowel obstruction and mild BOBBI , Presented with abdominal pain and constipation . Current medical issues leading to Palliative Medicine involvement include: recurrent metastatic colon rectal cancer , now with malignant bowel obstruction  For care goals , No AMD . 
 other PMH : CAD , HTN, DM , Chronic pain Oncology following , recent ct scan shows progression of disease , per oncology not a candidate for chemo. Interim history : 
6/13/19 : S/p ex-lap  ROBOTIC LYSIS OF ADHESIONS SMALL BOWEL RESECTION , on NGT suction Diagnostic laparoscopy converted to open exploratory laparotomy with small bowel bypass and repair of colotomy x6 Psychosocial :He lives with his wife , retired from [employer] 20 year ago , quit smoking 3 years ago , has two children . 
  
 
 
 
 PALLIATIVE DIAGNOSES:  
 
1. Full code status review /DNR discussion 2. Goals of care . 3. Psychosocail support (wife is overwhelmed ,patient is depressed ). 4. Recurrent metastatic rectal cancer with progressive disease 5. Small bowel obstruction ( on NGT suction and TPN ). 6. Constipation PLAN:  
 
1. Met with patient and his wife Laura Tenorio, along with LUANNE Finley 2. Patient is resting , and today after many days , he is interactive , make eyes contact and communicate clearly. Goals of care : 
- patient and his  wife have processed information . , provided to them by oncology /Dr Nurys Ramirez  patient is clear , there is no more treatment option , he is accepting of his terminal prognosis . He would want to spent the time left , with family at home. - we discussed hospice , they have agreed to meet with hospice for information session , consult already in place . we talked about weaning TPN as we moved to comfort . 3. -Code status : patient has decided , not to attempt resuscitation , if his heart stops , or he stops breathing , he signed DDNR . 4. Currently no active symptom management needs . 5. Psychsosocial  : we will continue to support . 6. Initial consult note routed to primary continuity provider and/or primary health care team members 7. Communicated plan of care with: Palliative IDTEsdras 192 Team 
 
 GOALS OF CARE / TREATMENT PREFERENCES:  
 
GOALS OF CARE: 
Patient/Health Care Proxy Stated Goals: Comfort TREATMENT PREFERENCES:  
Code Status: DNR Advance Care Planning: 
[x] [facility] Interdisciplinary Team has updated the ACP Navigator with [place] and Patient Capacity Primary Decision Maker: Sherrell Sharpe - [phone] Advance Care Planning 6/2/2019 Patient's Healthcare Decision Maker is: -  
Primary Decision Maker Name -  
Primary Decision Maker Phone Number -  
Primary Decision Maker Relationship to Patient -  
Confirm Advance Directive None Patient Would Like to Complete Advance Directive No  
 
 
Medical Interventions: Comfort measures Other Instructions: Other: As far as possible, the palliative care team has discussed with patient / health care proxy about goals of care / treatment preferences for patient. HISTORY:  
 
History obtained from: patient and his wife . CHIEF COMPLAINT: \" feeling better \" HPI/SUBJECTIVE: The patient is:  
[] Verbal and participatory, resting , tired Patient notes he has constipation  x 2 weeks and some nausea, initially relieved with mirala , it recurred , despite taking miralax , he was instructed to take senna with miralax, without any response for two days , associated with abdominal pain . He is on NGT suction , his NGT came out accidentally , and was replaced today . Yesterday he had some nausea , he denies any abdominal pain or discomfort , has some lower extremity swelling , he denies any sob , his bowels are moving , he denies any anxiety or being depressed . 6/10/19 : overall feeling better , denies any pain , c/o constipation . 6/20/19 ; patient is resting , per wife he walked today upto the door , now wanting to rest , he denies any pain , cough or sob . They are waiting for colorectal surgeon visit for advise regarding removal for NGT tube . 7/2/19 : 
 
Patient is resting tired after PT, not amenable to long conversation, answer yes and no questions. Per his wife he is able to keep down full liquids , he was able to walk in the hallway with PT . 
 he denies any pain , nausea , sob at the time of visit . 7/3/19 : 
 
Today after many days , I see patient is interactive, he denies any pain , nausea or vomiting , he is able to tolerate full liquids . Clinical Pain Assessment (nonverbal scale for severity on nonverbal patients):  
Clinical Pain Assessment Severity: 0 Location: right flank radiating to abdomen Character: unable to describe Duration: weeks Effect: function Factors: dilaudid helps Frequency: comes and go Duration: for how long has pt been experiencing pain (e.g., 2 days, 1 month, years) Frequency: how often pain is an issue (e.g., several times per day, once every few days, constant) FUNCTIONAL ASSESSMENT:  
 
Palliative Performance Scale (PPS): PPS: 40 
 PSYCHOSOCIAL/SPIRITUAL SCREENING:  
 
Palliative IDT has assessed this patient for cultural preferences / practices and a referral made as appropriate to needs (Cultural Services, Patient Advocacy, Ethics, etc.) Any spiritual / Christianity concerns: 
[] Yes /  [x] No 
 
Caregiver Burnout: 
[] Yes /  [x] No /  [] No Caregiver Present Anticipatory grief assessment:  
[x] Normal  / [] Maladaptive ESAS Anxiety: Anxiety: 0 
 
ESAS Depression: Depression: 0 REVIEW OF SYSTEMS:  
 
Positive and pertinent negative findings in ROS are noted above in HPI. The following systems were [x] reviewed / [] unable to be reviewed as noted in HPI Other findings are noted below. Systems: constitutional, ears/nose/mouth/throat, respiratory, gastrointestinal, genitourinary, musculoskeletal, integumentary, neurologic, psychiatric, endocrine. Positive findings noted below. Modified ESAS Completed by: provider Fatigue: 7 Drowsiness: 1 Depression: 0 Pain: 0 Anxiety: 0 Nausea: 1 Anorexia: 8 Dyspnea: 0 Constipation: No  
     
 
 
 PHYSICAL EXAM:  
 
From RN flowsheet: 
Wt Readings from Last 3 Encounters:  
07/03/19 231 lb 7.7 oz (105 kg) 04/18/19 245 lb (111.1 kg) 04/09/19 245 lb (111.1 kg) Blood pressure 124/69, pulse 84, temperature 98.3 °F (36.8 °C), resp. rate 19, height 5' 10\" (1.778 m), weight 231 lb 7.7 oz (105 kg), SpO2 100 %. Pain Scale 1: Numeric (0 - 10) Pain Intensity 1: 0 Pain Onset 1: acute Pain Location 1: Abdomen Pain Orientation 1: Anterior, Lateral 
Pain Description 1: Constant, Cramping Pain Intervention(s) 1: Medication (see MAR) Last bowel movement, if known:  
 
Constitutional: in bed , alert , oriented x 3, interactive , make eye contact Eyes: pupils equal, anicteric ENMT: no nasal discharge, moist mucous membranes Cardiovascular: regular rhythm, distal pulses intact Respiratory: breathing not labored, symmetric Gastrointestinal: soft, distended , non tender , colostomy and urostomy. Musculoskeletal: no deformity, no tenderness to palpation Skin: warm, dry Neurologic: following commands, moving all extremities Psychiatric: depressed . Other: 
 
 
 HISTORY:  
 
Principal Problem: 
  Metastatic cancer ([address] (6/2/2019) Active Problems: Hypertension complicating diabetes ([address] (8/8/2017) Type 2 diabetes mellitus with proliferative retinopathy ([address] (4/9/2019) Bowel obstruction ([address] (6/2/2019) Past Medical History:  
Diagnosis Date  Abnormal nuclear stress test 4/27/2017  Arthritis  Asthma   
 childhood  BPH (benign prostatic hypertrophy)  CAD (coronary artery disease) 1996 M.I., Stents x2  Cancer ([address] Rectal CA  Chronic pain  Colon polyp  DM (diabetes mellitus) ([address] 5/17/2011  Gout  Hemorrhoids  HTN (hypertension) 5/17/2011  Hyperlipidemia 5/17/2011  Ill-defined condition Colostomy, iliostomy  Rectal adenocarcinoma ([address] 1/16/2018 S/p surg.  S/P cardiac cath 4/27/2017 Past Surgical History:  
Procedure Laterality Date  CARDIAC SURG PROCEDURE UNLIST  9615/4585  
 stent x2  
 COLONOSCOPY N/A 10/14/2016 COLONOSCOPY/EGD performed by Grecia Loo MD at [facility] ENDOSCOPY  COLONOSCOPY N/A 2/3/2017 COLONOSCOPY performed by Raulito Mortensen MD at [facility] ENDOSCOPY  COLONOSCOPY N/A 3/27/2018 COLONOSCOPY performed by Raulito Mortensen MD at [facility] ENDOSCOPY  ENDOSCOPY, COLON, DIAGNOSTIC  6/2011  HX GI    
 Prostate, Bladder, rectum. colon removed  HX HERNIA REPAIR    
 AS INFANT  HX ORTHOPAEDIC  02/2016  
 hip replacement , left  HX PROSTATECTOMY  X2  
 biopsy benign  HX TONSILLECTOMY  HX UROLOGICAL Bladder removed  IR INSERT NON TUNL CVC OVER 5 YRS  6/14/2019  
 SIGMOIDOSCOPY,BIOPSY  10/14/2016  UPPER GI ENDOSCOPY,DIAGNOSIS  10/14/2016 Family History Problem Relation Age of Onset  Heart Disease Mother  COPD Mother  COPD Father  Diabetes Father  Hypertension Father  Alcohol abuse Sister  Diabetes Brother  High Cholesterol Brother  Hypertension Brother  Anesth Problems Neg Hx History reviewed, no pertinent family history. Social History Tobacco Use  Smoking status: Former Smoker Packs/day: 0.50 Years: 40.00 Pack years: 20.00 Last attempt to quit: 2016 Years since quittin.8  Smokeless tobacco: Never Used Substance Use Topics  Alcohol use: No  
  Alcohol/week: 0.0 oz  
  Comment: OCCASIONAL Allergies Allergen Reactions  Atorvastatin Myalgia  Pcn [Penicillins] Hives Current Facility-Administered Medications Medication Dose Route Frequency  TPN ADULT - CENTRAL   IntraVENous CONTINUOUS  
 [START ON 2019] VANCOMYCIN INFORMATION NOTE   Other ONCE  
 vancomycin (VANCOCIN) 1250 mg in  ml infusion  1,250 mg IntraVENous Q16H  
 0.9% sodium chloride infusion 250 mL  250 mL IntraVENous PRN  
 TPN ADULT - CENTRAL   IntraVENous CONTINUOUS  
 sodium bicarbonate (8.4%) 150 mEq in sterile water 1,000 mL infusion   IntraVENous CONTINUOUS  
 sodium bicarbonate tablet 650 mg  650 mg Oral TID  cefepime (MAXIPIME) 2 g in 0.9% sodium chloride (MBP/ADV) 100 mL  2 g IntraVENous Q12H  
 metroNIDAZOLE (FLAGYL) IVPB premix 500 mg  500 mg IntraVENous Q12H  
 insulin lispro (HUMALOG) injection   SubCUTAneous Q6H  
 dicyclomine (BENTYL) capsule 20 mg  20 mg Oral QID PRN  
 insulin NPH (NOVOLIN N, HUMULIN N) injection 32 Units  32 Units SubCUTAneous Q12H  
 acetaminophen (TYLENOL) solution 500 mg  500 mg Oral Q6H PRN  
 amLODIPine (NORVASC) tablet 10 mg  10 mg Oral DAILY  acetaminophen (TYLENOL) suppository 650 mg  650 mg Rectal Q4H PRN  
 labetalol (NORMODYNE) tablet 100 mg  100 mg Oral Q12H  nitroglycerin (NITROBID) 2 % ointment 1 Inch  1 Inch Topical Q6H PRN  
 enoxaparin (LOVENOX) injection 40 mg  40 mg SubCUTAneous Q24H  cloNIDine (CATAPRES) 0.3 mg/24 hr patch 1 Patch  1 Patch TransDERmal Q7D  
 hydrALAZINE (APRESOLINE) 20 mg/mL injection 20 mg  20 mg IntraVENous Q6H PRN  
 labetalol (NORMODYNE;TRANDATE) injection 20 mg  20 mg IntraVENous Q4H PRN  
 fat emulsion 20% (LIPOSYN, INTRAlipid) infusion 250 mL  250 mL IntraVENous Q MON, WED & FRI  dextrose 10% infusion 125-250 mL  125-250 mL IntraVENous PRN  zinc oxide-cod liver oil (DESITIN) 40 % paste   Topical BID  morphine injection 2 mg  2 mg IntraVENous Q3H PRN  
 glucose chewable tablet 16 g  4 Tab Oral PRN  
 glucagon (GLUCAGEN) injection 1 mg  1 mg IntraMUSCular PRN  
 sodium chloride (NS) flush 5-40 mL  5-40 mL IntraVENous Q8H  
 sodium chloride (NS) flush 5-40 mL  5-40 mL IntraVENous PRN  
 HYDROmorphone (PF) (DILAUDID) injection 0.5 mg  0.5 mg IntraVENous Q3H PRN  
 naloxone (NARCAN) injection 0.4 mg  0.4 mg IntraVENous PRN  
 ondansetron (ZOFRAN) injection 4 mg  4 mg IntraVENous Q4H PRN  
 
 
 
 LAB AND IMAGING FINDINGS:  
 
Lab Results Component Value Date/Time WBC 10.8 07/03/2019 03:36 AM  
 HGB 8.6 (L) 07/03/2019 03:36 AM  
 PLATELET 141 69/06/7123 03:36 AM  
 
Lab Results Component Value Date/Time Sodium 137 07/03/2019 03:36 AM  
 Potassium 4.3 07/03/2019 03:36 AM  
 Chloride 112 (H) 07/03/2019 03:36 AM  
 CO2 15 (LL) 07/03/2019 03:36 AM  
 BUN 36 (H) 07/03/2019 03:36 AM  
 Creatinine 1.20 07/03/2019 03:36 AM  
 Calcium 8.5 07/03/2019 03:36 AM  
 Magnesium 2.1 07/03/2019 03:36 AM  
 Phosphorus 3.6 07/03/2019 03:36 AM  
  
Lab Results Component Value Date/Time AST (SGOT) 32 07/03/2019 03:36 AM  
 Alk. phosphatase 224 (H) 07/03/2019 03:36 AM  
 Protein, total 7.8 07/03/2019 03:36 AM  
 Albumin 1.9 (L) 07/03/2019 03:36 AM  
 Globulin 5.9 (H) 07/03/2019 03:36 AM  
 
Lab Results Component Value Date/Time INR 1.0 04/26/2017 12:24 PM  
 INR (POC) 1.2 (H) 03/05/2018 10:47 AM  
 Prothrombin time 10.1 04/26/2017 12:24 PM  
 aPTT 28.6 10/25/2016 05:07 PM  
  
No results found for: IRON, FE, TIBC, IBCT, PSAT, FERR No results found for: PH, PCO2, PO2 No components found for: [place] No results found for: CPK, CKMB Total time:  
Counseling / coordination time, spent as noted above:  
> 50% counseling / coordination?:  
 
Prolonged service was provided for  []30 min   []75 min in face to face time in the presence of the patient, spent as noted above. Time Start:  
Time End:  
Note: this can only be billed with 89984 (initial) or 67528 (follow up). If multiple start / stop times, list each separately.

## 2019-07-03 NOTE — PROGRESS NOTES
Chart reviewed. Pt is planning to go home with hospice. Pt has been in the hospital for 30 days and mobilizing with family/nursing staff to maintain functional mobility (when desired). Will complete order for acute PT. Re-consult if there is a declined in functional status or change in POC.

## 2019-07-03 NOTE — HOSPICE
[employer] [facility] [facility] Good Help to Those in Need 
[phone] Patient Name: Arminda Ramírez YOB: 1950 Age: 70 y.o. [employer] RN Note:  Hospice consult noted. Chart reviewed. Plan of care discussed with patients care manager, Yimi Hand. Met at bedside to meet with spouse, Mary Fall and patient. Discussed Hospice philosophy, general plan of care, levels of care, services and on call procedures. Family information packet provided & reviewed with spouse Rico Garnett [phone] Patient resting comfortably w/ urostomy, colostomy and CATHERINE drain in place. Spouse and patient understand that they will not go home w/ TPN Wife and spouse in agreeancment w/ plan for hospice. Would like to go home Saturday if medical team in agreement. DASHA Chandrakant to set up transport for Saturday AM. DME needs include a hospital bed. JUS Bartholomew to set up delivery for Friday. Family wishes to use Dr. Pablo Funez [phone] as the attending physician. Attempted to contact office, no answer. Will ask [place] to attempt again Friday GI surgery team to be contacted by family and CM  regarding removal of CATHERINE drain and staples prior to D/C Thank you for the opportunity to be of service to this patient.

## 2019-07-03 NOTE — PROGRESS NOTES
Hematology Oncology Progress Note Follow up for: metastatic rectal cancer Chart notes reviewed since last visit. Case discussed with following: Patient, wife Patient complains of the following: more alert today and was unhappy with wife for repeating same question re his eating habits 4 times so they talked a bit about this and the frustration of his lack of appetite and waxing/waning strength. Additional concerns noted by the staff:  
 
Patient Vitals for the past 24 hrs: 
 BP Temp Pulse Resp SpO2 Weight 07/03/19 1053 124/69 98.3 °F (36.8 °C) 84 19 100 %   
07/03/19 0758 125/49 97.5 °F (36.4 °C) 87 16 100 %   
07/03/19 0550      105 kg (231 lb 7.7 oz) 07/03/19 0400 124/64 98.6 °F (37 °C) 88 16 100 %   
07/03/19 0002 116/67 99.6 °F (37.6 °C) 86 18 100 %   
07/02/19 2000 115/61 98.5 °F (36.9 °C) 84 18 100 %   
07/02/19 1822 124/59 98.2 °F (36.8 °C) 77 18 100 %   
07/02/19 1550 131/73 99 °F (37.2 °C) 84 18 99 %   
07/02/19 1529 129/61 99.1 °F (37.3 °C) 84 22 99 %   
07/02/19 1517 132/61 99.2 °F (37.3 °C) 87 22    
07/02/19 1441 127/57 98.8 °F (37.1 °C) 84 22    
07/02/19 1435 113/58 (!) 100.7 °F (38.2 °C) 85 22    
 
 
ROS negative for 11 organ systems except as ntoed above. Physical Examination: 
Gen NAD, wide awake and communicative today. HEENT: unremarkable Neck: no visible JVD or adenopathy Chest: clear anterolaterally CV reg rate and rhythm 
abd: dual ostomies in place, colostomy with brown soft stool, hypoactive BS Extr: without edema Neuro: nonfocal but not specifically tested. Psych: weak,  conversed appropriately in soft voice with direct questions. Skin: no suspicious lesions. Labs: 
Recent Results (from the past 24 hour(s)) CEA Collection Time: 07/02/19 12:11 PM  
Result Value Ref Range CEA 1.6 ng/mL GLUCOSE, POC Collection Time: 07/02/19  5:48 PM  
Result Value Ref Range Glucose (POC) 200 (H) 65 - 100 mg/dL Performed by Unkown  GLUCOSE, POC Collection Time: 07/02/19  8:10 PM  
Result Value Ref Range Glucose (POC) 176 (H) 65 - 100 mg/dL Performed by Digna Mccormick GLUCOSE, POC Collection Time: 07/03/19 12:01 AM  
Result Value Ref Range Glucose (POC) 137 (H) 65 - 100 mg/dL Performed by Digna Mccormick CBC WITH AUTOMATED DIFF Collection Time: 07/03/19  3:36 AM  
Result Value Ref Range WBC 10.8 4.1 - 11.1 K/uL  
 RBC 3.09 (L) 4.10 - 5.70 M/uL HGB 8.6 (L) 12.1 - 17.0 g/dL HCT 26.9 (L) 36.6 - 50.3 % MCV 87.1 80.0 - 99.0 FL  
 MCH 27.8 26.0 - 34.0 PG  
 MCHC 32.0 30.0 - 36.5 g/dL  
 RDW 16.4 (H) 11.5 - 14.5 % PLATELET 404 044 - 492 K/uL MPV 10.7 8.9 - 12.9 FL  
 NRBC 0.0 0  WBC ABSOLUTE NRBC 0.00 0.00 - 0.01 K/uL NEUTROPHILS 82 (H) 32 - 75 % LYMPHOCYTES 6 (L) 12 - 49 % MONOCYTES 8 5 - 13 % EOSINOPHILS 3 0 - 7 % BASOPHILS 0 0 - 1 % IMMATURE GRANULOCYTES 1 (H) 0.0 - 0.5 % ABS. NEUTROPHILS 8.9 (H) 1.8 - 8.0 K/UL  
 ABS. LYMPHOCYTES 0.7 (L) 0.8 - 3.5 K/UL  
 ABS. MONOCYTES 0.9 0.0 - 1.0 K/UL  
 ABS. EOSINOPHILS 0.3 0.0 - 0.4 K/UL  
 ABS. BASOPHILS 0.0 0.0 - 0.1 K/UL  
 ABS. IMM. GRANS. 0.1 (H) 0.00 - 0.04 K/UL  
 DF AUTOMATED MAGNESIUM Collection Time: 07/03/19  3:36 AM  
Result Value Ref Range Magnesium 2.1 1.6 - 2.4 mg/dL METABOLIC PANEL, COMPREHENSIVE Collection Time: 07/03/19  3:36 AM  
Result Value Ref Range Sodium 137 136 - 145 mmol/L Potassium 4.3 3.5 - 5.1 mmol/L Chloride 112 (H) 97 - 108 mmol/L  
 CO2 15 (LL) 21 - 32 mmol/L Anion gap 10 5 - 15 mmol/L Glucose 110 (H) 65 - 100 mg/dL BUN 36 (H) 6 - 20 MG/DL Creatinine 1.20 0.70 - 1.30 MG/DL  
 BUN/Creatinine ratio 30 (H) 12 - 20 GFR est AA >60 >60 ml/min/1.73m2 GFR est non-AA >60 >60 ml/min/1.73m2 Calcium 8.5 8.5 - 10.1 MG/DL  Bilirubin, total 1.0 0.2 - 1.0 MG/DL  
 ALT (SGPT) 40 12 - 78 U/L  
 AST (SGOT) 32 15 - 37 U/L  
 Alk. phosphatase 224 (H) 45 - 117 U/L Protein, total 7.8 6.4 - 8.2 g/dL Albumin 1.9 (L) 3.5 - 5.0 g/dL Globulin 5.9 (H) 2.0 - 4.0 g/dL A-G Ratio 0.3 (L) 1.1 - 2.2 PHOSPHORUS Collection Time: 07/03/19  3:36 AM  
Result Value Ref Range Phosphorus 3.6 2.6 - 4.7 MG/DL  
GLUCOSE, POC Collection Time: 07/03/19  5:48 AM  
Result Value Ref Range Glucose (POC) 158 (H) 65 - 100 mg/dL Performed by [address] [place] Collection Time: 07/03/19  8:30 AM  
Result Value Ref Range Vancomycin,trough 12.3 (H) 5.0 - 10.0 ug/mL Reported dose date: NOT PROVIDED Reported dose time: NOT PROVIDED Reported dose: NOT PROVIDED UNITS Imaging: 
CXray 6/1/19 - no acute process Assessment and Plan: SBO: 
- Had adhesions lysed in OR with Dr El Nicholson 6/13. 
- NG removed 6/25 
- Improved, diet advanced but over weekend but appetite worsened. Acidosis  
- sodium bicarb ordered. TPN should be adjusted to try to correct. Mental status changes  
- fluctuating. Better today BOBBI: 
- resolved. Cr 1.20 this AM 
 
Metastatic rectal cancer: 
- progressive on CT as treatment held while recovering from surgery. I went over findings with him and wife Tuesday. - Follows with Dr. Efrain Demarco in [address] [place] [address],  several chemotherapeutic options available for treatment and would generally not be considered terminal from perspective of lack of available treatment options - he is getting weaker , however, and likelihood of improving physically to point where he can be treated is becoming increasingly less likely. I reviewed this with him and wife Tuesday. He indicated he would try to eat better. Would like fruit. DM - on insulin Hypertension - clonidine, norvasc, labetalol  ordered.

## 2019-07-03 NOTE — PROGRESS NOTES
[place] [phone] ([phone]) [facility]: [phone] ([phone]) Palliative team of Dr Kaye Pal and Trenton Coreas LCSW met with patient and wife Bijal Rudd to provide emotional support, address Code status and discuss what is important to patient in terms of how he wishes to live out whatever time he has. ?There were some visitors in the room and Bijal Rudd did not wish to have conversations with them present, they did leave the room. Bijal Rudd was quiet during this conversation. Patient initially with eyes closed, but opened them and engaged in brief conversation. Patient able to tell us that he is aware of no further treatment for his cancer and has accepted things the way they are. He notes that he and Bijal Rudd have talked about all of this and that his wish is to go home. We discussed how going home could be an option with hospice involved and the goal of comfort. Patient also able to have a discussion of Code Status and decided for No Code. He signed DDNr Offered support to patient and wife. Discussed case with CM and bedside RN. A hospice referral is pending, which was placed earlier and actually asked for by maco Rudd as she would like patient to come home.

## 2019-07-03 NOTE — PROGRESS NOTES
[address] [facility] with hospice 1:45pm - Pt's wife requested to speak with this CM. Pt's wife stated she spoke with her  and he was in agreement to change his status to a DNR and that he wants to go home with hospice. Pt's wife requested a hospice information session. CM called Dr. Maira Morales and informed him of the update and was in agreement. Order obtained for hospice and referral was sent to [address] [place] [address] via [facility]. 4pm -  Hospice visited with pt and pt's wife. The plan will be to admit pt on Saturday to hospice at pt's home.  hospice will deliver the DME on Friday. Marisol Casiano, [phone]

## 2019-07-03 NOTE — PROGRESS NOTES
Brief visit of summary , full note will be placed. Met with patient and his wife along with RICKY Young. Patient is alert , oriented , communicative , understand his terminal prognosis , and has come to term with his prognosis , he wants to spent the time left , at [facility] with his family. Hospice consult in place for  information Patient decided for DNR , signed DDNR . Currently patient is able to tolerate full liquids , we discussed weaning TPN as he moves towards comfort .

## 2019-07-03 NOTE — PROGRESS NOTES
Pharmacy Automatic Renal Dosing Protocol - Antimicrobials Indication for Antimicrobials: Sepsis of unknown etiology Current Regimen of Each Antimicrobial: 
Vancomycin IV - started  day 3 Cefepime 1gm IV q12h - started  day 3 Metronidazole 500 mg IVq12h  - started  day 3 Previous Antimicrobial Therapy: 
Cefepime IV  -  Metronidazole IV  -  Vancomycin HD  -  Vancomycin trough goal level:  10-15 Date Dose & Interval Measured (mcg/mL) Extrapolated (mcg/mL)  
7/3/19  1250 mg Q18H 12.3 12.4 Significant Cultures:  
 BCx - ng - final 
 BCx -NG - pending Radiology / Imaging results: (X-ray, CT scan or MRI):  
 CXR - no acute disease  CT chest + Abd pelv - 1. Mildly dilated loops of central small bowel may reflect ileus or partial small bowel obstruction. No clear focal transition is identified. 2. Progression of neoplasm with enlarging irregular soft tissue mass in the 
rectal resection bed, worsened peritoneal carcinomatosis with small ascites, and 
increased size of left apical nodule with surrounding pneumonitis concerning for 
metastatic neoplasm deposit. Paralysis, amputations, malnutrition: none Labs: 
Recent Labs  
  19 
0336 19 
0232 19 
8349 CREA 1.20 1.36* 1.34* BUN 36* 44* 44* WBC 10.8 13.5* 12.7* Temp (24hrs), Av.9 °F (37.2 °C), Min:97.5 °F (36.4 °C), Max:100.7 °F (38.2 °C) Creatinine Clearance (mL/min) or Dialysis:  
Estimated Creatinine Clearance (using IBW):60.0 mL/min Impression/Plan:  
Previous BOBBI requiring HD, now resolved and off dialysis Vancomycin 1,250 mg IV q 18 h, trough this am 12.4 mcg/ml. Adjust to Q16H with anticipated trough of 14.8 mcg/ml WBC trending down Scr trending down Daily renal function panel ordered Antimicrobial stop date - pending Pharmacy will follow daily and adjust medications as appropriate for renal function and/or serum levels. Thank you, 
Gia Sanchez, [facility]

## 2019-07-03 NOTE — PROGRESS NOTES
Hospitalist Progress Note NAME: Heather Salas :  1950 MRN:  [id number] Assessment / Plan: 
Severe hyperkalemia - resolved s/p HD BOBBI - required HD - now resolved and off dialysis, with a new insult now improving. Metabolic acidosis c/w  bicarbonate drip Sepsis 2/ unknown source , intraabdominal infection? - resolved Pt had long  surgery on , he is c/o abdominal pain and spiked fever on  Wbc trending down , Started on cefepime and flagyl and vanco  
Cont NG to suction, NPO for now Potassium 6.4, bicarb 17 creat up 2.8-3.8 on  EKG : no peaked T waves , given insulin + ca gluconate  
nephro consulted , appreciate management , started on bicarb drip on  S/p HD on  and bicarb drip , K down to wnl and creat improving No further HD on 06/15 
completed vanco cefipime and flagyl, resumed ABX  On 19 BCx NTD  
WBC up and spiking fevers, CT shows pneumonitis, c/w Cefepime/Flagyl/Vancomycin Anemia: no signs of bleeding at this time, s/p 1 unit PRBC 19 Hypokalemia - resolved, now trending high again TPN adjusted by Nephro to include K Follow lytes, ok today, K trending up- reduced K in TPN , K better today, reduced in TPN, follow 
hypomag 
-supp IV yesterday and added to tpn 
-mag better today Hypernatremia - resolved Na adjusted in TPN, follow S/p ex-lap  ROBOTIC LYSIS OF ADHESIONS SMALL BOWEL RESECTION on  Small Bowel Obstruction POA-  CT shows new partial SBO, keep NPO, c/w TPN. Colon Cancer s/p resection with recurrence/Rectal Adenocarcinoma on ChemotX s/p resection On TPN  
CT abd/pelvis on :Small bowel distention with decompressed loops distally is compatible with obstruction likely related to effusion formation in the mid lower abdomen. Mild free fluid New CT : progression of disease with increased carcinomatosis, increased size of rectal mass, lung nodule with pneumonitis, partial SBO. KUB shows bowel distension, ongoing SBO, would not advance diet any further Brief op note : Diagnostic laparoscopy converted to open exploratory laparotomy with small bowel bypass and repair of colotomy x6 
IP Oncology consult appreciated & noted- Chemo delayed for now Cont IV dilaudid prn pain and IV antiemetics prn 
TPN cont for now, daily CMP mag phos - adjust k, mag 
C/w humalog Sc qachs, eating Tolerating fulls, NGT out 6/26, cont tpn, advance diet per surgery to gi lyte 6/30 per note Fever and not tolerating po today CT scan to reeval 
Hypertension - control improved 
-titrate PO meds  
-place on clonidine patch and scheduled Nitrobid 
-prn IV hydralazine ordered, IV Labetalol ordered by Nephro Diabetes Mellitus, controlled 
-Increased dose of NPH  To 32 units BID 6/29, SS, FS monitoring 
-changed ss to qac and hs 
-adjusted insulin in tpn to 55units 
-once off TPN, at home was on lantus 32 daily and prn humalog 
- watch closely, may need to go down on NPH if not eating as well Code status: Full , reconsult palliative Surrogate Decision Maker: Wife Prophylaxis: lovenox Recommended Disposition:  PT following,  home health ? Hospice? Poor overall prognosis 30.0 - 39.9 Obese / Body mass index is 33.21 kg/m². Overall prognosis is poor, patients disease has progressed and there are no curative options, patient is appropriate for Hospice although seems that he is not prepared for this and still wants to be full code As per the team Oncology, Surgery, Nephrology, no further curative treatments available, need to start preparing for disposition, spoke with wife and patient about Home with Home Health , Hospice, SNF, they will think about those options, he will need to c/w TPN. Subjective: Chief Complaint / Reason for Physician Visit 
abd pain/n/v  
 
6/25 Patient is sleepy but easily arousable with NG tube in place.   He denies any nausea or vomiting is tolerating some clears. His wife reports more output from his ostomy. He denies any shortness of breath or chest pain or abdominal pain. He is still very weak. 6/26 pt reports minimal abd pain 1-2/10. No n/v. No appetite. ngt removed today, clears per surgery. Better with PT today 6/27 pt reports awoke around 4 in the morning and felt \"lack of energy\" can specify more,  No pain, no sob, still passing stool. Tolerated clear diet for dinner. 6/28 pt feels well. Eager to eat.  tolerating fulls. No abd pain, stooling. Getting stronger. 6/29 pt feels \"not bad\". Improved mobility. tolerating full liq diet. some intermittent \"gas' pains. 6/30 pt had a bad day yesterday, no appetite only small sips all day and minimal activity per wife, occ cramps in abd \"gas\" no n/v. No sob/cp. 7/1 pt not eating or drinking, seems to deny abd pain, only occ cramp, some slight sob, no desat, no cough, denies n/v. Feels food is moving but \"slow\". We discussed general decline over the weekend. Addressed reeval with palliative care, but will proceed with CT scan first given fever overnight. When asked if he would consider another NGT, he said it would depend. Wife understands longterm prognosis poor. 07/02: \"I feel a little better, just very tired\" 07/03/19: \"I feel better today\" Discussed with RN events overnight. Review of Systems: 
Symptom Y/N Comments  Symptom Y/N Comments Fever/Chills y   Chest Pain n   
Poor Appetite y   Edema Cough n   Abdominal Pain n   
Sputum    Joint Pain SOB/TALBOT y   Pruritis/Rash Nausea/vomit n   Tolerating PT/OT Diarrhea    Tolerating Diet y full Constipation    Other Could NOT obtain due to:   
 
Objective: VITALS:  
Last 24hrs VS reviewed since prior progress note. Most recent are: 
Patient Vitals for the past 24 hrs: 
 Temp Pulse Resp BP SpO2  
07/03/19 1053 98.3 °F (36.8 °C) 84 19 124/69 100 % 07/03/19 0758 97.5 °F (36.4 °C) 87 16 125/49 100 % 07/03/19 0400 98.6 °F (37 °C) 88 16 124/64 100 % 07/03/19 0002 99.6 °F (37.6 °C) 86 18 116/67 100 % 07/02/19 2000 98.5 °F (36.9 °C) 84 18 115/61 100 % 07/02/19 1822 98.2 °F (36.8 °C) 77 18 124/59 100 % 07/02/19 1550 99 °F (37.2 °C) 84 18 131/73 99 % 07/02/19 1529 99.1 °F (37.3 °C) 84 22 129/61 99 % 07/02/19 1517 99.2 °F (37.3 °C) 87 22 132/61   
07/02/19 1441 98.8 °F (37.1 °C) 84 22 127/57   
07/02/19 1435 (!) 100.7 °F (38.2 °C) 85 22 113/58  Intake/Output Summary (Last 24 hours) at 7/3/2019 1202 Last data filed at 7/3/2019 1139 Gross per 24 hour Intake 3319.84 ml Output 2225 ml Net 1094.84 ml PHYSICAL EXAM: 
General:          WD, WN. Alert, cooperative, in no distress EENT:              EOMI. Anicteric sclerae. MMM Resp:               Coarse BS, mild crackles in both sides CV:                  Regular  rhythm,  No edema GI:                   Soft, Non distended,epi-meso gastric pain  +Bowel sounds Ostomy stool and a urostomy with yellow urine in tubing Neurologic:      Alert and oriented X 2, normal speech, Psych:             Fair  insight. mildly anxious Skin:                No rashes. No jaundice Reviewed most current lab test results and cultures  YES Reviewed most current radiology test results   YES Review and summation of old records today    NO Reviewed patient's current orders and MAR    YES 
PMH/SH reviewed - no change compared to H&P 
________________________________________________________________________ Care Plan discussed with: 
  Comments Patient y Family  y wife RN y   
Care Manager y Consultant  y surgery Multidiciplinary team rounds were held today with , nursing, pharmacist and clinical coordinator. Patient's plan of care was discussed; medications were reviewed and discharge planning was addressed. ________________________________________________________________________ Total NON critical care TIME:  35  Minutes Total CRITICAL CARE TIME Spent:   Minutes non procedure based Comments >50% of visit spent in counseling and coordination of care y   
________________________________________________________________________ Hugo Adams MD  
 
Procedures: see electronic medical records for all procedures/Xrays and details which were not copied into this note but were reviewed prior to creation of Plan. LABS: 
I reviewed today's most current labs and imaging studies. Pertinent labs include: 
Recent Labs  
  07/03/19 0336 07/02/19 0232 07/01/19 
1359 WBC 10.8 13.5* 12.7* HGB 8.6* 6.3* 7.7* HCT 26.9* 19.4* 22.7*  
 260 248 Recent Labs  
  07/03/19 0336 07/02/19 0232 07/01/19 
3318  137 134* K 4.3 4.4 4.6 * 111* 109* CO2 15* 14* 14* * 163* 155* BUN 36* 44* 44* CREA 1.20 1.36* 1.34* CA 8.5 8.3* 8.8 MG 2.1 2.1 2.6* PHOS 3.6 4.5 4.8* ALB 1.9* 1.9* 2.0*  
TBILI 1.0 0.7  --   
SGOT 32 45*  --   
ALT 40 45  --   
 
 
Signed: Hugo Adams MD

## 2019-07-03 NOTE — ACP (ADVANCE CARE PLANNING)
Nancy Payne - wife #[phone] C / #[phone] H Advance Care Planning 7/3/2019 Patient's Healthcare Decision Maker is: Legal Next of Kin Primary Decision Maker Name -  
Primary Decision Maker Phone Number -  
Primary Decision Maker Relationship to Patient -  
Confirm Advance Directive None Patient Would Like to Complete Advance Directive No  
Does the patient have other document types Do Not Resuscitate Palliative team of Dr Bernardo Gates and Roel Young met with patient and wife Luisana Mcintyre to discuss Code status in light of patient's current medical situation. Both patient and wife have come to accept that there is no treatment at this time that would help with his cancer. They have been processing information that has been presented to them over this week from their medical team, especially Dr Paulette Hooker. Patient voiced, \"I have accepted it, what will be will be. I know there is no treatment at this time\". Patient able to understand CPR and futility of this in his condition. He was able to voice that he would want to \"go naturally\" and not be \"brought back to life\" once he dies. He was able to sign the DDNR today. Copies made for wife and chart.

## 2019-07-03 NOTE — PROGRESS NOTES
Pharmacy Automatic Renal Dosing Protocol - Antimicrobials Indication for Antimicrobials: Sepsis of unknown etiology Current Regimen of Each Antimicrobial: 
Vancomycin IV - started  day 3 Cefepime 1gm IV q12h - started  day 3 Metronidazole 500 mg IVq12h  - started  day 3 Previous Antimicrobial Therapy: 
Cefepime IV  -  Metronidazole IV  -  Vancomycin HD  -  Vancomycin trough goal level:  10-15 Date Dose & Interval Measured (mcg/mL) Extrapolated (mcg/mL) Significant Cultures:  
 BCx - ng - final 
 BCx -NG x 1 day- pending Radiology / Imaging results: (X-ray, CT scan or MRI):  
 CXR - no acute disease  CT chest + Abd pelv - 1. Mildly dilated loops of central small bowel may reflect ileus or partial small bowel obstruction. No clear focal transition is identified. 2. Progression of neoplasm with enlarging irregular soft tissue mass in the 
rectal resection bed, worsened peritoneal carcinomatosis with small ascites, and 
increased size of left apical nodule with surrounding pneumonitis concerning for 
metastatic neoplasm deposit. Paralysis, amputations, malnutrition: none Labs: 
Recent Labs  
  19 
0336 19 
0232 19 
2397 CREA 1.20 1.36* 1.34* BUN 36* 44* 44* WBC 10.8 13.5* 12.7* Temp (24hrs), Av.9 °F (37.2 °C), Min:97.5 °F (36.4 °C), Max:100.7 °F (38.2 °C) Creatinine Clearance (mL/min) or Dialysis:  
Estimated Creatinine Clearance: 70.5 mL/min (based on SCr of 1.2 mg/dL). Estimated Creatinine Clearance (using IBW):60.0 mL/min Impression/Plan:  
Previous BOBBI requiring HD, now resolved and off dialysis Vancomycin 1,250 mg IV q 18 h Vancomycin trough prior to ~7/4 evening dose WBC trending down Scr trending down Daily renal function panel ordered Antimicrobial stop date - pending Pharmacy will follow daily and adjust medications as appropriate for renal function and/or serum levels. Thank you, Ariadne Decker, PHARMD

## 2019-07-03 NOTE — PROGRESS NOTES
Pharmacy Automatic Renal Dosing Protocol - Antimicrobials Indication for Antimicrobials: Sepsis of unknown etiology Current Regimen of Each Antimicrobial: 
Vancomycin IV - started  day 3 Cefepime 2 gm IV q12h - started  day 3 Metronidazole 500 mg IVq12h  - started  day 3 Previous Antimicrobial Therapy: 
Cefepime IV  -  Metronidazole IV  -  Vancomycin HD  -  Vancomycin trough goal level:  10-15 Date Dose & Interval Measured (mcg/mL) Extrapolated (mcg/mL)  
7/3/19  1250 mg Q18H 12.3 12.4 Significant Cultures:  
 BCx - ng - final 
 BCx -NG - pending Radiology / Imaging results: (X-ray, CT scan or MRI):  
 CXR - no acute disease  CT chest + Abd pelv - 1. Mildly dilated loops of central small bowel may reflect ileus or partial small bowel obstruction. No clear focal transition is identified. 2. Progression of neoplasm with enlarging irregular soft tissue mass in the 
rectal resection bed, worsened peritoneal carcinomatosis with small ascites, and 
increased size of left apical nodule with surrounding pneumonitis concerning for 
metastatic neoplasm deposit. Paralysis, amputations, malnutrition: none Labs: 
Recent Labs  
  19 
0336 19 
0232 19 
8442 CREA 1.20 1.36* 1.34* BUN 36* 44* 44* WBC 10.8 13.5* 12.7* Temp (24hrs), Av.9 °F (37.2 °C), Min:97.5 °F (36.4 °C), Max:100.7 °F (38.2 °C) Creatinine Clearance (mL/min) or Dialysis:  
Estimated Creatinine Clearance (using IBW):60.0 mL/min Impression/Plan:  
Previous BOBBI requiring HD, now resolved and off dialysis Vancomycin 1,250 mg IV q 18 h, trough this am 12.4 mcg/ml. Adjust to Q16H with anticipated trough of 14.8 mcg/ml WBC trending down Scr trending down Daily renal function panel ordered Antimicrobial stop date - pending Pharmacy will follow daily and adjust medications as appropriate for renal function and/or serum levels. Thank you, 
Munir Osei, [facility]

## 2019-07-04 NOTE — PROGRESS NOTES
Pharmacy Automatic Renal Dosing Protocol - Antimicrobials Indication for Antimicrobials: Sepsis of unknown etiology Current Regimen of Each Antimicrobial: 
Vancomycin 1250mg  IV q16h - started  day 4 Cefepime 2 gm IV q12h - started  day 4 Metronidazole 500 mg IVq12h  - started  day 4 Previous Antimicrobial Therapy: 
Cefepime IV  -  Metronidazole IV  -  Vancomycin HD  -  Vancomycin trough goal level:  15-20 for sepsis Date Dose & Interval Measured (mcg/mL) Extrapolated (mcg/mL)  
7/3/19  1250 mg Q16H 12.3 12.4 Significant Cultures:  
 BCx - ng - final 
 BCx -NG - pending Radiology / Imaging results: (X-ray, CT scan or MRI):  
 CXR - no acute disease  CT chest + Abd pelv - 1. Mildly dilated loops of central small bowel may reflect ileus or partial small bowel obstruction. No clear focal transition is identified. 2. Progression of neoplasm with enlarging irregular soft tissue mass in the 
rectal resection bed, worsened peritoneal carcinomatosis with small ascites, and 
increased size of left apical nodule with surrounding pneumonitis concerning for 
metastatic neoplasm deposit. Paralysis, amputations, malnutrition: none Labs: 
Recent Labs  
  19 
0345 19 
0336 19 
0232 CREA 1.07 1.20 1.36* BUN 31* 36* 44* WBC 9.7 10.8 13.5* Temp (24hrs), Av.8 °F (37.1 °C), Min:98.2 °F (36.8 °C), Max:99.3 °F (37.4 °C) Creatinine Clearance (mL/min) or Dialysis:  
Estimated Creatinine Clearance (using IBW):67.3 mL/min Impression/Plan: · Previous BOBBI requiring HD, now resolved and off dialysis · Corrected Vancomycin trough goal to 15-20 per indication · afebrile · WBC and SCr improving · Vancomycin regimen with projected trough of 11.6 due to SCr improvement, thus adjusted to 1000mg IV q12h for a projected trough at Css of 14-15. · Recommend recheck Vancomycin trough before  16:00 dose · Antimicrobial stop date - pending Pharmacy will follow daily and adjust medications as appropriate for renal function and/or serum levels.  
 
Thank you, 
Andrzej Crow, [facility]

## 2019-07-04 NOTE — PROGRESS NOTES
Bedside and Verbal shift change report given to [facility] [place] [address] (oncoming nurse) by Marian Arzate RN (offgoing nurse). Report included the following information SBAR, Kardex, Intake/Output and Cardiac Rhythm Normal Sinus. 2300 Pt resting quietly in bed, pt states\"It is hard to get any sleep here. \" 
 
0300 Pt resting in bed, vital signs stable, pt talking in sleep but awakens quickly Bedside and Verbal shift change report given to [address] (oncoming nurse) by [address] (offgoing nurse). Report included the following information SBAR, Kardex and Intake/Output.

## 2019-07-04 NOTE — PROGRESS NOTES
Staples removed from long midline incision and 2 small right lower abd incisions, monique also removed, pt tolerated well

## 2019-07-04 NOTE — PROGRESS NOTES
Hospitalist Progress Note NAME: Kym Parker :  1950 MRN:  [id number] Assessment / Plan: 
Severe hyperkalemia - resolved s/p HD BOBBI - required HD - now resolved and off dialysis, with a new insult now improving. Metabolic acidosis c/w  bicarbonate drip Sepsis 2/ unknown source , intraabdominal infection? - resolved Pt had long  surgery on , he is c/o abdominal pain and spiked fever on  Wbc trending down , Started on cefepime and flagyl and vanco  
Cont NG to suction, NPO for now Potassium 6.4, bicarb 17 creat up 2.8-3.8 on  EKG : no peaked T waves , given insulin + ca gluconate  
nephro consulted , appreciate management , started on bicarb drip on  S/p HD on  and bicarb drip , K down to wnl and creat improving No further HD on 06/15 
completed vanco cefipime and flagyl, resumed ABX  On 19 BCx NTD No more  fevers, CT shows pneumonitis, c/w Cefepime/Flagyl/Vancomycin Anemia: no signs of bleeding at this time, s/p 1 unit PRBC 19 Hypokalemia - resolved, now trending high again TPN adjusted by Nephro to include K Follow lytes, ok today, K trending up- reduced K in TPN , K better today, reduced in TPN, follow 
hypomagnesemia  Resolved, monitor Hypernatremia - resolved Na adjusted in TPN, follow S/p ex-lap  ROBOTIC LYSIS OF ADHESIONS SMALL BOWEL RESECTION on  Small Bowel Obstruction POA-  CT shows new partial SBO, keep NPO, c/w TPN. Colon Cancer s/p resection with recurrence/Rectal Adenocarcinoma on ChemotX s/p resection On TPN  
CT abd/pelvis on :Small bowel distention with decompressed loops distally is compatible with obstruction likely related to effusion formation in the mid lower abdomen. Mild free fluid New CT : progression of disease with increased carcinomatosis, increased size of rectal mass, lung nodule with pneumonitis, partial SBO. 
KUB shows bowel distension, ongoing SBO, would not advance diet any further Brief op note : Diagnostic laparoscopy converted to open exploratory laparotomy with small bowel bypass and repair of colotomy x6 
IP Oncology consult appreciated & noted- Chemo delayed for now Cont IV dilaudid prn pain and IV antiemetics prn 
TPN cont for now, daily CMP mag phos - adjust k, mag 
C/w humalog Sc qachs, eating Tolerating fulls, NGT out 6/26, cont tpn, advance diet per surgery to gi lyte 6/30 per note CT scan to re-eval shows progression of disease Hypertension - control improved 
-titrate PO meds  
-place on clonidine patch and scheduled Nitrobid 
-prn IV hydralazine ordered, IV Labetalol ordered by Nephro Diabetes Mellitus, controlled 
-Increased dose of NPH  To 32 units BID 6/29, SS, FS monitoring 
-changed ss to qac and hs 
-adjusted insulin in tpn to 55units 
-once off TPN, at home was on lantus 32 daily and prn humalog 
- watch closely, may need to go down on NPH if not eating as well Code status: DNR , as per patient and family Surrogate Decision Maker: Wife Prophylaxis: lovenox Recommended Disposition:  PT following,  home health ? Hospice? Poor overall prognosis 30.0 - 39.9 Obese / Body mass index is 33.65 kg/m². Overall prognosis is poor, patients disease has progressed and there are no curative options, patient and family had decided for Hospice, equipment to be delivered tomorrow, plan for D/c Saturday home with Hospice on Full liquid diet low fiber Subjective: Chief Complaint / Reason for Physician Visit 
abd pain/n/v  
 
6/25 Patient is sleepy but easily arousable with NG tube in place. He denies any nausea or vomiting is tolerating some clears. His wife reports more output from his ostomy. He denies any shortness of breath or chest pain or abdominal pain. He is still very weak. 6/26 pt reports minimal abd pain 1-2/10. No n/v. No appetite. ngt removed today, clears per surgery. Better with PT today 6/27 pt reports awoke around 4 in the morning and felt \"lack of energy\" can specify more,  No pain, no sob, still passing stool. Tolerated clear diet for dinner. 6/28 pt feels well. Eager to eat.  tolerating fulls. No abd pain, stooling. Getting stronger. 6/29 pt feels \"not bad\". Improved mobility. tolerating full liq diet. some intermittent \"gas' pains. 6/30 pt had a bad day yesterday, no appetite only small sips all day and minimal activity per wife, occ cramps in abd \"gas\" no n/v. No sob/cp. 7/1 pt not eating or drinking, seems to deny abd pain, only occ cramp, some slight sob, no desat, no cough, denies n/v. Feels food is moving but \"slow\". We discussed general decline over the weekend. Addressed reeval with palliative care, but will proceed with CT scan first given fever overnight. When asked if he would consider another NGT, he said it would depend. Wife understands longterm prognosis poor. 07/02: \"I feel a little better, just very tired\" 07/03/19: \"I feel better today\" 07/04/19  \"I feel OK\" Discussed with RN events overnight. Review of Systems: 
Symptom Y/N Comments  Symptom Y/N Comments Fever/Chills y   Chest Pain n   
Poor Appetite y   Edema Cough n   Abdominal Pain n   
Sputum    Joint Pain SOB/TALBOT y   Pruritis/Rash Nausea/vomit n   Tolerating PT/OT Diarrhea    Tolerating Diet y Full liquid Constipation    Other Could NOT obtain due to:   
 
Objective: VITALS:  
Last 24hrs VS reviewed since prior progress note. Most recent are: 
Patient Vitals for the past 24 hrs: 
 Temp Pulse Resp BP SpO2  
07/04/19 1039 98.8 °F (37.1 °C) 84 18 153/79 98 % 07/04/19 0844 98.9 °F (37.2 °C) 85 18 134/70 99 % 07/04/19 0322 99 °F (37.2 °C) 88 18 132/64 100 % 07/03/19 2245 99.3 °F (37.4 °C) 84 20 133/74 100 % 07/03/19 2011     100 % 07/03/19 1900 98.2 °F (36.8 °C) 95 18 130/65 100 % 07/03/19 1630 98.3 °F (36.8 °C) 80 18 138/71 99 % Intake/Output Summary (Last 24 hours) at 7/4/2019 1358 Last data filed at 7/4/2019 1151 Gross per 24 hour Intake 920 ml Output 3510 ml Net -2590 ml PHYSICAL EXAM: 
General:          WD, WN. Alert, cooperative, in no distress EENT:              EOMI. Anicteric sclerae. MMM Resp:               Coarse BS, mild crackles in both sides CV:                  Regular  rhythm,  No edema GI:                   Soft, Non distended,epi-meso gastric pain  +Bowel sounds Ostomy stool and a urostomy with yellow urine in tubing Neurologic:      Alert and oriented X 2, normal speech, Psych:             Fair  insight. mildly anxious Skin:                No rashes. No jaundice Reviewed most current lab test results and cultures  YES Reviewed most current radiology test results   YES Review and summation of old records today    NO Reviewed patient's current orders and MAR    YES 
PMH/SH reviewed - no change compared to H&P 
________________________________________________________________________ Care Plan discussed with: 
  Comments Patient y Family  y wife RN y   
Care Manager y Consultant  y surgery Multidiciplinary team rounds were held today with , nursing, pharmacist and clinical coordinator. Patient's plan of care was discussed; medications were reviewed and discharge planning was addressed. ________________________________________________________________________ Total NON critical care TIME:  35  Minutes Total CRITICAL CARE TIME Spent:   Minutes non procedure based Comments >50% of visit spent in counseling and coordination of care y   
________________________________________________________________________ Chikis Pittman MD  
 
Procedures: see electronic medical records for all procedures/Xrays and details which were not copied into this note but were reviewed prior to creation of Plan.    
 
LABS: 
 I reviewed today's most current labs and imaging studies. Pertinent labs include: 
Recent Labs  
  07/04/19 0345 07/03/19 
0336 07/02/19 
0232 WBC 9.7 10.8 13.5* HGB 8.0* 8.6* 6.3* HCT 23.7* 26.9* 19.4*  240 260 Recent Labs  
  07/04/19 0345 07/03/19 0336 07/02/19 0232  137 137  
K 3.9 4.3 4.4  
 112* 111* CO2 19* 15* 14* * 110* 163* BUN 31* 36* 44* CREA 1.07 1.20 1.36* CA 8.1* 8.5 8.3*  
MG 1.6 2.1 2.1 PHOS 2.8 3.6 4.5 ALB 1.8* 1.9* 1.9* TBILI 0.6 1.0 0.7 SGOT 39* 32 45* ALT 40 40 45 Signed: Reji Hale MD

## 2019-07-04 NOTE — PROGRESS NOTES
Problem: Falls - Risk of 
Goal: *Absence of Falls Description Document Lola Argueta Fall Risk and appropriate interventions in the flowsheet.  
Outcome: Progressing Towards Goal

## 2019-07-04 NOTE — PROGRESS NOTES
CRS Progress Note Feels better today. More alert. Denies any pain or nausea. States that his appetite is better but still poor /79   Pulse 84   Temp 98.8 °F (37.1 °C)   Resp 18   Ht 5' 10\" (1.778 m)   Wt 106.4 kg (234 lb 8 oz)   SpO2 98%   BMI 33.65 kg/m² NAD, sleepy but AAOx3 Abd soft, mildly tender, no rebound/guarding, mild distension Ostomy with some stool in bag Plan Diet as tolerated since he is going home with hospice. I recommended low fiber so that it would not cause obstruction I spoke with the family and patient and offered emotional support. I also discussed the patient's condition with Dr. Abril Mariano

## 2019-07-05 NOTE — PROGRESS NOTES
Hematology Oncology Progress Note Follow up for: metastatic rectal cancer Chart notes reviewed since last visit. Case discussed with following:  Wife not at bedside today. Patient complains of the following: sleeping quite soundly Additional concerns noted by the staff:  
 
Patient Vitals for the past 24 hrs: 
 BP Temp Pulse Resp SpO2  
07/05/19 1117 151/61 98.3 °F (36.8 °C) 93 20 99 % 07/05/19 0400   92  100 % 07/05/19 0355   98  100 % 07/05/19 0350   98  100 % 07/05/19 0345   (!) 102  100 % 07/05/19 0340   99  100 % 07/05/19 0335 148/72 99.7 °F (37.6 °C) 91 18 100 % 07/05/19 0200   94  100 % 07/05/19 0100   92  100 % 07/05/19 0000   91  100 % 07/04/19 2300   92  99 % 07/04/19 2236 141/65 99.3 °F (37.4 °C) 90 18 100 % 07/04/19 2200   93  100 % 07/04/19 2119   (!) 101    
07/04/19 2100   (!) 101  100 % 07/04/19 2012 132/76 99.1 °F (37.3 °C) 98 18   
07/04/19 2000   98  100 % 07/04/19 1900 132/76 98.7 °F (37.1 °C) 98 18 98 % ROS negative for 11 organ systems except as ntoed above. Physical Examination: 
Gen NAD, sleeping quite soundly. Did not rouse. HEENT: unremarkable Neck: no visible JVD or adenopathy Chest: clear anterolaterally CV reg rate and rhythm 
abd: dual ostomies in place, colostomy with brown soft stool, hypoactive BS Extr: without edema Neuro: nonfocal but not specifically tested. Psych: sleeping. Skin: no suspicious lesions. Labs: 
Recent Results (from the past 24 hour(s)) GLUCOSE, POC Collection Time: 07/04/19  4:50 PM  
Result Value Ref Range Glucose (POC) 202 (H) 65 - 100 mg/dL Performed by Valorie Valverde (PCT) GLUCOSE, POC Collection Time: 07/04/19 11:06 PM  
Result Value Ref Range Glucose (POC) 170 (H) 65 - 100 mg/dL Performed by Erica Pope ([address]) [place] Fle[place] Collection Time: 07/05/19  3:33 AM  
Result Value Ref Range Vancomycin,trough 13.6 (H) 5.0 - 10.0 ug/mL Reported dose date: NOT PROVIDED Reported dose time: NOT PROVIDED Reported dose: NOT PROVIDED UNITS  
CBC WITH AUTOMATED DIFF Collection Time: 07/05/19  3:33 AM  
Result Value Ref Range WBC 11.5 (H) 4.1 - 11.1 K/uL  
 RBC 2.31 (L) 4.10 - 5.70 M/uL HGB 6.5 (L) 12.1 - 17.0 g/dL HCT 20.0 (L) 36.6 - 50.3 % MCV 86.6 80.0 - 99.0 FL  
 MCH 28.1 26.0 - 34.0 PG  
 MCHC 32.5 30.0 - 36.5 g/dL  
 RDW 16.8 (H) 11.5 - 14.5 % PLATELET 690 998 - 314 K/uL MPV 10.9 8.9 - 12.9 FL  
 NRBC 0.0 0  WBC ABSOLUTE NRBC 0.00 0.00 - 0.01 K/uL NEUTROPHILS 81 (H) 32 - 75 % LYMPHOCYTES 7 (L) 12 - 49 % MONOCYTES 9 5 - 13 % EOSINOPHILS 2 0 - 7 % BASOPHILS 0 0 - 1 % IMMATURE GRANULOCYTES 1 (H) 0.0 - 0.5 % ABS. NEUTROPHILS 9.3 (H) 1.8 - 8.0 K/UL  
 ABS. LYMPHOCYTES 0.8 0.8 - 3.5 K/UL  
 ABS. MONOCYTES 1.1 (H) 0.0 - 1.0 K/UL  
 ABS. EOSINOPHILS 0.2 0.0 - 0.4 K/UL  
 ABS. BASOPHILS 0.0 0.0 - 0.1 K/UL  
 ABS. IMM. GRANS. 0.1 (H) 0.00 - 0.04 K/UL  
 DF AUTOMATED MAGNESIUM Collection Time: 07/05/19  3:33 AM  
Result Value Ref Range Magnesium 1.6 1.6 - 2.4 mg/dL METABOLIC PANEL, COMPREHENSIVE Collection Time: 07/05/19  3:33 AM  
Result Value Ref Range Sodium 138 136 - 145 mmol/L Potassium 3.7 3.5 - 5.1 mmol/L Chloride 107 97 - 108 mmol/L  
 CO2 22 21 - 32 mmol/L Anion gap 9 5 - 15 mmol/L Glucose 156 (H) 65 - 100 mg/dL BUN 28 (H) 6 - 20 MG/DL Creatinine 1.04 0.70 - 1.30 MG/DL  
 BUN/Creatinine ratio 27 (H) 12 - 20 GFR est AA >60 >60 ml/min/1.73m2 GFR est non-AA >60 >60 ml/min/1.73m2 Calcium 8.3 (L) 8.5 - 10.1 MG/DL Bilirubin, total 0.6 0.2 - 1.0 MG/DL  
 ALT (SGPT) 41 12 - 78 U/L  
 AST (SGOT) 36 15 - 37 U/L Alk. phosphatase 248 (H) 45 - 117 U/L Protein, total 7.6 6.4 - 8.2 g/dL Albumin 1.8 (L) 3.5 - 5.0 g/dL Globulin 5.8 (H) 2.0 - 4.0 g/dL A-G Ratio 0.3 (L) 1.1 - 2.2 PHOSPHORUS  
 Collection Time: 07/05/19  3:33 AM  
Result Value Ref Range Phosphorus 2.9 2.6 - 4.7 MG/DL  
GLUCOSE, POC Collection Time: 07/05/19  5:25 AM  
Result Value Ref Range Glucose (POC) 152 (H) 65 - 100 mg/dL Performed by Mark Dyer ([address]) GLUCOSE, POC Collection Time: 07/05/19 12:04 PM  
Result Value Ref Range Glucose (POC) 256 (H) 65 - 100 mg/dL Performed by Radhames Benitez Imaging: 
CXray 6/1/19 - no acute process Assessment and Plan: SBO: 
- Had adhesions lysed in OR with Dr Estefani Jang 6/13. 
- NG removed 6/25 
- Improved, diet advanced but over weekend but appetite worsened and he seemed worse. Mental status changes  
- fluctuating. Better today BOBBI: 
- resolved. Cr 1.04 this AM 
 
Metastatic rectal cancer: 
- progressive on CT as treatment held while recovering from surgery. I went over findings with him and wife Tuesday.  
- going home with hospice. Agree with this plan. Will let Dr. Reinaldo Barrios know. DM - on insulin Hypertension - clonidine, norvasc, labetalol  ordered.

## 2019-07-05 NOTE — PROGRESS NOTES
Bedside and Verbal shift change report given to [facility] [address] (oncoming nurse) by Jonh Rg RN (offgoing nurse). Report included the following information SBAR, Kardex and Intake/Output Bedside and Verbal shift change report given to [facility] [address] (oncoming nurse) by Jose Elias Church RN (offgoing nurse). Report included the following information SBAR, Kardex and Intake/Output. Orlando Jacobs

## 2019-07-05 NOTE — PROGRESS NOTES
Nutrition Assessment: 
 
INTERVENTIONS/RECOMMENDATIONS:  
Decrease TPN rate to 42 mL/hr and let current bag run out PO as patient desires ASSESSMENT:  
Chart reviewed; patient and family have decided to discharge home with hospice tomorrow. CRS recommending a low fiber diet on discharge. Discontinue TPN/lipids. Diet Order: Full liquids(Ensure compact TID) (TPN AA5% D15% @ 83 mL/hr + lipids 3x/week; 1628 kcal, 100g protein) % Eaten:   
Patient Vitals for the past 72 hrs: 
 % Diet Eaten 07/04/19 1618 10 % 07/04/19 1151 10 % 07/04/19 0902 10 % 07/03/19 1518 10 % 07/03/19 0801 0 % 07/02/19 1313 60 % Pertinent Medications: [x] Reviewed []Other: Norvasc, Humalog, NPH, labetalol Pertinent Labs: [x]Reviewed  []Other:  
Food Allergies: [x]None []Yes:    
Last BM: 7/4   [x]Active     []Hyperactive  []Hypoactive       [] Absent  BS Skin:    [] Intact   [x] Incision  [] Breakdown   []Edema   []Other: Anthropometrics: Height: 5' 10\" (177.8 cm) Weight: 106.4 kg (234 lb 8 oz) IBW (%IBW):   ( ) UBW (%UBW):   (  %) BMI: Body mass index is 33.65 kg/m². This BMI is indicative of: 
[]Underweight   []Normal   []Overweight   [x] Obesity   [] Extreme Obesity (BMI>40) Last Weight Metrics: 
Weight Loss Metrics 7/4/2019 4/18/2019 4/9/2019 2/27/2019 12/17/2018 8/21/2018 4/17/2018 Today's Wt 234 lb 8 oz 245 lb 245 lb 245 lb 249 lb 247 lb 244 lb BMI 33.65 kg/m2 36.18 kg/m2 36.18 kg/m2 36.18 kg/m2 36.77 kg/m2 36.48 kg/m2 36.03 kg/m2 Estimated Nutrition Needs (Based on): 2381 Kcals/day(BMR (1831) x 1. 3AF) , 106 g(1.0 g/kg bw) Protein Carbohydrate: At Least 130 g/day  Fluids: 2000 mL/day Pt expected to meet estimated nutrient needs: []Yes [x]No 
 
NUTRITION DIAGNOSES:  
Problem:  Altered GI function Etiology: related to metastatic rectal cancer and SBO Signs/Symptoms: as evidenced by need for TPN to meet nutrition needs NUTRITION INTERVENTIONS: 
 Meals/Snacks: General/healthful diet GOAL:  
PO intake as desired for hospice/comfort next 5-7 days NUTRITION MONITORING AND EVALUATION Food/Nutrient Intake Outcomes: Total energy intake Physical Signs/Symptoms Outcomes: Electrolyte and renal profile, GI profile, Weight/weight change, Glucose profile, GI 
 
Previous Goal Met: 
 [x] Met              [] Progressing Towards Goal              [] Not Progressing Towards Goal  
Previous Recommendations: 
 [x] Implemented          [] Not Implemented          [] Not Applicable LEARNING NEEDS (Diet, Food/Nutrient-Drug Interaction):  
 [x] None Identified 
 [] Identified and Education Provided/Documented 
 [] Identified and Pt declined/was not appropriate Cultural, Adventist, OR Ethnic Dietary Needs:  
 [x] None Identified 
 [] Identified and Addressed 
 
 [x] Interdisciplinary Care Plan Reviewed/Documented  
 [x] Discharge Planning: Low fiber diet per CRS [] Participated in Interdisciplinary Rounds NUTRITION RISK:  
 [] Patient At Nutritional Risk             [x] Patient Not At Nutritional Risk Mark Kerr [phone] Pager [phone] Weekend Pager [phone]

## 2019-07-05 NOTE — HOSPICE
[facility] Good Help to Those in Need 
[phone] Inpatient Nursing PRE Admission Patient Name: Jody Johnson YOB: 1950 Age: 70 y.o. Date of PLANNED Hospice Admission: 2019 Hospice Attending: Dr. Pippa Baxter Primary Care Physician: Renu Jean Baptiste MD 
  
Home Hospice Zip Code: [address] Expected  (if patient transferred to [facility]): TBD ADVANCE CARE PLANNING Code Status:DNR Durable DNR: [x]  Yes  []  No 
Advance Care Planning 7/3/2019 Patient's Healthcare Decision Maker is: Legal Next of Kin Primary Decision Maker Name -  
Primary Decision Maker Phone Number -  
Primary Decision Maker Relationship to Patient -  
Confirm Advance Directive None Patient Would Like to Complete Advance Directive No  
Does the patient have other document types Do Not Resuscitate Roman Catholic: Adventism  Home: tbd HOSPICE SUMMARY  
ER Visits/ Hospitalizations in past year: 1 Hospice Diagnosis: metastatic colon ca Onset Date of Hospice Diagnosis:  Summary of Disease Progression Leading to Hospice Diagnosis:  Per Dr. Ana Jamison note on : Male with a past history of colon  Cancer  Diagnosed 2016 , s/p resection , pelvic exenteration(partial colectomy , prostatectomy and cystectomy ) with colostomy and urostomy concurrent chemoradiation completed  , ,  recurrent rectal adenocarcinoma on chemo with folfox, last chemo  , under the direction of Dr Sebastian Lang, who was admitted on 2019 from home with a diagnosis of Small bowel obstruction and mild BOBBI , Presented with abdominal pain and constipation . Patient and wife do not wish to pursue any additional surgery or curative treatments at this time. Wish to begin hospice care at home Co-Morbidities:  
Patient Active Problem List  
Diagnosis Code  Hyperlipidemia E78.5  Radial nerve compression G56.30  
 Diabetic polyneuropathy (HCC) E11.42  
  Osteoarthritis of right hip M16.11  
 Coronary artery disease involving native coronary artery of native heart without angina pectoris I25.10  Rectal mass K62.9  Status post colostomy ([facility] [address] Z93.3  Abnormal nuclear stress test R94.39  
 S/P cardiac cath Z98.890  
 Hypertension complicating diabetes ([facility] [address] E11.59, I10  
 Rectal adenocarcinoma (HCC) C20  
 Severe obesity (BMI 35.0-39. 9) with comorbidity ([facility] [address] E66.01  
 Encounter for long-term (current) drug use Z79.899  Type 2 diabetes with nephropathy (HCC) E11.21  
 Type 2 diabetes mellitus with proliferative retinopathy ([facility] [address] N43.0492  Bowel obstruction ([facility] [address] S63.073  Metastatic cancer ([facility] [address] C79.9 Diagnoses RELATED to the terminal prognosis: bowel obstruction, sepsis Other Diagnoses: see above Rationale for a prognosis of life expectancy of 6 months or less if the disease follows its normal course (Disease Specific History):  
 
Erika Parra is a 70 y.o. who was admitted to [facility]. The patient's principle diagnosis of colon cancer has resulted in pursuit of hospice care. Functionally, the patient's Palliative Performance Scale has declined over a period of months and is estimated at 40. Objective information that support this patients limited prognosis includes: bowel obstruction, inability to tolerate more than minimal amounts of PO intake. The patient/family chose comfort measures with the support of Hospice. Patient meets for routine LOC as evidenced by minimal symptoms, poor PO intake, mostly bedbound Verbal certification of terminal diagnosis with life expectancy of 6 months or less received from: Dr. Howard Ferguson Prognosis estimated based on 07/05/19 clinical assessment is:  
[] Few to Many Hours [] Hours to Days  
[] Few to Many Days [x] Days to Weeks  
[] Few to Many Weeks  
[] Weeks to Months  
[] Few to Many Months CLINICAL INFORMATION Allergies: Allergies Allergen Reactions  Atorvastatin Myalgia  Pcn [Penicillins] Hives Wt Readings from Last 3 Encounters:  
07/04/19 106.4 kg (234 lb 8 oz) 04/18/19 111.1 kg (245 lb) 04/09/19 111.1 kg (245 lb) Ht Readings from Last 3 Encounters:  
06/02/19 5' 10\" (1.778 m) 04/18/19 5' 9\" (1.753 m) 04/09/19 5' 9\" (1.753 m) Body mass index is 33.65 kg/m². Visit Vitals /72 (BP 1 Location: Right arm) Pulse 92 Temp 99.7 °F (37.6 °C) Resp 18 Ht 5' 10\" (1.778 m) Wt 106.4 kg (234 lb 8 oz) SpO2 100% BMI 33.65 kg/m² LAB VALUES 
   
CBC WITH AUTOMATED DIFF Collection Time: 07/05/19  3:33 AM  
Result Value Ref Range WBC 11.5 (H) 4.1 - 11.1 K/uL  
 RBC 2.31 (L) 4.10 - 5.70 M/uL HGB 6.5 (L) 12.1 - 17.0 g/dL HCT 20.0 (L) 36.6 - 50.3 % MCV 86.6 80.0 - 99.0 FL  
 MCH 28.1 26.0 - 34.0 PG  
 MCHC 32.5 30.0 - 36.5 g/dL  
 RDW 16.8 (H) 11.5 - 14.5 % PLATELET 655 558 - 841 K/uL MPV 10.9 8.9 - 12.9 FL  
 NRBC 0.0 0  WBC ABSOLUTE NRBC 0.00 0.00 - 0.01 K/uL NEUTROPHILS 81 (H) 32 - 75 % LYMPHOCYTES 7 (L) 12 - 49 % MONOCYTES 9 5 - 13 % EOSINOPHILS 2 0 - 7 % BASOPHILS 0 0 - 1 % IMMATURE GRANULOCYTES 1 (H) 0.0 - 0.5 % ABS. NEUTROPHILS 9.3 (H) 1.8 - 8.0 K/UL  
 ABS. LYMPHOCYTES 0.8 0.8 - 3.5 K/UL  
 ABS. MONOCYTES 1.1 (H) 0.0 - 1.0 K/UL  
 ABS. EOSINOPHILS 0.2 0.0 - 0.4 K/UL  
 ABS. BASOPHILS 0.0 0.0 - 0.1 K/UL  
 ABS. IMM. GRANS. 0.1 (H) 0.00 - 0.04 K/UL  
 DF AUTOMATED No results found for this visit on 06/02/19 (from the past 6 hour(s)). Lab Results Component Value Date/Time Protein, total 7.6 07/05/2019 03:33 AM  
 Albumin 1.8 (L) 07/05/2019 03:33 AM  
 
 
Currently this patient has: 
[x] Supplemental O2 [x] Peripheral IV  [] PICC    [x] PORT  
[] Ruiz Catheter [] NG Tube   [] PEG Tube  X Ostomy/Urostomy   
[] AICD: Has ICD been deactivated? [] Yes [] No:______ Progression to DEPENDENCE WITH ADLs (include time frame): at this time patient requires moderate assistance with ADLs ASSESSMENT & PLAN 1. Symptom Issues Identified: none at this time 2. Spiritual Issues Identified: none 3. Psych/ Social/ Emotional Issues Identified: lost brother to cancer 3 months ago 4. Care Coordination:  
Transfer to: home Report given to: intake Transportation by: set up by [facility] Scheduled for 11am 
 
Medications Needs: SRK set up through [facility] DME: hospital bed, set up through [place], delivery set up 07/06 Supplies: TBD, currently receiving ostomy/urostomy supplies through [employer]

## 2019-07-05 NOTE — PROGRESS NOTES
CRYSTAL - Home with [facility] Hosp[facility] CM set medical transport with [facility] via [employer] and they will  pt tomorrow, 7/6 at [address] will admit pt tomorrow afternoon. Medicare pt has received, reviewed, and signed 2nd IM letter informing them of their right to appeal the discharge. Signed copy has been placed on pt bedside chart. Mallorie Reyes, [facility]

## 2019-07-05 NOTE — PROGRESS NOTES
Hospitalist Progress Note NAME: Latoya Colunga :  1950 MRN:  [id number] Assessment / Plan: 
Severe hyperkalemia - resolved s/p HD BOBBI - required HD - now resolved and off dialysis, with a new insult now improving, creatinine down to WNL. Metabolic acidosis resolved, D/c bicarbonate drip Sepsis 2/2 unknown source , intraabdominal infection? - resolved Pt had long  surgery on , he is c/o abdominal pain and spiked fever on  Wbc trending down , Started on cefepime and flagyl and vanco  
Cont NG to suction, NPO for now Potassium 6.4, bicarb 17 creat up 2.8-3.8 on  EKG : no peaked T waves , given insulin + ca gluconate  
nephro consulted , appreciate management , started on bicarb drip on  S/p HD on  and bicarb drip , K down to wnl and creat improving No further HD on 06/15 
completed vanco cefipime and flagyl, resumed ABX  On 19 BCx NTD No more  fevers, CT shows pneumonitis, c/w Cefepime/Flagyl/D/Michael Vancomycin Anemia: no signs of bleeding at this time, s/p 1 unit PRBC 19, HH dropping again , will not do transfusion since patient is going home with hospice tomorrow Hypokalemia - resolved,  
hypomagnesemia  Resolved, monitor Hypernatremia - resolved Na adjusted in TPN, follow S/p ex-lap  ROBOTIC LYSIS OF ADHESIONS SMALL BOWEL RESECTION on  Small Bowel Obstruction POA-  CT shows new partial SBO, keep NPO, c/w TPN. Colon Cancer s/p resection with recurrence/Rectal Adenocarcinoma on ChemotX s/p resection On TPN  
CT abd/pelvis on :Small bowel distention with decompressed loops distally is compatible with obstruction likely related to effusion formation in the mid lower abdomen. Mild free fluid New CT : progression of disease with increased carcinomatosis, increased size of rectal mass, lung nodule with pneumonitis, partial SBO. 
KUB shows bowel distension, ongoing SBO, would not advance diet any further Brief op note : Diagnostic laparoscopy converted to open exploratory laparotomy with small bowel bypass and repair of colotomy x6 
IP Oncology consult appreciated & noted- Chemo delayed for now Cont IV dilaudid prn pain and IV antiemetics prn 
TPN cont for now, daily CMP mag phos - adjust k, mag 
C/w humalog Sc qachs, eating Tolerating fulls, NGT out 6/26, cont tpn, advance diet per surgery to gi lyte 6/30 per note CT scan to re-eval shows progression of disease Hypertension - control improved 
-titrate PO meds  
-place on clonidine patch and scheduled Nitrobid 
-prn IV hydralazine ordered, IV Labetalol ordered by Nephro Diabetes Mellitus, controlled, c/w NPH, but once off TPN may chuy to cut down dose or even stop. Code status: DNR , as per patient and family Surrogate Decision Maker: Wife Prophylaxis: lovenox Recommended Disposition:  PT following,  home health ? Hospice? Poor overall prognosis 30.0 - 39.9 Obese / Body mass index is 33.65 kg/m². Overall prognosis is poor, patients disease has progressed and there are no curative options, patient and family had decided for Hospice, equipment to be delivered today, plan for D/c tomorrow home with Hospice on Full liquid diet low fiber Subjective: Chief Complaint / Reason for Physician Visit 
abd pain/n/v  
 
6/25 Patient is sleepy but easily arousable with NG tube in place. He denies any nausea or vomiting is tolerating some clears. His wife reports more output from his ostomy. He denies any shortness of breath or chest pain or abdominal pain. He is still very weak. 6/26 pt reports minimal abd pain 1-2/10. No n/v. No appetite. ngt removed today, clears per surgery. Better with PT today 6/27 pt reports awoke around 4 in the morning and felt \"lack of energy\" can specify more,  No pain, no sob, still passing stool. Tolerated clear diet for dinner. 6/28 pt feels well. Eager to eat.  tolerating fulls.  No abd pain, stooling. Getting stronger. 6/29 pt feels \"not bad\". Improved mobility. tolerating full liq diet. some intermittent \"gas' pains. 6/30 pt had a bad day yesterday, no appetite only small sips all day and minimal activity per wife, occ cramps in abd \"gas\" no n/v. No sob/cp. 7/1 pt not eating or drinking, seems to deny abd pain, only occ cramp, some slight sob, no desat, no cough, denies n/v. Feels food is moving but \"slow\". We discussed general decline over the weekend. Addressed reeval with palliative care, but will proceed with CT scan first given fever overnight. When asked if he would consider another NGT, he said it would depend. Wife understands longterm prognosis poor. 07/02: \"I feel a little better, just very tired\" 07/03/19: \"I feel better today\" 07/04/19  \"I feel OK\" 07/05: \"I just want to sleep\" Discussed with RN events overnight. Review of Systems: 
Symptom Y/N Comments  Symptom Y/N Comments Fever/Chills y   Chest Pain n   
Poor Appetite y   Edema Cough n   Abdominal Pain n   
Sputum    Joint Pain SOB/TALBOT y   Pruritis/Rash Nausea/vomit n   Tolerating PT/OT Diarrhea    Tolerating Diet y Full liquid Constipation    Other Could NOT obtain due to:   
 
Objective: VITALS:  
Last 24hrs VS reviewed since prior progress note. Most recent are: 
Patient Vitals for the past 24 hrs: 
 Temp Pulse Resp BP SpO2  
07/05/19 1117 98.3 °F (36.8 °C) 93 20 151/61 99 % 07/05/19 0400  92   100 % 07/05/19 0355  98   100 % 07/05/19 0350  98   100 % 07/05/19 0345  (!) 102   100 % 07/05/19 0340  99   100 % 07/05/19 0335 99.7 °F (37.6 °C) 91 18 148/72 100 % 07/05/19 0200  94   100 % 07/05/19 0100  92   100 % 07/05/19 0000  91   100 % 07/04/19 2300  92   99 % 07/04/19 2236 99.3 °F (37.4 °C) 90 18 141/65 100 % 07/04/19 2200  93   100 % 07/04/19 2119  (!) 101     
07/04/19 2100  (!) 101   100 % 07/04/19 2012 99.1 °F (37.3 °C) 98 18 132/76   
07/04/19 2000  98   100 % 07/04/19 1900 98.7 °F (37.1 °C) 98 18 132/76 98 % Intake/Output Summary (Last 24 hours) at 7/5/2019 1322 Last data filed at 7/5/2019 3376 Gross per 24 hour Intake 2874.38 ml Output 3800 ml Net -925.62 ml PHYSICAL EXAM: 
General:          WD, WN. Alert, cooperative, in no distress EENT:              EOMI. Anicteric sclerae. MMM Resp:               Coarse BS, mild crackles in both sides CV:                  Regular  rhythm,  No edema GI:                   Soft, Non distended,epi-meso gastric pain  +Bowel sounds Ostomy stool and a urostomy with yellow urine in tubing Neurologic:      Alert and oriented X 2, normal speech, Psych:             Fair  insight. mildly anxious Skin:                No rashes. No jaundice Reviewed most current lab test results and cultures  YES Reviewed most current radiology test results   YES Review and summation of old records today    NO Reviewed patient's current orders and MAR    YES 
PMH/SH reviewed - no change compared to H&P 
________________________________________________________________________ Care Plan discussed with: 
  Comments Patient y Family  y wife RN y   
Care Manager y Consultant  y surgery Multidiciplinary team rounds were held today with , nursing, pharmacist and clinical coordinator. Patient's plan of care was discussed; medications were reviewed and discharge planning was addressed. ________________________________________________________________________ Total NON critical care TIME:  25  Minutes Total CRITICAL CARE TIME Spent:   Minutes non procedure based Comments >50% of visit spent in counseling and coordination of care y   
________________________________________________________________________ Jl Neve, MD  
 
 Procedures: see electronic medical records for all procedures/Xrays and details which were not copied into this note but were reviewed prior to creation of Plan. LABS: 
I reviewed today's most current labs and imaging studies. Pertinent labs include: 
Recent Labs  
  07/05/19 0333 07/04/19 0345 07/03/19 
0008 WBC 11.5* 9.7 10.8 HGB 6.5* 8.0* 8.6* HCT 20.0* 23.7* 26.9*  
 225 240 Recent Labs  
  07/05/19 0333 07/04/19 0345 07/03/19 
5092  136 137  
K 3.7 3.9 4.3  108 112* CO2 22 19* 15* * 148* 110* BUN 28* 31* 36* CREA 1.04 1.07 1.20 CA 8.3* 8.1* 8.5 MG 1.6 1.6 2.1 PHOS 2.9 2.8 3.6 ALB 1.8* 1.8* 1.9* TBILI 0.6 0.6 1.0  
SGOT 36 39* 32 ALT 41 40 40 Signed: Ana Cristina Jules MD

## 2019-07-05 NOTE — PROGRESS NOTES
Goals of care are clear for d/chome home with hospice. We will sign off . Thank you for allowing us to participate in the care of this patient . Gayathri Murillo MD 
 
[phone]

## 2019-07-06 NOTE — DISCHARGE SUMMARY
Hospitalist Discharge Summary Patient ID: 
Archana Tellez [id number] 
77 y.o. 
1950 
6/2/2019 PCP on record: Yunior Foley MD 
 
Admit date: 6/2/2019 Discharge date and time: 7/6/2019 DISCHARGE DIAGNOSIS: 
 
Hyperkalemia, BOBBI, Acidosis, Sepsis, Anemia, Hypomagnesemia, Hyponatremia, SBO/Adhesions/s/p Bowel Resection, Colon Cancer, Rectal Adenocarcinoma, DM 
 
CONSULTATIONS: 
IP CONSULT TO COLORECTAL SURGERY 
IP CONSULT TO ONCOLOGY 
IP CONSULT TO PALLIATIVE CARE - PROVIDER 
IP CONSULT TO NEPHROLOGY 
IP CONSULT TO PALLIATIVE CARE - PROVIDER Excerpted HPI from H&P of Héctor Pierre MD: 
Carolann Joseph is a 70 y.o.  male who presents with abdominal pain. Patient's symptoms started on Thursday evening. He say his oncologist on Friday and was complaining of abdominal bloating and constipation and was instructed to take senna and miralax. Patient has not yet had a BM. He also denies passing gas. Patient reports vomiting on Saturday and this am. On presentation here patient was found to have a small bowel obstruction. We were asked to admit for work up and evaluation of the above problems. ______________________________________________________________________ DISCHARGE SUMMARY/HOSPITAL COURSE:  for full details see H&P, daily progress notes, labs, consult notes. Severe hyperkalemia - resolved s/p HD BOBBI - required HD - now resolved and off dialysis, with a new insult now improving, creatinine down to WNL. Metabolic acidosis resolved, D/c bicarbonate drip Sepsis 2/2 unknown source , intraabdominal infection? - resolved Pt had long  surgery on 06/13, he is c/o abdominal pain and spiked fever on 06/13 Wbc trending down , Started on cefepime and flagyl and vanco  
Cont NG to suction, NPO for now Potassium 6.4, bicarb 17 creat up 2.8-3.8 on 06/14 EKG : no peaked T waves , given insulin + ca gluconate nephro consulted , appreciate management , started on bicarb drip on 06/14 S/p HD on 06/14 and bicarb drip , K down to wnl and creat improving No further HD on 06/15 
completed vanco cefipime and flagyl, resumed ABX  On 07/01/19 BCx NTD No more  fevers, CT shows pneumonitis, c/w Cefepime/Flagyl/D/Michael Vancomycin Anemia: no signs of bleeding at this time, s/p 1 unit PRBC 07/02/19, HH dropping again , will not do transfusion since patient is going home with hospice tomorrow Hypokalemia - resolved,  
hypomagnesemia  Resolved, monitor Hypernatremia - resolved Na adjusted in TPN, follow S/p ex-lap  ROBOTIC LYSIS OF ADHESIONS SMALL BOWEL RESECTION on 06/13 Small Bowel Obstruction POA-  CT shows new partial SBO, keep NPO, c/w TPN. Colon Cancer s/p resection with recurrence/Rectal Adenocarcinoma on ChemotX s/p resection On TPN  
CT abd/pelvis on 6/2:Small bowel distention with decompressed loops distally is compatible with obstruction likely related to effusion formation in the mid lower abdomen. Mild free fluid New CT 7/01: progression of disease with increased carcinomatosis, increased size of rectal mass, lung nodule with pneumonitis, partial SBO. 
KUB shows bowel distension, ongoing SBO, would not advance diet any further Brief op note : Diagnostic laparoscopy converted to open exploratory laparotomy with small bowel bypass and repair of colotomy x6 
IP Oncology consult appreciated & noted- Chemo delayed for now Cont IV dilaudid prn pain and IV antiemetics prn 
TPN cont for now, daily CMP mag phos - adjust k, mag 
C/w humalog Sc qachs, eating Tolerating fulls, NGT out 6/26, cont tpn, advance diet per surgery to gi lyte 6/30 per note CT scan to re-eval shows progression of disease Hypertension - control improved 
-titrate PO meds  
-place on clonidine patch and scheduled Nitrobid 
-prn IV hydralazine ordered, IV Labetalol ordered by Nephro Diabetes Mellitus, controlled, c/w NPH, but once off TPN may chuy to cut down dose or even stop. Code status: DNR , as per patient and family Surrogate Decision Maker: Wife Prophylaxis: lovenox Recommended Disposition:  PT following,  home health ? Hospice? Poor overall prognosis 
  
30.0 - 39.9 Obese / Body mass index is 33.65 kg/m².  
  
Overall prognosis is poor, patients disease has progressed and there are no curative options, patient and family had decided for Hospice, equipment to be delivered today, plan for D/c today home with Hospice on Full liquid diet low fiber 
_______________________________________________________________________ Patient seen and examined by me on discharge day. Pertinent Findings: 
Gen:    Not in distress Chest: Coarse BS 
CVS:   Regular rhythm. No edema Abd:  Soft, not distended, mild tender Neuro:  Alert, GCS 15 
_______________________________________________________________________ DISCHARGE MEDICATIONS:  
Current Discharge Medication List  
  
START taking these medications Details  
acetaminophen (TYLENOL) 32MG/ML soln solution Take 15.62 mL by mouth every six (6) hours as needed for Fever. Qty: 473 mL, Refills: 1  
  
cloNIDine (CATAPRES) 0.3 mg/24 hr 1 Patch by TransDERmal route every seven (7) days. Qty: 4 Patch, Refills: 1  
  
sodium bicarbonate 650 mg tablet Take 1 Tab by mouth three (3) times daily. Qty: 90 Tab, Refills: 0  
  
levoFLOXacin (LEVAQUIN) 500 mg tablet Take 1 Tab by mouth every fourty-eight (48) hours for 4 doses. Qty: 4 Tab, Refills: 0  
  
scopolamine (TRANSDERM-SCOP) 1 mg over 3 days pt3d 1 Patch by TransDERmal route every seventy-two (72) hours. Qty: 10 Patch, Refills: 0 Associated Diagnoses: Small bowel obstruction ([facility] DNR (do not resuscitate) discussion  
  
morphine 20 mg/5 mL (4 mg/mL) solution Take 1.25 mL by mouth every two (2) hours as needed for Pain for up to 3 days. Max Daily Amount: 60 mg. 
Qty: 100 mL, Refills: 0 Associated Diagnoses: [facility] bowel [facility] ([facility] DNR (do not resuscitate) discussion; Rectal mass LORazepam (LORAZEPAM INTENSOL) 2 mg/mL concentrated solution Take 0.5 mL by mouth every eight (8) hours as needed for Agitation or Anxiety. Max Daily Amount: 3 mg. Qty: 30 mL, Refills: 0 Associated Diagnoses: Small bowel obstruction ([facility] DNR (do not resuscitate) discussion; Diabetic polyneuropathy associated with type 2 diabetes mellitus ([facility] CONTINUE these medications which have CHANGED Details  
amLODIPine (NORVASC) 10 mg tablet Take 1 Tab by mouth daily. Qty: 30 Tab, Refills: 1  
  
labetalol (NORMODYNE) 100 mg tablet Take 1 Tab by mouth every twelve (12) hours. Qty: 60 Tab, Refills: 1  
  
ondansetron hcl (ZOFRAN) 8 mg tablet Take 1 Tab by mouth every eight (8) hours as needed for Nausea. Qty: 30 Tab, Refills: 0  
  
insulin glargine (LANTUS SOLOSTAR U-100 INSULIN) 100 unit/mL (3 mL) inpn 15 Units by SubCUTAneous route nightly. Qty: 2 Pen, Refills: 1 CONTINUE these medications which have NOT CHANGED Details  
lidocaine (RECTICARE EX) Insert  into rectum daily as needed for Pain.  
  
polyethylene glycol (MIRALAX) 17 gram/dose powder Take 17 g by mouth daily as needed (constipation). HUMALOG KWIKPEN INSULIN 100 unit/mL kwikpen USE FOUR TIMES DAILY PER SLIDING SCALE Qty: 9 Adjustable Dose Pre-filled Pen Syringe, Refills: 11  
  
isosorbide mononitrate ER (IMDUR) 60 mg CR tablet TAKE 1 TABLET BY MOUTH DAILY. Qty: 30 Tab, Refills: 9  
  
magnesium oxide (MAGOX) 400 mg tablet Take 400 mg by mouth two (2) times a day. ferrous sulfate (SLOW FE) 142 mg (45 mg iron) ER tablet Take 142 mg by mouth Daily (before breakfast). Associated Diagnoses: Mixed hyperlipidemia STOP taking these medications  
  
 multivitamin (ONE A DAY) tablet Comments:  
Reason for Stopping: FLUOROURACIL IV Comments:  
Reason for Stopping: OXALIPLATIN IV Comments: Reason for Stopping: PREPARATION H, WITCH HAZEL, DIANDRA Comments:  
Reason for Stopping:   
   
 metFORMIN (GLUCOPHAGE) 500 mg tablet Comments:  
Reason for Stopping:   
   
 bevacizumab (AVASTIN IV) Comments:  
Reason for Stopping:   
   
 lisinopril (PRINIVIL, ZESTRIL) 10 mg tablet Comments:  
Reason for Stopping:   
   
 cloNIDine HCl (CATAPRES) 0.2 mg tablet Comments:  
Reason for Stopping:   
   
 multivitamins-minerals-lutein (MULTIVITAMIN 50 PLUS) tab tablet Comments:  
Reason for Stopping:   
   
 aspirin delayed-release 81 mg tablet Comments:  
Reason for Stopping:   
   
  
 
 
 
Patient Follow Up Instructions: Activity: Activity as tolerated Diet: Full liquid low fiber diet Wound Care: Keep wound clean and dry Follow-up with Hospice Team  
 
Follow-up Information Follow up With Specialties Details Why Contact Info Armida David MD Internal Medicine   [address] 
[phone] [address] [place] [address]  therapy and nursing services  [address] [place] [address] 
[phone] F/U Hospice Team 
________________________________________________________________ Risk of deterioration: High 
 
Condition at Discharge:  Stable 
__________________________________________________________________ Disposition Home with hospice services 
 
____________________________________________________________________ Code Status: DNR/DNI 
___________________________________________________________________ Total time in minutes spent coordinating this discharge (includes going over instructions, follow-up, prescriptions, and preparing report for sign off to her PCP) :  35 minutes Signed: 
Carmine Melendez MD

## 2019-07-06 NOTE — DISCHARGE INSTRUCTIONS
HOSPITALIST DISCHARGE INSTRUCTIONS  NAME: Jag Ortiz   :  1950   MRN:  [id number]     Date/Time:  2019 9:59 AM    ADMIT DATE: 2019     DISCHARGE DATE: 2019     DIAGNOSIS:  Hyperkalemia, BOBBI, Acidosis, Sepsis, Anemia, Hypomagnesemia, Hyponatremia, SBO/Adhesions/s/p Bowel Resection, Colon Cancer, Rectal Adenocarcinoma, DM    MEDICATIONS:                As per medication reconciliation    · It is important that you take the medication exactly as they are prescribed. · Keep your medication in the bottles provided by the pharmacist and keep a list of the medication names, dosages, and times to be taken in your wallet. · Do not take other medications without consulting Hospice Team    Pain Management: per above medications    What to do at Home    Recommended diet:  Full Liquid Diet low fiber    Recommended activity: Activity as tolerated        Follow Up:   Hospice Team      Information obtained by :  I understand that if any problems occur once I am at home I am to contact my physician. I understand and acknowledge receipt of the instructions indicated above.                                                                                                                                            Physician's or R.N.'s Signature                                                                  Date/Time                                                                                                                                              Patient or Representative Signature                                                          Date/Time

## 2019-07-06 NOTE — PROGRESS NOTES
CRYSTAL: [facility] Pt to discharge home today with [employer] [facility] via [facility] BLS transport @ 11:00AM (scheduled yesterday by previous CM). Hospice to admit pt this afternoon. All DME delivered to the home. Pt to follow-up with PCP as needed, will be followed by hospice MD. All information entered into pt AVS.  
 
Pt has no additional CM needs at this time. Care Management Interventions PCP Verified by CM: Yes(4/9/2019) Palliative Care Criteria Met (RRAT>21 & CHF Dx)?: No 
Mode of Transport at Discharge: BLS([facility]) Hospital Transport Time of Discharge: 1100 Transition of Care Consult (CM Consult): Home Hospice [facility]: Yes Discharge Durable Medical Equipment: No(No O2. DME (cane, walker, and shower chair)) Health Maintenance Reviewed: Yes Physical Therapy Consult: Yes Occupational Therapy Consult: Yes Speech Therapy Consult: Yes Current Support Network: Lives with Spouse, Own Home(Lives in ranch style home w/five steps to enter the front door. ) Confirm Follow Up Transport: Family Plan discussed with Pt/Family/Caregiver: Yes Freedom of Choice Offered: Yes Discharge Location Discharge Placement: Home with hospice([facility]) [facility] Supervisee in Social Work, [employer] [facility] [phone]

## 2019-07-06 NOTE — PROGRESS NOTES
Bedside shift change report given to [facility]  (oncoming nurse) by me (offgoing nurse). Report given with SBAR, MAR and Recent Results.

## 2019-08-29 PROBLEM — R11.2 NAUSEA AND/OR VOMITING: Status: ACTIVE | Noted: 2019-01-01

## 2019-08-29 NOTE — HOSPICE
[facility]  [phone]    GIP Daily Nursing Note   Patient Name: Severiano Abe  YOB: 1950  Age: 70 y.o. Date of Visit: 08/29/19  Facility of Care: [facility]  Patient Room: [facility]/     Hospice Attending: Jose Alejandro Carrion MD  Hospice Diagnosis: Nausea and/or vomiting [R11.2]    Level of Care: GIP    Current GIP Symptoms    1. Uncontrolled nausea and vomiting. Unable to maintain sustenance. ASSESSMENT & PLAN     1. Skilled Nurse Visit Daily x 7 days    Care Coordination Needs:   Communicate with home based care team    Care plan and New Orders discussed / approved with Cheyenne Mariee NP     Description History and Chart Review     Narrative History of last 24 hours that demonstrates care cannot be provided in another setting:   Mr. Ismael Watson has been unable to sustain sustenance in 3 daysultiplrdays. He is nauseated and vomiting/wretching. He has been given multiple  Zofran, Compazine, and Haldol with inability to keep them down. He has had persistent hiccups. Has been unable to get from bed d/t weakness. What has been done to control the patient's symptoms in the last 24 hours? See above note     Does the patient currently require IV medications? Yes  Does the patient currently require scheduled medications? Yes  Does the patient currently require a PCA?  No    List number of doses of PRN medications in last 24 hours:  Medication 1: Zofran  Number of doses: 2    Medication 2: Haldol  Number of doses: 4    Medication 3: Compazine  Number of doses: 2    Supporting documentation for GIP need for pain control:   Frequent evaluation by a doctor, nurse practitioner, nurse    Frequent medication adjustment     IVs that cannot be administered at home      Supporting documentation for GIP need for symptom control:  [x]  Sudden decline necessitating intensive nursing intervention  [x]  Uncontrolled / intractable nausea or vomiting       DISCHARGE PLANNING   Daily discharge planning required for GIP  1. Discharge Plan: : Discharge back to home with hospice if patient stabilizes  2. Patient/Family teaching: Patient and wife told of impending orders for scheduled antiemetics and steroids to control n/v.  3. Response to patient/family teaching: Both display understanding    ASSESSMENT    KARNOFSKY: 30    Prognosis estimated based on 08/29/19 clinical assessment is:   [x] Few to Many Weeks     Patient self-reports:   No, defers to wife  LAST BM: Colostomy bag with light brown liquid stool noted    CLINICAL INFORMATION     Patient Vitals for the past 12 hrs:   Temp Pulse Resp BP SpO2   08/29/19 1650 97.7 °F (36.5 °C) (!) 103 16 120/73 100 %       Currently this patient has:  [x] IV        [x] PORT    [x] Ostomy : Colostomy, Urostomy    SIGNS/PHYSICAL FINDINGS     Skin (including wound):  [x] Warm  [x] Dry      Turgor   [x] Normal  Color:   [x] Normal for Race  [x]  Wounds:Heel, Scapula, Sacrum ( Wound Care Consulted )    Neuro:  [x] Lethargy     Respiratory:  Breath sounds:    [x] Diminished  [x] Even and unlabored  GI  [x] Abdomen Firm  [x] Nausea  [x] Vomiting  [x] Bowel sounds -Yes    [x] Last BM : Colostomy Bag with light brown stool, liquid visible in bag    Nutrition  Diet:    As Tolerated  Appetite: Poor      [x] Voiding: Urostomy      Musculoskeletal  [x] Weak   Strength:     [x] Decreasing   Activities:     [x] Specify:Currently unable to ambulate d/t weakness. ALLERGIES AND MEDICATIONS     Allergies:    Allergies   Allergen Reactions    Atorvastatin Myalgia    Pcn [Penicillins] Hives       Current Facility-Administered Medications   Medication Dose Route Frequency    ketorolac (TORADOL) injection 30 mg  30 mg IntraVENous Q8H PRN    acetaminophen (TYLENOL) suppository 650 mg  650 mg Rectal Q4H PRN    bisacodyl (DULCOLAX) suppository 10 mg  10 mg Rectal DAILY PRN    sodium chloride (NS) flush 5-40 mL  5-40 mL IntraVENous Q8H    nystatin (MYCOSTATIN) 100,000 unit/mL oral suspension 500,000 Units  500,000 Units Oral QID    [START ON 9/1/2019] scopolamine (TRANSDERM-SCOP) 1 mg over 3 days 1 Patch  1 Patch TransDERmal Q72H    LORazepam (ATIVAN) injection 1 mg  1 mg IntraVENous Q15MIN PRN    morphine injection 2 mg  2 mg IntraVENous Q15MIN PRN    polyethylene glycol (MIRALAX) packet 17 g  17 g Oral DAILY PRN    [START ON 9/5/2019] cloNIDine (CATAPRES) 0.3 mg/24 hr patch 1 Patch  1 Patch TransDERmal Q7D    acetaminophen (TYLENOL) tablet 650 mg  650 mg Oral Q4H PRN    ondansetron (ZOFRAN) injection 4 mg  4 mg IntraVENous Q4H    promethazine (PHENERGAN) 25 mg in 0.9% sodium chloride 50 mL IVPB  25 mg IntraVENous Q4H    dexamethasone (DECADRON) 4 mg/mL injection 4 mg  4 mg IntraVENous Q6H    dextrose 5 % - 0.45% NaCl infusion  75 mL/hr IntraVENous CONTINUOUS    baclofen (LIORESAL) tablet 10 mg  10 mg Oral TID    insulin lispro (HUMALOG) injection   SubCUTAneous ACB&D    glucose chewable tablet 16 g  4 Tab Oral PRN    glucagon (GLUCAGEN) injection 1 mg  1 mg IntraMUSCular PRN          Visit Time In: 2476  Visit Time Out: 9795

## 2019-08-30 NOTE — HOSPICE
[facility] RN note:  Pt with improved nausea and vomiting with scheduled IV phenergan, zofran and decadron. Phenergan reduced to PRN until hiccoughs developed. Plan is to scheduled again for next 24hrs 25mg every 4 hrs per Dr Debra Johns (MD for hospice). Pt unable to take po medication without S/S of aspiration which is a new development. H$ P done by Dr Debra Johns today. [facility]  [phone]    [facility] Daily Nursing Note   Patient Name: Pramod Miranda  YOB: 1950  Age: 70 y.o. Date of Visit: 08/30/19  Facility of Care: [facility]  Patient Room: 1112/     Hospice Attending: Sahil Newell MD  Hospice Diagnosis: Nausea and/or vomiting [R11.2]    Level of Care: [facility]    Current [facility] Symptoms    1. Nausea/vomiting  2. Hiccoughs  3. pain        ASSESSMENT & PLAN   Must update Plan of Care including visit frequencies for IDT members  1. Continue GIP level of care for intractable nausea and vomiting  2. Continue scheduled IV decadron with sliding scale insulin coverage for increased blood sugar. 3. Scheduled allen changed to PRN  4. IV phenergan initially changed to PRN until hiccoughs resumed, now phenergan scheduled for 25mg IV every 4 hrs until hiccoughs resolve  5. Pain:  Pt unable to take po tylenol. IV toradol available with sl morphine for pain. CT tylenol via stoma also an option. In no obvious pain so far today. 6.  Emotional support to etta Mcintyre through challenge of accepting a \"new norm\" as pt declines and nears end of life.       Spiritual Interventions: being followed by home team     Psych/ Social/ Emotional Interventions: etta Mcintyre at [id number]: communication between hospice team, hospital staff to assure optimal symptom management    Care plan and New Orders discussed / approved with [facility] MD.    Description History and Chart Review   If this is initial GIP note must document RN assessment/MD communication in previous setting. Specifically document nursing/medication needs in last 24 hours to support GIP care  Narrative History of last 24 hours that demonstrates care cannot be provided in another setting:  Requiring IV medications for intractable nausea. Pt is minimally responsive and requiring skilled nursing assessment for appropriate use of PRN medications    What has been done to control the patient's symptoms in the last 24 hours? See above    Does the patient currently require IV medications? yes  Does the patient currently require scheduled medications? yes  Does the patient currently require a PCA? no    List number of doses of PRN medications in last 24 hours:  Medication 1:  Number of doses:    Medication 2:   Number of doses:    Medication 3:   Number of doses:    Supporting documentation for GIP need for pain control:  [x] Frequent evaluation by a doctor, nurse practitioner, nurse   [x] Frequent medication adjustment    [x] IVs that cannot be administered at home   [] Aggressive pain management   [x] Complicated technical delivery of medications              Supporting documentation for GIP need for symptom control:  [x]  Sudden decline necessitating intensive nursing intervention  [x]  Uncontrolled / intractable nausea or vomiting   []  Pathological fractures  []  Advanced open wounds requiring frequent skilled care  [] Unmanageable respiratory distress  [] New or worsening delirium   [] Delirium with behavior issues: Is 24 hour caregiver present due to safety concerns with agitation? (yes/no)  [] Imminent death  with skilled nursing needs documented above    DISCHARGE PLANNING   Daily discharge planning required for GIP  1. Discharge Plan: home with hospice once pt stabilized  2. Patient/Family teaching: end of life process  3.  Response to patient/family teaching: expressed understanding, continuing to process    ASSESSMENT    KARNOFSKY: 30%    Prognosis estimated based on 08/30/19 clinical assessment is:   [] Few to Many Hours  [] Hours to Days   [] Few to Many Days   [x] Days to Weeks   [] Few to Many Weeks   [] Weeks to Months   [] Few to Many Months    Quality Measure: Patient self-reports:  [] Yes    [x] No        CLINICAL INFORMATION     Patient Vitals for the past 12 hrs:   Temp Pulse Resp BP SpO2   08/30/19 0800 96.2 °F (35.7 °C) (!) 102 18 136/85 100 %       Currently this patient has:  [] Supplemental O2   [x] IV    [] PICC      [] PORT   [] NG Tube    [] PEG Tube   [x] Ostomy     [] Ruiz draining _______ urine  [] Other:   urostomy  SIGNS/PHYSICAL FINDINGS     Skin (including wound):  [] Warm, dry, supple, intact and color normal for race  [] Warm   [] Dry   [] Cool     [] Clammy       [] Diaphoretic    Turgor   [] Normal   [x] Decreased  Color:   [] Pink   [] Pale   [] Cyanotic   [] Erythema   [] Jaundice   [x] Normal for Race  []  Wounds:    Neuro:  [x] Lethargy  [] Restlessness / agitation  [] Confusion / delirium  [] Hallucinations  [] Responds to maximal stimulation  [] Unresponsive  [] Seizures     Cardiac:  [] Dyspnea on Exertion  [] JVD  [] Murmur  [] Palpitations  [] Hypotension  [] Hypertension  [x] Tachycardia  [] Bradycardia  [] Irregular HR  [] Pulses Decreased  [] Pulses Absent  [] Edema:       (Location, Grade and Pitting)  [] Mottling:      (Location)    Respiratory:  Breath sounds:    [x] Diminished   [] Wheeze   [] Rhonchi   [] Rales   [] Even and unlabored  [] Labored:            [] Cough   [] Non Productive   [] Productive    [] Description:           [] Deep suctioned   [] O2 at ___ LPM  [] High flow oxygen greater than 10 LPM  [] Bi-Pap    GI  [] Abdomen (describe)   [] Ascites  [x] Nausea  [x] Vomiting  [] Incontinent of bowels  [] Bowel sounds (yes/no)  [] Diarrhea  [] Constipation (see above including last bowel movement)  [] Checked for impaction  [] Last BM colostomy x 2    Nutrition  Diet:___NPO_______  Appetite:   [] Good   [] Fair   [] Poor   [] Tube Feeding       [] Voiding  [] Incontinent   [] Ruiz    Musculoskeletal  [] Balance/Atkinson Unsteady   [] Weak   Strength:    [] Normal    [] Limited    [x] Decreasing   Activities:    [] Up as tolerated   [x] Bedridden    [] Specify:    SAFETY  [x] 24 hr. Caregiver   [x] Side rails ? [x] Hospital bed   [] Reviewed Falls & Safety       ALLERGIES AND MEDICATIONS     Allergies:    Allergies   Allergen Reactions    Atorvastatin Myalgia    Pcn [Penicillins] Hives       Current Facility-Administered Medications   Medication Dose Route Frequency    ondansetron (ZOFRAN) injection 4 mg  4 mg IntraVENous Q4H PRN    ketorolac (TORADOL) injection 30 mg  30 mg IntraVENous Q8H PRN    acetaminophen (TYLENOL) suppository 650 mg  650 mg Rectal Q4H PRN    nystatin (MYCOSTATIN) 100,000 unit/mL oral suspension 500,000 Units  500,000 Units Oral QID    LORazepam (ATIVAN) injection 1 mg  1 mg IntraVENous Q15MIN PRN    polyethylene glycol (MIRALAX) packet 17 g  17 g Oral DAILY PRN    dexamethasone (DECADRON) 4 mg/mL injection 4 mg  4 mg IntraVENous Q6H    dextrose 5 % - 0.45% NaCl infusion  75 mL/hr IntraVENous CONTINUOUS    baclofen (LIORESAL) tablet 10 mg  10 mg Oral TID    insulin lispro (HUMALOG) injection   SubCUTAneous ACB&D    glucose chewable tablet 16 g  4 Tab Oral PRN    glucagon (GLUCAGEN) injection 1 mg  1 mg IntraMUSCular PRN    acetaminophen (TYLENOL) solution 650 mg  650 mg Oral Q4H PRN    [START ON 9/3/2019] cloNIDine (CATAPRES) 0.3 mg/24 hr patch 1 Patch  1 Patch TransDERmal Q7D    [START ON 9/6/2019] scopolamine (TRANSDERM-SCOP) 1 mg over 3 days 1 Patch  1 Patch TransDERmal Q72H    morphine injection 2 mg  2 mg IntraVENous Q1H PRN          Visit Time In: 2:00  Visit Time Out: 3:00

## 2019-08-30 NOTE — DIABETES MGMT
[facility]    DTC Progress Note    Recommendations/ Comments:  1) Consider resuming home dose of lantus if patient desires to prevent hyperglycemia      Chart reviewed on Erika Parra. Patient is now on hospice care. Current hospital DM medication: Lispro Correctional insulin BID    Patient is a 70 y.o. male with known diabetes who was taking 15 units of lantus at home with lispro insulin prior to meals based on a sliding scale. A1c:   Lab Results   Component Value Date/Time    Hemoglobin A1c 6.8 (H) 06/03/2019 03:26 AM    Hemoglobin A1c 6.5 (H) 10/17/2016 02:40 AM     Recent Glucose Results:   Lab Results   Component Value Date/Time    GLUCPOC 344 (H) 08/30/2019 08:21 AM    GLUCPOC 217 (H) 08/29/2019 07:50 PM        Lab Results   Component Value Date/Time    Creatinine 1.04 07/05/2019 03:33 AM     CrCl cannot be calculated (Unknown ideal weight. ). Active Orders   There are no active orders of the following types: Diet. PO intake:   Patient Vitals for the past 72 hrs:   % Diet Eaten   08/29/19 1755 0 %         Will continue to follow as needed. Thank you.   Mackenzie Craig RD  [facility]      Time spent: 8 minutes

## 2019-08-30 NOTE — WOUND CARE
Wound care nurse consult from staff nurse for POA wounds to left heel and buttock. Patient is in [place] [address] room 1112. Patient allowed me to look at his left heel but refused to let me look at his buttock. Patient barely able to talk because so weak and family member in room asked if  I would respect his wishes not to turnhim. Left heel DTPI: measures aprox 5x5 cm's and is opening at center of wound. Staff have a foam dressing covering it. Recommend:    Left heel and buttock PI\"s: cleanse both wounds with NS moist 4x4. Cover both wounds with a piece of Aquacell-ag and secure heel wound with dry 4x4's and gauze debi and buttock wound with foam dressing. Change PRN saturation.     Danitza Dixon RN, [employer]

## 2019-08-30 NOTE — PROGRESS NOTES
Problem: Falls - Risk of  Goal: *Absence of Falls  Description  Document Bryan Brunner Fall Risk and appropriate interventions in the flowsheet. Outcome: Progressing Towards Goal  Note:   Fall Risk Interventions:  Mobility Interventions: Communicate number of staff needed for ambulation/transfer, Patient to call before getting OOB         Medication Interventions: Patient to call before getting OOB    Elimination Interventions: Call light in reach, Patient to call for help with toileting needs              Problem: Patient Education: Go to Patient Education Activity  Goal: Patient/Family Education  Outcome: Progressing Towards Goal     Problem: Pressure Injury - Risk of  Goal: *Prevention of pressure injury  Description  Document Vishnu Scale and appropriate interventions in the flowsheet.   Outcome: Progressing Towards Goal  Note:   Pressure Injury Interventions:  Sensory Interventions: Assess changes in LOC, Check visual cues for pain, Keep linens dry and wrinkle-free         Activity Interventions: Increase time out of bed    Mobility Interventions: HOB 30 degrees or less    Nutrition Interventions: Document food/fluid/supplement intake    Friction and Shear Interventions: HOB 30 degrees or less, Minimize layers                Problem: Patient Education: Go to Patient Education Activity  Goal: Patient/Family Education  Outcome: Progressing Towards Goal

## 2019-08-30 NOTE — PROGRESS NOTES
Brief Nutrition Note    Chart reviewed, pt under hospice care. Nutrition referral triggered due to MST score. No aggressive nutrition interventions at this time. Currently he does not have a diet order placed, consider liquid diet if pt desires.     Ashley Akers RDN  Pager: [phone]

## 2019-08-30 NOTE — PROGRESS NOTES
Oncology End of Shift Note      Bedside shift change report given to Franck Cardoza RN (incoming nurse) by Rachel Morgan (outgoing nurse) on Alcario Basques. Report included the following information SBAR, Kardex, Intake/Output and MAR. Shift Summary: Hiccups present all night. Pt not tolerating pills, but does well with liquids. Wound care completed. No PRNs needed. Gave schedule Zofran, Phenergan, Decadron. Polo Dos Santos working well, with good blood return. Issues for Physician to Address:  Medications for hiccups. Baclofen did not help. Pt not tolerating pills. Patient on Cardiac Monitoring?     [] Yes  [x] No    Rhythm:          Rachel Morgan

## 2019-08-30 NOTE — H&P
[address] [facility] Help to Those in Need  [phone]    Patient Name: Mike Gonzales  YOB: 1950    Date of Provider Hospice Visit: 08/30/19    Level of Care:   [x] General Inpatient (GIP)    [] Routine   [] Respite    Current Location of Care:  [] [facility] [] [facility] [x] [facility] [] [facility] [] Hospice [facility], patient referred from:  [] [facility] [] [facility] [x] [facility] [] [facility] [] Home [] Other:     Date of [address] [facility] Director at time of admission: Dr Gaviota Castellanos Diagnosis: Nausea, vomiting   Diagnoses RELATED to the terminal prognosis: Metastatic colon cancer  Other Diagnoses: Hiccups     HOSPICE SUMMARY   Do not cut and paste chart information other than imaging findings    Mike Gonzales is a 70y.o. year old who was admitted to [facility] with intractable nausea and vomiti with underlying diagnosis of extensive metastatic colon cancer s/p chemotherapy multiple abdominal surgeries. currently on hospice as outpatient admitted for symptom management ,Most of the history obtained from spouse as patient is completely out of it very sleepy. I discussed plan with wife in detail. patient also has very poor appetite , difficulty in swallowing, No fever, no chills, mild abdominal pain,tolerates tylenol and typically says his pain level is 1,  Patient has been deteriorating last few weeks, initially was thought to have obstruction but colostomy working well with stools in bag, He is in poor condition very debilitated  . The patient's principle diagnosis has resulted in Metastatic colon cancer   Refer to LCD     Functionally, the patient's Karnofsky and/or Palliative Performance Scale has declined over a period of  and is estimated at low 1-2  The patient is dependent on the following ADLs:    Objective information that support this patients limited prognosis includes:  Metastatic colon cancer s/p chemo.  Surgery , intractable nausea       The patient/family chose comfort measures with the support of Hospice. HOSPICE DIAGNOSES   Active Symptoms:  1. Intractable nausea, vomiting    2 hiccups    3 Candida esophagitis    4 dehydration     PLAN   Iv  Fluids     Iv phenergan    SL Roxanol    Bowel regimen     NPO for now      1.  and SW to support family needs  2. Disposition: home hospice when symptoms resolve     Prognosis estimated based on 08/30/19 clinical assessment is:   [] Hours to Days    [] Days to Weeks    [x] Other:    Communicated plan of care with: Hospice Case Manager; Hospice IDT; Care Team     GOALS OF CARE     Patient/Medical POA stated Goal of Care: nausea, vomiting, colon cancer     [x] I have reviewed and/or updated ACP information in the Advance Care Planning Navigator. This information is available in the [address] link in the patient's chart header. Primary Decision Maker (Health Care Agent):   Primary Decision Maker: Ovi Woods - [phone]    Resuscitation Status: DNR  If DNR is there a Durable DNR on file? : [x] Yes [] No (If no, complete Durable DNR)    HISTORY     History obtained from:  wife    CHIEF COMPLAINT: poorly vrrbal obtained from wife  The patient is:   [] Verbal   [x] Nonverbal  [] Unresponsive    HPI/SUBJECTIVE:   Intractable nausea , vomiting      REVIEW OF SYSTEMS     The following systems were: [x] reviewed  [] unable to be reviewed    Positive ROS include:  Constitutional: fatigue, weakness, in pain, short of breath  Ears/nose/mouth/throat: increased airway secretions  Respiratory:shortness of breath, wheezing  Gastrointestinal:poor appetite, nausea, vomiting, abdominal pain, constipation, diarrhea  Musculoskeletal:pain, deformities, swelling legs  Neurologic:confusion, hallucinations, weakness  Psychiatric:anxiety, feeling depressed, poor sleep  Endocrine:     Adult Non-Verbal Pain Assessment Score:      Face  [] 0   No particular expression or smile  [x] 1   Occasional grimace, tearing, frowning, wrinkled forehead  [] 2   Frequent grimace, tearing, frowning, wrinkled forehead    Activity (movement)  [x] 0   Lying quietly, normal position  [] 1   Seeking attention through movement or slow, cautious movement  [] 2   Restless, excessive activity and/or withdrawal reflexes    Guarding  [x] 0   Lying quietly, no positioning of hands over areas of body  [] 1   Splinting areas of the body, tense  [] 2   Rigid, stiff    Physiology (vital signs)  [x] 0   Stable vital signs  [] 1   Change in any of the following: SBP > 20mm Hg; HR > 20/minute  [] 2   Change in any of the following: SBP > 30mm Hg; HR > 25/minute    Respiratory  [x] 0   Baseline RR/SpO2, compliant with ventilator  [] 1   RR > 10 above baseline, or 5% drop SpO2, mild asynchrony with ventilator  [] 2   RR > 20 above baseline, or 10% drop SpO2, asynchrony with ventilator     FUNCTIONAL ASSESSMENT     Palliative Performance Scale (PPS):       PSYCHOSOCIAL/SPIRITUAL ASSESSMENT     Active Problems:    Nausea and/or vomiting (8/29/2019)      Past Medical History:   Diagnosis Date    Abnormal nuclear stress test 4/27/2017    Arthritis     Asthma     childhood    BPH (benign prostatic hypertrophy)     CAD (coronary artery disease) 1996    M.I., Stents x2    Cancer ([facility] [address]     Rectal CA     Chronic pain     Colon polyp     DM (diabetes mellitus) ([facility] [address] 5/17/2011    Gout     Hemorrhoids     HTN (hypertension) 5/17/2011    Hyperlipidemia 5/17/2011    Ill-defined condition     Colostomy, iliostomy    Rectal adenocarcinoma ([facility] [address] 1/16/2018    S/p surg.     S/P cardiac cath 4/27/2017      Past Surgical History:   Procedure Laterality Date    CARDIAC SURG PROCEDURE UNLIST  0770/2790    stent x2    COLONOSCOPY N/A 10/14/2016    COLONOSCOPY/EGD performed by Berann Lopez [facility]    COLONOSCOPY N/A 2/3/2017    COLONOSCOPY performed by Zaria Saldivar MD at [place] [address]. N/A 3/27/2018    COLONOSCOPY performed by Booker Shelton MD at [facility] ENDOSCOPY    ENDOSCOPY, COLON, DIAGNOSTIC  2011    HX GI      Prostate, Bladder, rectum.  colon removed    HX HERNIA REPAIR      AS INFANT    HX ORTHOPAEDIC  2016    hip replacement , left    HX PROSTATECTOMY  X2    biopsy benign    HX TONSILLECTOMY      HX UROLOGICAL      Bladder removed    IR INSERT NON TUNL CVC OVER 5 YRS  2019    SIGMOIDOSCOPY,BIOPSY  10/14/2016         UPPER GI ENDOSCOPY,DIAGNOSIS  10/14/2016           Social History     Tobacco Use    Smoking status: Former Smoker     Packs/day: 0.50     Years: 40.00     Pack years: 20.00     Last attempt to quit: 2016     Years since quittin.9    Smokeless tobacco: Never Used   Substance Use Topics    Alcohol use: No     Alcohol/week: 0.0 standard drinks     Comment: OCCASIONAL     Family History   Problem Relation Age of Onset    Heart Disease Mother     COPD Mother     COPD Father     Diabetes Father     Hypertension Father     Alcohol abuse Sister     Diabetes Brother     High Cholesterol Brother     Hypertension Brother     Anesth Problems Neg Hx       Allergies   Allergen Reactions    Atorvastatin Myalgia    Pcn [Penicillins] Hives      Current Facility-Administered Medications   Medication Dose Route Frequency    ondansetron (ZOFRAN) injection 4 mg  4 mg IntraVENous Q4H PRN    ketorolac (TORADOL) injection 30 mg  30 mg IntraVENous Q8H PRN    acetaminophen (TYLENOL) suppository 650 mg  650 mg Rectal Q4H PRN    nystatin (MYCOSTATIN) 100,000 unit/mL oral suspension 500,000 Units  500,000 Units Oral QID    LORazepam (ATIVAN) injection 1 mg  1 mg IntraVENous Q15MIN PRN    polyethylene glycol (MIRALAX) packet 17 g  17 g Oral DAILY PRN    dexamethasone (DECADRON) 4 mg/mL injection 4 mg  4 mg IntraVENous Q6H    dextrose 5 % - 0.45% NaCl infusion  75 mL/hr IntraVENous CONTINUOUS    baclofen (LIORESAL) tablet 10 mg  10 mg Oral TID    insulin lispro (HUMALOG) injection   SubCUTAneous ACB&D    glucose chewable tablet 16 g  4 Tab Oral PRN    glucagon (GLUCAGEN) injection 1 mg  1 mg IntraMUSCular PRN    acetaminophen (TYLENOL) solution 650 mg  650 mg Oral Q4H PRN    [START ON 9/3/2019] cloNIDine (CATAPRES) 0.3 mg/24 hr patch 1 Patch  1 Patch TransDERmal Q7D    [START ON 9/6/2019] scopolamine (TRANSDERM-SCOP) 1 mg over 3 days 1 Patch  1 Patch TransDERmal Q72H    morphine injection 2 mg  2 mg IntraVENous Q1H PRN        PHYSICAL EXAM     Wt Readings from Last 3 Encounters:   07/06/19 106.4 kg (234 lb 9.1 oz)   07/06/19 106.4 kg (234 lb 8.1 oz)   04/18/19 111.1 kg (245 lb)       Visit Vitals  /85 (BP 1 Location: Right arm, BP Patient Position: At rest)   Pulse (!) 102   Temp 96.2 °F (35.7 °C)   Resp 18   SpO2 100%       Supplemental O2  [] Yes  [x] NO  Last bowel movement: today colostomy bag present     Currently this patient has:  [] Peripheral IV [] PICC  [x] PORT [] ICD    [] Ruiz Catheter [] NG Tube   [] PEG Tube    [] Rectal Tube [] Drain  [] Other:     Constitutional:  Eyes:   ENMT:   Cardiovascular:   Respiratory:   Gastrointestinal:   Musculoskeletal:  Skin:  Neurologic:  Psychiatric:   Other:       Pertinent Lab and or Imaging Tests:  Lab Results   Component Value Date/Time    Sodium 138 07/05/2019 03:33 AM    Potassium 3.7 07/05/2019 03:33 AM    Chloride 107 07/05/2019 03:33 AM    CO2 22 07/05/2019 03:33 AM    Anion gap 9 07/05/2019 03:33 AM    Glucose 156 (H) 07/05/2019 03:33 AM    BUN 28 (H) 07/05/2019 03:33 AM    Creatinine 1.04 07/05/2019 03:33 AM    BUN/Creatinine ratio 27 (H) 07/05/2019 03:33 AM    GFR est AA >60 07/05/2019 03:33 AM    GFR est non-AA >60 07/05/2019 03:33 AM    Calcium 8.3 (L) 07/05/2019 03:33 AM     Lab Results   Component Value Date/Time    Protein, total 7.6 07/05/2019 03:33 AM    Albumin 1.8 (L) 07/05/2019 03:33 AM           Total time:   Counseling / coordination time:   > 50% counseling / coordination spoke with wife in detail :

## 2019-08-30 NOTE — PROGRESS NOTES
Oncology End of Shift Note      Bedside shift change report given to JUS Lopez (incoming nurse) by Ella Romo (outgoing nurse) on [facility]. Report included the following information SBAR, Kardex, Intake/Output, MAR and Accordion. Shift Summary: family at the bedside throughout the day; c/o pain x1- PRN given; emesis episode x1; q4 phenergan IV for hiccups; not tolerating PO intake      Issues for Physician to Address:       Patient on Cardiac Monitoring?     [] Yes  [x] No    Rhythm:          Shift Events        Ella Romo

## 2019-08-30 NOTE — PROGRESS NOTES
Oncology End of Shift Note      Bedside shift change report given to Vic Snyder RN (incoming nurse) by Nicholas West (outgoing nurse) on [facility]. Report included the following information SBAR, Kardex and MAR. Shift Summary: Patient was admitted to the unit and was oriented to RN and room. All medications were given, patient stated that he wanted liquid tylenol as he is not able to take oral pills. Port was accessed and patient is receiving IV fluids as well as IV medications. Medication education was provided for patient and significant other. Admissions database and dual skin assessment was completed. Patient has pressure ulcers on his left heel, sacrum and right below right shoulder blade. Patient also had a rash prior do admission and back has white flakes from lower neck down to sacrum. Urostomy was changed to a drainage bag. Sugar was checked and patient needed insulin coverage. Issues for Physician to Address:  None. Patient on Cardiac Monitoring?     [] Yes  [x] No    Rhythm:              Nicholas Wset

## 2019-08-31 NOTE — PROGRESS NOTES
RAPID RESPONSE TEAM    Reviewed patient's chart due to MEWS of 4. Hospice patient. No RRT involvement indicated.      [address] [facility]  Rapid Response JUS Larson

## 2019-08-31 NOTE — PROGRESS NOTES
[address] [facility] Help to Those in Need  [phone]    Patient Name: Teagan Carl  YOB: 1950    Date of Provider Hospice Visit: 08/31/19    Level of Care:   [x] General Inpatient (GIP)    [] Routine   [] Respite    Current Location of Care:  [] [facility] [] [facility] [x] [facility] [] [facility] [] [facility], patient referred from:  [] [facility] [] [facility] [x] [facility] [] [facility] [] Home [] Other:     Date of [address] [facility] Director at time of admission: Dr Ashlee Cedillo Diagnosis: Nausea, vomiting   Diagnoses RELATED to the terminal prognosis: Metastatic colon cancer  Other Diagnoses: Hiccups     HOSPICE SUMMARY   Do not cut and paste chart information other than imaging findings    Teagan Carl is a 70y.o. year old who was admitted to [facility] with intractable nausea and vomiting with underlying diagnosis of extensive metastatic colon cancer s/p chemotherapy multiple abdominal surgeries. Currently on hospice as outpatient admitted for symptom management , with very poor appetite , difficulty in swallowing,  mild abdominal pain, nausea and vomiting which had been intractable  upon admission. Patient has been deteriorating last few weeks, initially was thought to have obstruction but colostomy working well with stools in bag, He is in poor condition very debilitated, minimally responsive. The patient's principle diagnosis has resulted in Metastatic colon cancer        Functionally, the patient's Karnofsky and/or Palliative Performance Scale has declined over a period of  and is estimated at low 1-2  The patient is dependent on the following ADLs:    Objective information that support this patients limited prognosis includes:  Metastatic colon cancer s/p chemo. Surgery , intractable nausea       The patient/family chose comfort measures with the support of Hospice.      HOSPICE DIAGNOSES   Active Symptoms:  1. Intractable nausea, vomiting    2 hiccups    3 Candida esophagitis    4 dehydration     PLAN   1. Iv  Fluids D5 1/2 NSS for nausea  2. Iv phenergan 25mg IV scheduled for every 5 hrs  3. Morphine 2mg IV  4. Lorazepam 1mg every 4 hours scheduled  5. Zofran as needed  6. Bowel regimen   7. NPO    8. Scop patch  9.  and SW to support family needs  8. Disposition: home hospice when symptoms resolve     Prognosis estimated based on 08/31/19 clinical assessment is:   [x] Hours to Days    [] Days to Weeks    [] Other:    Communicated plan of care with: Hospice Case Manager; Hospice IDT; Care Team     GOALS OF CARE     Patient/Medical POA stated Goal of Care: nausea, vomiting, colon cancer     [x] I have reviewed and/or updated ACP information in the Advance Care Planning Navigator. This information is available in the [address] [place] [address] Drive link in the patient's chart header. Primary Decision Maker (Health Care Agent):   Primary Decision Maker: [facility] - [phone]    Resuscitation Status: DNR  If DNR is there a Durable DNR on file? : [x] Yes [] No (If no, complete Durable DNR)    HISTORY     History obtained from:  wife    CHIEF COMPLAINT: unresponsive  The patient is:   [] Verbal   [] Nonverbal  [x] Unresponsive    HPI/SUBJECTIVE:   Intractable nausea , vomiting      REVIEW OF SYSTEMS     The following systems were: [x] reviewed  [] unable to be reviewed    Positive ROS include:  Constitutional: fatigue, weakness, in pain, short of breath  Ears/nose/mouth/throat: increased airway secretions  Respiratory:shortness of breath, wheezing  Gastrointestinal:poor appetite, nausea, vomiting, abdominal pain, constipation, diarrhea  Musculoskeletal:pain, deformities, swelling legs  Neurologic:confusion, hallucinations, weakness  Psychiatric:anxiety, feeling depressed, poor sleep  Endocrine:     Adult Non-Verbal Pain Assessment Score:      Face  [] 0   No particular expression or smile  [x] 1 Occasional grimace, tearing, frowning, wrinkled forehead  [] 2   Frequent grimace, tearing, frowning, wrinkled forehead    Activity (movement)  [x] 0   Lying quietly, normal position  [] 1   Seeking attention through movement or slow, cautious movement  [] 2   Restless, excessive activity and/or withdrawal reflexes    Guarding  [x] 0   Lying quietly, no positioning of hands over areas of body  [] 1   Splinting areas of the body, tense  [] 2   Rigid, stiff    Physiology (vital signs)  [] 0   Stable vital signs  [] 1   Change in any of the following: SBP > 20mm Hg; HR > 20/minute  [x] 2   Change in any of the following: SBP > 30mm Hg; HR > 25/minute    Respiratory  [x] 0   Baseline RR/SpO2, compliant with ventilator  [] 1   RR > 10 above baseline, or 5% drop SpO2, mild asynchrony with ventilator  [] 2   RR > 20 above baseline, or 10% drop SpO2, asynchrony with ventilator     FUNCTIONAL ASSESSMENT     Palliative Performance Scale (PPS): 10%     PSYCHOSOCIAL/SPIRITUAL ASSESSMENT     Active Problems:    Nausea and/or vomiting (8/29/2019)      Past Medical History:   Diagnosis Date    Abnormal nuclear stress test 4/27/2017    Arthritis     Asthma     childhood    BPH (benign prostatic hypertrophy)     CAD (coronary artery disease) 1996    M.I., Stents x2    Cancer ([facility] [address]     Rectal CA     Chronic pain     Colon polyp     DM (diabetes mellitus) ([facility] [address] 5/17/2011    Gout     Hemorrhoids     HTN (hypertension) 5/17/2011    Hyperlipidemia 5/17/2011    Ill-defined condition     Colostomy, iliostomy    Rectal adenocarcinoma ([facility] [address] 1/16/2018    S/p surg.     S/P cardiac cath 4/27/2017      Past Surgical History:   Procedure Laterality Date    CARDIAC SURG PROCEDURE UNLIST  1370/8397    stent x2    COLONOSCOPY N/A 10/14/2016    COLONOSCOPY/EGD performed by Meliza Golden MD at [address] [facility] [address] N/A 2/3/2017    COLONOSCOPY performed by Aravind Zimmerman MD at [address] COLONOSCOPY N/A 3/27/2018 COLONOSCOPY performed by Reggie De Jesus MD at [facility] ENDOSCOPY    ENDOSCOPY, COLON, DIAGNOSTIC  2011    HX GI      Prostate, Bladder, rectum.  colon removed    HX HERNIA REPAIR      AS INFANT    HX ORTHOPAEDIC  2016    hip replacement , left    HX PROSTATECTOMY  X2    biopsy benign    HX TONSILLECTOMY      HX UROLOGICAL      Bladder removed    IR INSERT NON TUNL CVC OVER 5 YRS  2019    SIGMOIDOSCOPY,BIOPSY  10/14/2016         UPPER GI ENDOSCOPY,DIAGNOSIS  10/14/2016           Social History     Tobacco Use    Smoking status: Former Smoker     Packs/day: 0.50     Years: 40.00     Pack years: 20.00     Last attempt to quit: 2016     Years since quittin.9    Smokeless tobacco: Never Used   Substance Use Topics    Alcohol use: No     Alcohol/week: 0.0 standard drinks     Comment: OCCASIONAL     Family History   Problem Relation Age of Onset    Heart Disease Mother     COPD Mother    [facility] COPD Father     Diabetes Father     Hypertension Father     Alcohol abuse Sister     Diabetes Brother     High Cholesterol Brother     Hypertension Brother     Anesth Problems Neg Hx       Allergies   Allergen Reactions    Atorvastatin Myalgia    Pcn [Penicillins] Hives      Current Facility-Administered Medications   Medication Dose Route Frequency    ondansetron (ZOFRAN) injection 4 mg  4 mg IntraVENous Q4H PRN    promethazine (PHENERGAN) 25 mg in 0.9% sodium chloride 50 mL IVPB  25 mg IntraVENous Q4H    scopolamine (TRANSDERM-SCOP) 1 mg over 3 days 1 Patch  1 Patch TransDERmal Q72H    ketorolac (TORADOL) injection 30 mg  30 mg IntraVENous Q8H PRN    acetaminophen (TYLENOL) suppository 650 mg  650 mg Rectal Q4H PRN    nystatin (MYCOSTATIN) 100,000 unit/mL oral suspension 500,000 Units  500,000 Units Oral QID    LORazepam (ATIVAN) injection 1 mg  1 mg IntraVENous Q15MIN PRN    polyethylene glycol (MIRALAX) packet 17 g  17 g Oral DAILY PRN    dexamethasone (DECADRON) 4 mg/mL injection 4 mg  4 mg IntraVENous Q6H    dextrose 5 % - 0.45% NaCl infusion  75 mL/hr IntraVENous CONTINUOUS    insulin lispro (HUMALOG) injection   SubCUTAneous ACB&D    glucose chewable tablet 16 g  4 Tab Oral PRN    glucagon (GLUCAGEN) injection 1 mg  1 mg IntraMUSCular PRN    acetaminophen (TYLENOL) solution 650 mg  650 mg Oral Q4H PRN    [START ON 9/3/2019] cloNIDine (CATAPRES) 0.3 mg/24 hr patch 1 Patch  1 Patch TransDERmal Q7D    morphine injection 2 mg  2 mg IntraVENous Q1H PRN        PHYSICAL EXAM     Wt Readings from Last 3 Encounters:   07/06/19 106.4 kg (234 lb 9.1 oz)   07/06/19 106.4 kg (234 lb 8.1 oz)   04/18/19 111.1 kg (245 lb)       Visit Vitals  /85 (BP 1 Location: Right arm, BP Patient Position: At rest)   Pulse (!) 114   Temp 97.1 °F (36.2 °C)   Resp 22   SpO2 100%       Supplemental O2  [] Yes  [x] NO  Last bowel movement: today colostomy bag present     Currently this patient has:  [x] Peripheral IV [] PICC  [x] PORT [] ICD    [x] Ruiz Catheter [] NG Tube   [] PEG Tube    [] Rectal Tube [] Drain  [] Other:     Constitutional: minimally responsive  Eyes: pupils equal, anicteric  ENMT: no nasal discharge, moist mucous membranes  Cardiovascular: regular rhythm, distal pulses intact  Respiratory: breathing not labored, symmetric  Gastrointestinal: soft non-tender, +bowel sounds  Musculoskeletal: no deformity, no tenderness to palpation  Skin: warm, dry  Neurologic:pt is/ not able to follow commands, pt is/ not moving all extremities  Psychiatric:NA  Beverly Hauser, NP          Pertinent Lab and or Imaging Tests:  Lab Results   Component Value Date/Time    Sodium 138 07/05/2019 03:33 AM    Potassium 3.7 07/05/2019 03:33 AM    Chloride 107 07/05/2019 03:33 AM    CO2 22 07/05/2019 03:33 AM    Anion gap 9 07/05/2019 03:33 AM    Glucose 156 (H) 07/05/2019 03:33 AM    BUN 28 (H) 07/05/2019 03:33 AM    Creatinine 1.04 07/05/2019 03:33 AM    BUN/Creatinine ratio 27 (H) 07/05/2019 03:33 AM    GFR est AA >60 07/05/2019 03:33 AM    GFR est non-AA >60 07/05/2019 03:33 AM    Calcium 8.3 (L) 07/05/2019 03:33 AM     Lab Results   Component Value Date/Time    Protein, total 7.6 07/05/2019 03:33 AM    Albumin 1.8 (L) 07/05/2019 03:33 AM         Elayne Amezcua NP

## 2019-08-31 NOTE — PROGRESS NOTES
Problem: Falls - Risk of  Goal: *Absence of Falls  Description  Document Macie Rutledge Fall Risk and appropriate interventions in the flowsheet. Outcome: Progressing Towards Goal  Note:   Fall Risk Interventions:  Mobility Interventions: Communicate number of staff needed for ambulation/transfer    Mentation Interventions: Adequate sleep, hydration, pain control, Door open when patient unattended, Family/sitter at bedside, More frequent rounding    Medication Interventions: Teach patient to arise slowly    Elimination Interventions: Call light in reach, Toileting schedule/hourly rounds              Problem: Pressure Injury - Risk of  Goal: *Prevention of pressure injury  Description  Document Vishnu Scale and appropriate interventions in the flowsheet.   Outcome: Progressing Towards Goal  Note:   Pressure Injury Interventions:  Sensory Interventions: Assess changes in LOC, Assess need for specialty bed, Float heels, Keep linens dry and wrinkle-free, Maintain/enhance activity level         Activity Interventions: Pressure redistribution bed/mattress(bed type)    Mobility Interventions: HOB 30 degrees or less, Pressure redistribution bed/mattress (bed type)    Nutrition Interventions: Document food/fluid/supplement intake    Friction and Shear Interventions: HOB 30 degrees or less, Lift sheet, Lift team/patient mobility team

## 2019-08-31 NOTE — PROGRESS NOTES
Oncology End of Shift Note      Bedside shift change report given to Hair Lr RN (incoming nurse) by Veda Ignacio (outgoing nurse) on Tom[facility] Contras. Report included the following information SBAR, Kardex, Intake/Output, MAR and Accordion. Shift Summary: patient rested most of the day- seems less responsive to stimuli than yesterday; family at the bedside throughout the day; CHG bath and linen change done; Promethazine q4 given as ordered; PRN morphine given x1 for pt moaning/grunting      Issues for Physician to Address:       Patient on Cardiac Monitoring?     [] Yes  [x] No    Rhythm:          Shift Events      Veda Ignacio

## 2019-08-31 NOTE — PROGRESS NOTES
Oncology End of Shift Note      Bedside shift change report given to Elena Antony RN (incoming nurse) by Da Dodd RN (outgoing nurse) on [facility]. Report included the following information SBAR, Kardex and MAR. Shift Summary: Scope patch placed behind right ear, one episode of emesis, lethargic most of shift, no complaints of pain      Issues for Physician to Address:  Not alert enough to swallow pills     Patient on Cardiac Monitoring?     [] Yes  [x] No    Rhythm:          Shift Events        Da Dodd RN

## 2019-09-01 NOTE — HOSPICE
[address] [facility]  [phone]    GIP Daily Nursing Note   Patient Name: Quinten Cooper  YOB: 1950  Age: 70 y.o. Date of Visit: 09/01/19  Facility of Care: [facility]  Patient Room: 1112/     Hospice Attending: Maverick Hobson MD  Hospice Diagnosis: Nausea and/or vomiting [R11.2]    Level of Care: GIP    Current GIP Symptoms    1. Unresponsive  2. Hiccups  3. Pain        ASSESSMENT & PLAN   Must update Plan of Care including visit frequencies for IDT members  1. DC D51/2NS and decadron d/t high BS  2. DC Catapres patch, no longer needed  3. Increase frequency of Ativan to 1mg IV q4hr  4. Change frequency of IVPB phenergan to q6hr  5. Administer PRN medications as indicated  6. DC blood sugar checks, glucagon tablets    Spiritual Interventions: hospice chaplain visits as indicated    Psych/ Social/ Emotional Interventions: hospice MSW visits as needed    Care Coordination Needs: continue to discuss w/ hospice team    Care plan and New Orders discussed / approved with Yeni Salinas NP    Description History and Chart Review   If this is initial GIP note must document RN assessment/MD communication in previous setting. Specifically document nursing/medication needs in last 24 hours to support GIP care  Narrative History of last 24 hours that demonstrates care cannot be provided in another setting:  Patient now unresponsive, BG running very high so IVF stopped, decadron stopped, BP WNL, catapres patch stopped, increased frequency of Ativan, Patient requires monitoring and treatment that cannot be performed outside the hospital settting    What has been done to control the patient's symptoms in the last 24 hours? Scheduled  IVPB phenergan, ativan for N/V and hiccups, PRN morphine for pain, 4mg Zofran IV for breakthough nausea    Does the patient currently require IV medications? yes  Does the patient currently require scheduled medications?  yes  Does the patient currently require a PCA? no    List number of doses of PRN medications in last 24 hours:  Medication 1: 2mg Morphine IV  Number of doses: 1     Medication 2: 4mg Zofran IV  Number of doses: 1    Medication 3:   Number of doses:    Supporting documentation for GIP need for pain control:  [x] Frequent evaluation by a doctor, nurse practitioner, nurse   [x] Frequent medication adjustment    [x] IVs that cannot be administered at home   [] Aggressive pain management   [] Complicated technical delivery of medications              Supporting documentation for GIP need for symptom control:  []  Sudden decline necessitating intensive nursing intervention  []  Uncontrolled / intractable nausea or vomiting   []  Pathological fractures  []  Advanced open wounds requiring frequent skilled care  [] Unmanageable respiratory distress  [] New or worsening delirium   [] Delirium with behavior issues: Is 24 hour caregiver present due to safety concerns with agitation? (yes/no)  [x] Imminent death  with skilled nursing needs documented above    DISCHARGE PLANNING   Daily discharge planning required for GIP  1. Discharge Plan: should patient stabilize, can transition patient home or to LTC/[facility]  2. Patient/Family teaching: EOL symptoms  3.  Response to patient/family teaching: verbalized understanding    ASSESSMENT    KARNOFSKY: 10    Prognosis estimated based on 09/01/19 clinical assessment is:   [] Few to Many Hours  [x] Hours to Days   [] Few to Many Days   [] Days to Weeks   [] Few to Many Weeks   [] Weeks to Months   [] Few to Many Months        CLINICAL INFORMATION     Patient Vitals for the past 12 hrs:   Temp Pulse Resp BP SpO2   09/01/19 0723 97.9 °F (36.6 °C) (!) 112 22 118/82 100 %       Currently this patient has:  [] Supplemental O2   [x] IV    [] PICC      [] PORT   [] NG Tube    [] PEG Tube   [x] Ostomy     [x] Urostomy draining _______ urine  [] Other:     SIGNS/PHYSICAL FINDINGS     Skin (including wound):  [x] Warm, dry, supple, intact and color normal for race  [] Warm   [] Dry   [] Cool     [] Clammy       [] Diaphoretic    Turgor   [] Normal   [] Decreased  Color:   [] Pink   [] Pale   [] Cyanotic   [] Erythema   [] Jaundice   [x] Normal for Race  []  Wounds:    Neuro:  [] Lethargy  [] Restlessness / agitation  [] Confusion / delirium  [] Hallucinations  [] Responds to maximal stimulation  [x] Unresponsive  [] Seizures     Cardiac:  [] Dyspnea on Exertion  [] JVD  [] Murmur  [] Palpitations  [] Hypotension  [] Hypertension  [] Tachycardia  [] Bradycardia  [] Irregular HR  [] Pulses Decreased  [] Pulses Absent  [] Edema:       (Location, Grade and Pitting)  [] Mottling:      (Location)    Respiratory:  Breath sounds:    [x] Diminished   [] Wheeze   [x] Rhonchi   [] Rales   [] Even and unlabored  [] Labored:            [] Cough   [] Non Productive   [] Productive    [] Description:           [] Deep suctioned   [] O2 at ___ LPM  [] High flow oxygen greater than 10 LPM  [] Bi-Pap    GI  [] Abdomen (describe)   [] Ascites  [] Nausea  [] Vomiting  [] Incontinent of bowels  [x] Bowel sounds hypoactive  [] Diarrhea  [] Constipation (see above including last bowel movement)  [] Checked for impaction  [] Last BM    Nutrition  Diet:___NPO_______  Appetite:   [] Good   [] Fair   [] Poor   [] Tube Feeding       [] Voiding  [] Incontinent   [x] Urostomy    Musculoskeletal  [] Balance/[place] Unsteady   [] Weak   Strength:    [] Normal    [] Limited    [] Decreasing   Activities:    [] Up as tolerated   [x] Bedridden    [] Specify:    SAFETY  [] 24 hr. Caregiver   [x] Side rails ? [x] Hospital bed   [] Reviewed Falls & Safety       ALLERGIES AND MEDICATIONS     Allergies:    Allergies   Allergen Reactions    Atorvastatin Myalgia    Pcn [Penicillins] Hives       Current Facility-Administered Medications   Medication Dose Route Frequency    LORazepam (ATIVAN) injection 1 mg  1 mg IntraVENous Q4H    promethazine (PHENERGAN) 25 mg in 0.9% sodium chloride 50 mL IVPB  25 mg IntraVENous Q6H    ondansetron (ZOFRAN) injection 4 mg  4 mg IntraVENous Q4H PRN    scopolamine (TRANSDERM-SCOP) 1 mg over 3 days 1 Patch  1 Patch TransDERmal Q72H    acetaminophen (TYLENOL) suppository 650 mg  650 mg Rectal Q4H PRN    LORazepam (ATIVAN) injection 1 mg  1 mg IntraVENous Q15MIN PRN    polyethylene glycol (MIRALAX) packet 17 g  17 g Oral DAILY PRN    morphine injection 2 mg  2 mg IntraVENous Q1H PRN          Visit Time In: 1600  Visit Time Out: 0310

## 2019-09-01 NOTE — PROGRESS NOTES
Oncology End of Shift Note      Bedside shift change report given to Francisco Rutledge RN (incoming nurse) by Nicholas West (outgoing nurse) on [facility]. Report included the following information SBAR, Kardex and MAR. Shift Summary: Patient is now comfort care. All medications were given and medication education was provided. Patient has been unresponsive all shift. Colostomy bag was changed. Linens and gown were changed. Issues for Physician to Address:  None. Patient on Cardiac Monitoring?     [] Yes  [x] No    Rhythm:                Nicholas West

## 2019-09-01 NOTE — HOSPICE
[facility]  [phone]    [facility] Daily Nursing Note   Patient Name: Silvana Noel  YOB: 1950  Age: 70 y.o. Date of Visit: 08/31/19  Facility of Care: [id number] [facility]  Patient Room: [address] [place] [address]     Hospice Attending: Ro Palma MD  Hospice Diagnosis: Nausea and/or vomiting [R11.2]    Level of Care: GIP    Current GIP Symptoms    1. Nausea, vomiting  2. Dyspnea, terminal secretions  3. Minimal responsiveness. ASSESSMENT & PLAN   Must update Plan of Care including visit frequencies for IDT members  1. Nausea, vomiting. D/c all po meds. Start Ativan 0.5mg IV q6 as unable to take po Baclofen. Continue Phenergan IV q4.  2. Dyspnea and pain. Decadron IV, PRN Morphine. SSI for hyperglycemia. 3. Secretions. Scop patch, scheduled Robinul. 4. Support and educate family. Consider weaning off IV Fluids as pt transitions. 5.  and MSW visits. 6. Disposition: pt is declining and unable to transfer. Spiritual Interventions: Support from Latter day family. Psych/ Social/ Emotional Interventions: Active listening given as wife Konstantin Dillard shared their journey together. She is holding uriarte but takes some breaks to care for their beloved Maciej Parker named \"Peppy\". Pt and wife have two children and 5 grands who along with her Latter day members offer support. Care Coordination Needs: with JUS Felix    Care plan and New Orders discussed / approved with Noman Willis NP. Description History and Chart Review   If this is initial GIP note must document RN assessment/MD communication in previous setting. Specifically document nursing/medication needs in last 24 hours to support GIP care  Narrative History of last 24 hours that demonstrates care cannot be provided in another setting:  Pt continues to need continuous IV medication to control symptoms of nausea and vomiting. Had one episode of small emesis last night. Hiccups resolved.  His breathing is uneven and he is minimally responsive to physical stimuli. Coarse upper airway secretions but lung sounds clear to diminished. Wounds on sacrum, scapula and heels. What has been done to control the patient's symptoms in the last 24 hours? Phenergan and Decadron IV, continuous D5-1/2NS at 75ml/hr, Morphine IV prn, Ruiz catheter    Does the patient currently require IV medications? yes3  Does the patient currently require scheduled medications? yes  Does the patient currently require a PCA? no    List number of doses of PRN medications in last 24 hours:  Medication 1: Morphine 2mg  Number of doses: 1    Medication 2: Zofran  Number of doses: 1    Medication 3: Tylenol  Number of doses: 1    Supporting documentation for GIP need for pain control:  [x] Frequent evaluation by a doctor, nurse practitioner, nurse   [x] Frequent medication adjustment    [x] IVs that cannot be administered at home   [] Aggressive pain management   [x] Complicated technical delivery of medications              Supporting documentation for GIP need for symptom control:  []  Sudden decline necessitating intensive nursing intervention  [x]  Uncontrolled / intractable nausea or vomiting   []  Pathological fractures  [x]  Advanced open wounds requiring frequent skilled care  [] Unmanageable respiratory distress  [x] New or worsening delirium   [] Delirium with behavior issues: Is 24 hour caregiver present due to safety concerns with agitation? (yes/no)  [] Imminent death  with skilled nursing needs documented above    DISCHARGE PLANNING   Daily discharge planning required for GIP  1. Discharge Plan: unsafe for transfer  2. Patient/Family teaching: Medication changes for symptom management, signs of breathing changes. 3. Response to patient/family teaching: Wife Tere Lias aware of poor prognosis and coping as expected.     ASSESSMENT    KARNOFSKY: 20%    Prognosis estimated based on 08/31/19 clinical assessment is:   [] Few to Many Hours  [x] Hours to Days   [] Few to Many Days   [] Days to Weeks   [] Few to Many Weeks   [] Weeks to Months   [] Few to Many Months    Quality Measure: Patient self-reports:  [] Yes    [x] No    ESAS:   Time of Assessment: 11:00  Pain (1-10):4  Fatigue (1-10): 8  Shortness of breath (1-10):4  Nausea (1-10): 9  Appetite (1-10): Anxiety: (1-10):   Depression: (1-10):   Well-being: (1-10):   Constipation: _ Yes  _ No  LAST BM: 8/31    CLINICAL INFORMATION   No data found.     Currently this patient has:  [] Supplemental O2   [] IV    [] PICC      [x] PORT   [] NG Tube    [] PEG Tube   [x] Ostomy     [x] Urostomy draining hazy, duyen urine  [] Other:     SIGNS/PHYSICAL FINDINGS     Skin (including wound):  [] Warm, dry, supple, intact and color normal for race  [x] Warm   [x] Dry   [] Cool     [] Clammy       [] Diaphoretic    Turgor   [] Normal   [x] Decreased  Color:   [] Pink   [] Pale   [] Cyanotic   [] Erythema   [] Jaundice   [x] Normal for Race  [x]  Wounds: buttocks, scapula, heels    Neuro:  [] Lethargy  [] Restlessness / agitation  [x] Confusion / delirium  [] Hallucinations  [] Responds to maximal stimulation  [] Unresponsive  [] Seizures     Cardiac:  [] Dyspnea on Exertion  [] JVD  [] Murmur  [] Palpitations  [] Hypotension  [] Hypertension  [x] Tachycardia  [] Bradycardia  [] Irregular HR  [] Pulses Decreased  [] Pulses Absent  [] Edema:       (Location, Grade and Pitting)  [] Mottling:      (Location)    Respiratory:  Breath sounds:    [x] Diminished   [] Wheeze   [] Rhonchi   [] Rales   [] Even and unlabored  [] Labored:            [] Cough   [] Non Productive   [] Productive    [] Description:           [] Deep suctioned   [] O2 at ___ LPM  [] High flow oxygen greater than 10 LPM  [] Bi-Pap    GI  [x] Abdomen (describe) urostomy, colostomy  [] Ascites  [x] Nausea  [] Vomiting  [] Incontinent of bowels  [x] Bowel sounds active  [] Diarrhea  [] Constipation (see above including last bowel movement)  [] Checked for impaction  [x] Last BM 8/31    Nutrition  Diet:___NPO_______  Appetite:   [] Good   [] Fair   [] Poor   [] Tube Feeding       [x] Voiding  [] Incontinent   [x] Urostomy    Musculoskeletal  [] Balance/[place] Unsteady   [x] Weak   Strength:    [] Normal    [] Limited    [x] Decreasing   Activities:    [] Up as tolerated   [x] Bedridden    [] Specify:    SAFETY  [x] 24 hr. Caregiver   [x] Side rails ? [x] Hospital bed   [] Reviewed Falls & Safety       ALLERGIES AND MEDICATIONS     Allergies:    Allergies   Allergen Reactions    Atorvastatin Myalgia    Pcn [Penicillins] Hives       Current Facility-Administered Medications   Medication Dose Route Frequency    LORazepam (ATIVAN) injection 0.5 mg  0.5 mg IntraVENous Q6H    ondansetron (ZOFRAN) injection 4 mg  4 mg IntraVENous Q4H PRN    promethazine (PHENERGAN) 25 mg in 0.9% sodium chloride 50 mL IVPB  25 mg IntraVENous Q4H    scopolamine (TRANSDERM-SCOP) 1 mg over 3 days 1 Patch  1 Patch TransDERmal Q72H    acetaminophen (TYLENOL) suppository 650 mg  650 mg Rectal Q4H PRN    nystatin (MYCOSTATIN) 100,000 unit/mL oral suspension 500,000 Units  500,000 Units Oral QID    LORazepam (ATIVAN) injection 1 mg  1 mg IntraVENous Q15MIN PRN    polyethylene glycol (MIRALAX) packet 17 g  17 g Oral DAILY PRN    dexamethasone (DECADRON) 4 mg/mL injection 4 mg  4 mg IntraVENous Q6H    dextrose 5 % - 0.45% NaCl infusion  75 mL/hr IntraVENous CONTINUOUS    insulin lispro (HUMALOG) injection   SubCUTAneous ACB&D    glucose chewable tablet 16 g  4 Tab Oral PRN    glucagon (GLUCAGEN) injection 1 mg  1 mg IntraMUSCular PRN    acetaminophen (TYLENOL) solution 650 mg  650 mg Oral Q4H PRN    [START ON 9/3/2019] cloNIDine (CATAPRES) 0.3 mg/24 hr patch 1 Patch  1 Patch TransDERmal Q7D    morphine injection 2 mg  2 mg IntraVENous Q1H PRN          Visit Time In: 11:00  Visit Time Out: 12:00

## 2019-09-01 NOTE — PROGRESS NOTES
Oncology End of Shift Note      Bedside shift change report given to [facility], RN (incoming nurse) by Micaela Jordan (outgoing nurse) on [facility]. Report included the following information SBAR, Kardex and MAR. Shift Summary: pt lethargic throughout night. Gave scheduled meds besides nystatin due to patient inability to swallow. 0600 pt opened eyes slightly. Family at bedside throughout night. Issues for Physician to Address:       Patient on Cardiac Monitoring?    [] Yes  [x] No    Rhythm:         Shift Events        Judy Jaime

## 2019-09-02 NOTE — PROGRESS NOTES
Spiritual Care Partner Volunteer visited patient in Oncology on August 30, 2019.     Documented on September 2, 2019 by:    BE Buitrago, [facility], [address] [place] [address] Paging Service  [phone])

## 2019-09-02 NOTE — HOSPICE
[facility]  [phone]    [facility] Daily Nursing Note   Patient Name: York Osler  YOB: 1950  Age: 70 y.o. Date of Visit: 09/02/19  Facility of Care: [address] [place] [address]  Patient Room: [address] [place] [address]     Hospice Attending: Xavier Sheridan MD  Hospice Diagnosis: Nausea and/or vomiting [R11.2]    Level of Care: GIP    Current GIP Symptoms    1. Occasional hiccoughs persist post intractable nausea     2. Assessed dyspnea   Increased inspiratory respiratory effort  RR 26     3. Assessed pain    Facial grimacing and increased respiratory rate during movement of extremeties for assessment  4. Portacath, Colostomy, and urostomy  5  Restlessness had been uncontrolled    ASSESSMENT & PLAN   Must update Plan of Care including visit frequencies for IDT members  1. Promethazine 25 mg IV q 6 hours scheduled    Scopolamine patch   2. Added Morphine 2 mg IV q 4 hours and continue prn    O2 2 lpm nc prn dyspnea  3. Morphine as above  4. [place] 5  5. Continue Lorazepam 1 mg IV q 4 hours      Spiritual Interventions: Hospice chaplains visit regularly and are available 24 hours a day as needed    Psych/ Social/ Emotional Interventions: Sat with wife Anaya Hart as she reviewed their life story and the history of patient's illness. She reported they don't have children yet patient's sons and daughter are close with her. Anaya Hart expects to be able to stay close with her step children and step grandchildren   Anaya Hart reported good support from pastors and members of their [employer]anda[employer]    Care Coordination Needs: Will send report to [address] [place] [address] interdisciplinary team.   Discussed patient with unit nurse Alyssa Jiang and New Orders discussed / approved with Ignacio Perez MD.    Description History and Chart Review   If this is initial GIP note must document RN assessment/MD communication in previous setting.  Specifically document nursing/medication needs in last 24 hours to support GIP care  Narrative History of last 24 hours that demonstrates care cannot be provided in another setting:  Patient is at end of life and requiring IV medications for symptom management due to marked nausea/vomiting/hiccoughs related to disease. Patient requiring frequent visits from floor nurses and staff, Hospice staff of Dr. Liz Guzmán and nurses. SNV daily and prn   Hospice chaplains and medical social workers visit regularly and are available 24 hours a day as needed    What has been done to control the patient's symptoms in the last 24 hours? Does the patient currently require IV medications? YES  Does the patient currently require scheduled medications? YES  Does the patient currently require a PCA? NO    List number of doses of PRN medications in last 24 hours:  Medication 1:  Number of doses:    Medication 2:   Number of doses:    Medication 3:   Number of doses:    Supporting documentation for GIP need for pain control:  [x] Frequent evaluation by a doctor, nurse practitioner, nurse   [x] Frequent medication adjustment    [x] IVs that cannot be administered at home   [x] Aggressive pain management   [x] Complicated technical delivery of medications              Supporting documentation for GIP need for symptom control:  [x]  Sudden decline necessitating intensive nursing intervention  [x]  Recent Uncontrolled / intractable nausea or vomiting   []  Pathological fractures  []  Advanced open wounds requiring frequent skilled care  [x] Unmanageable respiratory distress  [] New or worsening delirium   [] Delirium with behavior issues: Is 24 hour caregiver present due to safety concerns with agitation? (yes/no)  [x] Imminent death  with skilled nursing needs documented above    DISCHARGE PLANNING   Daily discharge planning required for GIP  1. Discharge Plan: It is expected that patient will pass while inpatient yet to return home may be an option   2.  Patient/Family teaching:  Education provided on symptoms at end of life and education provided on why Dr. Steven Rodrigez added scheduled Morphine  3. Response to patient/family teaching: understanding and appreciation expressed          ASSESSMENT    KARNOFSKY:  10      Quality Measure: Patient self-reports:    [x] No    ESAS:   Time of Assessment: 0815  Pain (1-10):  5  Fatigue (1-10):   Shortness of breath (1-10)  5  Nausea (1-10): Appetite (1-10): Anxiety: (1-10):   Depression: (1-10):   Well-being: (1-10):   Constipation: _ Yes  _ No  LAST BM:     CLINICAL INFORMATION     Patient Vitals for the past 12 hrs:   Temp Pulse Resp BP SpO2   09/02/19 0710 99.4 °F (37.4 °C) (!) 126 22 136/84 98 %       Currently this patient has:  [x] Supplemental O2 prn[] IV    [] PICC      [x] PORT   [] NG Tube    [] PEG Tube   [x] Ostomy  colostomy   [] Ruiz draining _______ urine  [x] Other: urostomy    SIGNS/PHYSICAL FINDINGS     Skin (including wound):  [] Warm, dry, supple, intact and color normal for race  [] Warm   [x] Dry   [x] Cool     [] Clammy       [] Diaphoretic    Turgor   [] Normal   [] Decreased  Color:   [] Pink   [x] Pale   [] Cyanotic   [] Erythema   [] Jaundice   [] Normal for Race  []  Wounds:    Neuro:  [] Lethargy  [] Restlessness / agitation  [] Confusion / delirium  [] Hallucinations  [x] Responds to maximal stimulation   Facial grimacing and increased RR during movement of extremeties for assessment.   [] Unresponsive  [] Seizures     Visit Vitals  /84 (BP 1 Location: Right arm, BP Patient Position: At rest)   Pulse (!) 126   Temp 99.4 °F (37.4 °C)   Resp 22   SpO2 98%       Cardiac:  [x] Dyspnea on Exertion  [] JVD  [] Murmur  [] Palpitations  [] Hypotension  [x] Hypertension  [x] Tachycardia  [] Bradycardia  [] Irregular HR  [] Pulses Decreased  [] Pulses Absent  [] Edema:       [] Mottling:          Respiratory:  Breath sounds:    [x] Diminished throughout left   [] Wheeze   [] Rhonchi   [] Rales   [] Even and unlabored  [x] Labored:    Increased respiratory effort   tachypnea       [] Cough   [] Non Productive   [] Productive    [] Description:           [] Deep suctioned   [x] O2 at _2__ LPM  prn  [] High flow oxygen greater than 10 LPM  [] Bi-Pap    GI  [x] Abdomen flat without bowel sounds   Colostomy pouch empty and urostomy draining small amounts cloudy duyen urine  [] Ascites  [] Nausea  [] Vomiting  [] Incontinent of bowels  [] Bowel sounds (yes/no)  [] Diarrhea  [] Constipation (see above including last bowel movement)  [] Checked for impaction  [] Last BM     Nutrition  Diet:__________  Appetite:   [] Good   [] Fair   [] Poor   [] Tube Feeding       Musculoskeletal  [] Balance/[place] Unsteady   [] Weak   Strength:    [] Normal    [] Limited    [x] Decreasing   Activities:    [] Up as tolerated   [x] Bedridden    [] Specify:    [address] [place] [address]  [] 24 hr. Caregiver   [x] Side rails ? [x] Hospital bed   [] Reviewed Falls & Safety       ALLERGIES AND MEDICATIONS     Allergies:    Allergies   Allergen Reactions    Atorvastatin Myalgia    Pcn [Penicillins] Hives       Current Facility-Administered Medications   Medication Dose Route Frequency    morphine injection 2 mg  2 mg IntraVENous Q4H    LORazepam (ATIVAN) injection 1 mg  1 mg IntraVENous Q4H    promethazine (PHENERGAN) 25 mg in 0.9% sodium chloride 50 mL IVPB  25 mg IntraVENous Q6H    ondansetron (ZOFRAN) injection 4 mg  4 mg IntraVENous Q4H PRN    scopolamine (TRANSDERM-SCOP) 1 mg over 3 days 1 Patch  1 Patch TransDERmal Q72H    acetaminophen (TYLENOL) suppository 650 mg  650 mg Rectal Q4H PRN    LORazepam (ATIVAN) injection 1 mg  1 mg IntraVENous Q15MIN PRN    polyethylene glycol (MIRALAX) packet 17 g  17 g Oral DAILY PRN    morphine injection 2 mg  2 mg IntraVENous Q1H PRN          Visit Time In: 0800  Visit Time Out: 2252

## 2019-09-02 NOTE — PROGRESS NOTES
Oncology End of Shift Note      Bedside shift change report given to Fozia Joseph RN (incoming nurse) by Laureen Coronado (outgoing nurse) on Cherelle Sizer. Report included the following information SBAR, Kardex and MAR. Shift Summary: pt. Unresponsive during shift, all scheduled meds given.        Issues for Physician to Address:       Patient on Cardiac Monitoring?  no  [] Yes  [] No    Rhythm:          Shift Events      Judy Jaime

## 2019-09-02 NOTE — PROGRESS NOTES
[facility]  [phone]    Patient Name: Pramod Miranda  YOB: 1950    Date of Provider Hospice Visit: 09/02/19    Level of Care:   [x] General Inpatient (GIP)    [] Routine   [] Respite    Current Location of Care:  [] [facility] [] [facility] [x] [address] [] [facility] [] Hospice House [facility], patient referred from:  [] [facility] [] [facility] [x] [address] [] [facility] [] Home [] Other:     Date of [address] [facility] Director at time of admission: Dr Sandor Pizarro Diagnosis: Nausea, vomiting   Diagnoses RELATED to the terminal prognosis: Metastatic colon cancer  Other Diagnoses: Hiccups     HOSPICE SUMMARY   Do not cut and paste chart information other than imaging findings    Pramod Miranda is a 70y.o. year old who was admitted to [facility] with intractable nausea and vomiting with underlying diagnosis of extensive metastatic colon cancer s/p chemotherapy multiple abdominal surgeries. Currently on hospice as outpatient admitted for symptom management , with very poor appetite , difficulty in swallowing,  mild abdominal pain, nausea and vomiting which had been intractable  upon admission. Patient has been deteriorating last few weeks, initially was thought to have obstruction but colostomy working well with stools in bag, He is in poor condition very debilitated, minimally responsive. The patient's principle diagnosis has resulted in Metastatic colon cancer        Functionally, the patient's Karnofsky and/or Palliative Performance Scale has declined over a period of  and is estimated at low 1-2  The patient is dependent on the following ADLs:    Objective information that support this patients limited prognosis includes:  Metastatic colon cancer s/p chemo. Surgery , intractable nausea       The patient/family chose comfort measures with the support of Hospice.      HOSPICE DIAGNOSES   Active Symptoms:  1. Intractable nausea, vomiting  2 Unresponsiveness  3 Non verbal pain  4 Restlessness  5. Labored breathing     PLAN   1. Continue [facility] level care as pt remain symptomatic and requires close monitoring, frequent assessments and IV medications  2. Continue Iv phenergan 25mg IV scheduled for every 6 hrs  3. Morphine 2mg IV scheduled dose increased to every 4 hours and 3mg every 15mts as needed  4. Lorazepam 1mg dose increased to every 4 hours scheduled and additional every 15mts as needed for agitation  5. Continue other comfort medications  9.  and SW to support family needs: met with wife Kim Izaguirre and spoke to her in length, offered reassurance and support, reviewed plan of care  10. Disposition: home hospice when symptoms resolve; unlikely as pt close to dying. Prognosis estimated based on 09/02/19 clinical assessment is:   [x] Hours to Days    [] Days to Weeks    [] Other:    Communicated plan of care with: Hospice Case Manager; Hospice IDT; Care Team     GOALS OF CARE     Patient/Medical POA stated Goal of Care: nausea, vomiting, colon cancer     [x] I have reviewed and/or updated ACP information in the Advance Care Planning Navigator. This information is available in the [address] [place] [address] Drive link in the patient's chart header. Primary Decision [facility] (Health Care Agent):   Primary Decision Maker: Mirza Arce - [phone]    Resuscitation Status: DNR  If DNR is there a Durable DNR on file? : [x] Yes [] No (If no, complete Durable DNR)    HISTORY     History obtained from:  wife    CHIEF COMPLAINT: unresponsive  The patient is:   [] Verbal   [] Nonverbal  [x] Unresponsive    HPI/SUBJECTIVE:   Pt remains lethargic and unresponsive, breathing from mouth is heavy. Fruity smell.  Appears little short of breath and intermittently restless  Has received all scheduled medications: phenergan, morphine and lorazepam       REVIEW OF SYSTEMS     The following systems were: [] reviewed  [x] unable to be reviewed       Adult Non-Verbal Pain Assessment Score: 6    Face  [] 0   No particular expression or smile  [x] 1   Occasional grimace, tearing, frowning, wrinkled forehead  [] 2   Frequent grimace, tearing, frowning, wrinkled forehead    Activity (movement)  [] 0   Lying quietly, normal position  [x] 1   Seeking attention through movement or slow, cautious movement  [] 2   Restless, excessive activity and/or withdrawal reflexes    Guarding  [x] 0   Lying quietly, no positioning of hands over areas of body  [] 1   Splinting areas of the body, tense  [] 2   Rigid, stiff    Physiology (vital signs)  [] 0   Stable vital signs  [] 1   Change in any of the following: SBP > 20mm Hg; HR > 20/minute  [x] 2   Change in any of the following: SBP > 30mm Hg; HR > 25/minute    Respiratory  [] 0   Baseline RR/SpO2, compliant with ventilator  [] 1   RR > 10 above baseline, or 5% drop SpO2, mild asynchrony with ventilator  [x] 2   RR > 20 above baseline, or 10% drop SpO2, asynchrony with ventilator     FUNCTIONAL ASSESSMENT     Palliative Performance Scale (PPS): 10%     PSYCHOSOCIAL/SPIRITUAL ASSESSMENT     Active Problems:    Nausea and/or vomiting (8/29/2019)      Past Medical History:   Diagnosis Date    Abnormal nuclear stress test 4/27/2017    Arthritis     Asthma     childhood    BPH (benign prostatic hypertrophy)     CAD (coronary artery disease) 1996    M.I., Stents x2    Cancer ([facility]     Rectal CA     Chronic pain     Colon polyp     DM (diabetes mellitus) ([facility] 5/17/2011    Gout     Hemorrhoids     HTN (hypertension) 5/17/2011    Hyperlipidemia 5/17/2011    Ill-defined condition     Colostomy, iliostomy    Rectal adenocarcinoma ([facility] [address] 1/16/2018    S/p surg.     S/P cardiac cath 4/27/2017      Past Surgical History:   Procedure Laterality Date    CARDIAC SURG PROCEDURE UNLIST  4892/1500    stent x2    COLONOSCOPY N/A 10/14/2016    COLONOSCOPY/EGD performed by [facility] Bowres MD at [facility] ENDOSCOPY    COLONOSCOPY N/A 2/3/2017    COLONOSCOPY performed by Bibi Bowser MD at [address] COLONOSCOPY N/A 3/27/2018    COLONOSCOPY performed by Bibi Bowser MD at [facility] ENDOSCOPY    ENDOSCOPY, COLON, DIAGNOSTIC  2011    HX GI      Prostate, Bladder, rectum.  colon removed    HX HERNIA REPAIR      AS INFANT    HX ORTHOPAEDIC  2016    hip replacement , left    HX PROSTATECTOMY  X2    biopsy benign    HX TONSILLECTOMY      HX UROLOGICAL      Bladder removed    IR INSERT NON TUNL CVC OVER 5 YRS  2019    SIGMOIDOSCOPY,BIOPSY  10/14/2016         UPPER GI ENDOSCOPY,DIAGNOSIS  10/14/2016           Social History     Tobacco Use    Smoking status: Former Smoker     Packs/day: 0.50     Years: 40.00     Pack years: 20.00     Last attempt to quit: 2016     Years since quittin.9    Smokeless tobacco: Never Used   Substance Use Topics    Alcohol use: No     Alcohol/week: 0.0 standard drinks     Comment: OCCASIONAL     Family History   Problem Relation Age of Onset    Heart Disease Mother     COPD Mother     COPD Father     Diabetes Father     Hypertension Father     Alcohol abuse Sister     Diabetes Brother     High Cholesterol Brother     Hypertension Brother     Anesth Problems Neg Hx       Allergies   Allergen Reactions    Atorvastatin Myalgia    Pcn [Penicillins] Hives      Current Facility-Administered Medications   Medication Dose Route Frequency    morphine injection 2 mg  2 mg IntraVENous Q4H    morphine injection 3 mg  3 mg IntraVENous Q15MIN PRN    LORazepam (ATIVAN) injection 1 mg  1 mg IntraVENous Q4H    promethazine (PHENERGAN) 25 mg in 0.9% sodium chloride 50 mL IVPB  25 mg IntraVENous Q6H    ondansetron (ZOFRAN) injection 4 mg  4 mg IntraVENous Q4H PRN    scopolamine (TRANSDERM-SCOP) 1 mg over 3 days 1 Patch  1 Patch TransDERmal Q72H    acetaminophen (TYLENOL) suppository 650 mg  650 mg Rectal Q4H PRN    LORazepam (ATIVAN) injection 1 mg  1 mg IntraVENous Q15MIN PRN        PHYSICAL EXAM     Wt Readings from Last 3 Encounters:   07/06/19 106.4 kg (234 lb 9.1 oz)   07/06/19 106.4 kg (234 lb 8.1 oz)   04/18/19 111.1 kg (245 lb)       Visit Vitals  /84 (BP 1 Location: Right arm, BP Patient Position: At rest)   Pulse (!) 126   Temp 99.4 °F (37.4 °C)   Resp 22   SpO2 98%       Supplemental O2  [] Yes  [x] NO  Last bowel movement: today colostomy bag present     Currently this patient has:  [x] Peripheral IV [] PICC  [x] PORT [] ICD    [x] Uriz Catheter [] NG Tube   [] PEG Tube    [] Rectal Tube [] Drain  [] Other:     Constitutional: lethargic, unresponsive, breathing heavy  Eyes: closed  ENMT: no nasal discharge, moist mucous membranes  Cardiovascular:tachycardic,  regular rhythm, distal pulses intact  Respiratory: breathing is labored, symmetric  Gastrointestinal: soft non-tender, +bowel sounds  Musculoskeletal: no deformity, no tenderness to palpation  Skin: warm, dry  Neurologic:pt is unresponsive, restless intermittently  Psychiatric:NA    Pertinent Lab and or Imaging Tests:  Lab Results   Component Value Date/Time    Sodium 138 07/05/2019 03:33 AM    Potassium 3.7 07/05/2019 03:33 AM    Chloride 107 07/05/2019 03:33 AM    CO2 22 07/05/2019 03:33 AM    Anion gap 9 07/05/2019 03:33 AM    Glucose 156 (H) 07/05/2019 03:33 AM    BUN 28 (H) 07/05/2019 03:33 AM    Creatinine 1.04 07/05/2019 03:33 AM    BUN/Creatinine ratio 27 (H) 07/05/2019 03:33 AM    GFR est AA >60 07/05/2019 03:33 AM    GFR est non-AA >60 07/05/2019 03:33 AM    Calcium 8.3 (L) 07/05/2019 03:33 AM     Lab Results   Component Value Date/Time    Protein, total 7.6 07/05/2019 03:33 AM    Albumin 1.8 (L) 07/05/2019 03:33 AM         Ignacio Perez MD     Time spent; 35mts

## 2019-09-03 NOTE — PROGRESS NOTES
Called into patients room by wife. Pt absent of spontaneous respirations and heart sounds. TOD 0600. Jana Wright, Nursing Supervisor notified. Dr. Guillermo Wesley came to the bedside to pronounce. [employer] notified. [employer] called, patient not suitable for donation. Chaplain De Paz at the bedside.

## 2019-09-03 NOTE — PROGRESS NOTES
Problem: Falls - Risk of  Goal: *Absence of Falls  Description  Document Andres Diaz Fall Risk and appropriate interventions in the flowsheet. Outcome: Progressing Towards Goal  Note:   Fall Risk Interventions:  Mobility Interventions: Communicate number of staff needed for ambulation/transfer    Mentation Interventions: Adequate sleep, hydration, pain control, Door open when patient unattended, Room close to nurse's station, Family/sitter at bedside    Medication Interventions: Evaluate medications/consider consulting pharmacy    Elimination Interventions: Bed/chair exit alarm              Problem: Pressure Injury - Risk of  Goal: *Prevention of pressure injury  Description  Document Vishnu Scale and appropriate interventions in the flowsheet.   Outcome: Progressing Towards Goal  Note:   Pressure Injury Interventions:  Sensory Interventions: Assess changes in LOC, Assess need for specialty bed, Float heels, Keep linens dry and wrinkle-free, Minimize linen layers    Moisture Interventions: Absorbent underpads, Apply protective barrier, creams and emollients, Maintain skin hydration (lotion/cream)    Activity Interventions: Pressure redistribution bed/mattress(bed type)    Mobility Interventions: HOB 30 degrees or less, Pressure redistribution bed/mattress (bed type)    Nutrition Interventions: Document food/fluid/supplement intake    Friction and Shear Interventions: HOB 30 degrees or less, Lift sheet, Lift team/patient mobility team, Foam dressings/transparent film/skin sealants                Problem: Breathing Pattern - Ineffective  Goal: *Absence of hypoxia  Outcome: Progressing Towards Goal

## 2019-09-03 NOTE — PROGRESS NOTES
Death Note    Physical Exam:  No gag reflex. No heart sounds on auscultation. No spontaneous breath sounds. No withdrawal from painful stimuli.     Time of Death 6.00 am

## 2019-09-03 NOTE — HOSPICE
[facility]  [phone]    Discharge/Death Nursing Note   Patient Name: Jag Ortiz  YOB: 1950  Age: 70 y.o. Date of Death: 19  Admitted Date: 2019  Time of Death: 06:00    Facility of Care: [facility]  [facility]  Patient Room: [address] [place] [address]     Hospice Attending: Oxana Aguilar MD  Hospice Diagnosis: Nausea and/or vomiting [R11.2]    Death Pronouncement   Pronouncement of death completed by: Dr Mary Ayers staff was not present at the time of death    At the time of death the patient was documented as: per Dr Li Barley: No gag reflex. No heart sounds on auscultation. No spontaneous breath sounds. No withdrawal from painful stimuli.     The pt  within [address]    The following were notified of the patient's death: family, nursing supervisor    Medications were disposed of per facility protocol     Discharge Summary   Discharge Reason: Death    Summary of Care Provided:    [x] Post mortem care provided by staff RN  [x] Notification of  home by nursing supervisor  [x] Referrals/Community resources provided:   [x] Goals completed  [] Durable Medical Equipment vendor notified     Disciplines involved: [x] RN [x] SW [x]  [] FITCH [] Vol [] PT [] OT [] ST [] BC    [] IDT communication/notification    Attending Physician, Dr. Madina Collins, notified of death    Bereaved   Low bereavement for wife [facility] 2019   Patient's Healthcare Decision Maker is: -   Primary Decision Maker Name -   Primary Decision [facility] Phone Number -   Primary Decision Maker Relationship to Patient -   Confirm Advance Directive None   Patient Would Like to Complete Advance Directive -   Does the patient have other document types -

## 2019-09-03 NOTE — PROGRESS NOTES
Spiritual Care Assessment/Progress Note  [facility]      NAME: Maite Hopkins      MRN: [id number]  AGE: 70 y.o. SEX: male  Buddhism Affiliation: Methodist   Language: English     9/3/2019     Total Time (in minutes): 40     Spiritual Assessment begun in [facility] through conversation with:         []Patient        [x] Family    [] Friend(s)        Reason for Consult: Death, Hospice     Spiritual beliefs: (Please include comment if needed)     [x] Identifies with a leanne tradition:         [x] Supported by a leanne community: Rebeca Miller          [] Claims no spiritual orientation:           [] Seeking spiritual identity:                [] Adheres to an individual form of spirituality:           [] Not able to assess:                           Identified resources for coping:      [x] Prayer                               [] Music                  [] Guided Imagery     [x] Family/friends                 [] Pet visits     [] Devotional reading                         [] Unknown     [] Other:                                              Interventions offered during this visit: (See comments for more details)    Patient Interventions: Death, Hospice     Family/Friend(s):  Affirmation of leanne, Affirmation of emotions/emotional suffering, Catharsis/review of pertinent events in supportive environment, Iconic (affirming the presence of God/Higher Power), Guidance concerning next steps/process to be expected, Prayer (assurance of), Normalization of emotional/spiritual concerns, Buddhism beliefs/image of God discussed     Plan of Care:     [] Support spiritual and/or cultural needs    [] Support AMD and/or advance care planning process      [x] Support grieving process   [] Coordinate Rites and/or Rituals    [] Coordination with community clergy   [] No spiritual needs identified at this time   [] Detailed Plan of Care below (See Comments)  [] Make referral to Music Therapy  [] Make referral to Pet Therapy     [] Make referral to Addiction services  [] Make referral to [facility]  [] Make referral to Spiritual Care Partner  [] No future visits requested        [x] Follow up visits as needed     Comments:   Responded to page to Oncology unit when patient . Patient's wife was present. Provided pastoral presence and support as Mrs. Daniela Anthony shared about family-adult children are on their way to the hospital.  The children and grandchildren visited yesterday. She expressed a sense of gratefulness that her  knew he was not alone. Patient's son and daughter arrived; offered support to them. Responded to wife's inquiry about next steps. Offered assurance of prayer. [facility] [place] will handle arrangements.       BE Lind, [facility], [address]    [address] Paging Service  [phone])

## 2019-09-04 ENCOUNTER — HOME CARE VISIT (OUTPATIENT)
Dept: HOSPICE | Facility: HOSPICE | Age: 69
End: 2019-09-04
Payer: MEDICARE

## 2019-09-27 NOTE — DISCHARGE SUMMARY
Discharge Summary [address] [place] [address] [facility] Help to Those in Need 
[phone] Date of Admission: 8/29/2019 Date of Discharge: 9/3/2019 Jag Ortiz is a 70y.o. year old who was admitted to [address] [place] [address] at [facility] with a Hospice diagnosis of Metastatic Colon Cancer. Pt was admitted for [facility] level care. Per HPI Active Symptoms: 
1. Intractable nausea, vomiting 2 Unresponsiveness 3 Non verbal pain 4 Restlessness 5. Labored breathing 
  
 PLAN 1. Continue GIP level care as pt remain symptomatic and requires close monitoring, frequent assessments and IV medications 2. Continue Iv phenergan 25mg IV scheduled for every 6 hrs 3. Morphine 2mg IV scheduled dose increased to every 4 hours and 3mg every 15mts as needed 4. Lorazepam 1mg dose increased to every 4 hours scheduled and additional every 15mts as needed for agitation 5. Continue other comfort medications 9.  and SW to support family needs: met with wife Jameel Granado and spoke to her in length, offered reassurance and support, reviewed plan of care 10. Disposition: home hospice when symptoms resolve; unlikely as pt close to dying.  
  
 
The patient's care was focused on comfort and the patient passed away on 9/3/2019. Bettina Donovan

## 2020-12-24 NOTE — PROGRESS NOTES
CRS 
Pt in bed Flowsheet, labs reviewed Drain: serosang Abd: dressing intact with some drainage Plan: D/w wife at great length. Pt with progressive, metastatic stage 4 cancer. Very poor prognosis. Palliative care should be a consideration Labs/Imaging Studies

## 2022-09-27 NOTE — PROGRESS NOTES
Oncology End of Shift Note      Bedside shift change report given to JUS Lopez (incoming nurse) by Baldo Manzo (outgoing nurse) on [facility]. Report included the following information SBAR, Kardex and MAR. Shift Summary: Patient received morphine, ativan and phenergan. Patient has been unresponsive throughout shift. Issues for Physician to Address:  None. Patient on Cardiac Monitoring?     [] Yes  [x] No    Rhythm:                Baldo Manzo [Mother] : mother [Fruit] : fruit [Vegetables] : vegetables [Meat] : meat [Grains] : grains [Eggs] : eggs [Fish] : fish [Dairy] : dairy [Eats meals with family] : eats meals with family [___ stools per day] : [unfilled]  stools per day [___ voids per day] : [unfilled] voids per day [Normal] : Normal [In own bed] : In own bed [Sleeps ___ hours per night] : sleeps [unfilled] hours per night [Brushing teeth twice/d] : brushing teeth twice per day [Yes] : Patient goes to dentist yearly [Toothpaste] : Primary Fluoride Source: Toothpaste [Premenarche] : premenarche [Grade ___] : Grade [unfilled] [Exposure to tobacco] : no exposure to tobacco [Exposure to alcohol] : no exposure to alcohol [Exposure to electronic nicotine delivery system] : No exposure to electronic nicotine delivery system [Exposure to illicit drugs] : no exposure to illicit drugs [Supervised outdoor play] : supervised outdoor play [Supervised around water] : supervised around water [Wears helmet and pads] : wears helmet and pads [Parent discusses safety rules regarding adults] : parent discusses safety rules regarding adults [Monitored computer use] : monitored computer use [de-identified] : [place] [FreeTextEntry1] : \par 9 year female brought to the office for Well .Has been doing well, appetite is good, sleeps well, voiding and stooling normally. Growth and development is appropriate for age\par \par

## 2022-11-23 NOTE — PROGRESS NOTES
2,620 mls removed during paracentesis.
Discharge instructions reviewed with patient with good understanding. Patient signed hard copy of discharge instructions and copy given to patient upon leaving. Bandaid to right lower abdomen remains dry and intact. Patient taken to waiting room via wheelchair with nurse at side.
Dr. Jayde Torres in to talk with patient about procedure. POCT INR done at bedside. Patient assessed and evaluated by Dr. Jayde Torres for procedure. Consent signed.
Patient ambulated to xray recovery for procedure. Waiting for US to check for presence of fluid accumulation for Paracentesis. Waiting for Dr. Sho Dinh to talk with patient about procedure.
Specimen taken to lab by radiology nurse.
US tech in to scan patient for fluid.
Detail Level: Zone
Photo Preface (Leave Blank If You Do Not Want): Photographs were obtained today
